# Patient Record
Sex: MALE | Race: WHITE | NOT HISPANIC OR LATINO | Employment: OTHER | URBAN - METROPOLITAN AREA
[De-identification: names, ages, dates, MRNs, and addresses within clinical notes are randomized per-mention and may not be internally consistent; named-entity substitution may affect disease eponyms.]

---

## 2017-02-14 ENCOUNTER — HOSPITAL ENCOUNTER (OUTPATIENT)
Dept: SLEEP MEDICINE | Facility: HOSPITAL | Age: 72
Discharge: HOME | End: 2017-02-14
Attending: INTERNAL MEDICINE | Admitting: INTERNAL MEDICINE

## 2017-03-08 ENCOUNTER — HOSPITAL ENCOUNTER (OUTPATIENT)
Dept: RESPIRATORY THERAPY | Facility: HOSPITAL | Age: 72
Discharge: HOME | End: 2017-03-08
Attending: INTERNAL MEDICINE | Admitting: INTERNAL MEDICINE

## 2017-07-17 ENCOUNTER — HOSPITAL ENCOUNTER (OUTPATIENT)
Dept: OTHER | Facility: HOSPITAL | Age: 72
Discharge: HOME | End: 2017-07-17
Attending: UROLOGY | Admitting: UROLOGY

## 2017-07-17 LAB
ALBUMIN SERPL-MCNC: 4 G/DL (ref 3.5–4.8)
ALP SERPL-CCNC: 40 IU/L (ref 32–91)
ALT SERPL-CCNC: 26 IU/L (ref 17–63)
AST SERPL-CCNC: 27 IU/L (ref 15–41)
BASOPHILS # BLD AUTO: 0 10*3/UL (ref 0–0.2)
BASOPHILS NFR BLD AUTO: 0 % (ref 0–2)
BILIRUB DIRECT SERPL-MCNC: 0.1 MG/DL (ref 0.1–0.5)
BILIRUB SERPL-MCNC: 0.9 MG/DL (ref 0.3–1.2)
CONV TOTAL PROTEIN: 7.1 G/DL (ref 6.1–7.9)
DIFFERENTIAL METHOD BLD: (no result)
EOSINOPHIL # BLD AUTO: 0.1 10*3/UL (ref 0–0.3)
EOSINOPHIL # BLD AUTO: 2 % (ref 0–3)
ERYTHROCYTE [DISTWIDTH] IN BLOOD BY AUTOMATED COUNT: 18.9 % (ref 11.5–14.5)
HCT VFR BLD AUTO: 46.1 % (ref 40–54)
HGB BLD-MCNC: 14.6 G/DL (ref 14–18)
LYMPHOCYTES # BLD AUTO: 3.7 10*3/UL (ref 0.8–4.8)
LYMPHOCYTES NFR BLD AUTO: 44 % (ref 18–42)
MCH RBC QN AUTO: 22.1 PG (ref 26–32)
MCHC RBC AUTO-ENTMCNC: 31.7 G/DL (ref 32–36)
MCV RBC AUTO: 69.8 FL (ref 80–94)
MONOCYTES # BLD AUTO: 1 10*3/UL (ref 0.1–1.3)
MONOCYTES NFR BLD AUTO: 12 % (ref 2–11)
NEUTROPHILS # BLD AUTO: 3.5 10*3/UL (ref 2.3–8.6)
NEUTROPHILS NFR BLD AUTO: 42 % (ref 50–75)
NRBC BLD AUTO-RTO: 0 /100{WBCS}
NRBC/RBC NFR BLD MANUAL: 0 10*3/UL
PLATELET # BLD AUTO: 156 10*3/UL (ref 150–450)
PMV BLD AUTO: 8.8 FL (ref 7.4–10.4)
PSA SERPL-MCNC: 0.47 NG/ML (ref 0–4)
RBC # BLD AUTO: 6.61 10*6/UL (ref 4.6–6)
TESTOST SERPL-MCNC: 6.13 NG/ML (ref 1.7–7.8)
WBC # BLD AUTO: 8.3 10*3/UL (ref 4.5–11.5)

## 2017-08-01 ENCOUNTER — HOSPITAL ENCOUNTER (OUTPATIENT)
Dept: OTHER | Facility: HOSPITAL | Age: 72
Discharge: HOME | End: 2017-08-01
Attending: UROLOGY | Admitting: UROLOGY

## 2017-08-01 LAB
ANION GAP SERPL CALC-SCNC: 13.8 MMOL/L (ref 10–20)
BASOPHILS # BLD AUTO: 0 10*3/UL (ref 0–0.2)
BASOPHILS NFR BLD AUTO: 0 % (ref 0–2)
BUN SERPL-MCNC: 10 MG/DL (ref 8–20)
BUN/CREAT SERPL: 8.3 (ref 6.2–20.3)
CALCIUM SERPL-MCNC: 8.9 MG/DL (ref 8.9–10.3)
CHLORIDE SERPL-SCNC: 102 MMOL/L (ref 101–111)
CONV CO2: 28 MMOL/L (ref 22–32)
CREAT UR-MCNC: 1.2 MG/DL (ref 0.7–1.2)
DIFFERENTIAL METHOD BLD: (no result)
EOSINOPHIL # BLD AUTO: 0.1 10*3/UL (ref 0–0.3)
EOSINOPHIL # BLD AUTO: 1 % (ref 0–3)
ERYTHROCYTE [DISTWIDTH] IN BLOOD BY AUTOMATED COUNT: 19 % (ref 11.5–14.5)
GLUCOSE SERPL-MCNC: 83 MG/DL (ref 65–99)
HCT VFR BLD AUTO: 47.5 % (ref 40–54)
HGB BLD-MCNC: 14.8 G/DL (ref 14–18)
LYMPHOCYTES # BLD AUTO: 3.1 10*3/UL (ref 0.8–4.8)
LYMPHOCYTES NFR BLD AUTO: 37 % (ref 18–42)
MCH RBC QN AUTO: 21.8 PG (ref 26–32)
MCHC RBC AUTO-ENTMCNC: 31.1 G/DL (ref 32–36)
MCV RBC AUTO: 70.1 FL (ref 80–94)
MONOCYTES # BLD AUTO: 0.9 10*3/UL (ref 0.1–1.3)
MONOCYTES NFR BLD AUTO: 10 % (ref 2–11)
NEUTROPHILS # BLD AUTO: 4.4 10*3/UL (ref 2.3–8.6)
NEUTROPHILS NFR BLD AUTO: 52 % (ref 50–75)
NRBC BLD AUTO-RTO: 0 /100{WBCS}
NRBC/RBC NFR BLD MANUAL: 0 10*3/UL
PLATELET # BLD AUTO: 205 10*3/UL (ref 150–450)
PMV BLD AUTO: 8.5 FL (ref 7.4–10.4)
POTASSIUM SERPL-SCNC: 3.8 MMOL/L (ref 3.6–5.1)
RBC # BLD AUTO: 6.79 10*6/UL (ref 4.6–6)
SODIUM SERPL-SCNC: 140 MMOL/L (ref 136–144)
WBC # BLD AUTO: 8.5 10*3/UL (ref 4.5–11.5)

## 2017-08-15 ENCOUNTER — HOSPITAL ENCOUNTER (OUTPATIENT)
Dept: SLEEP MEDICINE | Facility: HOSPITAL | Age: 72
Discharge: HOME | End: 2017-08-15
Attending: INTERNAL MEDICINE | Admitting: INTERNAL MEDICINE

## 2017-12-08 ENCOUNTER — HOSPITAL ENCOUNTER (OUTPATIENT)
Dept: URGENT CARE | Facility: CLINIC | Age: 72
Discharge: HOME | End: 2017-12-08
Attending: NURSE PRACTITIONER | Admitting: NURSE PRACTITIONER

## 2018-06-06 ENCOUNTER — OFFICE (AMBULATORY)
Dept: URBAN - METROPOLITAN AREA PATHOLOGY 4 | Facility: PATHOLOGY | Age: 73
End: 2018-06-06

## 2018-06-06 ENCOUNTER — ON CAMPUS - OUTPATIENT (AMBULATORY)
Dept: URBAN - METROPOLITAN AREA HOSPITAL 2 | Facility: HOSPITAL | Age: 73
End: 2018-06-06

## 2018-06-06 ENCOUNTER — HOSPITAL ENCOUNTER (OUTPATIENT)
Dept: OTHER | Facility: HOSPITAL | Age: 73
Setting detail: SPECIMEN
Discharge: HOME | End: 2018-06-06
Attending: INTERNAL MEDICINE | Admitting: INTERNAL MEDICINE

## 2018-06-06 VITALS
DIASTOLIC BLOOD PRESSURE: 94 MMHG | HEART RATE: 100 BPM | RESPIRATION RATE: 10 BRPM | HEIGHT: 73 IN | SYSTOLIC BLOOD PRESSURE: 181 MMHG | SYSTOLIC BLOOD PRESSURE: 91 MMHG | SYSTOLIC BLOOD PRESSURE: 135 MMHG | HEART RATE: 94 BPM | HEART RATE: 68 BPM | SYSTOLIC BLOOD PRESSURE: 137 MMHG | OXYGEN SATURATION: 95 % | DIASTOLIC BLOOD PRESSURE: 100 MMHG | DIASTOLIC BLOOD PRESSURE: 83 MMHG | OXYGEN SATURATION: 99 % | HEART RATE: 102 BPM | OXYGEN SATURATION: 98 % | OXYGEN SATURATION: 96 % | RESPIRATION RATE: 15 BRPM | DIASTOLIC BLOOD PRESSURE: 107 MMHG | SYSTOLIC BLOOD PRESSURE: 129 MMHG | HEART RATE: 70 BPM | HEART RATE: 91 BPM | OXYGEN SATURATION: 97 % | DIASTOLIC BLOOD PRESSURE: 69 MMHG | SYSTOLIC BLOOD PRESSURE: 151 MMHG | DIASTOLIC BLOOD PRESSURE: 78 MMHG | DIASTOLIC BLOOD PRESSURE: 105 MMHG | HEART RATE: 101 BPM | WEIGHT: 285 LBS | TEMPERATURE: 98 F | OXYGEN SATURATION: 73 % | HEART RATE: 66 BPM | SYSTOLIC BLOOD PRESSURE: 126 MMHG | RESPIRATION RATE: 16 BRPM | SYSTOLIC BLOOD PRESSURE: 141 MMHG | OXYGEN SATURATION: 100 % | HEART RATE: 84 BPM | DIASTOLIC BLOOD PRESSURE: 70 MMHG | DIASTOLIC BLOOD PRESSURE: 91 MMHG | SYSTOLIC BLOOD PRESSURE: 96 MMHG | HEART RATE: 92 BPM | DIASTOLIC BLOOD PRESSURE: 82 MMHG

## 2018-06-06 DIAGNOSIS — Z86.010 PERSONAL HISTORY OF COLONIC POLYPS: ICD-10-CM

## 2018-06-06 DIAGNOSIS — D12.3 BENIGN NEOPLASM OF TRANSVERSE COLON: ICD-10-CM

## 2018-06-06 DIAGNOSIS — K62.6 ULCER OF ANUS AND RECTUM: ICD-10-CM

## 2018-06-06 DIAGNOSIS — K57.30 DIVERTICULOSIS OF LARGE INTESTINE WITHOUT PERFORATION OR ABS: ICD-10-CM

## 2018-06-06 DIAGNOSIS — A63.0 ANOGENITAL (VENEREAL) WARTS: ICD-10-CM

## 2018-06-06 PROBLEM — K63.3 ULCER OF INTESTINE: Status: ACTIVE | Noted: 2018-06-06

## 2018-06-06 LAB
GI HISTOLOGY: A. UNSPECIFIED: (no result)
GI HISTOLOGY: B. SELECT: (no result)
GI HISTOLOGY: PDF REPORT: (no result)

## 2018-06-06 PROCEDURE — 45385 COLONOSCOPY W/LESION REMOVAL: CPT | Performed by: INTERNAL MEDICINE

## 2018-06-06 PROCEDURE — 45380 COLONOSCOPY AND BIOPSY: CPT | Mod: 59 | Performed by: INTERNAL MEDICINE

## 2018-06-06 PROCEDURE — 88305 TISSUE EXAM BY PATHOLOGIST: CPT | Mod: 26 | Performed by: INTERNAL MEDICINE

## 2018-06-06 RX ADMIN — PROPOFOL: 10 INJECTION, EMULSION INTRAVENOUS at 11:09

## 2018-06-06 NOTE — SERVICEHPINOTES
JANINE SHAH  is a  72  male   who presents today for a  Colonoscopy   for   the indications listed below. The updated Patient Profile was reviewed prior to the procedure, in conjunction with the Physical Exam, including medical conditions, surgical procedures, medications, allergies, family history and social history. See Physical Exam time stamp below for date and time of HPI completion.Pre-operatively, I reviewed the indication(s) for the procedure, the risks of the procedure [including but not limited to: unexpected bleeding possibly requiring hospitalization and/or unplanned repeat procedures, perforation possibly requiring surgical treatment, missed lesions and complications of sedation/MAC (also explained by anesthesia staff)]. I have evaluated the patient for risks associated with the planned anesthesia and the procedure to be performed and find the patient an acceptable candidate for IV sedation.Multiple opportunities were provided for any questions or concerns, and all questions were answered satisfactorily before any anesthesia was administered. We will proceed with the planned procedure.BR

## 2018-06-28 ENCOUNTER — HOSPITAL ENCOUNTER (OUTPATIENT)
Dept: CARDIOLOGY | Facility: HOSPITAL | Age: 73
Discharge: HOME | End: 2018-06-28
Attending: FAMILY MEDICINE | Admitting: FAMILY MEDICINE

## 2018-08-08 ENCOUNTER — HOSPITAL ENCOUNTER (OUTPATIENT)
Dept: OTHER | Facility: HOSPITAL | Age: 73
Discharge: HOME | End: 2018-08-08
Attending: UROLOGY | Admitting: UROLOGY

## 2018-08-08 LAB
BASOPHILS # BLD AUTO: 0.1 10*3/UL (ref 0–0.2)
BASOPHILS NFR BLD AUTO: 1 % (ref 0–2)
DIFFERENTIAL METHOD BLD: (no result)
EOSINOPHIL # BLD AUTO: 0.1 10*3/UL (ref 0–0.3)
EOSINOPHIL # BLD AUTO: 2 % (ref 0–3)
ERYTHROCYTE [DISTWIDTH] IN BLOOD BY AUTOMATED COUNT: 19.5 % (ref 11.5–14.5)
HCT VFR BLD AUTO: 50 % (ref 40–54)
HGB BLD-MCNC: 15.4 G/DL (ref 14–18)
LYMPHOCYTES # BLD AUTO: 2.4 10*3/UL (ref 0.8–4.8)
LYMPHOCYTES NFR BLD AUTO: 34 % (ref 18–42)
MCH RBC QN AUTO: 20.9 PG (ref 26–32)
MCHC RBC AUTO-ENTMCNC: 30.9 G/DL (ref 32–36)
MCV RBC AUTO: 67.9 FL (ref 80–94)
MONOCYTES # BLD AUTO: 0.7 10*3/UL (ref 0.1–1.3)
MONOCYTES NFR BLD AUTO: 9 % (ref 2–11)
NEUTROPHILS # BLD AUTO: 3.8 10*3/UL (ref 2.3–8.6)
NEUTROPHILS NFR BLD AUTO: 54 % (ref 50–75)
NRBC BLD AUTO-RTO: 0 /100{WBCS}
NRBC/RBC NFR BLD MANUAL: 0 10*3/UL
PLATELET # BLD AUTO: 185 10*3/UL (ref 150–450)
PMV BLD AUTO: 8.4 FL (ref 7.4–10.4)
RBC # BLD AUTO: 7.36 10*6/UL (ref 4.6–6)
WBC # BLD AUTO: 7.1 10*3/UL (ref 4.5–11.5)

## 2018-11-28 ENCOUNTER — HOSPITAL ENCOUNTER (OUTPATIENT)
Dept: GENERAL RADIOLOGY | Facility: HOSPITAL | Age: 73
Discharge: HOME | End: 2018-11-28
Attending: UROLOGY | Admitting: UROLOGY

## 2019-02-11 ENCOUNTER — HOSPITAL ENCOUNTER (OUTPATIENT)
Dept: OTHER | Facility: HOSPITAL | Age: 74
Discharge: HOME | End: 2019-02-11
Attending: UROLOGY | Admitting: UROLOGY

## 2019-02-11 LAB
BASOPHILS # BLD AUTO: 0 10*3/UL (ref 0–0.2)
BASOPHILS NFR BLD AUTO: 1 % (ref 0–2)
DIFFERENTIAL METHOD BLD: (no result)
EOSINOPHIL # BLD AUTO: 0.1 10*3/UL (ref 0–0.3)
EOSINOPHIL # BLD AUTO: 2 % (ref 0–3)
ERYTHROCYTE [DISTWIDTH] IN BLOOD BY AUTOMATED COUNT: 19 % (ref 11.5–14.5)
HCT VFR BLD AUTO: 45 % (ref 40–54)
HGB BLD-MCNC: 14 G/DL (ref 14–18)
LYMPHOCYTES # BLD AUTO: 2.2 10*3/UL (ref 0.8–4.8)
LYMPHOCYTES NFR BLD AUTO: 32 % (ref 18–42)
MCH RBC QN AUTO: 21.3 PG (ref 26–32)
MCHC RBC AUTO-ENTMCNC: 31.2 G/DL (ref 32–36)
MCV RBC AUTO: 68.4 FL (ref 80–94)
MONOCYTES # BLD AUTO: 0.6 10*3/UL (ref 0.1–1.3)
MONOCYTES NFR BLD AUTO: 9 % (ref 2–11)
NEUTROPHILS # BLD AUTO: 3.8 10*3/UL (ref 2.3–8.6)
NEUTROPHILS NFR BLD AUTO: 56 % (ref 50–75)
NRBC BLD AUTO-RTO: 0 /100{WBCS}
NRBC/RBC NFR BLD MANUAL: 0 10*3/UL
PLATELET # BLD AUTO: 196 10*3/UL (ref 150–450)
PMV BLD AUTO: 6.8 FL (ref 7.4–10.4)
RBC # BLD AUTO: 6.57 10*6/UL (ref 4.6–6)
WBC # BLD AUTO: 6.6 10*3/UL (ref 4.5–11.5)

## 2019-02-27 ENCOUNTER — HOSPITAL ENCOUNTER (OUTPATIENT)
Dept: LAB | Facility: HOSPITAL | Age: 74
Setting detail: SPECIMEN
Discharge: HOME | End: 2019-02-27
Attending: UROLOGY | Admitting: UROLOGY

## 2019-05-14 ENCOUNTER — HOSPITAL ENCOUNTER (OUTPATIENT)
Dept: OTHER | Facility: HOSPITAL | Age: 74
Discharge: HOME | End: 2019-05-14
Attending: UROLOGY | Admitting: UROLOGY

## 2019-05-14 LAB
ANION GAP SERPL CALC-SCNC: 14.9 MMOL/L (ref 10–20)
BASOPHILS # BLD AUTO: 0 10*3/UL (ref 0–0.2)
BASOPHILS NFR BLD AUTO: 1 % (ref 0–2)
BUN SERPL-MCNC: 10 MG/DL (ref 8–20)
BUN/CREAT SERPL: 7.7 (ref 6.2–20.3)
CALCIUM SERPL-MCNC: 9.1 MG/DL (ref 8.9–10.3)
CHLORIDE SERPL-SCNC: 101 MMOL/L (ref 101–111)
CONV CO2: 25 MMOL/L (ref 22–32)
CREAT UR-MCNC: 1.3 MG/DL (ref 0.7–1.2)
DIFFERENTIAL METHOD BLD: (no result)
EOSINOPHIL # BLD AUTO: 0.1 10*3/UL (ref 0–0.3)
EOSINOPHIL # BLD AUTO: 1 % (ref 0–3)
ERYTHROCYTE [DISTWIDTH] IN BLOOD BY AUTOMATED COUNT: 19.5 % (ref 11.5–14.5)
GLUCOSE SERPL-MCNC: 98 MG/DL (ref 65–99)
HCT VFR BLD AUTO: 47.7 % (ref 40–54)
HGB BLD-MCNC: 14.9 G/DL (ref 14–18)
LYMPHOCYTES # BLD AUTO: 2.2 10*3/UL (ref 0.8–4.8)
LYMPHOCYTES NFR BLD AUTO: 32 % (ref 18–42)
MCH RBC QN AUTO: 21.7 PG (ref 26–32)
MCHC RBC AUTO-ENTMCNC: 31.2 G/DL (ref 32–36)
MCV RBC AUTO: 69.6 FL (ref 80–94)
MONOCYTES # BLD AUTO: 0.7 10*3/UL (ref 0.1–1.3)
MONOCYTES NFR BLD AUTO: 10 % (ref 2–11)
NEUTROPHILS # BLD AUTO: 3.9 10*3/UL (ref 2.3–8.6)
NEUTROPHILS NFR BLD AUTO: 56 % (ref 50–75)
NRBC BLD AUTO-RTO: 0 /100{WBCS}
NRBC/RBC NFR BLD MANUAL: 0 10*3/UL
PLATELET # BLD AUTO: 200 10*3/UL (ref 150–450)
PMV BLD AUTO: 8.4 FL (ref 7.4–10.4)
POTASSIUM SERPL-SCNC: 3.9 MMOL/L (ref 3.6–5.1)
RBC # BLD AUTO: 6.84 10*6/UL (ref 4.6–6)
SODIUM SERPL-SCNC: 137 MMOL/L (ref 136–144)
WBC # BLD AUTO: 6.9 10*3/UL (ref 4.5–11.5)

## 2019-05-21 ENCOUNTER — HOSPITAL ENCOUNTER (OUTPATIENT)
Dept: OTHER | Facility: HOSPITAL | Age: 74
Setting detail: SPECIMEN
Discharge: HOME | End: 2019-05-21
Attending: UROLOGY | Admitting: UROLOGY

## 2019-06-21 ENCOUNTER — TRANSCRIBE ORDERS (OUTPATIENT)
Dept: ADMINISTRATIVE | Facility: HOSPITAL | Age: 74
End: 2019-06-21

## 2019-06-21 ENCOUNTER — APPOINTMENT (OUTPATIENT)
Dept: GENERAL RADIOLOGY | Facility: HOSPITAL | Age: 74
End: 2019-06-21

## 2019-06-21 ENCOUNTER — OFFICE VISIT (OUTPATIENT)
Dept: CARDIOLOGY | Facility: CLINIC | Age: 74
End: 2019-06-21

## 2019-06-21 ENCOUNTER — HOSPITAL ENCOUNTER (OUTPATIENT)
Dept: GENERAL RADIOLOGY | Facility: HOSPITAL | Age: 74
Discharge: HOME | End: 2019-06-21
Admitting: UROLOGY

## 2019-06-21 DIAGNOSIS — I48.20 CHRONIC ATRIAL FIBRILLATION (HCC): ICD-10-CM

## 2019-06-21 DIAGNOSIS — N20.0 RENAL STONE: Primary | ICD-10-CM

## 2019-06-21 LAB — INR PPP: 2.5 (ref 0.9–1.1)

## 2019-06-21 PROCEDURE — 36416 COLLJ CAPILLARY BLOOD SPEC: CPT | Performed by: INTERNAL MEDICINE

## 2019-06-21 PROCEDURE — 74018 RADEX ABDOMEN 1 VIEW: CPT

## 2019-06-21 PROCEDURE — 85610 PROTHROMBIN TIME: CPT | Performed by: INTERNAL MEDICINE

## 2019-06-25 RX ORDER — WARFARIN SODIUM 5 MG/1
TABLET ORAL
Qty: 30 TABLET | Refills: 0 | Status: SHIPPED | OUTPATIENT
Start: 2019-06-25 | End: 2019-07-22 | Stop reason: SDUPTHER

## 2019-06-25 RX ORDER — VERAPAMIL HYDROCHLORIDE 240 MG/1
CAPSULE, EXTENDED RELEASE ORAL
Qty: 90 CAPSULE | Refills: 2 | Status: SHIPPED | OUTPATIENT
Start: 2019-06-25 | End: 2019-10-21 | Stop reason: SDUPTHER

## 2019-06-26 PROBLEM — I48.91 ATRIAL FIBRILLATION: Status: ACTIVE | Noted: 2019-06-26

## 2019-07-19 ENCOUNTER — HOSPITAL ENCOUNTER (OUTPATIENT)
Dept: GENERAL RADIOLOGY | Facility: HOSPITAL | Age: 74
Discharge: HOME | End: 2019-07-19
Admitting: UROLOGY

## 2019-07-19 ENCOUNTER — TRANSCRIBE ORDERS (OUTPATIENT)
Dept: ADMINISTRATIVE | Facility: HOSPITAL | Age: 74
End: 2019-07-19

## 2019-07-19 DIAGNOSIS — K80.50 STONES COMMON DUCT: ICD-10-CM

## 2019-07-19 DIAGNOSIS — K80.50 STONES COMMON DUCT: Primary | ICD-10-CM

## 2019-07-19 PROCEDURE — 74018 RADEX ABDOMEN 1 VIEW: CPT

## 2019-07-22 RX ORDER — WARFARIN SODIUM 5 MG/1
TABLET ORAL
Qty: 30 TABLET | Refills: 0 | Status: SHIPPED | OUTPATIENT
Start: 2019-07-22 | End: 2019-08-19 | Stop reason: SDUPTHER

## 2019-07-25 ENCOUNTER — OFFICE VISIT (OUTPATIENT)
Dept: CARDIOLOGY | Facility: CLINIC | Age: 74
End: 2019-07-25

## 2019-07-25 DIAGNOSIS — I48.20 CHRONIC ATRIAL FIBRILLATION (HCC): ICD-10-CM

## 2019-07-25 LAB
INR PPP: 2.6 (ref 2–3)
INR PPP: 2.6 (ref 2–3)

## 2019-07-25 PROCEDURE — 36416 COLLJ CAPILLARY BLOOD SPEC: CPT | Performed by: INTERNAL MEDICINE

## 2019-07-25 PROCEDURE — 85610 PROTHROMBIN TIME: CPT | Performed by: INTERNAL MEDICINE

## 2019-08-19 RX ORDER — HYDRALAZINE HYDROCHLORIDE 50 MG/1
TABLET, FILM COATED ORAL
Qty: 60 TABLET | Refills: 2 | Status: SHIPPED | OUTPATIENT
Start: 2019-08-19 | End: 2019-11-13 | Stop reason: SDUPTHER

## 2019-08-19 RX ORDER — WARFARIN SODIUM 5 MG/1
TABLET ORAL
Qty: 30 TABLET | Refills: 0 | Status: SHIPPED | OUTPATIENT
Start: 2019-08-19 | End: 2019-09-23 | Stop reason: SDUPTHER

## 2019-08-23 ENCOUNTER — ANTICOAGULATION VISIT (OUTPATIENT)
Dept: CARDIOLOGY | Facility: CLINIC | Age: 74
End: 2019-08-23

## 2019-08-23 DIAGNOSIS — I48.20 CHRONIC ATRIAL FIBRILLATION (HCC): ICD-10-CM

## 2019-08-23 LAB — INR PPP: 2.5 (ref 2–3)

## 2019-08-23 PROCEDURE — 85610 PROTHROMBIN TIME: CPT | Performed by: INTERNAL MEDICINE

## 2019-08-23 PROCEDURE — 36416 COLLJ CAPILLARY BLOOD SPEC: CPT | Performed by: INTERNAL MEDICINE

## 2019-09-23 RX ORDER — WARFARIN SODIUM 5 MG/1
TABLET ORAL
Qty: 30 TABLET | Refills: 0 | Status: SHIPPED | OUTPATIENT
Start: 2019-09-23 | End: 2019-10-21 | Stop reason: SDUPTHER

## 2019-09-30 ENCOUNTER — ANTICOAGULATION VISIT (OUTPATIENT)
Dept: CARDIOLOGY | Facility: CLINIC | Age: 74
End: 2019-09-30

## 2019-09-30 ENCOUNTER — OFFICE VISIT (OUTPATIENT)
Dept: CARDIOLOGY | Facility: CLINIC | Age: 74
End: 2019-09-30

## 2019-09-30 VITALS
HEART RATE: 88 BPM | WEIGHT: 281 LBS | BODY MASS INDEX: 36.06 KG/M2 | SYSTOLIC BLOOD PRESSURE: 132 MMHG | HEIGHT: 74 IN | OXYGEN SATURATION: 98 % | DIASTOLIC BLOOD PRESSURE: 78 MMHG

## 2019-09-30 DIAGNOSIS — I48.21 PERMANENT ATRIAL FIBRILLATION (HCC): ICD-10-CM

## 2019-09-30 DIAGNOSIS — I10 ESSENTIAL HYPERTENSION: ICD-10-CM

## 2019-09-30 DIAGNOSIS — I48.19 PERSISTENT ATRIAL FIBRILLATION (HCC): Primary | ICD-10-CM

## 2019-09-30 DIAGNOSIS — J44.9 CHRONIC OBSTRUCTIVE PULMONARY DISEASE, UNSPECIFIED COPD TYPE (HCC): ICD-10-CM

## 2019-09-30 PROCEDURE — 93000 ELECTROCARDIOGRAM COMPLETE: CPT | Performed by: INTERNAL MEDICINE

## 2019-09-30 PROCEDURE — 99213 OFFICE O/P EST LOW 20 MIN: CPT | Performed by: INTERNAL MEDICINE

## 2019-09-30 RX ORDER — LEVOTHYROXINE SODIUM 0.07 MG/1
75 TABLET ORAL DAILY
Refills: 5 | COMMUNITY
Start: 2019-09-23

## 2019-09-30 RX ORDER — PRAVASTATIN SODIUM 40 MG
40 TABLET ORAL
Refills: 3 | COMMUNITY
Start: 2019-09-23 | End: 2020-04-01

## 2019-09-30 RX ORDER — SERTRALINE HYDROCHLORIDE 100 MG/1
150 TABLET, FILM COATED ORAL DAILY
Refills: 5 | COMMUNITY
Start: 2019-09-23 | End: 2022-03-15 | Stop reason: SDUPTHER

## 2019-09-30 RX ORDER — MONTELUKAST SODIUM 10 MG/1
10 TABLET ORAL EVERY EVENING
Refills: 5 | COMMUNITY
Start: 2019-09-23

## 2019-09-30 RX ORDER — IPRATROPIUM BROMIDE AND ALBUTEROL SULFATE 2.5; .5 MG/3ML; MG/3ML
3 SOLUTION RESPIRATORY (INHALATION) 4 TIMES DAILY PRN
COMMUNITY
Start: 2019-04-01 | End: 2020-06-16

## 2019-09-30 RX ORDER — DIGOXIN 250 UG/1
250 TABLET ORAL DAILY
Refills: 4 | COMMUNITY
Start: 2019-08-19 | End: 2020-01-13

## 2019-09-30 RX ORDER — OMEPRAZOLE 20 MG/1
20 CAPSULE, DELAYED RELEASE ORAL 2 TIMES DAILY
Refills: 0 | COMMUNITY
Start: 2019-09-23 | End: 2020-06-12 | Stop reason: HOSPADM

## 2019-09-30 RX ORDER — GABAPENTIN 300 MG/1
600 CAPSULE ORAL 2 TIMES DAILY
Refills: 3 | COMMUNITY
Start: 2019-09-13

## 2019-09-30 RX ORDER — ALFUZOSIN HYDROCHLORIDE 10 MG/1
10 TABLET, EXTENDED RELEASE ORAL 2 TIMES DAILY
Refills: 11 | COMMUNITY
Start: 2019-09-23

## 2019-09-30 RX ORDER — THEOPHYLLINE 400 MG/1
400 TABLET, EXTENDED RELEASE ORAL 2 TIMES DAILY
Refills: 5 | COMMUNITY
Start: 2019-09-23

## 2019-09-30 RX ORDER — MELOXICAM 15 MG/1
15 TABLET ORAL DAILY
Refills: 1 | COMMUNITY
Start: 2019-08-19 | End: 2020-06-12 | Stop reason: HOSPADM

## 2019-10-01 ENCOUNTER — TELEPHONE (OUTPATIENT)
Dept: ENDOCRINOLOGY | Facility: CLINIC | Age: 74
End: 2019-10-01

## 2019-10-01 NOTE — TELEPHONE ENCOUNTER
I CALLED THE PATIENT AND REMINDED HIM OF HIS APPT TOMORROW  WITH NAVEEN NORTON AT 1PM AND TO ARRIVE 15MINS BEFORE HIS APPT.

## 2019-10-02 ENCOUNTER — OFFICE VISIT (OUTPATIENT)
Dept: ENDOCRINOLOGY | Facility: CLINIC | Age: 74
End: 2019-10-02

## 2019-10-02 VITALS
SYSTOLIC BLOOD PRESSURE: 116 MMHG | WEIGHT: 284 LBS | DIASTOLIC BLOOD PRESSURE: 80 MMHG | BODY MASS INDEX: 36.45 KG/M2 | HEART RATE: 93 BPM | OXYGEN SATURATION: 96 % | HEIGHT: 74 IN

## 2019-10-02 DIAGNOSIS — E11.65 UNCONTROLLED TYPE 2 DIABETES MELLITUS WITH HYPERGLYCEMIA (HCC): Primary | ICD-10-CM

## 2019-10-02 PROBLEM — R73.9 HYPERGLYCEMIA: Status: ACTIVE | Noted: 2019-10-02

## 2019-10-02 LAB — GLUCOSE BLDC GLUCOMTR-MCNC: 145 MG/DL (ref 70–130)

## 2019-10-02 PROCEDURE — 82962 GLUCOSE BLOOD TEST: CPT | Performed by: INTERNAL MEDICINE

## 2019-10-02 PROCEDURE — 99213 OFFICE O/P EST LOW 20 MIN: CPT | Performed by: INTERNAL MEDICINE

## 2019-10-02 NOTE — PROGRESS NOTES
Subjective   Efe Malloy is a 74 y.o. male.     History of Present Illness ;    This is a 74-year-old male who was referred for the evaluation of newly diagnosed type 2 diabetes mellitus about 2 to 3 months ago.  He has no glucose monitor. He denies any polyuria, polydipsia, and neuropathic pain or any vision problems.  He is not on any oral hypoglycemic agents.  He admits that he has been drinking Pepsi 2 times daily, and eating sweets.  Reports he walks every day.  Labs showed A1c of 6.6% July 24, 2009.  Creatinine is 1.32 with estimated GFR of 57.    The following portions of the patient's history were reviewed and updated as appropriate: allergies, current medications, past family history, past medical history, past social history, past surgical history and problem list.    Review of Systems   Constitutional: Negative.    Eyes: Negative.    Respiratory: Negative.    Cardiovascular: Negative.    Gastrointestinal: Negative.    Endocrine: Negative.    Genitourinary: Negative for dysuria, frequency and urgency.   Musculoskeletal: negative for joint swelling,  Negative for myalgias.   Neurological: Negative for dizziness, light-headedness and memory problem.   Psychiatric/Behavioral: Negative for behavioral problems, hallucinations and depressed mood.       Physical Exam   Constitutional:  oriented to person, place, and time. appears well-developed and well-nourished.   Head: Normocephalic and atraumatic.   Eyes: EOM are normal. Pupils are equal, round, and reactive to light.   Neck: Normal range of motion. Neck supple.   Cardiovascular: Normal rate, regular rhythm and normal heart sounds.   Pulmonary/Chest: Effort normal and breath sounds normal.   Abdominal: Soft. Bowel sounds are normal.   Musculoskeletal: Normal range of motion.   Neurological:  alert and oriented to person, place, and time.   Skin: Skin is warm.   Psychiatric: normal mood and affect.  behavior is normal. Judgment and thought content normal.        Assessment/Plan     Hyperglycemia  Patient has a A1c of 6.6%.  Labs showed a glucose level of 102.  I had a very lengthy discussion with the patient to stop the regular sodas and sweet tea and candies.  Discussed at length the need for the weight loss.  No need for any medications at this point.  Refer the patient to the diabetes education class.  Check A1c in 2 to 3 months.

## 2019-10-02 NOTE — ASSESSMENT & PLAN NOTE
Patient has a A1c of 6.6%.  Labs showed a glucose level of 102.  I had a very lengthy discussion with the patient to stop the regular sodas and sweet tea and candies.  Discussed at length the need for the weight loss.  No need for any medications at this point.  Refer the patient to the diabetes education class.  Check A1c in 2 to 3 months.

## 2019-10-21 RX ORDER — WARFARIN SODIUM 5 MG/1
TABLET ORAL
Qty: 30 TABLET | Refills: 0 | Status: SHIPPED | OUTPATIENT
Start: 2019-10-21 | End: 2019-11-18 | Stop reason: SDUPTHER

## 2019-10-21 RX ORDER — VERAPAMIL HYDROCHLORIDE 240 MG/1
CAPSULE, EXTENDED RELEASE ORAL
Qty: 90 CAPSULE | Refills: 2 | Status: SHIPPED | OUTPATIENT
Start: 2019-10-21 | End: 2020-02-05

## 2019-10-31 ENCOUNTER — ANTICOAGULATION VISIT (OUTPATIENT)
Dept: CARDIOLOGY | Facility: CLINIC | Age: 74
End: 2019-10-31

## 2019-10-31 DIAGNOSIS — I48.21 PERMANENT ATRIAL FIBRILLATION (HCC): ICD-10-CM

## 2019-10-31 LAB — INR PPP: 2.9 (ref 2–3)

## 2019-10-31 PROCEDURE — 85610 PROTHROMBIN TIME: CPT | Performed by: INTERNAL MEDICINE

## 2019-10-31 PROCEDURE — 36416 COLLJ CAPILLARY BLOOD SPEC: CPT | Performed by: INTERNAL MEDICINE

## 2019-11-13 RX ORDER — HYDRALAZINE HYDROCHLORIDE 50 MG/1
TABLET, FILM COATED ORAL
Qty: 60 TABLET | Refills: 2 | Status: SHIPPED | OUTPATIENT
Start: 2019-11-13 | End: 2020-02-05

## 2019-11-18 RX ORDER — WARFARIN SODIUM 5 MG/1
TABLET ORAL
Qty: 30 TABLET | Refills: 0 | Status: SHIPPED | OUTPATIENT
Start: 2019-11-18 | End: 2019-12-16 | Stop reason: SDUPTHER

## 2019-11-19 ENCOUNTER — OFFICE VISIT (OUTPATIENT)
Dept: ENDOCRINOLOGY | Facility: CLINIC | Age: 74
End: 2019-11-19

## 2019-11-19 DIAGNOSIS — E11.65 TYPE 2 DIABETES MELLITUS WITH HYPERGLYCEMIA, WITHOUT LONG-TERM CURRENT USE OF INSULIN (HCC): ICD-10-CM

## 2019-11-19 PROCEDURE — 97802 MEDICAL NUTRITION INDIV IN: CPT | Performed by: DIETITIAN, REGISTERED

## 2019-11-27 ENCOUNTER — ANTICOAGULATION VISIT (OUTPATIENT)
Dept: CARDIOLOGY | Facility: CLINIC | Age: 74
End: 2019-11-27

## 2019-11-27 DIAGNOSIS — I48.0 PAROXYSMAL ATRIAL FIBRILLATION (HCC): ICD-10-CM

## 2019-11-27 LAB
INR PPP: 2.6 (ref 2–3)
INR PPP: 2.6 (ref 2–3)

## 2019-11-27 PROCEDURE — 85610 PROTHROMBIN TIME: CPT | Performed by: INTERNAL MEDICINE

## 2019-11-27 PROCEDURE — 36416 COLLJ CAPILLARY BLOOD SPEC: CPT | Performed by: INTERNAL MEDICINE

## 2019-12-16 RX ORDER — WARFARIN SODIUM 5 MG/1
TABLET ORAL
Qty: 30 TABLET | Refills: 0 | Status: SHIPPED | OUTPATIENT
Start: 2019-12-16 | End: 2020-01-13

## 2019-12-19 ENCOUNTER — ANTICOAGULATION VISIT (OUTPATIENT)
Dept: CARDIOLOGY | Facility: CLINIC | Age: 74
End: 2019-12-19

## 2019-12-19 DIAGNOSIS — I48.0 PAROXYSMAL ATRIAL FIBRILLATION (HCC): ICD-10-CM

## 2019-12-19 LAB
INR PPP: 2.9 (ref 2–3)
INR PPP: 2.9 (ref 2–3)

## 2019-12-19 PROCEDURE — 36416 COLLJ CAPILLARY BLOOD SPEC: CPT | Performed by: INTERNAL MEDICINE

## 2019-12-19 PROCEDURE — 85610 PROTHROMBIN TIME: CPT | Performed by: INTERNAL MEDICINE

## 2020-01-05 PROBLEM — I48.19 PERSISTENT ATRIAL FIBRILLATION (HCC): Status: ACTIVE | Noted: 2019-06-26

## 2020-01-13 ENCOUNTER — TELEPHONE (OUTPATIENT)
Dept: CARDIOLOGY | Facility: CLINIC | Age: 75
End: 2020-01-13

## 2020-01-13 LAB — INR PPP: 1.5

## 2020-01-13 RX ORDER — DIGOXIN 250 MCG
TABLET ORAL
Qty: 90 TABLET | Refills: 2 | Status: SHIPPED | OUTPATIENT
Start: 2020-01-13 | End: 2020-09-21

## 2020-01-13 RX ORDER — WARFARIN SODIUM 5 MG/1
TABLET ORAL
Qty: 30 TABLET | Refills: 0 | Status: SHIPPED | OUTPATIENT
Start: 2020-01-13 | End: 2020-02-10

## 2020-01-13 NOTE — TELEPHONE ENCOUNTER
Patient was bridged with Eliquis per Dr. Vargas per Maryann at [facility]. Now home and started warfarin today.  INR 1.5.  Will recheck in 7 days.

## 2020-01-13 NOTE — TELEPHONE ENCOUNTER
Maryann with [facility]   Pt 17.5 inr 1.5  He took his last dose of eliquis today and 5 mg coumadin

## 2020-01-16 ENCOUNTER — TELEPHONE (OUTPATIENT)
Dept: CARDIOLOGY | Facility: CLINIC | Age: 75
End: 2020-01-16

## 2020-01-16 RX ORDER — WARFARIN SODIUM 1 MG/1
TABLET ORAL
Qty: 30 TABLET | Refills: 0 | Status: SHIPPED | OUTPATIENT
Start: 2020-01-16 | End: 2020-02-10

## 2020-01-16 NOTE — TELEPHONE ENCOUNTER
"Call actually from \"Raiza\" at [facility].  Reported an INR of 1.5 from 1/13.    Now have adjusted dosage to 6mg Mon and Thurs and continue 5mg all other days.  Instructed to recheck in one week at next home visit.   "

## 2020-01-22 ENCOUNTER — ANTICOAGULATION VISIT (OUTPATIENT)
Dept: CARDIOLOGY | Facility: CLINIC | Age: 75
End: 2020-01-22

## 2020-01-22 DIAGNOSIS — I48.19 PERSISTENT ATRIAL FIBRILLATION (HCC): ICD-10-CM

## 2020-02-05 RX ORDER — HYDRALAZINE HYDROCHLORIDE 50 MG/1
TABLET, FILM COATED ORAL
Qty: 60 TABLET | Refills: 2 | Status: SHIPPED | OUTPATIENT
Start: 2020-02-05 | End: 2020-05-08

## 2020-02-05 RX ORDER — VERAPAMIL HYDROCHLORIDE 240 MG/1
CAPSULE, EXTENDED RELEASE ORAL
Qty: 90 CAPSULE | Refills: 2 | Status: SHIPPED | OUTPATIENT
Start: 2020-02-05 | End: 2020-06-22

## 2020-02-06 ENCOUNTER — ANTICOAGULATION VISIT (OUTPATIENT)
Dept: CARDIOLOGY | Facility: CLINIC | Age: 75
End: 2020-02-06

## 2020-02-06 DIAGNOSIS — I48.19 PERSISTENT ATRIAL FIBRILLATION (HCC): ICD-10-CM

## 2020-02-06 LAB — INR PPP: 2.4 (ref 2–3)

## 2020-02-06 PROCEDURE — 36416 COLLJ CAPILLARY BLOOD SPEC: CPT | Performed by: INTERNAL MEDICINE

## 2020-02-06 PROCEDURE — 85610 PROTHROMBIN TIME: CPT | Performed by: INTERNAL MEDICINE

## 2020-02-10 RX ORDER — WARFARIN SODIUM 5 MG/1
TABLET ORAL
Qty: 30 TABLET | Refills: 0 | Status: SHIPPED | OUTPATIENT
Start: 2020-02-10 | End: 2020-03-09

## 2020-02-10 RX ORDER — WARFARIN SODIUM 1 MG/1
TABLET ORAL
Qty: 30 TABLET | Refills: 0 | Status: SHIPPED | OUTPATIENT
Start: 2020-02-10 | End: 2020-03-09

## 2020-02-27 ENCOUNTER — ANTICOAGULATION VISIT (OUTPATIENT)
Dept: CARDIOLOGY | Facility: CLINIC | Age: 75
End: 2020-02-27

## 2020-02-27 DIAGNOSIS — I48.19 PERSISTENT ATRIAL FIBRILLATION (HCC): ICD-10-CM

## 2020-02-27 LAB — INR PPP: 2.8 (ref 2–3)

## 2020-02-27 PROCEDURE — 85610 PROTHROMBIN TIME: CPT | Performed by: INTERNAL MEDICINE

## 2020-02-27 PROCEDURE — 36416 COLLJ CAPILLARY BLOOD SPEC: CPT | Performed by: INTERNAL MEDICINE

## 2020-03-09 RX ORDER — WARFARIN SODIUM 5 MG/1
TABLET ORAL
Qty: 30 TABLET | Refills: 0 | Status: SHIPPED | OUTPATIENT
Start: 2020-03-09 | End: 2020-04-28

## 2020-03-09 RX ORDER — WARFARIN SODIUM 1 MG/1
TABLET ORAL
Qty: 30 TABLET | Refills: 0 | Status: SHIPPED | OUTPATIENT
Start: 2020-03-09 | End: 2020-05-29

## 2020-04-01 RX ORDER — PRAVASTATIN SODIUM 40 MG
TABLET ORAL
Qty: 90 TABLET | Refills: 3 | Status: SHIPPED | OUTPATIENT
Start: 2020-04-01 | End: 2021-01-27

## 2020-04-28 RX ORDER — WARFARIN SODIUM 5 MG/1
TABLET ORAL
Qty: 30 TABLET | Refills: 0 | Status: SHIPPED | OUTPATIENT
Start: 2020-04-28 | End: 2020-05-29

## 2020-04-30 ENCOUNTER — ANTICOAGULATION VISIT (OUTPATIENT)
Dept: CARDIOLOGY | Facility: CLINIC | Age: 75
End: 2020-04-30

## 2020-04-30 DIAGNOSIS — I48.19 PERSISTENT ATRIAL FIBRILLATION (HCC): ICD-10-CM

## 2020-04-30 LAB — INR PPP: 3.2 (ref 2–3)

## 2020-04-30 PROCEDURE — 85610 PROTHROMBIN TIME: CPT | Performed by: INTERNAL MEDICINE

## 2020-04-30 PROCEDURE — 36416 COLLJ CAPILLARY BLOOD SPEC: CPT | Performed by: INTERNAL MEDICINE

## 2020-04-30 NOTE — PROGRESS NOTES
Per Breann take 6 mg on Monday and 5 Mg all other days.. Recheck in 3 weeks.. Patient voiced understanding appt made

## 2020-05-08 RX ORDER — HYDRALAZINE HYDROCHLORIDE 50 MG/1
TABLET, FILM COATED ORAL
Qty: 60 TABLET | Refills: 2 | Status: SHIPPED | OUTPATIENT
Start: 2020-05-08 | End: 2020-08-17

## 2020-05-28 ENCOUNTER — ANTICOAGULATION VISIT (OUTPATIENT)
Dept: CARDIOLOGY | Facility: CLINIC | Age: 75
End: 2020-05-28

## 2020-05-28 DIAGNOSIS — I48.19 PERSISTENT ATRIAL FIBRILLATION (HCC): ICD-10-CM

## 2020-05-28 LAB — INR PPP: 2.8 (ref 2–3)

## 2020-05-28 PROCEDURE — 85610 PROTHROMBIN TIME: CPT | Performed by: INTERNAL MEDICINE

## 2020-05-28 PROCEDURE — 36416 COLLJ CAPILLARY BLOOD SPEC: CPT | Performed by: INTERNAL MEDICINE

## 2020-05-29 RX ORDER — WARFARIN SODIUM 5 MG/1
TABLET ORAL
Qty: 30 TABLET | Refills: 0 | Status: SHIPPED | OUTPATIENT
Start: 2020-05-29 | End: 2020-06-29

## 2020-05-29 RX ORDER — WARFARIN SODIUM 1 MG/1
TABLET ORAL
Qty: 30 TABLET | Refills: 0 | Status: SHIPPED | OUTPATIENT
Start: 2020-05-29 | End: 2020-06-29

## 2020-06-11 ENCOUNTER — HOSPITAL ENCOUNTER (OUTPATIENT)
Facility: HOSPITAL | Age: 75
Setting detail: OBSERVATION
Discharge: HOME | End: 2020-06-12
Attending: INTERNAL MEDICINE | Admitting: INTERNAL MEDICINE

## 2020-06-11 ENCOUNTER — APPOINTMENT (OUTPATIENT)
Dept: CT IMAGING | Facility: HOSPITAL | Age: 75
End: 2020-06-11

## 2020-06-11 ENCOUNTER — APPOINTMENT (OUTPATIENT)
Dept: GENERAL RADIOLOGY | Facility: HOSPITAL | Age: 75
End: 2020-06-11

## 2020-06-11 DIAGNOSIS — R51.9 NONINTRACTABLE HEADACHE, UNSPECIFIED CHRONICITY PATTERN, UNSPECIFIED HEADACHE TYPE: ICD-10-CM

## 2020-06-11 DIAGNOSIS — Z79.01 CHRONIC ANTICOAGULATION: ICD-10-CM

## 2020-06-11 DIAGNOSIS — R42 DIZZINESS: ICD-10-CM

## 2020-06-11 DIAGNOSIS — R07.9 CHEST PAIN, UNSPECIFIED TYPE: Primary | ICD-10-CM

## 2020-06-11 PROBLEM — E78.5 HYPERLIPIDEMIA: Status: ACTIVE | Noted: 2020-06-11

## 2020-06-11 PROBLEM — G47.33 OBSTRUCTIVE SLEEP APNEA SYNDROME: Status: ACTIVE | Noted: 2020-06-11

## 2020-06-11 PROBLEM — K21.9 GASTROESOPHAGEAL REFLUX DISEASE: Status: ACTIVE | Noted: 2020-06-11

## 2020-06-11 PROBLEM — F32.A DEPRESSIVE DISORDER: Status: ACTIVE | Noted: 2020-06-11

## 2020-06-11 PROBLEM — G62.9 NEUROPATHY: Status: ACTIVE | Noted: 2020-06-11

## 2020-06-11 PROBLEM — I50.9 CONGESTIVE HEART FAILURE: Status: ACTIVE | Noted: 2020-06-11

## 2020-06-11 PROBLEM — M17.11 PRIMARY OSTEOARTHRITIS OF RIGHT KNEE: Status: ACTIVE | Noted: 2020-01-09

## 2020-06-11 PROBLEM — R85.619 ABNORMAL PAP SMEAR OF ANUS: Status: ACTIVE | Noted: 2020-06-11

## 2020-06-11 PROBLEM — E03.9 HYPOTHYROIDISM: Status: ACTIVE | Noted: 2020-06-11

## 2020-06-11 LAB
ANION GAP SERPL CALCULATED.3IONS-SCNC: 11 MMOL/L (ref 5–15)
APTT PPP: 29.3 SECONDS (ref 24–31)
BASOPHILS # BLD AUTO: 0.1 10*3/MM3 (ref 0–0.2)
BASOPHILS NFR BLD AUTO: 1.2 % (ref 0–1.5)
BUN BLD-MCNC: 10 MG/DL (ref 8–23)
BUN BLD-MCNC: ABNORMAL MG/DL
BUN/CREAT SERPL: ABNORMAL
CALCIUM SPEC-SCNC: 9.2 MG/DL (ref 8.6–10.5)
CHLORIDE SERPL-SCNC: 102 MMOL/L (ref 98–107)
CO2 SERPL-SCNC: 27 MMOL/L (ref 22–29)
CREAT BLD-MCNC: 1.37 MG/DL (ref 0.76–1.27)
DEPRECATED RDW RBC AUTO: 45.1 FL (ref 37–54)
DIGOXIN SERPL-MCNC: 1.2 NG/ML (ref 0.6–1.2)
EOSINOPHIL # BLD AUTO: 0.1 10*3/MM3 (ref 0–0.4)
EOSINOPHIL NFR BLD AUTO: 1.9 % (ref 0.3–6.2)
ERYTHROCYTE [DISTWIDTH] IN BLOOD BY AUTOMATED COUNT: 18.6 % (ref 12.3–15.4)
GFR SERPL CREATININE-BSD FRML MDRD: 51 ML/MIN/1.73
GLUCOSE BLD-MCNC: 117 MG/DL (ref 65–99)
HCT VFR BLD AUTO: 45.3 % (ref 37.5–51)
HGB BLD-MCNC: 14.6 G/DL (ref 13–17.7)
HOLD SPECIMEN: NORMAL
INR PPP: 2.75 (ref 2–3)
LYMPHOCYTES # BLD AUTO: 2.3 10*3/MM3 (ref 0.7–3.1)
LYMPHOCYTES NFR BLD AUTO: 34.4 % (ref 19.6–45.3)
MAGNESIUM SERPL-MCNC: 2 MG/DL (ref 1.6–2.4)
MCH RBC QN AUTO: 22 PG (ref 26.6–33)
MCHC RBC AUTO-ENTMCNC: 32.3 G/DL (ref 31.5–35.7)
MCV RBC AUTO: 68.1 FL (ref 79–97)
MONOCYTES # BLD AUTO: 0.6 10*3/MM3 (ref 0.1–0.9)
MONOCYTES NFR BLD AUTO: 9.7 % (ref 5–12)
NEUTROPHILS # BLD AUTO: 3.5 10*3/MM3 (ref 1.7–7)
NEUTROPHILS NFR BLD AUTO: 52.8 % (ref 42.7–76)
NRBC BLD AUTO-RTO: 0.1 /100 WBC (ref 0–0.2)
NT-PROBNP SERPL-MCNC: 328.2 PG/ML (ref 5–900)
PLATELET # BLD AUTO: 172 10*3/MM3 (ref 140–450)
PMV BLD AUTO: 8.1 FL (ref 6–12)
POTASSIUM BLD-SCNC: 4.1 MMOL/L (ref 3.5–5.2)
PROTHROMBIN TIME: 26 SECONDS (ref 19.4–28.5)
RBC # BLD AUTO: 6.65 10*6/MM3 (ref 4.14–5.8)
SODIUM BLD-SCNC: 140 MMOL/L (ref 136–145)
TROPONIN T SERPL-MCNC: <0.01 NG/ML (ref 0–0.03)
WBC NRBC COR # BLD: 6.7 10*3/MM3 (ref 3.4–10.8)

## 2020-06-11 PROCEDURE — 70450 CT HEAD/BRAIN W/O DYE: CPT

## 2020-06-11 PROCEDURE — 80048 BASIC METABOLIC PNL TOTAL CA: CPT | Performed by: NURSE PRACTITIONER

## 2020-06-11 PROCEDURE — 84484 ASSAY OF TROPONIN QUANT: CPT | Performed by: NURSE PRACTITIONER

## 2020-06-11 PROCEDURE — 99285 EMERGENCY DEPT VISIT HI MDM: CPT

## 2020-06-11 PROCEDURE — 99220 PR INITIAL OBSERVATION CARE/DAY 70 MINUTES: CPT | Performed by: INTERNAL MEDICINE

## 2020-06-11 PROCEDURE — 85730 THROMBOPLASTIN TIME PARTIAL: CPT | Performed by: NURSE PRACTITIONER

## 2020-06-11 PROCEDURE — 96374 THER/PROPH/DIAG INJ IV PUSH: CPT

## 2020-06-11 PROCEDURE — 83880 ASSAY OF NATRIURETIC PEPTIDE: CPT | Performed by: NURSE PRACTITIONER

## 2020-06-11 PROCEDURE — 25010000002 KETOROLAC TROMETHAMINE PER 15 MG: Performed by: PHYSICIAN ASSISTANT

## 2020-06-11 PROCEDURE — G0378 HOSPITAL OBSERVATION PER HR: HCPCS

## 2020-06-11 PROCEDURE — 93005 ELECTROCARDIOGRAM TRACING: CPT | Performed by: INTERNAL MEDICINE

## 2020-06-11 PROCEDURE — 80162 ASSAY OF DIGOXIN TOTAL: CPT | Performed by: NURSE PRACTITIONER

## 2020-06-11 PROCEDURE — 85025 COMPLETE CBC W/AUTO DIFF WBC: CPT | Performed by: NURSE PRACTITIONER

## 2020-06-11 PROCEDURE — 71045 X-RAY EXAM CHEST 1 VIEW: CPT

## 2020-06-11 PROCEDURE — 93005 ELECTROCARDIOGRAM TRACING: CPT

## 2020-06-11 PROCEDURE — 72125 CT NECK SPINE W/O DYE: CPT

## 2020-06-11 PROCEDURE — 85610 PROTHROMBIN TIME: CPT | Performed by: NURSE PRACTITIONER

## 2020-06-11 PROCEDURE — 83735 ASSAY OF MAGNESIUM: CPT | Performed by: NURSE PRACTITIONER

## 2020-06-11 RX ORDER — NITROGLYCERIN 0.4 MG/1
0.4 TABLET SUBLINGUAL
Status: DISCONTINUED | OUTPATIENT
Start: 2020-06-11 | End: 2020-06-12 | Stop reason: HOSPADM

## 2020-06-11 RX ORDER — ACETAMINOPHEN 500 MG
1000 TABLET ORAL ONCE
Status: COMPLETED | OUTPATIENT
Start: 2020-06-11 | End: 2020-06-11

## 2020-06-11 RX ORDER — FLUTICASONE PROPIONATE 50 MCG
1 SPRAY, SUSPENSION (ML) NASAL DAILY
COMMUNITY

## 2020-06-11 RX ORDER — SODIUM CHLORIDE 0.9 % (FLUSH) 0.9 %
10 SYRINGE (ML) INJECTION AS NEEDED
Status: DISCONTINUED | OUTPATIENT
Start: 2020-06-11 | End: 2020-06-12 | Stop reason: HOSPADM

## 2020-06-11 RX ORDER — BUTALBITAL, ACETAMINOPHEN AND CAFFEINE 50; 325; 40 MG/1; MG/1; MG/1
1 TABLET ORAL EVERY 4 HOURS PRN
Status: DISCONTINUED | OUTPATIENT
Start: 2020-06-11 | End: 2020-06-12 | Stop reason: HOSPADM

## 2020-06-11 RX ORDER — KETOROLAC TROMETHAMINE 15 MG/ML
15 INJECTION, SOLUTION INTRAMUSCULAR; INTRAVENOUS EVERY 6 HOURS PRN
Status: DISCONTINUED | OUTPATIENT
Start: 2020-06-11 | End: 2020-06-12

## 2020-06-11 RX ORDER — ACETAMINOPHEN 500 MG
TABLET ORAL
Status: DISPENSED
Start: 2020-06-11 | End: 2020-06-12

## 2020-06-11 RX ADMIN — ACETAMINOPHEN 1000 MG: 500 TABLET, FILM COATED ORAL at 14:45

## 2020-06-11 RX ADMIN — NITROGLYCERIN 0.4 MG: 0.4 TABLET SUBLINGUAL at 21:12

## 2020-06-11 RX ADMIN — KETOROLAC TROMETHAMINE 15 MG: 15 INJECTION, SOLUTION INTRAMUSCULAR; INTRAVENOUS at 21:41

## 2020-06-11 RX ADMIN — BUTALBITAL, ACETAMINOPHEN AND CAFFEINE 1 TABLET: 50; 325; 40 TABLET ORAL at 16:16

## 2020-06-11 RX ADMIN — NITROGLYCERIN 0.4 MG: 0.4 TABLET SUBLINGUAL at 13:37

## 2020-06-11 NOTE — H&P
[facility]    Patient Name: Efe Malloy  : 1945  MRN: [id number]  Primary Care Physician: Uday Beltran MD  Date of admission: 2020    Patient Care Team:  Uday Beltran MD as PCP - General (Family Medicine)  Moise Mclean Jr., DPM as PCP - Claims Attributed     Subjective   History Present Illness     Chief Complaint:   Chief Complaint   Patient presents with   • Chest Pain     Mr. Malloy is a 74 y.o. with a past medical history of CHF, HTN, HLD, hypothyroidism, persistent atrial fibrillation on Coumadin, REGLA on CPAP, depression and COPD who presents to [facility]  complaining of intermittent chest pressure x 1 week.  The patient states he has also had intermittent dizziness x 1-2 months, for which he is being followed by ENT.  He developed a headache and chest pressure while he was walking around his yard this morning.  He denies nausea, vomiting, dyspnea and diaphoresis.  The patient has noticed when he takes his socks off at the end of the day, his legs are very swollen.    In the ED, the patient's labs included the following: Na 140, K 4.1, BUN 10, Cr 1.37, glucose 117, WBC 6.7, hgb 14.6, plts 172.  Troponin negative, .  CXR, CT cervical spine and CT head negative.  EKG: atrial fibrillation, HR 79.  The patient was given nitro SL and tylenol in the ED.  He was admitted for further cardiac workup.     History of Present Illness    Review of Systems   Constitution: Negative for chills and fever.   Cardiovascular: Positive for chest pain.   Respiratory: Negative for shortness of breath.    Gastrointestinal: Negative for nausea and vomiting.   Neurological: Positive for dizziness and headaches.   All other systems reviewed and are negative.    Personal History     Past Medical History:   Past Medical History:   Diagnosis Date   • Asthma    • COPD (chronic obstructive pulmonary disease) (CMS/HCC)    • Hyperlipidemia    • Kidney  stone      Surgical History:      Past Surgical History:   Procedure Laterality Date   • APPENDECTOMY     • CARDIAC CATHETERIZATION  2007    nonobstructive dz   • CHOLECYSTECTOMY     • EYE SURGERY Right     corneal transplant   • JOINT REPLACEMENT Right     knee   • KIDNEY STONE SURGERY     • PROSTATE SURGERY       Family History: family history includes Cancer in his father; Diabetes in his father, paternal grandmother, and sister; Heart disease in his maternal grandmother and mother; Hypertension in his father and mother; Stroke in his father; Thyroid disease in his sister.     Social History:  reports that he has quit smoking. He does not have any smokeless tobacco history on file. He reports that he drinks alcohol. Drug use questions deferred to the physician.    Medications:  Prior to Admission medications    Medication Sig Start Date End Date Taking? Authorizing Provider   alfuzosin (UROXATRAL) 10 MG 24 hr tablet Take 10 mg by mouth 2 (Two) Times a Day. 9/23/19  Yes Shelby Haskins MD   BREHENRY ELLIPTA 100-25 MCG/INH inhaler Inhale 1 puff Daily. 9/24/19  Yes Shelby Haskins MD   digoxin (LANOXIN) 250 MCG tablet TAKE ONE TABLET BY MOUTH EVERY DAY 1/13/20  Yes AUDRA Vargas MD   fluticasone (FLONASE) 50 MCG/ACT nasal spray 1 spray into the nostril(s) as directed by provider Daily.   Yes ProviderShelby MD   gabapentin (NEURONTIN) 300 MG capsule Take 600 mg by mouth 2 (Two) Times a Day. 9/13/19  Yes ProviderShelby MD   hydrALAZINE (APRESOLINE) 50 MG tablet TAKE ONE TABLET BY MOUTH TWICE DAILY 5/8/20  Yes AUDRA Vargas MD   ipratropium-albuterol (DUO-NEB) 0.5-2.5 mg/3 ml nebulizer Take 3 mL by nebulization 4 (Four) Times a Day As Needed. 4/1/19  Yes Shelby Haskins MD   levothyroxine (SYNTHROID, LEVOTHROID) 75 MCG tablet Take 75 mcg by mouth Daily. 9/23/19  Yes Shelby Haskins MD   meloxicam (MOBIC) 15 MG tablet Take 15 mg by mouth Daily. 8/19/19  Yes Shelby Haskins  MD   montelukast (SINGULAIR) 10 MG tablet Take 10 mg by mouth Every Evening. 9/23/19  Yes ProviderShelby MD   omeprazole (priLOSEC) 20 MG capsule Take 20 mg by mouth 2 (Two) Times a Day. 9/23/19  Yes ProviderShelby MD   pravastatin (PRAVACHOL) 40 MG tablet TAKE ONE TABLET BY MOUTH EVERY NIGHT AT BEDTIME 4/1/20  Yes AUDRA Vargas MD   sertraline (ZOLOFT) 100 MG tablet Take 100 mg by mouth Daily. 9/23/19  Yes ProviderShelby MD   theophylline (UNIPHYL) 400 MG 24 hr tablet Take 400 mg by mouth Daily. 9/23/19  Yes ProviderShelby MD   verapamil ER (VERELAN) 240 MG 24 hr capsule TAKE ONE CAPSULE BY MOUTH TWICE DAILY 2/5/20  Yes AUDRA Vargas MD   warfarin (COUMADIN) 1 MG tablet TAKE ONE TABLET BY MOUTH DAILY AS DIRECTED  Patient taking differently: Take 1 mg by mouth 1 (One) Time Per Week. Take with 5 mg tablet on Monday only 5/29/20  Yes AUDRA Vargas MD   warfarin (COUMADIN) 5 MG tablet TAKE ONE TABLET BY MOUTH EVERY DAY AS DIRECTED 5/29/20  Yes AUDRA Vargas MD       Allergies:  No Known Allergies    Objective   Objective     Vital Signs  Temp:  [98.5 °F (36.9 °C)] 98.5 °F (36.9 °C)  Heart Rate:  [66-84] 66  Resp:  [14] 14  BP: (117-170)/(61-87) 140/84  SpO2:  [95 %-99 %] 99 %  on   ;      Body mass index is 36.24 kg/m².    Physical Exam   Constitutional: He is oriented to person, place, and time. He appears well-developed and well-nourished. No distress.   HENT:   Head: Normocephalic and atraumatic.   Eyes: Pupils are equal, round, and reactive to light. EOM are normal.   Neck: Normal range of motion. Neck supple.   Cardiovascular: Normal rate, regular rhythm and normal heart sounds. Exam reveals no gallop and no friction rub.   No murmur heard.  Pulmonary/Chest: Effort normal and breath sounds normal. No stridor. No respiratory distress. He has no wheezes.   Abdominal: Soft. Bowel sounds are normal. He exhibits no distension. There is no tenderness. There is no guarding.    Musculoskeletal: Normal range of motion. He exhibits no edema or deformity.   Neurological: He is alert and oriented to person, place, and time.   Skin: Skin is warm and dry. He is not diaphoretic.   Psychiatric: He has a normal mood and affect.   Vitals reviewed.    Results Review:    Results from last 7 days   Lab Units 06/11/20  1307   WBC 10*3/mm3 6.70   HEMOGLOBIN g/dL 14.6   HEMATOCRIT % 45.3   PLATELETS 10*3/mm3 172   INR  2.75     Results from last 7 days   Lab Units 06/11/20  1307   SODIUM mmol/L 140   POTASSIUM mmol/L 4.1   CHLORIDE mmol/L 102   CO2 mmol/L 27.0   BUN  10   CREATININE mg/dL 1.37*   GLUCOSE mg/dL 117*   CALCIUM mg/dL 9.2   TROPONIN T ng/mL <0.010   PROBNP pg/mL 328.2     Estimated Creatinine Clearance: 67.5 mL/min (A) (by C-G formula based on SCr of 1.37 mg/dL (H)).  Brief Urine Lab Results     None          Microbiology Results (last 10 days)     ** No results found for the last 240 hours. **          ECG/EMG Results (most recent)     Procedure Component Value Units Date/Time    ECG 12 Lead [[id number]] Collected:  06/11/20 1246     Updated:  06/11/20 1454    Narrative:       HEART RATE= 79  bpm  RR Interval= 756  ms  GA Interval=   ms  P Horizontal Axis=   deg  P Front Axis=   deg  QRSD Interval= 113  ms  QT Interval= 358  ms  QRS Axis= -61  deg  T Wave Axis= 103  deg  - ABNORMAL ECG -  Atrial fibrillation  Nonspecific T abnormalities, lateral leads  When compared with ECG of 14-May-2019 11:46:30,  No significant change  Electronically Signed By: Tu Langley (CHRISTIANO) 11-Jun-2020 14:53:06  Date and Time of Study: 2020-06-11 12:46:36    ECG 12 Lead [[id number]] Resulted:  06/11/20 1538     Updated:  06/11/20 1538                    Ct Head Without Contrast    Result Date: 6/11/2020  Age-appropriate atrophy. No acute process identified.  Electronically Signed By-Guilherme Guadarrama On:6/11/2020 2:26 PM This report was finalized on [id number] by  Guilherme Guadarrama, .    Ct Cervical Spine Without  Contrast    Result Date: 6/11/2020  No acute cervical spine findings. Multilevel degenerative changes as described above.  Electronically Signed By-Dr. Gabrielle Georges MD On:6/11/2020 2:30 PM This report was finalized on [id number] by Dr. Gabrielle Georges MD.    Xr Chest 1 View    Result Date: 6/11/2020  No acute chest finding.  Electronically Signed By-Dr. Gabrielle Georges MD On:6/11/2020 1:06 PM This report was finalized on [id number] by Dr. Gabrielle Georges MD.        Estimated Creatinine Clearance: 67.5 mL/min (A) (by C-G formula based on SCr of 1.37 mg/dL (H)).    Assessment/Plan   Assessment/Plan       Active Hospital Problems    Diagnosis  POA   • **Chest pain [R07.9]  Yes     Priority: High   • Congestive heart failure (CMS/HCC) [I50.9]  Yes   • Depressive disorder [F32.9]  Yes   • Gastroesophageal reflux disease [K21.9]  Yes   • Hyperlipidemia [E78.5]  Yes   • Hypothyroidism [E03.9]  Yes   • Obstructive sleep apnea syndrome [G47.33]  Yes   • COPD (chronic obstructive pulmonary disease) (CMS/HCC) [J44.9]  Yes   • Essential hypertension [I10]  Yes   • Persistent atrial fibrillation (CMS/HCC) [I48.19]  Yes      Resolved Hospital Problems   No resolved problems to display.     Acute chest pain   - r/o ACS   - CXR negative   - EKG: atrial fibrillation, HR 79  - troponin negative   -   - stress test in AM   - cardiac monitoring  - SL nitro PRN   - regular diet / NPO after midnight     Acute dizziness  - CT head negative   - CT cervical spine negative   - monitor   - continue follow up with ENT as scheduled     Bilateral lower extremity edema with h/o CHF   - echo pending  - BNP noted above    Essential hypertension, chronic  - continue home hydralazine  - hold home verapamil d/t stress test in AM     Hyperlipidemia   - continue home pravastatin     Hypothyroidism   - continue home synthroid     Persistent atrial fibrillation  - continue home Coumadin   - monitor daily INR    Depression  - continue home  zoloft     COPD  - without acute exacerbation  - continue home inhalers, Singulair and theophylline     REGLA on CPAP   - patient may use home unit     Obesity (BMI 36.24)  - encourage lifestyle modifications     VTE Prophylaxis - warfarin    Mechanical Order History:     None      Pharmalogical Order History:     None        CODE STATUS:    Code Status and Medical Interventions:   Ordered at: 06/11/20 1609     Code Status:    CPR     Medical Interventions (Level of Support Prior to Arrest):    Full     I discussed the patient's findings and my recommendations with patient.    Electronically signed by Roma Perry PA-C, 06/11/20, 3:40 PM.  [facility] Hospitalist Team

## 2020-06-11 NOTE — ED PROVIDER NOTES
Subjective   Chief complaint: dizziness, cp,       Context: Patient is a 74-year-old male who comes in with multiple complaints.  He states he has had some chest tightness and heaviness intermittently for the last week.  He states it is nonradiating.  He denies any recent trauma or injury.  It is not reproducible with range of motion.  He states he has had some intermittent dizziness for the last 4 months that is not new worse or different than it has been.  He has been following an ENT for this.  He denies any unilateral focal deficits weakness confusion or lethargy.  He denies any leg swelling recent trauma surgery immobilization prior history of DVT or PE.  He states his been many years since he had a heart cath, does not member when his last stress test was.  He is currently anticoagulated on warfarin therapy and states he has been compliant with his medications and follow-up visits with his cardiologist.  He denies any fever but bites tick bites.  He states his headache today is not the worst headache he is ever had no thunderclap type noise.  Denies any unilateral focal deficits weakness confusion ataxia or lethargy.  She denies any fever or systemic illness.  No visual changes.    Duration: As above    Timing: Waxes and wanes    Severity: Mild to moderate    Associated symptoms: Denies          PCP: grief, bickers            Review of Systems    Past Medical History:   Diagnosis Date   • Asthma    • Atrial fibrillation (CMS/HCC)    • COPD (chronic obstructive pulmonary disease) (CMS/HCC)    • Hyperlipidemia    • Hypertension    • Kidney stone    • Sleep apnea    • Sleep apnea        No Known Allergies    Past Surgical History:   Procedure Laterality Date   • APPENDECTOMY     • CARDIAC CATHETERIZATION  2007    nonobstructive dz   • CHOLECYSTECTOMY     • EYE SURGERY Right     corneal transplant   • JOINT REPLACEMENT Right     knee   • KIDNEY STONE SURGERY     • PROSTATE SURGERY         Family History   Problem  Relation Age of Onset   • Heart disease Mother    • Hypertension Mother    • Diabetes Father    • Stroke Father    • Hypertension Father    • Cancer Father         esophageal. prostate, skin   • Diabetes Sister    • Thyroid disease Sister    • Heart disease Maternal Grandmother    • Diabetes Paternal Grandmother        Social History     Socioeconomic History   • Marital status:      Spouse name: Not on file   • Number of children: Not on file   • Years of education: Not on file   • Highest education level: Not on file   Tobacco Use   • Smoking status: Former Smoker   Substance and Sexual Activity   • Alcohol use: Yes     Comment: occasionally   • Drug use: Defer   • Sexual activity: Defer           Objective   Physical Exam    Vital signs and triage nurse note reviewed.   Constitutional: Awake, alert; well-developed and well-nourished. No acute distress is noted.   HEENT: Normocephalic, atraumatic; pupils are PERRL with intact EOM; oropharynx is pink and moist without exudate or erythema.  No nystagmus   Neck: Supple, full range of motion without pain; no cervical lymphadenopathy.   Cardiovascular: Regularly irregular rate and rhythm, normal S1-S2.   Pulmonary: Respiratory effort regular nonlabored, breath sounds clear to auscultation all fields.   Abdomen: Soft, nontender nondistended with normoactive bowel sounds; no rebound or guarding.   Musculoskeletal: Independent range of motion of all extremities with no palpable tenderness or edema.   Neuro: Alert oriented x3, speech is clear and appropriate, GCS 15   Skin:  Fleshtone warm, dry, intact; no erythematous or petechial rash or lesion       ECG 12 Lead    Date/Time: 6/11/2020 12:46 PM  Performed by: Bryanna Danielson APRN  Authorized by: Bryanna Danielson APRN   Interpreted by physician (luiza)  Comparison: compared with previous ECG from 5/14/2019  Comparison to previous ECG: SUSANA woods  Rhythm: atrial fibrillation  BPM: 79                   ED  Course  ED Course as of Jun 11 1537   Thu Jun 11, 2020   1530 I saw and evaluated this patient    [LH]      ED Course User Index  [LH] Tu Langley DO      Labs Reviewed   BASIC METABOLIC PANEL - Abnormal; Notable for the following components:       Result Value    Glucose 117 (*)     Creatinine 1.37 (*)     eGFR Non  Amer 51 (*)     All other components within normal limits    Narrative:     GFR Normal >60  Chronic Kidney Disease <60  Kidney Failure <15     CBC WITH AUTO DIFFERENTIAL - Abnormal; Notable for the following components:    RBC 6.65 (*)     MCV 68.1 (*)     MCH 22.0 (*)     RDW 18.6 (*)     All other components within normal limits   DIGOXIN LEVEL - Normal   PROTIME-INR - Normal   APTT - Normal   TROPONIN (IN-HOUSE) - Normal    Narrative:     Troponin T Reference Range:  <= 0.03 ng/mL-   Negative for AMI  >0.03 ng/mL-     Abnormal for myocardial necrosis.  Clinicians would have to utilize clinical acumen, EKG, Troponin and serial changes to determine if it is an Acute Myocardial Infarction or myocardial injury due to an underlying chronic condition.       Results may be falsely decreased if patient taking Biotin.     BNP (IN-HOUSE) - Normal    Narrative:     Among patients with dyspnea, NT-proBNP is highly sensitive for the detection of acute congestive heart failure. In addition NT-proBNP of <300 pg/ml effectively rules out acute congestive heart failure with 99% negative predictive value.    Results may be falsely decreased if patient taking Biotin.     MAGNESIUM - Normal   BUN - Normal   CBC AND DIFFERENTIAL    Narrative:     The following orders were created for panel order CBC & Differential.  Procedure                               Abnormality         Status                     ---------                               -----------         ------                     CBC Auto Differential[[id number]]        Abnormal            Final result                 Please view results for these  tests on the individual orders.   EXTRA TUBES    Narrative:     The following orders were created for panel order Extra Tubes.  Procedure                               Abnormality         Status                     ---------                               -----------         ------                     Gold Top - SST[[id number]]                                   Final result                 Please view results for these tests on the individual orders.   GOLD TOP - SST       Medications   sodium chloride 0.9 % flush 10 mL (has no administration in time range)   nitroglycerin (NITROSTAT) SL tablet 0.4 mg (0.4 mg Sublingual Given 6/11/20 1337)   acetaminophen (TYLENOL) tablet 1,000 mg (1,000 mg Oral Given 6/11/20 1445)     Ct Head Without Contrast    Result Date: 6/11/2020  Age-appropriate atrophy. No acute process identified.  Electronically Signed By-Guilherme Guadarrama On:6/11/2020 2:26 PM This report was finalized on [id number] by  Guilherme Guadarrama, .    Ct Cervical Spine Without Contrast    Result Date: 6/11/2020  No acute cervical spine findings. Multilevel degenerative changes as described above.  Electronically Signed By-Dr. Gabrielle Georges MD On:6/11/2020 2:30 PM This report was finalized on [id number] by Dr. Gabrielle Georges MD.    Xr Chest 1 View    Result Date: 6/11/2020  No acute chest finding.  Electronically Signed By-Dr. Gabrielle Georges MD On:6/11/2020 1:06 PM This report was finalized on [id number] by Dr. Gabrielle Georges MD.                               MDM  Number of Diagnoses or Management Options  Chest pain, unspecified type:   Dizziness:   Nonintractable headache, unspecified chronicity pattern, unspecified headache type:   Diagnosis management comments:    Comorbidities:  has a past medical history of Asthma, Atrial fibrillation (CMS/HCC), COPD (chronic obstructive pulmonary disease) (CMS/HCC), Hyperlipidemia, Hypertension, Kidney stone, Sleep apnea, and Sleep apnea.  Differentials: The non-STEMI electrode  abnormalities dehydration not all inclusive of differentials considered  Discussion with provider: Hospitalist  Radiology interpretation:  X-rays reviewed by me and interpreted by radiologist,   Ct Head Without Contrast    Result Date: 6/11/2020  Age-appropriate atrophy. No acute process identified.  Electronically Signed By-Guilherme Guadarrama On:6/11/2020 2:26 PM This report was finalized on [id number] by  Guilherme Guadarrama, .    Ct Cervical Spine Without Contrast    Result Date: 6/11/2020  No acute cervical spine findings. Multilevel degenerative changes as described above.  Electronically Signed By-Dr. Gabrielle Georges MD On:6/11/2020 2:30 PM This report was finalized on [id number] by Dr. Gabrielle Georges MD.    Xr Chest 1 View    Result Date: 6/11/2020  No acute chest finding.  Electronically Signed By-Dr. Gabrielle Georges MD On:6/11/2020 1:06 PM This report was finalized on [id number] by Dr. Gabrielle Georges MD.    Lab interpretation:  Labs viewed by me significant for, creatinine 1.3    Appropriate PPE worn during exam.  Patient was given sublingual nitro, states his pain is resolved but was only a 2 on arrival.  He was given Tylenol for headache.  Reexam remains weak alert and oriented neurologically intact.    i discussed findings with patient who voices understanding of       Final diagnoses:   Chest pain, unspecified type   Dizziness   Nonintractable headache, unspecified chronicity pattern, unspecified headache type            Bryanna Danielson, APRN  06/11/20 1538

## 2020-06-11 NOTE — ED NOTES
Pt c/o of  chest pain that radiates to his neck x 1 week. Pt sts it also causes dizziness.     Kenyatta Ochoa, LPN  06/11/20 1257

## 2020-06-11 NOTE — ED PROVIDER NOTES
Subjective   Chief complaint: dizziness, cp,       Context: Patient is a 74-year-old male who comes in with multiple complaints.  He states he has had some chest tightness and heaviness intermittently for the last week.  He states it is nonradiating.  He denies any recent trauma or injury.  It is not reproducible with range of motion.  He states he has had some intermittent dizziness for the last 4 months that is not new worse or different than it has been.  He has been following an ENT for this.  He denies any unilateral focal deficits weakness confusion or lethargy.  He denies any leg swelling recent trauma surgery immobilization prior history of DVT or PE.  He states his been many years since he had a heart cath, does not remember when his last stress test was.  He is currently anticoagulated on warfarin therapy and states he has been compliant with his medications and follow-up visits with his cardiologist. Denies lower extremity edema.    Duration: As above    Timing: Waxes and wanes    Severity: Mild to moderate    Associated symptoms: Denies          PCP: grief, bickers            Review of Systems    Past Medical History:   Diagnosis Date   • Asthma    • Atrial fibrillation (CMS/HCC)    • COPD (chronic obstructive pulmonary disease) (CMS/HCC)    • Hyperlipidemia    • Hypertension    • Kidney stone    • Sleep apnea    • Sleep apnea        No Known Allergies    Past Surgical History:   Procedure Laterality Date   • APPENDECTOMY     • CARDIAC CATHETERIZATION  2007    nonobstructive dz   • CHOLECYSTECTOMY     • EYE SURGERY Right     corneal transplant   • JOINT REPLACEMENT Right     knee   • KIDNEY STONE SURGERY     • PROSTATE SURGERY         Family History   Problem Relation Age of Onset   • Heart disease Mother    • Hypertension Mother    • Diabetes Father    • Stroke Father    • Hypertension Father    • Cancer Father         esophageal. prostate, skin   • Diabetes Sister    • Thyroid disease Sister    • Heart  disease Maternal Grandmother    • Diabetes Paternal Grandmother        Social History     Socioeconomic History   • Marital status:      Spouse name: Not on file   • Number of children: Not on file   • Years of education: Not on file   • Highest education level: Not on file   Tobacco Use   • Smoking status: Former Smoker   Substance and Sexual Activity   • Alcohol use: Yes     Comment: occasionally   • Drug use: Defer   • Sexual activity: Defer           Objective   Physical Exam    Vital signs and triage nurse note reviewed.   Constitutional: Awake, alert; well-developed and well-nourished. No acute distress is noted.   HEENT: Normocephalic, atraumatic; pupils are PERRL with intact EOM; oropharynx is pink and moist without exudate or erythema.  No nystagmus   Neck: Supple, chronic limited ROM of neck due to arthritis; no cervical lymphadenopathy.   Cardiovascular: Regularly irregular rate and rhythm, normal S1-S2.   Pulmonary: Respiratory effort regular nonlabored, breath sounds clear to auscultation all fields.   Abdomen: Soft, nontender nondistended with normoactive bowel sounds; no rebound or guarding.   Musculoskeletal: Independent range of motion of all extremities with no palpable tenderness or edema.   Neuro: Alert oriented x3, speech is clear and appropriate, GCS 15   Skin:  Fleshtone warm, dry, intact; no erythematous or petechial rash or lesion       ECG 12 Lead    Date/Time: 6/11/2020 12:46 PM  Performed by: Bryanna Danielson APRN  Authorized by: Bryanna Danielson APRN   Interpreted by physician (luiza)  Comparison: compared with previous ECG from 5/14/2019  Comparison to previous ECG: A. fib  Rhythm: atrial fibrillation  BPM: 79                   ED Course      Labs Reviewed   BASIC METABOLIC PANEL - Abnormal; Notable for the following components:       Result Value    Glucose 117 (*)     Creatinine 1.37 (*)     eGFR Non  Amer 51 (*)     All other components within normal limits     Narrative:     GFR Normal >60  Chronic Kidney Disease <60  Kidney Failure <15     CBC WITH AUTO DIFFERENTIAL - Abnormal; Notable for the following components:    RBC 6.65 (*)     MCV 68.1 (*)     MCH 22.0 (*)     RDW 18.6 (*)     All other components within normal limits   DIGOXIN LEVEL - Normal   PROTIME-INR - Normal   APTT - Normal   TROPONIN (IN-HOUSE) - Normal    Narrative:     Troponin T Reference Range:  <= 0.03 ng/mL-   Negative for AMI  >0.03 ng/mL-     Abnormal for myocardial necrosis.  Clinicians would have to utilize clinical acumen, EKG, Troponin and serial changes to determine if it is an Acute Myocardial Infarction or myocardial injury due to an underlying chronic condition.       Results may be falsely decreased if patient taking Biotin.     BNP (IN-HOUSE) - Normal    Narrative:     Among patients with dyspnea, NT-proBNP is highly sensitive for the detection of acute congestive heart failure. In addition NT-proBNP of <300 pg/ml effectively rules out acute congestive heart failure with 99% negative predictive value.    Results may be falsely decreased if patient taking Biotin.     MAGNESIUM - Normal   BUN - Normal   CBC AND DIFFERENTIAL    Narrative:     The following orders were created for panel order CBC & Differential.  Procedure                               Abnormality         Status                     ---------                               -----------         ------                     CBC Auto Differential[[id number]]        Abnormal            Final result                 Please view results for these tests on the individual orders.   EXTRA TUBES    Narrative:     The following orders were created for panel order Extra Tubes.  Procedure                               Abnormality         Status                     ---------                               -----------         ------                     Gold Top - SST[[id number]]                                   Final result                 Please  view results for these tests on the individual orders.   GOLD TOP - SST     Medications   sodium chloride 0.9 % flush 10 mL (has no administration in time range)   nitroglycerin (NITROSTAT) SL tablet 0.4 mg (0.4 mg Sublingual Given 6/11/20 1337)   acetaminophen (TYLENOL) tablet 1,000 mg (1,000 mg Oral Given 6/11/20 1445)     Ct Head Without Contrast    Result Date: 6/11/2020  Age-appropriate atrophy. No acute process identified.  Electronically Signed By-Guilherme Guadarrama On:6/11/2020 2:26 PM This report was finalized on [id number] by  Guilherme Guadarrama, .    Ct Cervical Spine Without Contrast    Result Date: 6/11/2020  No acute cervical spine findings. Multilevel degenerative changes as described above.  Electronically Signed By-Dr. Gabrielle Georges MD On:6/11/2020 2:30 PM This report was finalized on [id number] by Dr. Gabrielle Georges MD.    Xr Chest 1 View    Result Date: 6/11/2020  No acute chest finding.  Electronically Signed By-Dr. Gabrielle Georges MD On:6/11/2020 1:06 PM This report was finalized on [id number] by Dr. Gabrielle Georges MD.                                         MDM  Number of Diagnoses or Management Options  Diagnosis management comments:     Comorbidities:  has a past medical history of Asthma, Atrial fibrillation (CMS/HCC), COPD (chronic obstructive pulmonary disease) (CMS/[facility]), Hyperlipidemia, Hypertension, Kidney stone, Sleep apnea, and Sleep apnea.  Differentials: STEMI non-STEMI electrode abnormalities dehydration REGLA not all inclusive of differentials considered  Discussion with provider: Hospitalist  Radiology interpretation:  X-rays reviewed by me and interpreted by radiologist,   Ct Head Without Contrast    Result Date: 6/11/2020  Age-appropriate atrophy. No acute process identified.  Electronically Signed By-Guilherme Guadarrama On:6/11/2020 2:26 PM This report was finalized on [id number] by  Guilherme Guadarrama, .    Ct Cervical Spine Without Contrast    Result Date: 6/11/2020  No acute cervical spine  findings. Multilevel degenerative changes as described above.  Electronically Signed By-Dr. Gabrielle Georges MD On:6/11/2020 2:30 PM This report was finalized on [id number] by Dr. Gabrielle Georges MD.    Xr Chest 1 View    Result Date: 6/11/2020  No acute chest finding.  Electronically Signed By-Dr. Gabrielle Georges MD On:6/11/2020 1:06 PM This report was finalized on [id number] by Dr. Gabrielle Georges MD.    Lab interpretation:  Labs viewed by me significant for,    Appropriate PPE worn during exam.  chest pain Pain free after ntg    i discussed findings with patient who voices understanding of admission      Final diagnoses:   None

## 2020-06-12 ENCOUNTER — APPOINTMENT (OUTPATIENT)
Dept: NUCLEAR MEDICINE | Facility: HOSPITAL | Age: 75
End: 2020-06-12

## 2020-06-12 ENCOUNTER — APPOINTMENT (OUTPATIENT)
Dept: CARDIOLOGY | Facility: HOSPITAL | Age: 75
End: 2020-06-12

## 2020-06-12 VITALS
RESPIRATION RATE: 16 BRPM | BODY MASS INDEX: 36.57 KG/M2 | TEMPERATURE: 97.6 F | HEART RATE: 84 BPM | HEIGHT: 72 IN | DIASTOLIC BLOOD PRESSURE: 80 MMHG | SYSTOLIC BLOOD PRESSURE: 145 MMHG | WEIGHT: 270 LBS | OXYGEN SATURATION: 98 %

## 2020-06-12 LAB
ANION GAP SERPL CALCULATED.3IONS-SCNC: 12 MMOL/L (ref 5–15)
BASOPHILS # BLD AUTO: 0 10*3/MM3 (ref 0–0.2)
BASOPHILS NFR BLD AUTO: 0.4 % (ref 0–1.5)
BUN BLD-MCNC: 21 MG/DL (ref 8–23)
BUN BLD-MCNC: ABNORMAL MG/DL
BUN/CREAT SERPL: ABNORMAL
CALCIUM SPEC-SCNC: 9 MG/DL (ref 8.6–10.5)
CHLORIDE SERPL-SCNC: 105 MMOL/L (ref 98–107)
CO2 SERPL-SCNC: 23 MMOL/L (ref 22–29)
CREAT BLD-MCNC: 1.36 MG/DL (ref 0.76–1.27)
DEPRECATED RDW RBC AUTO: 45.5 FL (ref 37–54)
EOSINOPHIL # BLD AUTO: 0.2 10*3/MM3 (ref 0–0.4)
EOSINOPHIL NFR BLD AUTO: 3.1 % (ref 0.3–6.2)
ERYTHROCYTE [DISTWIDTH] IN BLOOD BY AUTOMATED COUNT: 18.8 % (ref 12.3–15.4)
GFR SERPL CREATININE-BSD FRML MDRD: 52 ML/MIN/1.73
GLUCOSE BLD-MCNC: 73 MG/DL (ref 65–99)
HCT VFR BLD AUTO: 45.7 % (ref 37.5–51)
HGB BLD-MCNC: 14.7 G/DL (ref 13–17.7)
INR PPP: 2.87 (ref 2–3)
INR PPP: 2.96 (ref 2–3)
LYMPHOCYTES # BLD AUTO: 2.8 10*3/MM3 (ref 0.7–3.1)
LYMPHOCYTES NFR BLD AUTO: 36.8 % (ref 19.6–45.3)
MCH RBC QN AUTO: 22 PG (ref 26.6–33)
MCHC RBC AUTO-ENTMCNC: 32.1 G/DL (ref 31.5–35.7)
MCV RBC AUTO: 68.5 FL (ref 79–97)
MONOCYTES # BLD AUTO: 0.7 10*3/MM3 (ref 0.1–0.9)
MONOCYTES NFR BLD AUTO: 8.7 % (ref 5–12)
NEUTROPHILS # BLD AUTO: 3.8 10*3/MM3 (ref 1.7–7)
NEUTROPHILS NFR BLD AUTO: 51 % (ref 42.7–76)
NRBC BLD AUTO-RTO: 0.2 /100 WBC (ref 0–0.2)
PLATELET # BLD AUTO: 170 10*3/MM3 (ref 140–450)
PMV BLD AUTO: 8.4 FL (ref 6–12)
POTASSIUM BLD-SCNC: 4.7 MMOL/L (ref 3.5–5.2)
PROTHROMBIN TIME: 27 SECONDS (ref 19.4–28.5)
PROTHROMBIN TIME: 27.9 SECONDS (ref 19.4–28.5)
RBC # BLD AUTO: 6.68 10*6/MM3 (ref 4.14–5.8)
SODIUM BLD-SCNC: 140 MMOL/L (ref 136–145)
TROPONIN T SERPL-MCNC: <0.01 NG/ML (ref 0–0.03)
TSH SERPL DL<=0.05 MIU/L-ACNC: 4.04 UIU/ML (ref 0.27–4.2)
WBC NRBC COR # BLD: 7.5 10*3/MM3 (ref 3.4–10.8)

## 2020-06-12 PROCEDURE — 93017 CV STRESS TEST TRACING ONLY: CPT

## 2020-06-12 PROCEDURE — 84484 ASSAY OF TROPONIN QUANT: CPT | Performed by: PHYSICIAN ASSISTANT

## 2020-06-12 PROCEDURE — A9500 TC99M SESTAMIBI: HCPCS | Performed by: INTERNAL MEDICINE

## 2020-06-12 PROCEDURE — 93306 TTE W/DOPPLER COMPLETE: CPT

## 2020-06-12 PROCEDURE — G0378 HOSPITAL OBSERVATION PER HR: HCPCS

## 2020-06-12 PROCEDURE — 25010000002 REGADENOSON 0.4 MG/5ML SOLUTION: Performed by: INTERNAL MEDICINE

## 2020-06-12 PROCEDURE — 80048 BASIC METABOLIC PNL TOTAL CA: CPT | Performed by: PHYSICIAN ASSISTANT

## 2020-06-12 PROCEDURE — 99217 PR OBSERVATION CARE DISCHARGE MANAGEMENT: CPT | Performed by: INTERNAL MEDICINE

## 2020-06-12 PROCEDURE — 99214 OFFICE O/P EST MOD 30 MIN: CPT | Performed by: INTERNAL MEDICINE

## 2020-06-12 PROCEDURE — 78452 HT MUSCLE IMAGE SPECT MULT: CPT

## 2020-06-12 PROCEDURE — 0 TECHNETIUM SESTAMIBI: Performed by: INTERNAL MEDICINE

## 2020-06-12 PROCEDURE — 84443 ASSAY THYROID STIM HORMONE: CPT | Performed by: PHYSICIAN ASSISTANT

## 2020-06-12 PROCEDURE — 85610 PROTHROMBIN TIME: CPT | Performed by: PHYSICIAN ASSISTANT

## 2020-06-12 PROCEDURE — 85025 COMPLETE CBC W/AUTO DIFF WBC: CPT | Performed by: PHYSICIAN ASSISTANT

## 2020-06-12 RX ORDER — DIGOXIN 125 MCG
250 TABLET ORAL DAILY
Status: DISCONTINUED | OUTPATIENT
Start: 2020-06-12 | End: 2020-06-12 | Stop reason: HOSPADM

## 2020-06-12 RX ORDER — CALCIUM CARBONATE 200(500)MG
2 TABLET,CHEWABLE ORAL 2 TIMES DAILY PRN
Status: DISCONTINUED | OUTPATIENT
Start: 2020-06-12 | End: 2020-06-12 | Stop reason: HOSPADM

## 2020-06-12 RX ORDER — SERTRALINE HYDROCHLORIDE 100 MG/1
100 TABLET, FILM COATED ORAL DAILY
Status: DISCONTINUED | OUTPATIENT
Start: 2020-06-12 | End: 2020-06-12 | Stop reason: HOSPADM

## 2020-06-12 RX ORDER — MAGNESIUM SULFATE HEPTAHYDRATE 40 MG/ML
2 INJECTION, SOLUTION INTRAVENOUS AS NEEDED
Status: DISCONTINUED | OUTPATIENT
Start: 2020-06-12 | End: 2020-06-12 | Stop reason: HOSPADM

## 2020-06-12 RX ORDER — ALUMINA, MAGNESIA, AND SIMETHICONE 2400; 2400; 240 MG/30ML; MG/30ML; MG/30ML
15 SUSPENSION ORAL EVERY 6 HOURS PRN
Status: DISCONTINUED | OUTPATIENT
Start: 2020-06-12 | End: 2020-06-12 | Stop reason: HOSPADM

## 2020-06-12 RX ORDER — ONDANSETRON 2 MG/ML
4 INJECTION INTRAMUSCULAR; INTRAVENOUS EVERY 6 HOURS PRN
Status: DISCONTINUED | OUTPATIENT
Start: 2020-06-12 | End: 2020-06-12 | Stop reason: HOSPADM

## 2020-06-12 RX ORDER — ATORVASTATIN CALCIUM 10 MG/1
10 TABLET, FILM COATED ORAL DAILY
Status: DISCONTINUED | OUTPATIENT
Start: 2020-06-12 | End: 2020-06-12 | Stop reason: HOSPADM

## 2020-06-12 RX ORDER — HYDRALAZINE HYDROCHLORIDE 25 MG/1
50 TABLET, FILM COATED ORAL 2 TIMES DAILY
Status: DISCONTINUED | OUTPATIENT
Start: 2020-06-12 | End: 2020-06-12 | Stop reason: HOSPADM

## 2020-06-12 RX ORDER — DOCUSATE SODIUM 100 MG/1
100 CAPSULE, LIQUID FILLED ORAL 2 TIMES DAILY PRN
Status: DISCONTINUED | OUTPATIENT
Start: 2020-06-12 | End: 2020-06-12 | Stop reason: HOSPADM

## 2020-06-12 RX ORDER — POTASSIUM CHLORIDE 20 MEQ/1
40 TABLET, EXTENDED RELEASE ORAL AS NEEDED
Status: DISCONTINUED | OUTPATIENT
Start: 2020-06-12 | End: 2020-06-12 | Stop reason: HOSPADM

## 2020-06-12 RX ORDER — CHOLECALCIFEROL (VITAMIN D3) 125 MCG
5 CAPSULE ORAL NIGHTLY PRN
Status: DISCONTINUED | OUTPATIENT
Start: 2020-06-12 | End: 2020-06-12 | Stop reason: HOSPADM

## 2020-06-12 RX ORDER — WARFARIN SODIUM 1 MG/1
1 TABLET ORAL DAILY
Status: DISCONTINUED | OUTPATIENT
Start: 2020-06-12 | End: 2020-06-12

## 2020-06-12 RX ORDER — TAMSULOSIN HYDROCHLORIDE 0.4 MG/1
0.4 CAPSULE ORAL NIGHTLY
Status: DISCONTINUED | OUTPATIENT
Start: 2020-06-12 | End: 2020-06-12 | Stop reason: HOSPADM

## 2020-06-12 RX ORDER — TAMSULOSIN HYDROCHLORIDE 0.4 MG/1
CAPSULE ORAL
Status: COMPLETED
Start: 2020-06-12 | End: 2020-06-12

## 2020-06-12 RX ORDER — SODIUM CHLORIDE 0.9 % (FLUSH) 0.9 %
10 SYRINGE (ML) INJECTION AS NEEDED
Status: DISCONTINUED | OUTPATIENT
Start: 2020-06-12 | End: 2020-06-12 | Stop reason: HOSPADM

## 2020-06-12 RX ORDER — AMLODIPINE BESYLATE 5 MG/1
5 TABLET ORAL
Status: DISCONTINUED | OUTPATIENT
Start: 2020-06-12 | End: 2020-06-12 | Stop reason: HOSPADM

## 2020-06-12 RX ORDER — MAGNESIUM SULFATE HEPTAHYDRATE 40 MG/ML
4 INJECTION, SOLUTION INTRAVENOUS AS NEEDED
Status: DISCONTINUED | OUTPATIENT
Start: 2020-06-12 | End: 2020-06-12 | Stop reason: HOSPADM

## 2020-06-12 RX ORDER — SODIUM CHLORIDE 0.9 % (FLUSH) 0.9 %
10 SYRINGE (ML) INJECTION EVERY 12 HOURS SCHEDULED
Status: DISCONTINUED | OUTPATIENT
Start: 2020-06-12 | End: 2020-06-12 | Stop reason: HOSPADM

## 2020-06-12 RX ORDER — LEVOTHYROXINE SODIUM 0.07 MG/1
75 TABLET ORAL DAILY
Status: DISCONTINUED | OUTPATIENT
Start: 2020-06-12 | End: 2020-06-12 | Stop reason: HOSPADM

## 2020-06-12 RX ORDER — GABAPENTIN 300 MG/1
600 CAPSULE ORAL 2 TIMES DAILY
Status: DISCONTINUED | OUTPATIENT
Start: 2020-06-12 | End: 2020-06-12 | Stop reason: HOSPADM

## 2020-06-12 RX ORDER — HYDRALAZINE HYDROCHLORIDE 25 MG/1
TABLET, FILM COATED ORAL
Status: COMPLETED
Start: 2020-06-12 | End: 2020-06-12

## 2020-06-12 RX ORDER — POTASSIUM CHLORIDE 1.5 G/1.77G
40 POWDER, FOR SOLUTION ORAL AS NEEDED
Status: DISCONTINUED | OUTPATIENT
Start: 2020-06-12 | End: 2020-06-12 | Stop reason: HOSPADM

## 2020-06-12 RX ORDER — ACETAMINOPHEN 160 MG/5ML
650 SOLUTION ORAL EVERY 4 HOURS PRN
Status: DISCONTINUED | OUTPATIENT
Start: 2020-06-12 | End: 2020-06-12 | Stop reason: HOSPADM

## 2020-06-12 RX ORDER — ACETAMINOPHEN 325 MG/1
650 TABLET ORAL EVERY 4 HOURS PRN
Status: DISCONTINUED | OUTPATIENT
Start: 2020-06-12 | End: 2020-06-12 | Stop reason: HOSPADM

## 2020-06-12 RX ORDER — PANTOPRAZOLE SODIUM 40 MG/1
40 TABLET, DELAYED RELEASE ORAL EVERY MORNING
Status: DISCONTINUED | OUTPATIENT
Start: 2020-06-12 | End: 2020-06-12 | Stop reason: HOSPADM

## 2020-06-12 RX ORDER — PANTOPRAZOLE SODIUM 40 MG/1
40 TABLET, DELAYED RELEASE ORAL DAILY
Qty: 30 TABLET | Refills: 0 | Status: SHIPPED | OUTPATIENT
Start: 2020-06-12 | End: 2020-07-12

## 2020-06-12 RX ORDER — IPRATROPIUM BROMIDE AND ALBUTEROL SULFATE 2.5; .5 MG/3ML; MG/3ML
3 SOLUTION RESPIRATORY (INHALATION) 4 TIMES DAILY PRN
Status: DISCONTINUED | OUTPATIENT
Start: 2020-06-12 | End: 2020-06-12 | Stop reason: HOSPADM

## 2020-06-12 RX ORDER — KETOROLAC TROMETHAMINE 15 MG/ML
15 INJECTION, SOLUTION INTRAMUSCULAR; INTRAVENOUS EVERY 6 HOURS PRN
Status: DISCONTINUED | OUTPATIENT
Start: 2020-06-12 | End: 2020-06-12

## 2020-06-12 RX ORDER — WARFARIN SODIUM 5 MG/1
5 TABLET ORAL
Status: DISCONTINUED | OUTPATIENT
Start: 2020-06-12 | End: 2020-06-12

## 2020-06-12 RX ORDER — WARFARIN SODIUM 6 MG/1
6 TABLET ORAL
Status: DISCONTINUED | OUTPATIENT
Start: 2020-06-15 | End: 2020-06-12

## 2020-06-12 RX ORDER — CALCIUM GLUCONATE 20 MG/ML
1 INJECTION, SOLUTION INTRAVENOUS AS NEEDED
Status: DISCONTINUED | OUTPATIENT
Start: 2020-06-12 | End: 2020-06-12 | Stop reason: HOSPADM

## 2020-06-12 RX ORDER — ACETAMINOPHEN 650 MG/1
650 SUPPOSITORY RECTAL EVERY 4 HOURS PRN
Status: DISCONTINUED | OUTPATIENT
Start: 2020-06-12 | End: 2020-06-12 | Stop reason: HOSPADM

## 2020-06-12 RX ORDER — CALCIUM GLUCONATE 20 MG/ML
2 INJECTION, SOLUTION INTRAVENOUS AS NEEDED
Status: DISCONTINUED | OUTPATIENT
Start: 2020-06-12 | End: 2020-06-12 | Stop reason: HOSPADM

## 2020-06-12 RX ORDER — MONTELUKAST SODIUM 10 MG/1
10 TABLET ORAL EVERY EVENING
Status: DISCONTINUED | OUTPATIENT
Start: 2020-06-12 | End: 2020-06-12 | Stop reason: HOSPADM

## 2020-06-12 RX ORDER — ONDANSETRON 4 MG/1
4 TABLET, FILM COATED ORAL EVERY 6 HOURS PRN
Status: DISCONTINUED | OUTPATIENT
Start: 2020-06-12 | End: 2020-06-12 | Stop reason: HOSPADM

## 2020-06-12 RX ORDER — GABAPENTIN 300 MG/1
CAPSULE ORAL
Status: COMPLETED
Start: 2020-06-12 | End: 2020-06-12

## 2020-06-12 RX ORDER — BISACODYL 10 MG
10 SUPPOSITORY, RECTAL RECTAL DAILY PRN
Status: DISCONTINUED | OUTPATIENT
Start: 2020-06-12 | End: 2020-06-12 | Stop reason: HOSPADM

## 2020-06-12 RX ORDER — BUDESONIDE AND FORMOTEROL FUMARATE DIHYDRATE 80; 4.5 UG/1; UG/1
2 AEROSOL RESPIRATORY (INHALATION)
Status: DISCONTINUED | OUTPATIENT
Start: 2020-06-12 | End: 2020-06-12 | Stop reason: HOSPADM

## 2020-06-12 RX ORDER — MELOXICAM 15 MG/1
15 TABLET ORAL DAILY
Status: DISCONTINUED | OUTPATIENT
Start: 2020-06-12 | End: 2020-06-12

## 2020-06-12 RX ADMIN — HYDRALAZINE HYDROCHLORIDE 50 MG: 25 TABLET, FILM COATED ORAL at 10:07

## 2020-06-12 RX ADMIN — GABAPENTIN 600 MG: 300 CAPSULE ORAL at 01:44

## 2020-06-12 RX ADMIN — REGADENOSON 0.4 MG: 0.08 INJECTION, SOLUTION INTRAVENOUS at 08:15

## 2020-06-12 RX ADMIN — TAMSULOSIN HYDROCHLORIDE 0.4 MG: 0.4 CAPSULE ORAL at 01:07

## 2020-06-12 RX ADMIN — ATORVASTATIN CALCIUM 10 MG: 10 TABLET, FILM COATED ORAL at 10:06

## 2020-06-12 RX ADMIN — GABAPENTIN 600 MG: 300 CAPSULE ORAL at 01:07

## 2020-06-12 RX ADMIN — HYDRALAZINE HYDROCHLORIDE 50 MG: 25 TABLET, FILM COATED ORAL at 01:45

## 2020-06-12 RX ADMIN — PANTOPRAZOLE SODIUM 40 MG: 40 TABLET, DELAYED RELEASE ORAL at 10:06

## 2020-06-12 RX ADMIN — GABAPENTIN 600 MG: 300 CAPSULE ORAL at 10:06

## 2020-06-12 RX ADMIN — DIGOXIN 250 MCG: 0.12 TABLET ORAL at 10:07

## 2020-06-12 RX ADMIN — HYDRALAZINE HYDROCHLORIDE 50 MG: 25 TABLET, FILM COATED ORAL at 01:07

## 2020-06-12 RX ADMIN — TECHNETIUM TC 99M SESTAMIBI 1 DOSE: 1 INJECTION INTRAVENOUS at 08:15

## 2020-06-12 RX ADMIN — TAMSULOSIN HYDROCHLORIDE 0.4 MG: 0.4 CAPSULE ORAL at 01:46

## 2020-06-12 RX ADMIN — SERTRALINE 100 MG: 100 TABLET, FILM COATED ORAL at 10:06

## 2020-06-12 RX ADMIN — THEOPHYLLINE ANHYDROUS 400 MG: 200 CAPSULE, EXTENDED RELEASE ORAL at 10:06

## 2020-06-12 RX ADMIN — AMLODIPINE BESYLATE 5 MG: 5 TABLET ORAL at 12:59

## 2020-06-12 RX ADMIN — Medication 10 ML: at 10:07

## 2020-06-12 RX ADMIN — TECHNETIUM TC 99M SESTAMIBI 1 DOSE: 1 INJECTION INTRAVENOUS at 07:00

## 2020-06-12 RX ADMIN — LEVOTHYROXINE SODIUM 75 MCG: 75 TABLET ORAL at 10:06

## 2020-06-12 RX ADMIN — Medication 10 ML: at 02:23

## 2020-06-12 NOTE — PROGRESS NOTES
Discharge Planning Assessment   Samuel     Patient Name: Efe Malloy  MRN: [id number]  Today's Date: 6/12/2020    Admit Date: 6/11/2020    Discharge Needs Assessment     Row Name 06/12/20 1740       Living Environment    Lives With  child(suellen), dependent;spouse    Name(s) of Who Lives With Patient  adult handicap son    Current Living Arrangements  home/apartment/condo    Primary Care Provided by  self    Provides Primary Care For  child(suellen)    Caregiving Concerns  adult handicap son     Family Caregiver if Needed  spouse    Quality of Family Relationships  helpful    Able to Return to Prior Arrangements  yes       Resource/Environmental Concerns    Resource/Environmental Concerns  none    Transportation Concerns  car, none       Transition Planning    Patient/Family Anticipates Transition to  home with family    Patient/Family Anticipated Services at Transition  none    Transportation Anticipated  family or friend will provide       Discharge Needs Assessment    Readmission Within the Last 30 Days  no previous admission in last 30 days    Concerns to be Addressed  denies needs/concerns at this time    Anticipated Changes Related to Illness  none    Equipment Needed After Discharge  none        Discharge Plan     Row Name 06/12/20 0864       Plan    Plan  Anticipate routine home     Plan Comments  DC barriers: cardiology consult         Demographic Summary     Row Name 06/12/20 8091       General Information    Admission Type  observation    Arrived From  emergency department    Required Notices Provided  Observation Status Notice    Referral Source  admission list    Reason for Consult  discharge planning    Preferred Language  English        Functional Status     Row Name 06/12/20 8351       Functional Status    Usual Activity Tolerance  good    Current Activity Tolerance  good       Functional Status, IADL    Medications  independent    Meal Preparation  independent    Housekeeping  independent    Laundry   independent    Shopping  independent        Patient Forms     Row Name 06/12/20 1333       Patient Forms    Important Message from Medicare ([facility])  -- Dial 6/11            Tamiko Choi RN

## 2020-06-12 NOTE — CONSULTS
CARDIOLOGY CONSULT NOTE      Referring Provider: Dr. Pineda    Reason for Consultation: Chest pain    Attending: Natanael Pineda MD    Chief complaint    Chest Pain    Subjective .     History of present illness:  Efe Malloy is a 74 y.o. male who presents with complaints of chest pain..  He describes some chest heaviness and tightness occurring intermittently over the last couple of weeks.  It happens at rest and with exertion.  It is been associated with some periods of dizziness and shortness of breath.    The patient has a history of atrial fibrillation which been deemed permanent.  His ventricular rates have been well controlled.  He has been anticoagulated with warfarin.    He also reports some recent headaches.    Patient has a history of hypertension, obstructive sleep apnea, dyslipidemia.    EKG on admission showed atrial fibrillation with controlled rates.  There were no acute ST or T wave segment abnormalities.  He is ruled out for an MI.    Patient has nonobstructive coronary artery disease by cath done in 2007.        Review of Systems   Constitution: Negative for decreased appetite and diaphoresis.   HENT: Negative for congestion, hearing loss and nosebleeds.    Cardiovascular: Negative for chest pain, claudication, dyspnea on exertion, irregular heartbeat, leg swelling, near-syncope, orthopnea, palpitations, paroxysmal nocturnal dyspnea and syncope.   Respiratory: Negative for cough, shortness of breath and sleep disturbances due to breathing.    Endocrine: Negative for polyuria.   Hematologic/Lymphatic: Does not bruise/bleed easily.   Skin: Negative for itching and rash.   Musculoskeletal: Negative for back pain, muscle weakness and myalgias.   Gastrointestinal: Negative for abdominal pain, change in bowel habit and nausea.   Genitourinary: Negative for dysuria, flank pain, frequency and hesitancy.   Neurological: Negative for dizziness, tremors and weakness.   Psychiatric/Behavioral: Negative for  altered mental status. The patient does not have insomnia.        History  Past Medical History:   Diagnosis Date   • Asthma    • COPD (chronic obstructive pulmonary disease) (CMS/HCC)    • Hyperlipidemia    • Kidney stone        Past Surgical History:   Procedure Laterality Date   • APPENDECTOMY     • CARDIAC CATHETERIZATION  2007    nonobstructive dz   • CHOLECYSTECTOMY     • EYE SURGERY Right     corneal transplant   • JOINT REPLACEMENT Right     knee   • KIDNEY STONE SURGERY     • PROSTATE SURGERY         Family History   Problem Relation Age of Onset   • Heart disease Mother    • Hypertension Mother    • Diabetes Father    • Stroke Father    • Hypertension Father    • Cancer Father         esophageal. prostate, skin   • Diabetes Sister    • Thyroid disease Sister    • Heart disease Maternal Grandmother    • Diabetes Paternal Grandmother        Social History     Tobacco Use   • Smoking status: Former Smoker   Substance Use Topics   • Alcohol use: Yes     Comment: occasionally   • Drug use: Defer        Medications Prior to Admission   Medication Sig Dispense Refill Last Dose   • alfuzosin (UROXATRAL) 10 MG 24 hr tablet Take 10 mg by mouth 2 (Two) Times a Day.  11 6/11/2020 at 0800   • BREO ELLIPTA 100-25 MCG/INH inhaler Inhale 1 puff Daily.  5 6/11/2020 at 0800   • digoxin (LANOXIN) 250 MCG tablet TAKE ONE TABLET BY MOUTH EVERY DAY 90 tablet 2 6/11/2020 at 0800   • fluticasone (FLONASE) 50 MCG/ACT nasal spray 1 spray into the nostril(s) as directed by provider Daily.   6/11/2020 at 0800   • gabapentin (NEURONTIN) 300 MG capsule Take 600 mg by mouth 2 (Two) Times a Day.  3 6/11/2020 at 0800   • hydrALAZINE (APRESOLINE) 50 MG tablet TAKE ONE TABLET BY MOUTH TWICE DAILY 60 tablet 2 6/11/2020 at 0800   • ipratropium-albuterol (DUO-NEB) 0.5-2.5 mg/3 ml nebulizer Take 3 mL by nebulization 4 (Four) Times a Day As Needed.   Taking   • levothyroxine (SYNTHROID, LEVOTHROID) 75 MCG tablet Take 75 mcg by mouth Daily.  5  6/11/2020 at 0800   • meloxicam (MOBIC) 15 MG tablet Take 15 mg by mouth Daily.  1 6/11/2020 at 0800   • montelukast (SINGULAIR) 10 MG tablet Take 10 mg by mouth Every Evening.  5 6/10/2020 at Unknown time   • omeprazole (priLOSEC) 20 MG capsule Take 20 mg by mouth 2 (Two) Times a Day.  0 6/11/2020 at 0800   • pravastatin (PRAVACHOL) 40 MG tablet TAKE ONE TABLET BY MOUTH EVERY NIGHT AT BEDTIME 90 tablet 3 6/10/2020 at Unknown time   • sertraline (ZOLOFT) 100 MG tablet Take 100 mg by mouth Daily.  5 6/11/2020 at 0800   • theophylline (UNIPHYL) 400 MG 24 hr tablet Take 400 mg by mouth Daily.  5 6/11/2020 at 0800   • verapamil ER (VERELAN) 240 MG 24 hr capsule TAKE ONE CAPSULE BY MOUTH TWICE DAILY 90 capsule 2 6/11/2020 at 0800   • warfarin (COUMADIN) 1 MG tablet TAKE ONE TABLET BY MOUTH DAILY AS DIRECTED (Patient taking differently: Take 1 mg by mouth 1 (One) Time Per Week. Take with 5 mg tablet on Monday only) 30 tablet 0 Past Week at Unknown time   • warfarin (COUMADIN) 5 MG tablet TAKE ONE TABLET BY MOUTH EVERY DAY AS DIRECTED 30 tablet 0 6/10/2020 at Unknown time         Patient has no known allergies.    Scheduled Meds:    amLODIPine 5 mg Oral Q24H   atorvastatin 10 mg Oral Daily   budesonide-formoterol 2 puff Inhalation BID - RT   digoxin 250 mcg Oral Daily   gabapentin 600 mg Oral BID   hydrALAZINE 50 mg Oral BID   levothyroxine 75 mcg Oral Daily   montelukast 10 mg Oral Q PM   pantoprazole 40 mg Oral QAM   sertraline 100 mg Oral Daily   sodium chloride 10 mL Intravenous Q12H   tamsulosin 0.4 mg Oral Nightly   theophylline 400 mg Oral Q24H     Continuous Infusions:   PRN Meds:.•  acetaminophen **OR** acetaminophen **OR** acetaminophen  •  aluminum-magnesium hydroxide-simethicone  •  bisacodyl  •  butalbital-acetaminophen-caffeine  •  calcium carbonate  •  Calcium Gluconate-NaCl **AND** calcium gluconate **AND** Calcium, Ionized  •  docusate sodium  •  ipratropium-albuterol  •  magnesium hydroxide  •  magnesium  "sulfate **OR** magnesium sulfate **OR** magnesium sulfate  •  melatonin  •  nitroglycerin  •  ondansetron **OR** ondansetron  •  potassium chloride  •  potassium chloride  •  [COMPLETED] Insert peripheral IV **AND** sodium chloride  •  sodium chloride    Objective     VITAL SIGNS  Vitals:    06/12/20 0150 06/12/20 0907 06/12/20 1002 06/12/20 1137   BP:  161/91 157/93 145/80   BP Location:   Left arm Right arm   Patient Position:   Sitting Lying   Pulse:   64 84   Resp:   18 16   Temp:    97.6 °F (36.4 °C)   TempSrc:    Oral   SpO2:    98%   Weight: 122 kg (270 lb) 122 kg (270 lb)     Height: 185.4 cm (72.99\") 182.9 cm (72\")         Flowsheet Rows      First Filed Value   Admission Height  185.4 cm (73\") Documented at 06/11/2020 1233   Admission Weight  125 kg (274 lb 11.1 oz) Documented at 06/11/2020 1233           TELEMETRY: Atrial Fibrillation controlled rates    Physical Exam:  Physical Exam   Constitutional: He is oriented to person, place, and time. He appears well-developed and well-nourished. No distress.   HENT:   Head: Normocephalic and atraumatic.   Eyes: Pupils are equal, round, and reactive to light.   Neck: Normal range of motion. Neck supple. No JVD present.   Cardiovascular: Normal rate, regular rhythm, S1 normal, S2 normal, normal heart sounds and intact distal pulses.   No murmur heard.  Pulmonary/Chest: Effort normal and breath sounds normal.   Abdominal: Soft. Normal appearance. He exhibits no distension. There is no tenderness.   Musculoskeletal: Normal range of motion. He exhibits no edema.   Neurological: He is alert and oriented to person, place, and time.   Skin: Skin is warm and dry.   Psychiatric: He has a normal mood and affect.        Results Review:   I reviewed the patient's new clinical results.    CBC    Results from last 7 days   Lab Units 06/12/20  0519 06/11/20  1307   WBC 10*3/mm3 7.50 6.70   HEMOGLOBIN g/dL 14.7 14.6   PLATELETS 10*3/mm3 170 172     BMP   Results from last 7 days "   Lab Units 06/12/20  0044 06/11/20  1307   SODIUM mmol/L 140 140   POTASSIUM mmol/L 4.7 4.1   CHLORIDE mmol/L 105 102   CO2 mmol/L 23.0 27.0   BUN  21 10   CREATININE mg/dL 1.36* 1.37*   GLUCOSE mg/dL 73 117*   MAGNESIUM mg/dL  --  2.0     Cr Clearance Estimated Creatinine Clearance: 66.3 mL/min (A) (by C-G formula based on SCr of 1.36 mg/dL (H)).  Coag   Results from last 7 days   Lab Units 06/12/20  0519 06/12/20  0044 06/11/20  1307   INR  2.87 2.96 2.75   APTT seconds  --   --  29.3     HbA1C   Lab Results   Component Value Date    HGBA1C 6.4 (H) 11/26/2019     Blood Glucose No results found for: POCGLU  Infection     CMP   Results from last 7 days   Lab Units 06/12/20  0044 06/11/20  1307   SODIUM mmol/L 140 140   POTASSIUM mmol/L 4.7 4.1   CHLORIDE mmol/L 105 102   CO2 mmol/L 23.0 27.0   BUN  21 10   CREATININE mg/dL 1.36* 1.37*   GLUCOSE mg/dL 73 117*     ABG      UA      MIKI  No results found for: POCMETH, POCAMPHET, POCBARBITUR, POCBENZO, POCCOCAINE, POCOPIATES, POCOXYCODO, POCPHENCYC, POCPROPOXY, POCTHC, POCTRICYC  Lysis Labs   Results from last 7 days   Lab Units 06/12/20 0519 06/12/20 0044 06/11/20  1307   INR  2.87 2.96 2.75   APTT seconds  --   --  29.3   HEMOGLOBIN g/dL 14.7  --  14.6   PLATELETS 10*3/mm3 170  --  172   CREATININE mg/dL  --  1.36* 1.37*     Radiology(recent) Ct Head Without Contrast    Result Date: 6/11/2020  Age-appropriate atrophy. No acute process identified.  Electronically Signed By-Guilherme Guadarrama On:6/11/2020 2:26 PM This report was finalized on [id number] by  Guilherme Guadarrama, .    Ct Cervical Spine Without Contrast    Result Date: 6/11/2020  No acute cervical spine findings. Multilevel degenerative changes as described above.  Electronically Signed By-Dr. Gabrielle Georges MD On:6/11/2020 2:30 PM This report was finalized on [id number] by Dr. Gabrielle Georges MD.    Xr Chest 1 View    Result Date: 6/11/2020  No acute chest finding.  Electronically Signed By-Dr. Gabrielle Georges MD  On:6/11/2020 1:06 PM This report was finalized on [id number] by Dr. Gabrielle Georges MD.      Results from last 7 days   Lab Units 06/12/20  1240   TROPONIN T ng/mL <0.010       Imaging Results (Last 24 Hours)     ** No results found for the last 24 hours. **          EKG          I personally viewed and interpreted the patient's EKG/Telemetry data:    ECHOCARDIOGRAM:    pending    STRESS MYOVIEW:    CARDIAC CATHETERIZATION:  2007        OTHER:         Assessment/Plan       Chest pain    Persistent atrial fibrillation (CMS/[facility])    Essential hypertension    COPD (chronic obstructive pulmonary disease) (CMS/[facility])    Congestive heart failure (CMS/[facility])    Depressive disorder    Gastroesophageal reflux disease    Hyperlipidemia    Hypothyroidism    Obstructive sleep apnea syndrome      Assessment:    Chest Pain  Dizziness  Permanent Atrial Fibrillation  Elevated BP  Headaches  Non obstructive CAD by previous cath 2007      -30-40% LAD; RCA lesion 50-60% ? Vasospasm after IVUS  Dyslipidemia  REGLA  COPD/Asthma: on Theophylline    Plan:  Lexiscan myoview has been completed  Chest pain requiring SL NTG for relief after Lexiscan  Dr. Patel to review stress test  Hold Warfarin.   Was on verapamil at home; consider change to amlodipine due to vasospasm  Remains on digoxin  Not currently wheezing.   Consider cardiac catheterization but INR will need to come down to 1.5 or less.   Has not had am meds/will reassess blood pressure after am meds    Additional recommendations per Dr. Patel    I discussed the patients findings and my recommendations with patient.    Patient is seen and examined and findings are verified.  Patient is presented with chest pain.  Patient described this as a dull ache in the retrosternal region with radiation across the chest.    Hemodynamics are stable    Normal S1 and S2.  No pericardial rub or murmur.    At this stage, patient underwent stress test and it was negative for ischemia but fixed inferior  defect was noted.  Global EF on gated SPECT was normal.    I would recommend to start Protonix 40 mg daily.  However if patient continues to have chest pain he would need cardiac catheterization.  His INR is 2.8.  Patient is advised to call if his symptoms worsen after discharge.  Patient to be seen with Dr. Vargas next week.    Nate Patel MD  06/12/20  15:03

## 2020-06-12 NOTE — DISCHARGE SUMMARY
[facility]  DISCHARGE SUMMARY        Prepared For PCP:  Uday Beltran MD    Patient Name: Efe Malloy  : 1945  MRN: [id number]      Date of Admission:   2020    Date of Discharge:  2020    Length of stay:  LOS: 0 days     Hospital Course     Presenting Problem:   Dizziness [R42]  Nonintractable headache, unspecified chronicity pattern, unspecified headache type [R51]  Chest pain, unspecified type [R07.9]      Active Hospital Problems    Diagnosis  POA   • **Chest pain [R07.9]  Yes   • Congestive heart failure (CMS/HCC) [I50.9]  Yes   • Depressive disorder [F32.9]  Yes   • Gastroesophageal reflux disease [K21.9]  Yes   • Hyperlipidemia [E78.5]  Yes   • Hypothyroidism [E03.9]  Yes   • Obstructive sleep apnea syndrome [G47.33]  Yes   • COPD (chronic obstructive pulmonary disease) (CMS/HCC) [J44.9]  Yes   • Essential hypertension [I10]  Yes   • Persistent atrial fibrillation (CMS/HCC) [I48.19]  Yes      Resolved Hospital Problems   No resolved problems to display.           Hospital Course:  74-year-old man with medical history significant for persistent atrial fibrillation on Coumadin, hypothyroidism, hypertension and hyperlipidemia.  Presented to the emergency room with complaints of intermittent chest pain.  Stated chest pain is pressure-like, substernal, nonradiating with no aggravating or relieving factor.  After evaluating in the ED, patient was admitted for further work-up.  Serial troponin was insignificant and EKG showed no acute ST/T wave changes.  He underwent Myoview stress test on 2020 which was negative for ischemia but had fixed inferior defect and globally EF was normal.  Cardiology saw patient and recommended starting on PPI 40 mg daily and discharging patient home.  Patient is to follow-up with his primary cardiologist next week.  Cardiac catheterization will be considered if patient continues to have chest pain.  Patient was  advised to call cardiologist if symptoms worsens after discharge.    Of note is that patient serum creatinine is 1.36 and he is on daily NSAID- Mobic was discontinued.    Patient was hemodynamically stable at discharge        Recommendation for Outpatient Providers:             Reasons For Change In Medications and Indications for New Medications:        Day of Discharge     HPI:       Vital Signs:   Temp:  [97.6 °F (36.4 °C)-98 °F (36.7 °C)] 97.6 °F (36.4 °C)  Heart Rate:  [61-84] 84  Resp:  [16-18] 16  BP: (129-161)/() 145/80     Physical Exam:  Physical Exam   Constitutional: He is oriented to person, place, and time. He appears well-developed. No distress.   HENT:   Head: Normocephalic and atraumatic.   Eyes: Pupils are equal, round, and reactive to light. Conjunctivae and EOM are normal.   Neck: Neck supple.   Cardiovascular:   Irregularly irregular   Pulmonary/Chest: Effort normal and breath sounds normal. No respiratory distress.   Abdominal: Soft. Bowel sounds are normal. There is no tenderness.   Musculoskeletal: Normal range of motion.   Neurological: He is alert and oriented to person, place, and time. No cranial nerve deficit.   Skin: Skin is warm and dry.   Psychiatric: He has a normal mood and affect.   Vitals reviewed.      Pertinent  and/or Most Recent Results     Results from last 7 days   Lab Units 06/12/20 0519 06/12/20 0044 06/11/20  1307   WBC 10*3/mm3 7.50  --  6.70   HEMOGLOBIN g/dL 14.7  --  14.6   HEMATOCRIT % 45.7  --  45.3   PLATELETS 10*3/mm3 170  --  172   SODIUM mmol/L  --  140 140   POTASSIUM mmol/L  --  4.7 4.1   CHLORIDE mmol/L  --  105 102   CO2 mmol/L  --  23.0 27.0   BUN   --  21 10   CREATININE mg/dL  --  1.36* 1.37*   GLUCOSE mg/dL  --  73 117*   CALCIUM mg/dL  --  9.0 9.2     Results from last 7 days   Lab Units 06/12/20 0519 06/12/20 0044 06/11/20  1307   PROTIME Seconds 27.0 27.9 26.0   INR  2.87 2.96 2.75   APTT seconds  --   --  29.3           Invalid input(s):  TG, LDLCALC, LDLREALC  Results from last 7 days   Lab Units 06/12/20  1240 06/12/20  0519 06/12/20  0044 06/11/20  1307   TSH uIU/mL  --   --  4.040  --    PROBNP pg/mL  --   --   --  328.2   TROPONIN T ng/mL <0.010 <0.010 <0.010 <0.010       Brief Urine Lab Results     None          Microbiology Results Abnormal     None          Ct Head Without Contrast    Result Date: 6/11/2020  Impression: Age-appropriate atrophy. No acute process identified.  Electronically Signed By-Guilherme Guadarrama On:6/11/2020 2:26 PM This report was finalized on [id number] by  Guilherme Guadarrama, .    Ct Cervical Spine Without Contrast    Result Date: 6/11/2020  Impression: No acute cervical spine findings. Multilevel degenerative changes as described above.  Electronically Signed By-Dr. Gabrielle Georges MD On:6/11/2020 2:30 PM This report was finalized on [id number] by Dr. Gabrielle Georges MD.    Xr Chest 1 View    Result Date: 6/11/2020  Impression: No acute chest finding.  Electronically Signed By-Dr. Gabrielle Georges MD On:6/11/2020 1:06 PM This report was finalized on [id number] by Dr. Gabrielle Georges MD.                             Test Results Pending at Discharge        Procedures Performed           Consults:   Consults     Date and Time Order Name Status Description    6/12/2020 0850 Inpatient Cardiology Consult Completed     6/11/2020 1527 Hospitalist (on-call MD unless specified) Completed             Discharge Details        Discharge Medications      New Medications      Instructions Start Date   pantoprazole 40 MG EC tablet  Commonly known as:  Protonix   40 mg, Oral, Daily         Changes to Medications      Instructions Start Date   warfarin 5 MG tablet  Commonly known as:  COUMADIN  What changed:  Another medication with the same name was changed. Make sure you understand how and when to take each.   TAKE ONE TABLET BY MOUTH EVERY DAY AS DIRECTED      warfarin 1 MG tablet  Commonly known as:  COUMADIN  What changed:    when to take  this  additional instructions   TAKE ONE TABLET BY MOUTH DAILY AS DIRECTED         Continue These Medications      Instructions Start Date   alfuzosin 10 MG 24 hr tablet  Commonly known as:  UROXATRAL   10 mg, Oral, 2 Times Daily      Breo Ellipta 100-25 MCG/INH inhaler  Generic drug:  Fluticasone Furoate-Vilanterol   1 puff, Inhalation, Daily - RT      digoxin 250 MCG tablet  Commonly known as:  LANOXIN   TAKE ONE TABLET BY MOUTH EVERY DAY      fluticasone 50 MCG/ACT nasal spray  Commonly known as:  FLONASE   1 spray, Nasal, Daily      gabapentin 300 MG capsule  Commonly known as:  NEURONTIN   600 mg, Oral, 2 Times Daily      hydrALAZINE 50 MG tablet  Commonly known as:  APRESOLINE   TAKE ONE TABLET BY MOUTH TWICE DAILY      ipratropium-albuterol 0.5-2.5 mg/3 ml nebulizer  Commonly known as:  DUO-NEB   3 mL, Nebulization, 4 Times Daily PRN      levothyroxine 75 MCG tablet  Commonly known as:  SYNTHROID, LEVOTHROID   75 mcg, Oral, Daily      montelukast 10 MG tablet  Commonly known as:  SINGULAIR   10 mg, Oral, Every Evening      pravastatin 40 MG tablet  Commonly known as:  PRAVACHOL   TAKE ONE TABLET BY MOUTH EVERY NIGHT AT BEDTIME      sertraline 100 MG tablet  Commonly known as:  ZOLOFT   100 mg, Oral, Daily      theophylline 400 MG 24 hr tablet  Commonly known as:  UNIPHYL   400 mg, Oral, Daily      verapamil  MG 24 hr capsule  Commonly known as:  VERELAN   TAKE ONE CAPSULE BY MOUTH TWICE DAILY         Stop These Medications    meloxicam 15 MG tablet  Commonly known as:  MOBIC     omeprazole 20 MG capsule  Commonly known as:  priLOSEC            No Known Allergies      Discharge Disposition:  Home or Self Care    Diet:  Hospital:  Diet Order   Procedures   • NPO Diet   • Diet Cardiac; Healthy Heart         Discharge Activity:   Activity Instructions     Gradually Increase Activity Until at Pre-Hospitalization Level              CODE STATUS:    Code Status and Medical Interventions:   Ordered at: 06/11/20  1609     Code Status:    CPR     Medical Interventions (Level of Support Prior to Arrest):    Full         Follow-up Appointments  Future Appointments   Date Time Provider Department Center   6/25/2020  1:00 PM PROTIME, [facility]   10/1/2020 11:00 AM AUDRA Vargas MD [facility]       Additional Instructions for the Follow-ups that You Need to Schedule     Discharge Follow-up with PCP   As directed       Currently Documented PCP:    Uday Beltran MD    PCP Phone Number:    [phone]     Follow Up Details:  Follow-up with PCP as needed         Discharge Follow-up with Specified Provider: Follow-up with your cardiologist in 1 week   As directed      To:  Follow-up with your cardiologist in 1 week         Protime-INR    Jun 17, 2020 (Approximate)      Is Patient on anti-coag:  Yes                 Condition on Discharge:      Stable        Electronically signed by Natanael Pineda MD, 06/12/20, 3:22 PM.      Time: I spent  31  minutes on this discharge activity which included face-to-face encounter with the patient/reviewing the data in the system/coordination of the care with the nursing staff as well as consultants/documentation/entering orders.

## 2020-06-12 NOTE — SIGNIFICANT NOTE
06/12/20 1655   Discharge of Care   Discharge Mode wheel chair;ambulatory   Discharge Destination home   Discharged Accompanied by spouse   Discharge Contact Information if Applicable [phone]   Discharge Teaching Done  Yes   Learning Method Explanation;Teach Back     Patient called wife Desirae to provide transport home for pt. States will make own follow up appt with MD Vargas for next week.

## 2020-06-12 NOTE — PLAN OF CARE
Problem: Patient Care Overview  Goal: Plan of Care Review  Outcome: Ongoing (interventions implemented as appropriate)  Flowsheets  Taken 6/12/2020 0041  Progress: no change  Outcome Summary: Patient had myoview this AM. MD salmeron spoke to patient and can D/C on protonix and follow up with MD Vargas next week. Pt will discharge this evening.  Taken 6/12/2020 0710  Plan of Care Reviewed With: patient

## 2020-06-12 NOTE — PLAN OF CARE
Problem: Patient Care Overview  Goal: Plan of Care Review  Outcome: Ongoing (interventions implemented as appropriate)  Flowsheets (Taken 6/12/2020 5251)  Progress: no change  Plan of Care Reviewed With: patient  Outcome Summary: Patient was admitted this morning with chest pain. Has had no c/o pain since arriving on floor. Myoview planned for later today. Will continue to monitor.

## 2020-06-13 ENCOUNTER — READMISSION MANAGEMENT (OUTPATIENT)
Dept: CALL CENTER | Facility: HOSPITAL | Age: 75
End: 2020-06-13

## 2020-06-13 LAB
BH CV NUCLEAR PRIOR STUDY: 3
BH CV STRESS BP STAGE 1: NORMAL
BH CV STRESS BP STAGE 2: NORMAL
BH CV STRESS COMMENTS STAGE 1: NORMAL
BH CV STRESS COMMENTS STAGE 2: NORMAL
BH CV STRESS DOSE REGADENOSON STAGE 1: 0.4
BH CV STRESS DURATION MIN STAGE 1: 0
BH CV STRESS DURATION MIN STAGE 2: 4
BH CV STRESS DURATION SEC STAGE 1: 10
BH CV STRESS DURATION SEC STAGE 2: 0
BH CV STRESS HR STAGE 1: 78
BH CV STRESS HR STAGE 2: 87
BH CV STRESS PROTOCOL 1: NORMAL
BH CV STRESS RECOVERY BP: NORMAL MMHG
BH CV STRESS RECOVERY HR: 87 BPM
BH CV STRESS STAGE 1: 1
BH CV STRESS STAGE 2: 2
LV EF NUC BP: 56 %
MAXIMAL PREDICTED HEART RATE: 148 BPM
PERCENT MAX PREDICTED HR: 60.14 %
STRESS BASELINE BP: NORMAL MMHG
STRESS BASELINE HR: 63 BPM
STRESS PERCENT HR: 71 %
STRESS POST PEAK BP: NORMAL MMHG
STRESS POST PEAK HR: 89 BPM
STRESS TARGET HR: 126 BPM

## 2020-06-13 PROCEDURE — 93018 CV STRESS TEST I&R ONLY: CPT | Performed by: INTERNAL MEDICINE

## 2020-06-13 PROCEDURE — 78452 HT MUSCLE IMAGE SPECT MULT: CPT | Performed by: INTERNAL MEDICINE

## 2020-06-13 NOTE — OUTREACH NOTE
Prep Survey      Responses   [facility] facility patient discharged from?  [facility]   Is LACE score < 7 ?  Yes   Eligibility  Readm Mgmt   Discharge diagnosis  Chest pain, CHF, depressive disorder, GERD, HLD, Hypothyroidism, REGLA, COPD, essential HTN, PAF   COVID-19 Test Status  Not tested   Does the patient have one of the following disease processes/diagnoses(primary or secondary)?  Other [EF was normal, LACE <7]   Does the patient have Home health ordered?  No   Is there a DME ordered?  No   Comments regarding appointments  See AVS   Medication alerts for this patient  see AVS   Prep survey completed?  Yes          Madison Pearce RN

## 2020-06-14 LAB
BH CV ECHO MEAS - ACS: 2.3 CM
BH CV ECHO MEAS - AO MAX PG (FULL): -0.56 MMHG
BH CV ECHO MEAS - AO MAX PG: 5.4 MMHG
BH CV ECHO MEAS - AO MEAN PG (FULL): 0.51 MMHG
BH CV ECHO MEAS - AO MEAN PG: 3.2 MMHG
BH CV ECHO MEAS - AO ROOT AREA (BSA CORRECTED): 1.5
BH CV ECHO MEAS - AO ROOT AREA: 10.5 CM^2
BH CV ECHO MEAS - AO ROOT DIAM: 3.7 CM
BH CV ECHO MEAS - AO V2 MAX: 115.7 CM/SEC
BH CV ECHO MEAS - AO V2 MEAN: 83.4 CM/SEC
BH CV ECHO MEAS - AO V2 VTI: 20.7 CM
BH CV ECHO MEAS - AORTIC HR: 52.2 BPM
BH CV ECHO MEAS - AORTIC R-R: 1.2 SEC
BH CV ECHO MEAS - ASC AORTA: 3.1 CM
BH CV ECHO MEAS - AVA(I,A): 4.7 CM^2
BH CV ECHO MEAS - AVA(I,D): 4.7 CM^2
BH CV ECHO MEAS - AVA(V,A): 5.3 CM^2
BH CV ECHO MEAS - AVA(V,D): 5.3 CM^2
BH CV ECHO MEAS - BSA(HAYCOCK): 2.5 M^2
BH CV ECHO MEAS - BSA: 2.4 M^2
BH CV ECHO MEAS - BZI_BMI: 36.6 KILOGRAMS/M^2
BH CV ECHO MEAS - BZI_METRIC_HEIGHT: 182.9 CM
BH CV ECHO MEAS - BZI_METRIC_WEIGHT: 122.5 KG
BH CV ECHO MEAS - CI(AO): 4.7 L/MIN/M^2
BH CV ECHO MEAS - CI(LVOT): 2.1 L/MIN/M^2
BH CV ECHO MEAS - CO(AO): 11.4 L/MIN
BH CV ECHO MEAS - CO(LVOT): 5.1 L/MIN
BH CV ECHO MEAS - EDV(CUBED): 152.6 ML
BH CV ECHO MEAS - EDV(MOD-SP4): 74.1 ML
BH CV ECHO MEAS - EDV(TEICH): 138 ML
BH CV ECHO MEAS - EF(CUBED): 77.5 %
BH CV ECHO MEAS - EF(MOD-BP): 63 %
BH CV ECHO MEAS - EF(MOD-SP4): 62.8 %
BH CV ECHO MEAS - EF(TEICH): 69.1 %
BH CV ECHO MEAS - ESV(CUBED): 34.4 ML
BH CV ECHO MEAS - ESV(MOD-SP4): 27.6 ML
BH CV ECHO MEAS - ESV(TEICH): 42.6 ML
BH CV ECHO MEAS - FS: 39.1 %
BH CV ECHO MEAS - IVS/LVPW: 1.2
BH CV ECHO MEAS - IVSD: 1.9 CM
BH CV ECHO MEAS - LA DIMENSION(2D): 5.9 CM
BH CV ECHO MEAS - LV DIASTOLIC VOL/BSA (35-75): 30.6 ML/M^2
BH CV ECHO MEAS - LV MASS(C)D: 428.7 GRAMS
BH CV ECHO MEAS - LV MASS(C)DI: 177.1 GRAMS/M^2
BH CV ECHO MEAS - LV MAX PG: 5.9 MMHG
BH CV ECHO MEAS - LV MEAN PG: 2.7 MMHG
BH CV ECHO MEAS - LV SYSTOLIC VOL/BSA (12-30): 11.4 ML/M^2
BH CV ECHO MEAS - LV V1 MAX: 121.6 CM/SEC
BH CV ECHO MEAS - LV V1 MEAN: 73.6 CM/SEC
BH CV ECHO MEAS - LV V1 VTI: 19.3 CM
BH CV ECHO MEAS - LVIDD: 5.3 CM
BH CV ECHO MEAS - LVIDS: 3.3 CM
BH CV ECHO MEAS - LVOT AREA: 5.1 CM^2
BH CV ECHO MEAS - LVOT DIAM: 2.5 CM
BH CV ECHO MEAS - LVPWD: 1.5 CM
BH CV ECHO MEAS - MR MAX PG: 109 MMHG
BH CV ECHO MEAS - MR MAX VEL: 522.1 CM/SEC
BH CV ECHO MEAS - MV MAX PG: 3.9 MMHG
BH CV ECHO MEAS - MV MEAN PG: 1.6 MMHG
BH CV ECHO MEAS - MV V2 MAX: 98.4 CM/SEC
BH CV ECHO MEAS - MV V2 MEAN: 58.4 CM/SEC
BH CV ECHO MEAS - MV V2 VTI: 28 CM
BH CV ECHO MEAS - MVA(VTI): 3.5 CM^2
BH CV ECHO MEAS - PA ACC TIME: 0.13 SEC
BH CV ECHO MEAS - PA MAX PG (FULL): 0.11 MMHG
BH CV ECHO MEAS - PA MAX PG: 5.3 MMHG
BH CV ECHO MEAS - PA MEAN PG (FULL): 0.8 MMHG
BH CV ECHO MEAS - PA MEAN PG: 2.9 MMHG
BH CV ECHO MEAS - PA PR(ACCEL): 19.1 MMHG
BH CV ECHO MEAS - PA V2 MAX: 115.3 CM/SEC
BH CV ECHO MEAS - PA V2 MEAN: 80.5 CM/SEC
BH CV ECHO MEAS - PA V2 VTI: 20.5 CM
BH CV ECHO MEAS - PI END-D VEL: 82.3 CM/SEC
BH CV ECHO MEAS - PI MAX PG: 8.4 MMHG
BH CV ECHO MEAS - PI MAX VEL: 145.3 CM/SEC
BH CV ECHO MEAS - PVA(I,A): 7.9 CM^2
BH CV ECHO MEAS - PVA(I,D): 7.9 CM^2
BH CV ECHO MEAS - PVA(V,A): 7.6 CM^2
BH CV ECHO MEAS - PVA(V,D): 7.6 CM^2
BH CV ECHO MEAS - QP/QS: 1.7
BH CV ECHO MEAS - RAP SYSTOLE: 3 MMHG
BH CV ECHO MEAS - RV MAX PG: 5.2 MMHG
BH CV ECHO MEAS - RV MEAN PG: 2.1 MMHG
BH CV ECHO MEAS - RV V1 MAX: 114.2 CM/SEC
BH CV ECHO MEAS - RV V1 MEAN: 63.7 CM/SEC
BH CV ECHO MEAS - RV V1 VTI: 21.1 CM
BH CV ECHO MEAS - RVDD: 2.7 CM
BH CV ECHO MEAS - RVOT AREA: 7.7 CM^2
BH CV ECHO MEAS - RVOT DIAM: 3.1 CM
BH CV ECHO MEAS - RVSP: 23 MMHG
BH CV ECHO MEAS - SI(AO): 89.9 ML/M^2
BH CV ECHO MEAS - SI(CUBED): 48.8 ML/M^2
BH CV ECHO MEAS - SI(LVOT): 40.4 ML/M^2
BH CV ECHO MEAS - SI(MOD-SP4): 19.2 ML/M^2
BH CV ECHO MEAS - SI(TEICH): 39.4 ML/M^2
BH CV ECHO MEAS - SV(AO): 217.6 ML
BH CV ECHO MEAS - SV(CUBED): 118.2 ML
BH CV ECHO MEAS - SV(LVOT): 97.7 ML
BH CV ECHO MEAS - SV(MOD-SP4): 46.5 ML
BH CV ECHO MEAS - SV(RVOT): 161.8 ML
BH CV ECHO MEAS - SV(TEICH): 95.4 ML
BH CV ECHO MEAS - TR MAX VEL: 223.7 CM/SEC

## 2020-06-14 PROCEDURE — 93306 TTE W/DOPPLER COMPLETE: CPT | Performed by: INTERNAL MEDICINE

## 2020-06-16 ENCOUNTER — OFFICE VISIT (OUTPATIENT)
Dept: CARDIOLOGY | Facility: CLINIC | Age: 75
End: 2020-06-16

## 2020-06-16 VITALS
DIASTOLIC BLOOD PRESSURE: 68 MMHG | HEART RATE: 86 BPM | BODY MASS INDEX: 36.7 KG/M2 | WEIGHT: 271 LBS | HEIGHT: 72 IN | SYSTOLIC BLOOD PRESSURE: 104 MMHG | OXYGEN SATURATION: 100 %

## 2020-06-16 DIAGNOSIS — E78.2 MIXED HYPERLIPIDEMIA: ICD-10-CM

## 2020-06-16 DIAGNOSIS — J44.9 CHRONIC OBSTRUCTIVE PULMONARY DISEASE, UNSPECIFIED COPD TYPE (HCC): ICD-10-CM

## 2020-06-16 DIAGNOSIS — I50.9 CONGESTIVE HEART FAILURE, UNSPECIFIED HF CHRONICITY, UNSPECIFIED HEART FAILURE TYPE (HCC): ICD-10-CM

## 2020-06-16 DIAGNOSIS — I48.19 PERSISTENT ATRIAL FIBRILLATION (HCC): Primary | ICD-10-CM

## 2020-06-16 DIAGNOSIS — R07.89 ATYPICAL CHEST PAIN: ICD-10-CM

## 2020-06-16 DIAGNOSIS — I10 ESSENTIAL HYPERTENSION: ICD-10-CM

## 2020-06-16 DIAGNOSIS — G47.33 OBSTRUCTIVE SLEEP APNEA SYNDROME: ICD-10-CM

## 2020-06-16 PROCEDURE — 99213 OFFICE O/P EST LOW 20 MIN: CPT | Performed by: INTERNAL MEDICINE

## 2020-06-16 PROCEDURE — 93000 ELECTROCARDIOGRAM COMPLETE: CPT | Performed by: INTERNAL MEDICINE

## 2020-06-16 NOTE — PROGRESS NOTES
Cardiology Office Visit Note      Referring physician: Uday Beltran MD    Reason For Followup: Hospital follow up    HPI:  Efe Malloy is a 74 y.o. male presents Hospital  F/U  chronic persistent afib,  hypertensive heart dz, dyslipidemia, REGLA and reversible airway dz.     He typically maintains very good anticoagulation profiles through this office.    Call maintain satisfactory functional activity status with regards to routine ADLs, but is not engaged in a regular progressive exercise program.      He denies chest pain,  PND, orthopnea, palpitations, near syncope, lower extremity edema or feelings of her heart racing.     He reports dizziness and chest discomfort off and on last night.  His episodes of chest pain are very brief lasting typically only seconds and not associated with shortness of air diaphoresis or palpitations.  He has not had any predictable effort related angina episodes    He has been compliant with prescribed medications.          Past Medical History:   Diagnosis Date   • Asthma    • Atypical chest pain 6/11/2020   • COPD (chronic obstructive pulmonary disease) (CMS/[facility])    • Hyperlipidemia    • Kidney stone        Past Surgical History:   Procedure Laterality Date   • APPENDECTOMY     • CARDIAC CATHETERIZATION  2007    nonobstructive dz   • CHOLECYSTECTOMY     • EYE SURGERY Right     corneal transplant   • JOINT REPLACEMENT Right     knee   • KIDNEY STONE SURGERY     • PROSTATE SURGERY           Current Outpatient Medications:   •  alfuzosin (UROXATRAL) 10 MG 24 hr tablet, Take 10 mg by mouth 2 (Two) Times a Day., Disp: , Rfl: 11  •  BREO ELLIPTA 100-25 MCG/INH inhaler, Inhale 1 puff Daily., Disp: , Rfl: 5  •  digoxin (LANOXIN) 250 MCG tablet, TAKE ONE TABLET BY MOUTH EVERY DAY, Disp: 90 tablet, Rfl: 2  •  fluticasone (FLONASE) 50 MCG/ACT nasal spray, 1 spray into the nostril(s) as directed by provider Daily., Disp: , Rfl:   •  gabapentin (NEURONTIN) 300 MG capsule, Take 600 mg by  mouth 2 (Two) Times a Day., Disp: , Rfl: 3  •  hydrALAZINE (APRESOLINE) 50 MG tablet, TAKE ONE TABLET BY MOUTH TWICE DAILY, Disp: 60 tablet, Rfl: 2  •  levothyroxine (SYNTHROID, LEVOTHROID) 75 MCG tablet, Take 75 mcg by mouth Daily., Disp: , Rfl: 5  •  montelukast (SINGULAIR) 10 MG tablet, Take 10 mg by mouth Every Evening., Disp: , Rfl: 5  •  pantoprazole (Protonix) 40 MG EC tablet, Take 1 tablet by mouth Daily for 30 days., Disp: 30 tablet, Rfl: 0  •  pravastatin (PRAVACHOL) 40 MG tablet, TAKE ONE TABLET BY MOUTH EVERY NIGHT AT BEDTIME, Disp: 90 tablet, Rfl: 3  •  sertraline (ZOLOFT) 100 MG tablet, Take 100 mg by mouth Daily., Disp: , Rfl: 5  •  theophylline (UNIPHYL) 400 MG 24 hr tablet, Take 400 mg by mouth Daily., Disp: , Rfl: 5  •  warfarin (COUMADIN) 1 MG tablet, TAKE ONE TABLET BY MOUTH DAILY AS DIRECTED (Patient taking differently: Take 1 mg by mouth 1 (One) Time Per Week. Take with 5 mg tablet on Monday only), Disp: 30 tablet, Rfl: 0  •  warfarin (COUMADIN) 5 MG tablet, TAKE ONE TABLET BY MOUTH EVERY DAY AS DIRECTED, Disp: 30 tablet, Rfl: 0  •  verapamil ER (VERELAN) 240 MG 24 hr capsule, TAKE ONE CAPSULE BY MOUTH TWICE DAILY, Disp: 90 capsule, Rfl: 2    Social History     Socioeconomic History   • Marital status:      Spouse name: Not on file   • Number of children: Not on file   • Years of education: Not on file   • Highest education level: Not on file   Tobacco Use   • Smoking status: Former Smoker   • Smokeless tobacco: Never Used   Substance and Sexual Activity   • Alcohol use: Yes     Comment: occasionally   • Drug use: Defer   • Sexual activity: Defer       Family History   Problem Relation Age of Onset   • Heart disease Mother    • Hypertension Mother    • Diabetes Father    • Stroke Father    • Hypertension Father    • Cancer Father         esophageal. prostate, skin   • Diabetes Sister    • Thyroid disease Sister    • Heart disease Maternal Grandmother    • Diabetes Paternal Grandmother   "        Review of Systems   General: denies fever, chills, anorexia, weight loss  Eyes: denies blurring, diplopia  Ear/Nose/Throat: denies ear pain, nosebleeds, hoarseness  Cardiovascular: See HPI  Respiratory: denies excessive sputum, hemoptysis, wheezing  Gastrointestinal: denies nausea, vomiting, change in bowel habits, abdominal pain  Genitourinary: No hematuria or dysuria; nocturia usually once per night  Musculoskeletal: Mild to moderate chronic low back pain and lower extremity weightbearing arthralgias  Skin: denies rashes, itching, suspicious lesions  Neurologic: denies focal neuro deficits  Psychiatric: denies depression, anxiety  Endocrine: denies cold intolerance, heat intolerance  Hematologic/Lymphatic: denies abnormal bruising, bleeding  Allergic/Immunologic: denies urticaria or persistent infections      Objective     Visit Vitals  /68   Pulse 86   Ht 182.9 cm (72.01\")   Wt 123 kg (271 lb)   SpO2 100%   BMI 36.75 kg/m²           Physical Exam  General:     Obese, well developed,, in no acute distress.    Head:     normocephalic and atraumatic.    Eyes:    PERRL/EOM intact, conjunctiva and sclera clear with out nystagmus.    Neck:    no jvd or bruits  Chest Wall:    no deformities   Lungs:    clear bilaterally to auscultation with adequate global airflow   Heart:    non-displaced PMI; regular rate and rhythm, normal S1, S2 without murmurs, rubs, or gallops  Abdomen:  Soft, nontender without HSM  Msk:    no deformity; adequate R OM  Pulses:    pulses normal in all 4 extremities.    Extremities:    no clubbing, cyanosis, edema   Neurologic:    no focal sensory or motor deficits  Skin:    intact without lesions or rashes.    Psych:    alert and cooperative; normal mood and affect; normal attention span and concentration.            ECG 12 Lead  Date/Time: 6/16/2020 8:19 AM  Performed by: AUDRA Vargas MD  Authorized by: AUDRA Vargas MD   Comparison: compared with previous ECG   Similar to " previous ECG  Comparison to previous ECG: A Fib   Non Specific T wave abnormalities  HR 79  Rhythm: atrial fibrillation  Conduction: left anterior fascicular block and non-specific intraventricular conduction delay    Clinical impression: abnormal EKG              Assessment:   Problems Addressed this Visit        Cardiovascular and Mediastinum    Persistent atrial fibrillation (CMS/HCC) - Primary     Satisfactorily rate controlled and fully anticoagulated on Coumadin therapy         Relevant Orders    ECG 12 Lead    Essential hypertension     Hypertension is well regulated on current medications         Relevant Orders    ECG 12 Lead    Congestive heart failure (CMS/HCC)     Congestive heart failure due to diastolic dysfunction; currently well compensated         Relevant Orders    ECG 12 Lead    Hyperlipidemia     Lipid abnormalities are maintained on pravastatin; followed by PCP         Relevant Orders    ECG 12 Lead       Respiratory    COPD (chronic obstructive pulmonary disease) (CMS/HCC)    Relevant Orders    ECG 12 Lead    Obstructive sleep apnea syndrome     Compliant with CPAP therapy            Nervous and Auditory    Atypical chest pain    Relevant Orders    ECG 12 Lead            Plan:  Continue current medications as listed and reviewed in detail as he appears to be well compensated and relatively asymptomatic.  Once again, we discussed the features of ischemic coronary disease versus atypical chest pain which he typically has.  Weight reduction of a progressive exercise are encouraged.  Return to clinic 6 months or sooner if needed    AUDRA Vargas MD  6/29/2020 02:40    This report was generated using the Dragon voice recognition system.

## 2020-06-22 RX ORDER — VERAPAMIL HYDROCHLORIDE 240 MG/1
CAPSULE, EXTENDED RELEASE ORAL
Qty: 90 CAPSULE | Refills: 2 | Status: SHIPPED | OUTPATIENT
Start: 2020-06-22 | End: 2020-10-12

## 2020-06-25 ENCOUNTER — ANTICOAGULATION VISIT (OUTPATIENT)
Dept: CARDIOLOGY | Facility: CLINIC | Age: 75
End: 2020-06-25

## 2020-06-25 DIAGNOSIS — I48.19 PERSISTENT ATRIAL FIBRILLATION (HCC): ICD-10-CM

## 2020-06-25 LAB — INR PPP: 2.3 (ref 2–3)

## 2020-06-25 PROCEDURE — 36416 COLLJ CAPILLARY BLOOD SPEC: CPT | Performed by: INTERNAL MEDICINE

## 2020-06-25 PROCEDURE — 85610 PROTHROMBIN TIME: CPT | Performed by: INTERNAL MEDICINE

## 2020-06-29 RX ORDER — WARFARIN SODIUM 1 MG/1
TABLET ORAL
Qty: 30 TABLET | Refills: 0 | Status: SHIPPED | OUTPATIENT
Start: 2020-06-29 | End: 2020-07-29

## 2020-06-29 RX ORDER — WARFARIN SODIUM 5 MG/1
TABLET ORAL
Qty: 30 TABLET | Refills: 0 | Status: SHIPPED | OUTPATIENT
Start: 2020-06-29 | End: 2020-07-29

## 2020-07-23 ENCOUNTER — ANTICOAGULATION VISIT (OUTPATIENT)
Dept: CARDIOLOGY | Facility: CLINIC | Age: 75
End: 2020-07-23

## 2020-07-23 DIAGNOSIS — I48.19 PERSISTENT ATRIAL FIBRILLATION (HCC): ICD-10-CM

## 2020-07-23 LAB — INR PPP: 2.3 (ref 2–3)

## 2020-07-23 PROCEDURE — 36416 COLLJ CAPILLARY BLOOD SPEC: CPT | Performed by: INTERNAL MEDICINE

## 2020-07-23 PROCEDURE — 85610 PROTHROMBIN TIME: CPT | Performed by: INTERNAL MEDICINE

## 2020-07-29 RX ORDER — WARFARIN SODIUM 1 MG/1
TABLET ORAL
Qty: 30 TABLET | Refills: 0 | Status: ON HOLD | OUTPATIENT
Start: 2020-07-29 | End: 2022-04-10

## 2020-07-29 RX ORDER — WARFARIN SODIUM 5 MG/1
TABLET ORAL
Qty: 30 TABLET | Refills: 0 | Status: SHIPPED | OUTPATIENT
Start: 2020-07-29 | End: 2020-09-25

## 2020-08-17 RX ORDER — HYDRALAZINE HYDROCHLORIDE 50 MG/1
TABLET, FILM COATED ORAL
Qty: 60 TABLET | Refills: 2 | Status: SHIPPED | OUTPATIENT
Start: 2020-08-17 | End: 2020-11-23

## 2020-08-20 ENCOUNTER — ANTICOAGULATION VISIT (OUTPATIENT)
Dept: CARDIOLOGY | Facility: CLINIC | Age: 75
End: 2020-08-20

## 2020-08-20 DIAGNOSIS — I48.19 PERSISTENT ATRIAL FIBRILLATION (HCC): ICD-10-CM

## 2020-08-20 LAB — INR PPP: 2.8 (ref 2–3)

## 2020-08-20 PROCEDURE — 85610 PROTHROMBIN TIME: CPT | Performed by: INTERNAL MEDICINE

## 2020-08-20 PROCEDURE — 36416 COLLJ CAPILLARY BLOOD SPEC: CPT | Performed by: INTERNAL MEDICINE

## 2020-09-17 ENCOUNTER — ANTICOAGULATION VISIT (OUTPATIENT)
Dept: CARDIOLOGY | Facility: CLINIC | Age: 75
End: 2020-09-17

## 2020-09-17 DIAGNOSIS — I48.19 PERSISTENT ATRIAL FIBRILLATION (HCC): ICD-10-CM

## 2020-09-17 LAB — INR PPP: 2 (ref 2–3)

## 2020-09-17 PROCEDURE — 85610 PROTHROMBIN TIME: CPT | Performed by: INTERNAL MEDICINE

## 2020-09-17 PROCEDURE — 36416 COLLJ CAPILLARY BLOOD SPEC: CPT | Performed by: INTERNAL MEDICINE

## 2020-09-21 RX ORDER — DIGOXIN 250 MCG
TABLET ORAL
Qty: 90 TABLET | Refills: 2 | Status: SHIPPED | OUTPATIENT
Start: 2020-09-21 | End: 2021-01-27

## 2020-09-25 RX ORDER — WARFARIN SODIUM 5 MG/1
TABLET ORAL
Qty: 30 TABLET | Refills: 5 | Status: SHIPPED | OUTPATIENT
Start: 2020-09-25 | End: 2021-03-19

## 2020-10-13 RX ORDER — VERAPAMIL HYDROCHLORIDE 240 MG/1
CAPSULE, EXTENDED RELEASE ORAL
Qty: 180 CAPSULE | Refills: 2 | Status: SHIPPED | OUTPATIENT
Start: 2020-10-13 | End: 2021-01-27

## 2020-10-15 ENCOUNTER — ANTICOAGULATION VISIT (OUTPATIENT)
Dept: CARDIOLOGY | Facility: CLINIC | Age: 75
End: 2020-10-15

## 2020-10-15 DIAGNOSIS — I48.19 PERSISTENT ATRIAL FIBRILLATION (HCC): ICD-10-CM

## 2020-10-15 LAB — INR PPP: 2.4 (ref 2–3)

## 2020-10-15 PROCEDURE — 85610 PROTHROMBIN TIME: CPT | Performed by: INTERNAL MEDICINE

## 2020-10-15 PROCEDURE — 36416 COLLJ CAPILLARY BLOOD SPEC: CPT | Performed by: INTERNAL MEDICINE

## 2020-11-12 ENCOUNTER — ANTICOAGULATION VISIT (OUTPATIENT)
Dept: CARDIOLOGY | Facility: CLINIC | Age: 75
End: 2020-11-12

## 2020-11-12 DIAGNOSIS — I48.19 PERSISTENT ATRIAL FIBRILLATION (HCC): ICD-10-CM

## 2020-11-12 LAB — INR PPP: 2.1 (ref 2–3)

## 2020-11-12 PROCEDURE — 85610 PROTHROMBIN TIME: CPT | Performed by: INTERNAL MEDICINE

## 2020-11-12 PROCEDURE — 36416 COLLJ CAPILLARY BLOOD SPEC: CPT | Performed by: INTERNAL MEDICINE

## 2020-11-23 RX ORDER — HYDRALAZINE HYDROCHLORIDE 50 MG/1
TABLET, FILM COATED ORAL
Qty: 60 TABLET | Refills: 2 | Status: SHIPPED | OUTPATIENT
Start: 2020-11-23 | End: 2021-02-24

## 2020-12-17 ENCOUNTER — OFFICE VISIT (OUTPATIENT)
Dept: CARDIOLOGY | Facility: CLINIC | Age: 75
End: 2020-12-17

## 2020-12-17 ENCOUNTER — ANTICOAGULATION VISIT (OUTPATIENT)
Dept: CARDIOLOGY | Facility: CLINIC | Age: 75
End: 2020-12-17

## 2020-12-17 VITALS
SYSTOLIC BLOOD PRESSURE: 122 MMHG | OXYGEN SATURATION: 99 % | BODY MASS INDEX: 36.98 KG/M2 | DIASTOLIC BLOOD PRESSURE: 68 MMHG | HEIGHT: 72 IN | WEIGHT: 273 LBS | HEART RATE: 80 BPM

## 2020-12-17 DIAGNOSIS — R55 NEAR SYNCOPE: ICD-10-CM

## 2020-12-17 DIAGNOSIS — I10 ESSENTIAL HYPERTENSION: ICD-10-CM

## 2020-12-17 DIAGNOSIS — I50.9 CONGESTIVE HEART FAILURE, UNSPECIFIED HF CHRONICITY, UNSPECIFIED HEART FAILURE TYPE (HCC): ICD-10-CM

## 2020-12-17 DIAGNOSIS — R07.89 ATYPICAL CHEST PAIN: ICD-10-CM

## 2020-12-17 DIAGNOSIS — E78.2 MIXED HYPERLIPIDEMIA: ICD-10-CM

## 2020-12-17 DIAGNOSIS — I48.19 PERSISTENT ATRIAL FIBRILLATION (HCC): Primary | ICD-10-CM

## 2020-12-17 DIAGNOSIS — I48.19 PERSISTENT ATRIAL FIBRILLATION (HCC): ICD-10-CM

## 2020-12-17 PROBLEM — M47.817 LUMBOSACRAL SPONDYLOSIS WITHOUT MYELOPATHY: Status: ACTIVE | Noted: 2020-11-11

## 2020-12-17 PROBLEM — M47.812 CERVICAL SPONDYLOSIS WITHOUT MYELOPATHY: Status: ACTIVE | Noted: 2020-11-11

## 2020-12-17 PROBLEM — M54.81 CERVICO-OCCIPITAL NEURALGIA: Status: ACTIVE | Noted: 2020-11-11

## 2020-12-17 LAB — INR PPP: 2.1 (ref 2–3)

## 2020-12-17 PROCEDURE — 85610 PROTHROMBIN TIME: CPT | Performed by: INTERNAL MEDICINE

## 2020-12-17 PROCEDURE — 99213 OFFICE O/P EST LOW 20 MIN: CPT | Performed by: INTERNAL MEDICINE

## 2020-12-17 PROCEDURE — 93000 ELECTROCARDIOGRAM COMPLETE: CPT | Performed by: INTERNAL MEDICINE

## 2020-12-17 PROCEDURE — 36416 COLLJ CAPILLARY BLOOD SPEC: CPT | Performed by: INTERNAL MEDICINE

## 2020-12-17 RX ORDER — ALBUTEROL SULFATE 2.5 MG/3ML
SOLUTION RESPIRATORY (INHALATION) EVERY 6 HOURS PRN
COMMUNITY
Start: 2020-11-18 | End: 2021-08-05 | Stop reason: SDUPTHER

## 2020-12-17 RX ORDER — PANTOPRAZOLE SODIUM 40 MG/1
40 TABLET, DELAYED RELEASE ORAL DAILY
COMMUNITY
Start: 2020-11-27 | End: 2022-02-22 | Stop reason: SDUPTHER

## 2020-12-17 NOTE — PROGRESS NOTES
Cardiology Office Visit Note      Referring physician:  Uday Beltran MD    Reason For Followup: 6 month follow up A-fib/CP    HPI:  Efe Malloy is a 75 y.o. male. Presents to [facility] for a follow up visit for Chest pain, Near Syncope and Afib. History of chronic persistent afib, hypertensive heart dz, dyslipidemia, REGLA and reversible airway dz.  He denies   PND, orthopnea, palpitations, lower extremity edema or feelings of her heart racing. Currently getting C-Spine Epidural injections.     Despite multiple previous evaluations for ischemic coronary disease and improved unremarkable, Efe continues to have rare to occasional mid retrosternal chest discomfort described as pressure-like sensations not associated with shortness of air, diaphoresis, palpitations or other typical anginal features and not having any anginal type radiation.  These are not provoked by exertional activity or any predictable other events or situations.    Today's INR was 2.1     06/12/2020 Stress test-Myocardial perfusion imaging indicates a normal myocardial perfusion study with no evidence of ischemia. Impressions are consistent with a low risk study. This is normal Cardiolite imaging stress test with no evidence of ischemia or myocardial infarction. Left ventricular ejection fraction is normal (Calculated EF = 56%).   Echo-Left ventricular systolic function is normal. Calculated EF = 63.0%. Estimated EF appears to be in the range of 61 - 65%.     He has been compliant with prescribed medications.            Past Medical History:   Diagnosis Date   • Asthma    • Atypical chest pain 6/11/2020   • COPD (chronic obstructive pulmonary disease) (CMS/[facility])    • Hyperlipidemia    • Kidney stone    • Near syncope 12/17/2020       Past Surgical History:   Procedure Laterality Date   • APPENDECTOMY     • CARDIAC CATHETERIZATION  2007    nonobstructive dz   • CERVICAL EPIDURAL      with Dr. Harshil Rodgers mgt   • CHOLECYSTECTOMY     • EYE SURGERY  Bilateral     corneal transplant left 09/02/2020 (Right eye 2014)   • JOINT REPLACEMENT Right     knee   • KIDNEY STONE SURGERY     • PROSTATE SURGERY           Current Outpatient Medications:   •  albuterol (PROVENTIL) (2.5 MG/3ML) 0.083% nebulizer solution, INHALE ONE vial via NEBULIZER THREE TIMES DAILY, Disp: , Rfl:   •  alfuzosin (UROXATRAL) 10 MG 24 hr tablet, Take 10 mg by mouth 2 (Two) Times a Day., Disp: , Rfl: 11  •  BREO ELLIPTA 100-25 MCG/INH inhaler, Inhale 1 puff Daily., Disp: , Rfl: 5  •  digoxin (LANOXIN) 250 MCG tablet, TAKE ONE TABLET BY MOUTH EVERY DAY, Disp: 90 tablet, Rfl: 2  •  fluticasone (FLONASE) 50 MCG/ACT nasal spray, 1 spray into the nostril(s) as directed by provider Daily., Disp: , Rfl:   •  gabapentin (NEURONTIN) 300 MG capsule, Take 600 mg by mouth 2 (Two) Times a Day., Disp: , Rfl: 3  •  hydrALAZINE (APRESOLINE) 50 MG tablet, TAKE ONE TABLET BY MOUTH TWICE DAILY, Disp: 60 tablet, Rfl: 2  •  levothyroxine (SYNTHROID, LEVOTHROID) 75 MCG tablet, Take 75 mcg by mouth Daily., Disp: , Rfl: 5  •  montelukast (SINGULAIR) 10 MG tablet, Take 10 mg by mouth Every Evening., Disp: , Rfl: 5  •  pantoprazole (PROTONIX) 40 MG EC tablet, Take 40 mg by mouth Daily., Disp: , Rfl:   •  pravastatin (PRAVACHOL) 40 MG tablet, TAKE ONE TABLET BY MOUTH EVERY NIGHT AT BEDTIME, Disp: 90 tablet, Rfl: 3  •  sertraline (ZOLOFT) 100 MG tablet, Take 150 mg by mouth Daily., Disp: , Rfl: 5  •  theophylline (UNIPHYL) 400 MG 24 hr tablet, Take 400 mg by mouth Daily., Disp: , Rfl: 5  •  verapamil ER (VERELAN) 240 MG 24 hr capsule, TAKE ONE CAPSULE BY MOUTH TWICE DAILY, Disp: 180 capsule, Rfl: 2  •  warfarin (COUMADIN) 1 MG tablet, TAKE ONE TABLET BY MOUTH DAILY AS DIRECTED, Disp: 30 tablet, Rfl: 0  •  warfarin (COUMADIN) 5 MG tablet, TAKE ONE TABLET BY MOUTH EVERY DAY AS DIRECTED, Disp: 30 tablet, Rfl: 5    Social History     Socioeconomic History   • Marital status:      Spouse name: Not on file   • Number of  "children: Not on file   • Years of education: Not on file   • Highest education level: Not on file   Tobacco Use   • Smoking status: Former Smoker   • Smokeless tobacco: Never Used   Substance and Sexual Activity   • Alcohol use: Yes     Comment: occasionally   • Drug use: Defer   • Sexual activity: Defer       Family History   Problem Relation Age of Onset   • Heart disease Mother    • Hypertension Mother    • Diabetes Father    • Stroke Father    • Hypertension Father    • Cancer Father         esophageal. prostate, skin   • Diabetes Sister    • Thyroid disease Sister    • Heart disease Maternal Grandmother    • Diabetes Paternal Grandmother          Review of Systems   General: denies fever, chills, anorexia, weight loss  Eyes: denies blurring, diplopia  Ear/Nose/Throat: denies ear pain, nosebleeds, hoarseness  Cardiovascular: See HPI  Respiratory: denies excessive sputum, hemoptysis, wheezing  Gastrointestinal: denies nausea, vomiting, change in bowel habits, abdominal pain  Genitourinary: No hematuria dysuria only occasional nocturia usually not more than once per night  Musculoskeletal: Mild to moderate low back pain and weightbearing arthralgias  Skin: denies rashes, itching, suspicious lesions  Neurologic: denies focal neuro deficits  Psychiatric: denies depression, anxiety  Endocrine: denies cold intolerance, heat intolerance  Hematologic/Lymphatic: denies abnormal bruising, bleeding  Allergic/Immunologic: denies urticaria or persistent infections      Objective     Visit Vitals  /68   Pulse 80   Ht 182.9 cm (72.01\")   Wt 124 kg (273 lb)   SpO2 99%   BMI 37.02 kg/m²           Physical Exam  General:     Obese, well developed,, in no acute distress.    Head:     normocephalic and atraumatic.    Eyes:    PERRL/EOM intact, conjunctiva and sclera clear with out nystagmus.    Neck:    no jvd or bruits  Chest Wall:    no deformities   Lungs:    clear bilaterally to auscultation with adequate global airflow "   Heart:    non-displaced PMI; regular rate and rhythm, normal S1, S2 without murmurs, rubs, or gallops  Abdomen:  Soft, nontender without HSM  Msk:    no deformity; adequate R OM  Pulses:    pulses normal in all 4 extremities.    Extremities:    no clubbing, cyanosis, edema   Neurologic:    no focal sensory or motor deficits  Skin:    intact without lesions or rashes.    Psych:    alert and cooperative; normal mood and affect; normal attention span and concentration.            ECG 12 Lead    Date/Time: 12/17/2020 9:49 AM  Performed by: AUDRA Vargas MD  Authorized by: AUDRA Vargas MD   Comparison: compared with previous ECG from 6/16/2020  Similar to previous ECG  Rhythm: atrial fibrillation    Clinical impression: abnormal EKG  Comments: No change in previous tracing              Assessment:   Problems Addressed this Visit        Cardiovascular and Mediastinum    Persistent atrial fibrillation (CMS/HCC) - Primary    -Remains sufficiently rate controlled and fully anticoagulated        Essential hypertension    -Well-regulated on current medications as listed and reviewed in detail        Congestive heart failure (CMS/HCC)    -Remains hemodynamically stable well compensated and symptom-free        Hyperlipidemia-maintained on Pravachol; followed by PCP    Near syncope       Nervous and Auditory    Atypical chest pain      Diagnoses       Codes Comments    Persistent atrial fibrillation (CMS/HCC)    -  Primary ICD-10-CM: I48.19  ICD-9-CM: 427.31     Essential hypertension     ICD-10-CM: I10  ICD-9-CM: 401.9     Congestive heart failure, unspecified HF chronicity, unspecified heart failure type (CMS/HCC)     ICD-10-CM: I50.9  ICD-9-CM: 428.0     Mixed hyperlipidemia     ICD-10-CM: E78.2  ICD-9-CM: 272.2     Near syncope     ICD-10-CM: R55  ICD-9-CM: 780.2     Atypical chest pain     ICD-10-CM: R07.89  ICD-9-CM: 786.59             Plan:  Continue current medications as listed and reviewed in detail  today.  Encouraged continued efforts at weight reduction with regular progressive exercise.  I will prescribe sublingual nitroglycerin for indeterminate chest pain syndrome which may be related to vasospasm or even esophageal spasm or chest wall pain.  Return to clinic 6 to 12 months or sooner if needed.    AUDRA Vargas MD  12/17/2020 12:55 EST    This report was generated using the Dragon voice recognition system.

## 2021-01-14 ENCOUNTER — TELEPHONE (OUTPATIENT)
Dept: CARDIOLOGY | Facility: CLINIC | Age: 76
End: 2021-01-14

## 2021-01-14 NOTE — TELEPHONE ENCOUNTER
He tested positive for the COVID yesterday  Small cough and fever  They put him on doxycycline , furosemide and  potassium  They told him if he had trouble breathing or other issuesto go to the ER  His irregular heart beat is more pronounced He just wanted to let you know what's going on

## 2021-01-17 ENCOUNTER — HOSPITAL ENCOUNTER (EMERGENCY)
Facility: HOSPITAL | Age: 76
Discharge: HOME | End: 2021-01-17
Admitting: EMERGENCY MEDICINE

## 2021-01-17 VITALS
OXYGEN SATURATION: 97 % | BODY MASS INDEX: 36.05 KG/M2 | TEMPERATURE: 99.8 F | DIASTOLIC BLOOD PRESSURE: 77 MMHG | HEART RATE: 89 BPM | WEIGHT: 272 LBS | SYSTOLIC BLOOD PRESSURE: 109 MMHG | RESPIRATION RATE: 16 BRPM | HEIGHT: 73 IN

## 2021-01-17 DIAGNOSIS — U07.1 COVID-19: Primary | ICD-10-CM

## 2021-01-17 LAB
ANION GAP SERPL CALCULATED.3IONS-SCNC: 12 MMOL/L (ref 5–15)
BASOPHILS # BLD AUTO: 0 10*3/MM3 (ref 0–0.2)
BASOPHILS NFR BLD AUTO: 0.7 % (ref 0–1.5)
BUN SERPL-MCNC: 18 MG/DL (ref 8–23)
BUN/CREAT SERPL: 11.4 (ref 7–25)
CALCIUM SPEC-SCNC: 9 MG/DL (ref 8.6–10.5)
CHLORIDE SERPL-SCNC: 99 MMOL/L (ref 98–107)
CO2 SERPL-SCNC: 28 MMOL/L (ref 22–29)
CREAT SERPL-MCNC: 1.58 MG/DL (ref 0.76–1.27)
DEPRECATED RDW RBC AUTO: 46.8 FL (ref 37–54)
DIGOXIN SERPL-MCNC: 1.4 NG/ML (ref 0.6–1.2)
EOSINOPHIL # BLD AUTO: 0 10*3/MM3 (ref 0–0.4)
EOSINOPHIL NFR BLD AUTO: 0 % (ref 0.3–6.2)
ERYTHROCYTE [DISTWIDTH] IN BLOOD BY AUTOMATED COUNT: 18.1 % (ref 12.3–15.4)
GFR SERPL CREATININE-BSD FRML MDRD: 43 ML/MIN/1.73
GLUCOSE SERPL-MCNC: 112 MG/DL (ref 65–99)
HCT VFR BLD AUTO: 47.4 % (ref 37.5–51)
HGB BLD-MCNC: 16.3 G/DL (ref 13–17.7)
INR PPP: 2.25 (ref 2–3)
INR PPP: 2.25 (ref 2–3)
LYMPHOCYTES # BLD AUTO: 1.2 10*3/MM3 (ref 0.7–3.1)
LYMPHOCYTES NFR BLD AUTO: 23.9 % (ref 19.6–45.3)
MCH RBC QN AUTO: 25.3 PG (ref 26.6–33)
MCHC RBC AUTO-ENTMCNC: 34.3 G/DL (ref 31.5–35.7)
MCV RBC AUTO: 73.7 FL (ref 79–97)
MONOCYTES # BLD AUTO: 0.6 10*3/MM3 (ref 0.1–0.9)
MONOCYTES NFR BLD AUTO: 11 % (ref 5–12)
NEUTROPHILS NFR BLD AUTO: 3.3 10*3/MM3 (ref 1.7–7)
NEUTROPHILS NFR BLD AUTO: 64.4 % (ref 42.7–76)
NRBC BLD AUTO-RTO: 0.1 /100 WBC (ref 0–0.2)
PLATELET # BLD AUTO: 124 10*3/MM3 (ref 140–450)
PMV BLD AUTO: 7.3 FL (ref 6–12)
POTASSIUM SERPL-SCNC: 3.8 MMOL/L (ref 3.5–5.2)
PROTHROMBIN TIME: 23.8 SECONDS (ref 19.4–28.5)
RBC # BLD AUTO: 6.43 10*6/MM3 (ref 4.14–5.8)
SODIUM SERPL-SCNC: 139 MMOL/L (ref 136–145)
THEOPHYLLINE SERPL-MCNC: 11.3 MCG/ML (ref 10–20)
WBC # BLD AUTO: 5.1 10*3/MM3 (ref 3.4–10.8)

## 2021-01-17 PROCEDURE — 99284 EMERGENCY DEPT VISIT MOD MDM: CPT

## 2021-01-17 PROCEDURE — 25010000006 BAMLANIVIMAB 700 MG/20ML SOLUTION 20 ML VIAL: Performed by: NURSE PRACTITIONER

## 2021-01-17 PROCEDURE — 80048 BASIC METABOLIC PNL TOTAL CA: CPT | Performed by: NURSE PRACTITIONER

## 2021-01-17 PROCEDURE — 96374 THER/PROPH/DIAG INJ IV PUSH: CPT

## 2021-01-17 PROCEDURE — M0239 BAMLANIVIMAB-XXXX INFUSION: HCPCS | Performed by: NURSE PRACTITIONER

## 2021-01-17 PROCEDURE — 85025 COMPLETE CBC W/AUTO DIFF WBC: CPT | Performed by: NURSE PRACTITIONER

## 2021-01-17 PROCEDURE — 85610 PROTHROMBIN TIME: CPT | Performed by: NURSE PRACTITIONER

## 2021-01-17 PROCEDURE — 80198 ASSAY OF THEOPHYLLINE: CPT | Performed by: NURSE PRACTITIONER

## 2021-01-17 PROCEDURE — 25010000002 ONDANSETRON PER 1 MG: Performed by: NURSE PRACTITIONER

## 2021-01-17 PROCEDURE — 80162 ASSAY OF DIGOXIN TOTAL: CPT | Performed by: NURSE PRACTITIONER

## 2021-01-17 PROCEDURE — 36415 COLL VENOUS BLD VENIPUNCTURE: CPT

## 2021-01-17 RX ORDER — METHYLPREDNISOLONE SODIUM SUCCINATE 125 MG/2ML
125 INJECTION, POWDER, LYOPHILIZED, FOR SOLUTION INTRAMUSCULAR; INTRAVENOUS ONCE AS NEEDED
Status: DISCONTINUED | OUTPATIENT
Start: 2021-01-17 | End: 2021-01-18 | Stop reason: HOSPADM

## 2021-01-17 RX ORDER — ONDANSETRON 2 MG/ML
4 INJECTION INTRAMUSCULAR; INTRAVENOUS ONCE
Status: COMPLETED | OUTPATIENT
Start: 2021-01-17 | End: 2021-01-17

## 2021-01-17 RX ORDER — EPINEPHRINE 1 MG/ML
0.3 INJECTION, SOLUTION, CONCENTRATE INTRAVENOUS ONCE AS NEEDED
Status: DISCONTINUED | OUTPATIENT
Start: 2021-01-17 | End: 2021-01-18 | Stop reason: HOSPADM

## 2021-01-17 RX ORDER — DIPHENHYDRAMINE HYDROCHLORIDE 50 MG/ML
50 INJECTION INTRAMUSCULAR; INTRAVENOUS ONCE AS NEEDED
Status: DISCONTINUED | OUTPATIENT
Start: 2021-01-17 | End: 2021-01-18 | Stop reason: HOSPADM

## 2021-01-17 RX ORDER — SODIUM CHLORIDE 0.9 % (FLUSH) 0.9 %
10 SYRINGE (ML) INJECTION AS NEEDED
Status: DISCONTINUED | OUTPATIENT
Start: 2021-01-17 | End: 2021-01-18 | Stop reason: HOSPADM

## 2021-01-17 RX ORDER — ACETAMINOPHEN 500 MG
1000 TABLET ORAL ONCE
Status: COMPLETED | OUTPATIENT
Start: 2021-01-17 | End: 2021-01-17

## 2021-01-17 RX ORDER — SODIUM CHLORIDE 9 MG/ML
30 INJECTION, SOLUTION INTRAVENOUS ONCE
Status: COMPLETED | OUTPATIENT
Start: 2021-01-17 | End: 2021-01-17

## 2021-01-17 RX ADMIN — ACETAMINOPHEN 1000 MG: 500 TABLET, FILM COATED ORAL at 20:30

## 2021-01-17 RX ADMIN — SODIUM CHLORIDE 1000 ML: 9 INJECTION, SOLUTION INTRAVENOUS at 19:09

## 2021-01-17 RX ADMIN — SODIUM CHLORIDE 700 MG: 9 INJECTION, SOLUTION INTRAVENOUS at 19:18

## 2021-01-17 RX ADMIN — SODIUM CHLORIDE 30 ML: 9 INJECTION, SOLUTION INTRAVENOUS at 20:46

## 2021-01-17 RX ADMIN — ONDANSETRON 4 MG: 2 INJECTION, SOLUTION INTRAMUSCULAR; INTRAVENOUS at 19:09

## 2021-01-17 NOTE — ED NOTES
The FDA has authorized the emergency use of bamlanivimab, which is not an FDA approved drug. Discussions with Efe Malloy, regarding the risks and benefits of bamlanivimab have occurred. The patient, Efe Malloy, recognizes that this is an investigational treatment which may offer significant known and potential benefits and risks, the extent of which are unknown. Information on available alternative treatments and the risks and benefits of those alternatives was discussed. The patient, Efe Malloy, received the “Fact Sheet for Patients Parents and Caregivers”. All questions from the patient, Efe Malloy, were answered to satisfaction. Efe Malloy has the option to accept or refuse treatment with bamlanivimab and would like to accept treatment.    Counseling regarding continued self isolation and use of infection control measures according to the CDC guidelines has occurred.     Cindi Lewis RN  01/17/21 [id number]

## 2021-01-17 NOTE — ED PROVIDER NOTES
Subjective   History: Patient is a 75-year-old male complains of feeling tired over the last week.  States he had dry heaves for the last 2 days and has not been eating and drinking much.  States he began having Covid symptoms January 12 and tested + January 13 at [facility] pharmacy.  States he does not really think he has any chest pain or shortness of breath, just does not feel well overall.  States he has a history of A. fib and stays in A. fib, takes medications for the irregular heartbeat has not missed any dosages.      Onset: 2 days  Location:   Duration: Waxes and wanes  Character: Dry heaves  Aggravating/Alleviating factors: None  Radiation not applicable  Severity: Moderate            Review of Systems   Constitutional: Positive for fatigue. Negative for chills and fever.   HENT: Negative for congestion, sore throat, tinnitus and trouble swallowing.    Eyes: Negative for photophobia, discharge and visual disturbance.   Respiratory: Negative for cough and shortness of breath.    Cardiovascular: Negative for chest pain.   Gastrointestinal: Positive for nausea. Negative for abdominal pain, diarrhea and vomiting.   Genitourinary: Negative for dysuria, frequency and urgency.   Musculoskeletal: Negative for back pain and myalgias.   Skin: Negative for rash.   Neurological: Negative for dizziness and headaches.   Psychiatric/Behavioral: Negative for confusion.       Past Medical History:   Diagnosis Date   • Asthma    • Atypical chest pain 6/11/2020   • COPD (chronic obstructive pulmonary disease) (CMS/[facility])    • COVID-19 01/13/2021   • Hyperlipidemia    • Kidney stone    • Near syncope 12/17/2020       Allergies   Allergen Reactions   • Penicillins Other (See Comments)     AS A CHILD   • Rivaroxaban Other (See Comments)       Past Surgical History:   Procedure Laterality Date   • APPENDECTOMY     • CARDIAC CATHETERIZATION  2007    nonobstructive dz   • CERVICAL EPIDURAL      with Dr. Harshil Rodgers mgt   •  CHOLECYSTECTOMY     • EYE SURGERY Bilateral     corneal transplant left 09/02/2020 (Right eye 2014)   • JOINT REPLACEMENT Right     knee   • KIDNEY STONE SURGERY     • PROSTATE SURGERY         Family History   Problem Relation Age of Onset   • Heart disease Mother    • Hypertension Mother    • Diabetes Father    • Stroke Father    • Hypertension Father    • Cancer Father         esophageal. prostate, skin   • Diabetes Sister    • Thyroid disease Sister    • Heart disease Maternal Grandmother    • Diabetes Paternal Grandmother        Social History     Socioeconomic History   • Marital status:      Spouse name: Not on file   • Number of children: Not on file   • Years of education: Not on file   • Highest education level: Not on file   Tobacco Use   • Smoking status: Former Smoker   • Smokeless tobacco: Never Used   Substance and Sexual Activity   • Alcohol use: Yes     Comment: occasionally   • Drug use: Defer   • Sexual activity: Defer           Objective   Physical Exam  Constitutional:       Appearance: Normal appearance. He is obese. He is ill-appearing.   HENT:      Head: Normocephalic and atraumatic.      Right Ear: Tympanic membrane normal.      Left Ear: Tympanic membrane normal.      Mouth/Throat:      Mouth: Mucous membranes are dry.      Pharynx: Oropharynx is clear.   Eyes:      Pupils: Pupils are equal, round, and reactive to light.   Neck:      Musculoskeletal: Normal range of motion.   Cardiovascular:      Rate and Rhythm: Normal rate. Rhythm irregular.      Pulses: Normal pulses.      Heart sounds: No murmur.      Comments: A. fib, controlled rate, patient baseline  Pulmonary:      Effort: Pulmonary effort is normal. No respiratory distress.      Breath sounds: No stridor. No wheezing, rhonchi or rales.      Comments: Room air sat 98%.  Able to speak in complete sentences without pausing for breath between words.  Abdominal:      General: Bowel sounds are normal. There is no distension.       "Palpations: Abdomen is soft.      Tenderness: There is no abdominal tenderness. There is no guarding or rebound.      Hernia: No hernia is present.   Musculoskeletal: Normal range of motion.   Skin:     General: Skin is warm and dry.      Findings: No rash.   Neurological:      Mental Status: He is alert and oriented to person, place, and time.      GCS: GCS eye subscore is 4. GCS verbal subscore is 5. GCS motor subscore is 6.      Cranial Nerves: No dysarthria or facial asymmetry.      Sensory: No sensory deficit.      Motor: No pronator drift.      Coordination: Finger-Nose-Finger Test normal.      Comments: Able to ambulate independently.  3 object recall over 15 seconds intact.  Follows commands.   Psychiatric:         Mood and Affect: Mood normal.         Behavior: Behavior normal.         Thought Content: Thought content normal.         Judgment: Judgment normal.         Procedures           ED Course      /80 (BP Location: Left arm, Patient Position: Sitting)   Pulse 93   Temp 98.6 °F (37 °C) (Oral)   Resp 18   Ht 185.4 cm (73\")   Wt 123 kg (272 lb)   SpO2 98%   BMI 35.89 kg/m²   Labs Reviewed   BASIC METABOLIC PANEL - Abnormal; Notable for the following components:       Result Value    Glucose 112 (*)     Creatinine 1.58 (*)     eGFR Non  Amer 43 (*)     All other components within normal limits    Narrative:     GFR Normal >60  Chronic Kidney Disease <60  Kidney Failure <15     DIGOXIN LEVEL - Abnormal; Notable for the following components:    Digoxin 1.40 (*)     All other components within normal limits   CBC WITH AUTO DIFFERENTIAL - Abnormal; Notable for the following components:    RBC 6.43 (*)     MCV 73.7 (*)     MCH 25.3 (*)     RDW 18.1 (*)     Platelets 124 (*)     Eosinophil % 0.0 (*)     All other components within normal limits   PROTIME-INR - Normal   THEOPHYLLINE LEVEL - Normal   CBC AND DIFFERENTIAL    Narrative:     The following orders were created for panel order CBC & " Differential.  Procedure                               Abnormality         Status                     ---------                               -----------         ------                     Scan Slide[[id number]]                                                                  CBC Auto Differential[[id number]]        Abnormal            Final result                 Please view results for these tests on the individual orders.     Medications   EPINEPHrine PF (ADRENALIN) injection 0.3 mg (has no administration in time range)   diphenhydrAMINE (BENADRYL) injection 50 mg (has no administration in time range)   methylPREDNISolone sodium succinate (SOLU-Medrol) injection 125 mg (has no administration in time range)   sodium chloride 0.9 % flush 10 mL (has no administration in time range)   bamlanivimab 700 mg in sodium chloride 0.9 % 270 mL IVPB (0 mg Intravenous Stopped 1/17/21 2045)   sodium chloride 0.9 % infusion 30 mL (0 mL Intravenous Stopped 1/17/21 2052)   sodium chloride 0.9 % bolus 1,000 mL (0 mL Intravenous Stopped 1/17/21 2003)   ondansetron (ZOFRAN) injection 4 mg (4 mg Intravenous Given 1/17/21 1909)   acetaminophen (TYLENOL) tablet 1,000 mg (1,000 mg Oral Given 1/17/21 2030)     No radiology results for the last day                                       MDM     I examined the patient using the appropriate personal protective equipment.      DISPOSITION:   Chart Review:  Comorbidity:  has a past medical history of Asthma, Atypical chest pain (6/11/2020), COPD (chronic obstructive pulmonary disease) (CMS/[facility]), COVID-19 (01/13/2021), Hyperlipidemia, Kidney stone, and Near syncope (12/17/2020).    Labs: CBC white count 5.10 H&H 16.3 and 47.4.  BMP glucose 112 creatinine 1.58 digoxin 1.4    No radiology results for the last day    Disposition/Treatment:    Patient presented after being Covid positive since January 13.  Reports his symptoms started the prior day on January 12.  He states he has had increased  tiredness and dry heaves x2 days.  Patient has been on room air for the entire stay in ER sat is 98% heart rate 93.  Patient met criteria for and agreed to receive Bamlanivamab.  He did spike a fever while in .6 was given Tylenol with resolution of fever 97.9 patient tolerated infusion well.  Discussed emergent reasons to return to ER otherwise follow-up primary care provider.  May take over-the-counter medications for mild symptoms.  Tylenol for fever.  Also discussed pronation during sleep with patient.  Patient agreeable to plan of care.      Final diagnoses:   COVID-19            Karen Brannon, APRN  01/17/21 [id number]

## 2021-01-18 NOTE — DISCHARGE INSTRUCTIONS
May take Tylenol or over-the-counter medications for mild symptoms.  Sleep on your stomach is much as possible at night.  Return to ER for worsening of symptoms otherwise follow-up primary care provider.

## 2021-01-22 ENCOUNTER — ANTICOAGULATION VISIT (OUTPATIENT)
Dept: CARDIOLOGY | Facility: CLINIC | Age: 76
End: 2021-01-22

## 2021-01-22 DIAGNOSIS — I48.19 PERSISTENT ATRIAL FIBRILLATION (HCC): ICD-10-CM

## 2021-01-27 RX ORDER — PRAVASTATIN SODIUM 40 MG
TABLET ORAL
Qty: 90 TABLET | Refills: 3 | Status: SHIPPED | OUTPATIENT
Start: 2021-01-27 | End: 2022-04-18 | Stop reason: SDUPTHER

## 2021-01-27 RX ORDER — VERAPAMIL HYDROCHLORIDE 240 MG/1
CAPSULE, EXTENDED RELEASE ORAL
Qty: 180 CAPSULE | Refills: 2 | Status: SHIPPED | OUTPATIENT
Start: 2021-01-27 | End: 2021-10-04

## 2021-01-27 RX ORDER — DIGOXIN 250 MCG
TABLET ORAL
Qty: 90 TABLET | Refills: 2 | Status: SHIPPED | OUTPATIENT
Start: 2021-01-27 | End: 2021-11-18

## 2021-02-12 ENCOUNTER — OFFICE VISIT (OUTPATIENT)
Dept: CARDIOLOGY | Facility: CLINIC | Age: 76
End: 2021-02-12

## 2021-02-12 ENCOUNTER — ANTICOAGULATION VISIT (OUTPATIENT)
Dept: CARDIOLOGY | Facility: CLINIC | Age: 76
End: 2021-02-12

## 2021-02-12 VITALS
OXYGEN SATURATION: 97 % | HEIGHT: 73 IN | SYSTOLIC BLOOD PRESSURE: 110 MMHG | BODY MASS INDEX: 36.05 KG/M2 | HEART RATE: 84 BPM | WEIGHT: 272 LBS | DIASTOLIC BLOOD PRESSURE: 74 MMHG

## 2021-02-12 DIAGNOSIS — I10 ESSENTIAL HYPERTENSION: ICD-10-CM

## 2021-02-12 DIAGNOSIS — I48.19 PERSISTENT ATRIAL FIBRILLATION (HCC): ICD-10-CM

## 2021-02-12 DIAGNOSIS — R55 NEAR SYNCOPE: Primary | ICD-10-CM

## 2021-02-12 LAB — INR PPP: 3.5 (ref 2–3)

## 2021-02-12 PROCEDURE — 93000 ELECTROCARDIOGRAM COMPLETE: CPT | Performed by: INTERNAL MEDICINE

## 2021-02-12 PROCEDURE — 36416 COLLJ CAPILLARY BLOOD SPEC: CPT | Performed by: INTERNAL MEDICINE

## 2021-02-12 PROCEDURE — 99213 OFFICE O/P EST LOW 20 MIN: CPT | Performed by: INTERNAL MEDICINE

## 2021-02-12 PROCEDURE — 85610 PROTHROMBIN TIME: CPT | Performed by: INTERNAL MEDICINE

## 2021-02-12 RX ORDER — POTASSIUM CHLORIDE 750 MG/1
TABLET, FILM COATED, EXTENDED RELEASE ORAL
COMMUNITY
Start: 2021-01-13 | End: 2021-08-05 | Stop reason: ALTCHOICE

## 2021-02-12 RX ORDER — FUROSEMIDE 20 MG/1
TABLET ORAL
COMMUNITY
Start: 2021-01-13 | End: 2021-08-05 | Stop reason: ALTCHOICE

## 2021-02-12 NOTE — PROGRESS NOTES
Cardiology Office Visit Note      Referring physician:  Dr. Uday Beltran    Reason For Followup: Dizziness    HPI:  Efe Malloy is a 75 y.o. male who come to the office today with increasing episodes of dizziness over the last month or so.     He was seen in the ER January 17th and diagnosed with Covid 19. Since then he has been getting worse  . He has seen an ENT and there is a lot of wax build up- they advised cleaning his ears and using white vinegar. He states yesterday he was in the store and almost fell. He has not checked his blood pressure when these incidents occur. He has also had injections in his neck with pain management and he has had a lot of pain since that was done.    Nonetheless, he has had no chest pain, increased shortness of air, palpitations or near syncope.  He denies any PND orthopnea or bipedal edema.  He has had no persistent fever, cough, shortness of air or anosmia.    His medication list was reviewed by his memory during his visit today- no bottles or list to review.    Past Medical History:   Diagnosis Date   • Asthma    • Atypical chest pain 6/11/2020   • COPD (chronic obstructive pulmonary disease) (CMS/[facility])    • COVID-19 01/13/2021   • Hyperlipidemia    • Kidney stone    • Near syncope 12/17/2020       Past Surgical History:   Procedure Laterality Date   • APPENDECTOMY     • CARDIAC CATHETERIZATION  2007    nonobstructive dz   • CERVICAL EPIDURAL      with Dr. Harshil Rodgers mgjohn   • CHOLECYSTECTOMY     • EYE SURGERY Bilateral     corneal transplant left 09/02/2020 (Right eye 2014)   • JOINT REPLACEMENT Right     knee   • KIDNEY STONE SURGERY     • PROSTATE SURGERY           Current Outpatient Medications:   •  alfuzosin (UROXATRAL) 10 MG 24 hr tablet, Take 10 mg by mouth 2 (Two) Times a Day., Disp: , Rfl: 11  •  BREO ELLIPTA 100-25 MCG/INH inhaler, Inhale 1 puff Daily., Disp: , Rfl: 5  •  digoxin (LANOXIN) 250 MCG tablet, TAKE ONE TABLET BY MOUTH EVERY DAY, Disp: 90 tablet, Rfl:  2  •  fluticasone (FLONASE) 50 MCG/ACT nasal spray, 1 spray into the nostril(s) as directed by provider Daily., Disp: , Rfl:   •  gabapentin (NEURONTIN) 300 MG capsule, Take 600 mg by mouth 2 (Two) Times a Day., Disp: , Rfl: 3  •  hydrALAZINE (APRESOLINE) 50 MG tablet, TAKE ONE TABLET BY MOUTH TWICE DAILY, Disp: 60 tablet, Rfl: 2  •  levothyroxine (SYNTHROID, LEVOTHROID) 75 MCG tablet, Take 75 mcg by mouth Daily., Disp: , Rfl: 5  •  montelukast (SINGULAIR) 10 MG tablet, Take 10 mg by mouth Every Evening., Disp: , Rfl: 5  •  pantoprazole (PROTONIX) 40 MG EC tablet, Take 40 mg by mouth Daily., Disp: , Rfl:   •  pravastatin (PRAVACHOL) 40 MG tablet, TAKE ONE TABLET BY MOUTH EVERY NIGHT AT BEDTIME, Disp: 90 tablet, Rfl: 3  •  sertraline (ZOLOFT) 100 MG tablet, Take 150 mg by mouth Daily., Disp: , Rfl: 5  •  theophylline (UNIPHYL) 400 MG 24 hr tablet, Take 400 mg by mouth Daily., Disp: , Rfl: 5  •  Unable to find, Trimix 30/2/20mcg, Disp: , Rfl:   •  verapamil ER (VERELAN) 240 MG 24 hr capsule, TAKE ONE CAPSULE BY MOUTH TWICE DAILY, Disp: 180 capsule, Rfl: 2  •  warfarin (COUMADIN) 1 MG tablet, TAKE ONE TABLET BY MOUTH DAILY AS DIRECTED, Disp: 30 tablet, Rfl: 0  •  warfarin (COUMADIN) 5 MG tablet, TAKE ONE TABLET BY MOUTH EVERY DAY AS DIRECTED, Disp: 30 tablet, Rfl: 5  •  albuterol (PROVENTIL) (2.5 MG/3ML) 0.083% nebulizer solution, Every 6 (Six) Hours As Needed., Disp: , Rfl:   •  EC-RX Testosterone 10 % cream, APPLY FOUR CLICKS DAILY AS DIRECTED, Disp: , Rfl:   •  furosemide (LASIX) 20 MG tablet, , Disp: , Rfl:   •  potassium chloride 10 MEQ CR tablet, , Disp: , Rfl:     Social History     Socioeconomic History   • Marital status:      Spouse name: Not on file   • Number of children: Not on file   • Years of education: Not on file   • Highest education level: Not on file   Tobacco Use   • Smoking status: Former Smoker   • Smokeless tobacco: Never Used   Substance and Sexual Activity   • Alcohol use: Yes      "Comment: occasionally   • Drug use: Defer   • Sexual activity: Defer       Family History   Problem Relation Age of Onset   • Heart disease Mother    • Hypertension Mother    • Diabetes Father    • Stroke Father    • Hypertension Father    • Cancer Father         esophageal. prostate, skin   • Diabetes Sister    • Thyroid disease Sister    • Heart disease Maternal Grandmother    • Diabetes Paternal Grandmother          Review of Systems   General: denies fever, chills, anorexia, weight loss  Eyes: denies blurring, diplopia  Ear/Nose/Throat: denies ear pain, nosebleeds, hoarseness  Cardiovascular: See HPI  Respiratory: denies excessive sputum, hemoptysis, wheezing  Gastrointestinal: denies nausea, vomiting, change in bowel habits, abdominal pain  Genitourinary: BPH usually 1 or 2 times per night; no hematuria or dysuria  Musculoskeletal: Modest low back pain and weightbearing joint arthralgias though not functionally limiting  Skin: denies rashes, itching, suspicious lesions  Neurologic: denies focal neuro deficits  Psychiatric: denies depression, anxiety  Endocrine: denies cold intolerance, heat intolerance  Hematologic/Lymphatic: denies abnormal bruising, bleeding  Allergic/Immunologic: denies urticaria or persistent infections      Objective     Visit Vitals  /74   Pulse 84   Ht 185.4 cm (73\")   Wt 123 kg (272 lb)   SpO2 97%   BMI 35.89 kg/m²           Physical Exam  General:     Obese, well developed,, in no acute distress.    Head:     normocephalic and atraumatic.    Eyes:    PERRL/EOM intact, conjunctiva and sclera clear with out nystagmus.    Neck:    no jvd or bruits  Chest Wall:    no deformities   Lungs:    clear bilaterally to auscultation with adequate global airflow   Heart:    non-displaced PMI; irregularly irregular rate and rhythm consistent with A. fib with controlled ventricular rates, normal S1, S2 without murmurs, rubs, or gallops  Abdomen:  Soft, nontender without HSM  Msk:    no " deformity; adequate R OM  Pulses:    pulses normal in all 4 extremities.    Extremities:    no clubbing, cyanosis, edema   Neurologic:    no focal sensory or motor deficits  Skin:    intact without lesions or rashes.    Psych:    alert and cooperative; normal mood and affect; normal attention span and concentration.            ECG 12 Lead    Date/Time: 2/12/2021 10:48 AM  Performed by: AUDRA Vargas MD  Authorized by: AUDRA Vargas MD   Comparison: compared with previous ECG from 12/17/2020  Rhythm: atrial fibrillation  Conduction: left anterior fascicular block  Other findings: non-specific ST-T wave changes    Clinical impression: abnormal EKG  Comments: No change in previous tracing              Assessment:   Problems Addressed this Visit        Other    Persistent atrial fibrillation (CMS/HCC)    -Remains sufficiently rate controlled; fully anticoagulated on warfarin therapy with generally well controlled anticoagulant profiles        Essential hypertension    -Well-regulated on current medication listed and reviewed                Near syncope - Primary    -Recent episode somewhat like dizziness of noncardiac etiology          Diagnoses       Codes Comments    Near syncope    -  Primary ICD-10-CM: R55  ICD-9-CM: 780.2     Persistent atrial fibrillation (CMS/HCC)     ICD-10-CM: I48.19  ICD-9-CM: 427.31     Essential hypertension     ICD-10-CM: I10  ICD-9-CM: 401.9             Plan:  Overall Mr. Malloy appears hemodynamically stable well compensated despite permanent A. fib with controlled ventricular rates.  He is advised to continue his care medications as listed and reviewed in detail.  Strongly encouraged towards weight reduction and regular breast exercise, though strongly cautioned against risk for falls.  Return to clinic 6 months or sooner if needed    AUDRA Vargas MD  2/12/2021 12:01 EST    This report was generated using the Dragon voice recognition system.

## 2021-02-24 RX ORDER — HYDRALAZINE HYDROCHLORIDE 50 MG/1
TABLET, FILM COATED ORAL
Qty: 60 TABLET | Refills: 2 | Status: SHIPPED | OUTPATIENT
Start: 2021-02-24 | End: 2021-05-17

## 2021-03-11 ENCOUNTER — ANTICOAGULATION VISIT (OUTPATIENT)
Dept: CARDIOLOGY | Facility: CLINIC | Age: 76
End: 2021-03-11

## 2021-03-11 DIAGNOSIS — I48.19 PERSISTENT ATRIAL FIBRILLATION (HCC): ICD-10-CM

## 2021-03-11 LAB — INR PPP: 2.9 (ref 2–3)

## 2021-03-11 PROCEDURE — 85610 PROTHROMBIN TIME: CPT | Performed by: INTERNAL MEDICINE

## 2021-03-11 PROCEDURE — 36416 COLLJ CAPILLARY BLOOD SPEC: CPT | Performed by: INTERNAL MEDICINE

## 2021-03-19 RX ORDER — WARFARIN SODIUM 5 MG/1
TABLET ORAL
Qty: 30 TABLET | Refills: 5 | Status: SHIPPED | OUTPATIENT
Start: 2021-03-19 | End: 2021-09-13

## 2021-04-13 ENCOUNTER — ANTICOAGULATION VISIT (OUTPATIENT)
Dept: CARDIOLOGY | Facility: CLINIC | Age: 76
End: 2021-04-13

## 2021-04-13 DIAGNOSIS — I48.19 PERSISTENT ATRIAL FIBRILLATION (HCC): Primary | ICD-10-CM

## 2021-04-13 LAB — INR PPP: 2.5 (ref 2–3)

## 2021-04-13 PROCEDURE — 85610 PROTHROMBIN TIME: CPT | Performed by: INTERNAL MEDICINE

## 2021-04-13 PROCEDURE — 36416 COLLJ CAPILLARY BLOOD SPEC: CPT | Performed by: INTERNAL MEDICINE

## 2021-05-13 ENCOUNTER — ANTICOAGULATION VISIT (OUTPATIENT)
Dept: CARDIOLOGY | Facility: CLINIC | Age: 76
End: 2021-05-13

## 2021-05-13 DIAGNOSIS — I48.19 PERSISTENT ATRIAL FIBRILLATION (HCC): Primary | ICD-10-CM

## 2021-05-13 PROBLEM — K59.04 CHRONIC IDIOPATHIC CONSTIPATION: Status: ACTIVE | Noted: 2020-02-13

## 2021-05-13 PROBLEM — H91.90 HEARING LOSS: Status: ACTIVE | Noted: 2018-07-05

## 2021-05-13 PROBLEM — M19.90 ARTHRITIS: Status: ACTIVE | Noted: 2018-07-05

## 2021-05-13 PROBLEM — N42.9 DISORDER OF PROSTATE: Status: ACTIVE | Noted: 2018-07-05

## 2021-05-13 PROBLEM — J30.2 SEASONAL ALLERGIC RHINITIS: Status: ACTIVE | Noted: 2018-07-05

## 2021-05-13 PROBLEM — M10.9 GOUT: Status: ACTIVE | Noted: 2018-07-05

## 2021-05-13 PROBLEM — K62.82 DYSPLASIA OF ANUS: Status: ACTIVE | Noted: 2018-07-05

## 2021-05-13 LAB — INR PPP: 3.1 (ref 2–3)

## 2021-05-13 PROCEDURE — 85610 PROTHROMBIN TIME: CPT | Performed by: INTERNAL MEDICINE

## 2021-05-13 PROCEDURE — 36416 COLLJ CAPILLARY BLOOD SPEC: CPT | Performed by: INTERNAL MEDICINE

## 2021-05-17 RX ORDER — HYDRALAZINE HYDROCHLORIDE 50 MG/1
TABLET, FILM COATED ORAL
Qty: 60 TABLET | Refills: 2 | Status: SHIPPED | OUTPATIENT
Start: 2021-05-17 | End: 2021-08-11

## 2021-06-12 ENCOUNTER — HOSPITAL ENCOUNTER (EMERGENCY)
Facility: HOSPITAL | Age: 76
Discharge: HOME | End: 2021-06-12
Attending: EMERGENCY MEDICINE | Admitting: EMERGENCY MEDICINE

## 2021-06-12 VITALS
TEMPERATURE: 98.6 F | SYSTOLIC BLOOD PRESSURE: 150 MMHG | RESPIRATION RATE: 16 BRPM | WEIGHT: 270.73 LBS | HEIGHT: 73 IN | OXYGEN SATURATION: 100 % | DIASTOLIC BLOOD PRESSURE: 95 MMHG | BODY MASS INDEX: 35.88 KG/M2 | HEART RATE: 75 BPM

## 2021-06-12 DIAGNOSIS — S61.211A LACERATION OF LEFT INDEX FINGER WITHOUT FOREIGN BODY WITHOUT DAMAGE TO NAIL, INITIAL ENCOUNTER: Primary | ICD-10-CM

## 2021-06-12 LAB
INR PPP: 1.69 (ref 2–3)
PROTHROMBIN TIME: 18.1 SECONDS (ref 19.4–28.5)

## 2021-06-12 PROCEDURE — 99283 EMERGENCY DEPT VISIT LOW MDM: CPT

## 2021-06-12 PROCEDURE — 85610 PROTHROMBIN TIME: CPT | Performed by: EMERGENCY MEDICINE

## 2021-06-13 NOTE — ED PROVIDER NOTES
Subjective   Patient is a 74-year-old male complaint laceration to his left index finger from a piece of metal.  He states had difficulty controlling the bleeding.  He denies other associated complaints          Review of Systems  Negative for numbness tingling or other complaint  Past Medical History:   Diagnosis Date   • Asthma    • Atypical chest pain 6/11/2020   • COPD (chronic obstructive pulmonary disease) (CMS/[facility])    • COVID-19 01/13/2021   • Hyperlipidemia    • Kidney stone    • Near syncope 12/17/2020       Allergies   Allergen Reactions   • Penicillins Other (See Comments)     AS A CHILD   • Rivaroxaban Other (See Comments)       Past Surgical History:   Procedure Laterality Date   • APPENDECTOMY     • CARDIAC CATHETERIZATION  2007    nonobstructive dz   • CERVICAL EPIDURAL      with Dr. Harshil Rodgers mgt   • CHOLECYSTECTOMY     • EYE SURGERY Bilateral     corneal transplant left 09/02/2020 (Right eye 2014)   • JOINT REPLACEMENT Right     knee   • KIDNEY STONE SURGERY     • PROSTATE SURGERY         Family History   Problem Relation Age of Onset   • Heart disease Mother    • Hypertension Mother    • Diabetes Father    • Stroke Father    • Hypertension Father    • Cancer Father         esophageal. prostate, skin   • Diabetes Sister    • Thyroid disease Sister    • Heart disease Maternal Grandmother    • Diabetes Paternal Grandmother        Social History     Socioeconomic History   • Marital status:      Spouse name: Not on file   • Number of children: Not on file   • Years of education: Not on file   • Highest education level: Not on file   Tobacco Use   • Smoking status: Former Smoker   • Smokeless tobacco: Never Used   Vaping Use   • Vaping Use: Never used   Substance and Sexual Activity   • Alcohol use: Yes     Comment: occasionally   • Drug use: Defer   • Sexual activity: Defer           Objective   Physical Exam  Extremity exam shows a 1/2 cm laceration over the dorsum of the patient's left index  finger.  Wound edges are well approximated.  No active bleeding is noted.  He has no tendon dysfunction is neurovascular intact.  Procedures           ED Course                                           MDM  Number of Diagnoses or Management Options  Diagnosis management comments: She has been cleaned and a sterile dressing applied.  INR is mildly low.  Patient will take 1 extra Coumadin pill and follow his MD for recheck as needed.       Amount and/or Complexity of Data Reviewed  Clinical lab tests: reviewed    Risk of Complications, Morbidity, and/or Mortality  Presenting problems: low  Diagnostic procedures: low  Management options: low    Patient Progress  Patient progress: stable      Final diagnoses:   Laceration of left index finger without foreign body without damage to nail, initial encounter       ED Disposition  ED Disposition     ED Disposition Condition Comment    Discharge Stable           No follow-up provider specified.       Medication List      No changes were made to your prescriptions during this visit.          Everardo House MD  06/12/21 [id number]

## 2021-06-24 ENCOUNTER — ANTICOAGULATION VISIT (OUTPATIENT)
Dept: CARDIOLOGY | Facility: CLINIC | Age: 76
End: 2021-06-24

## 2021-06-24 DIAGNOSIS — I48.19 PERSISTENT ATRIAL FIBRILLATION (HCC): Primary | ICD-10-CM

## 2021-06-24 LAB — INR PPP: 2.6 (ref 2–3)

## 2021-06-24 PROCEDURE — 85610 PROTHROMBIN TIME: CPT | Performed by: INTERNAL MEDICINE

## 2021-06-24 PROCEDURE — 36416 COLLJ CAPILLARY BLOOD SPEC: CPT | Performed by: INTERNAL MEDICINE

## 2021-07-22 ENCOUNTER — ANTICOAGULATION VISIT (OUTPATIENT)
Dept: CARDIOLOGY | Facility: CLINIC | Age: 76
End: 2021-07-22

## 2021-07-22 DIAGNOSIS — I48.19 PERSISTENT ATRIAL FIBRILLATION (HCC): Primary | ICD-10-CM

## 2021-07-22 LAB — INR PPP: 2.4 (ref 2–3)

## 2021-07-22 PROCEDURE — 85610 PROTHROMBIN TIME: CPT | Performed by: INTERNAL MEDICINE

## 2021-07-22 PROCEDURE — 36416 COLLJ CAPILLARY BLOOD SPEC: CPT | Performed by: INTERNAL MEDICINE

## 2021-08-04 RX ORDER — ALBUTEROL SULFATE 90 UG/1
2 AEROSOL, METERED RESPIRATORY (INHALATION) EVERY 6 HOURS PRN
COMMUNITY
Start: 2021-07-06

## 2021-08-04 RX ORDER — BACLOFEN 10 MG/1
10 TABLET ORAL 2 TIMES DAILY
COMMUNITY
Start: 2021-07-13

## 2021-08-04 RX ORDER — FLUDROCORTISONE ACETATE 0.1 MG/1
TABLET ORAL 2 TIMES DAILY
COMMUNITY
Start: 2021-07-07 | End: 2021-08-05 | Stop reason: ALTCHOICE

## 2021-08-04 RX ORDER — AZELASTINE HCL 205.5 UG/1
SPRAY NASAL
COMMUNITY
Start: 2021-05-17 | End: 2021-08-05

## 2021-08-04 RX ORDER — FLUOROMETHOLONE 0.1 %
1 SUSPENSION, DROPS(FINAL DOSAGE FORM)(ML) OPHTHALMIC (EYE) DAILY
COMMUNITY
Start: 2021-05-17

## 2021-08-04 RX ORDER — EPINEPHRINE 0.3 MG/.3ML
INJECTION SUBCUTANEOUS
COMMUNITY

## 2021-08-04 NOTE — PROGRESS NOTES
Cardiology Office Visit Note      Referring physician: Uday Beltran MD     Reason For Followup: Afib, HTN, Dizziness    HPI:  Efe Malloy is a 76 y.o. male who returns to the office for a 6 month follow up.  He has a history of chronic persistent afib, hypertensive heart disease, dyslipidemia, REGLA and reversible airway disease. He has had some chest pain and pain in his left arm as well as some episodes of dizziness- most recently Tuesday night. He has been to a neurologist and is getting and MRI of his head soon. He has been having issues with his ears, which he feels may be part of his dizziness complaint, and he is seeing Dr Cadena routinely.     He denies PND, orthopnea, palpitations, lower extremity edema or feelings of her heart racing.    Despite multiple previous evaluations for ischemic coronary disease and improved unremarkable, Efe continues to have rare to occasional mid retrosternal chest discomfort described as pressure-like sensations not associated with shortness of air, diaphoresis, palpitations or other typical anginal features and not having any anginal type radiation.  These are not provoked by exertional activity or any predictable other events or situations    Medications were reviewed today during his visit    Past Medical History:   Diagnosis Date   • Asthma    • Atypical chest pain 6/11/2020   • COPD (chronic obstructive pulmonary disease) (CMS/[facility])    • COVID-19 01/13/2021   • Hyperlipidemia    • Kidney stone    • Near syncope 12/17/2020       Past Surgical History:   Procedure Laterality Date   • APPENDECTOMY     • CARDIAC CATHETERIZATION  2007    nonobstructive dz   • CERVICAL EPIDURAL      with Dr. Harshil Rodgers mgt   • CHOLECYSTECTOMY     • EYE SURGERY Bilateral     corneal transplant left 09/02/2020 (Right eye 2014)   • JOINT REPLACEMENT Right     knee   • KIDNEY STONE SURGERY     • PROSTATE SURGERY           Current Outpatient Medications:   •  albuterol sulfate  (90  Base) MCG/ACT inhaler, INHALE 2 puffs every 4-6 hours as needed, Disp: , Rfl:   •  alfuzosin (UROXATRAL) 10 MG 24 hr tablet, Take 10 mg by mouth 2 (Two) Times a Day., Disp: , Rfl: 11  •  baclofen (LIORESAL) 10 MG tablet, Take 10 mg by mouth 2 (Two) Times a Day., Disp: , Rfl:   •  BREO ELLIPTA 100-25 MCG/INH inhaler, Inhale 1 puff Daily., Disp: , Rfl: 5  •  digoxin (LANOXIN) 250 MCG tablet, TAKE ONE TABLET BY MOUTH EVERY DAY, Disp: 90 tablet, Rfl: 2  •  EPINEPHrine (EPIPEN) 0.3 MG/0.3ML solution auto-injector injection, epinephrine 0.3 mg/0.3 mL injection, auto-injector, Disp: , Rfl:   •  fluorometholone (FML) 0.1 % ophthalmic suspension, APPLY ONE DROP IN THE RIGHT EYE EVERY DAY, Disp: , Rfl:   •  fluticasone (FLONASE) 50 MCG/ACT nasal spray, 1 spray into the nostril(s) as directed by provider Daily., Disp: , Rfl:   •  gabapentin (NEURONTIN) 300 MG capsule, Take 600 mg by mouth 2 (Two) Times a Day., Disp: , Rfl: 3  •  levothyroxine (SYNTHROID, LEVOTHROID) 75 MCG tablet, Take 75 mcg by mouth Daily., Disp: , Rfl: 5  •  montelukast (SINGULAIR) 10 MG tablet, Take 10 mg by mouth Every Evening., Disp: , Rfl: 5  •  pantoprazole (PROTONIX) 40 MG EC tablet, Take 40 mg by mouth Daily., Disp: , Rfl:   •  pravastatin (PRAVACHOL) 40 MG tablet, TAKE ONE TABLET BY MOUTH EVERY NIGHT AT BEDTIME, Disp: 90 tablet, Rfl: 3  •  sertraline (ZOLOFT) 100 MG tablet, Take 150 mg by mouth Daily., Disp: , Rfl: 5  •  theophylline (UNIPHYL) 400 MG 24 hr tablet, Take 400 mg by mouth Daily., Disp: , Rfl: 5  •  verapamil ER (VERELAN) 240 MG 24 hr capsule, TAKE ONE CAPSULE BY MOUTH TWICE DAILY, Disp: 180 capsule, Rfl: 2  •  warfarin (COUMADIN) 1 MG tablet, TAKE ONE TABLET BY MOUTH DAILY AS DIRECTED, Disp: 30 tablet, Rfl: 0  •  warfarin (COUMADIN) 5 MG tablet, TAKE ONE TABLET BY MOUTH EVERY DAY AS DIRECTED, Disp: 30 tablet, Rfl: 5  •  EC-RX Testosterone 10 % cream, APPLY FOUR CLICKS DAILY AS DIRECTED, Disp: , Rfl:   •  hydrALAZINE (APRESOLINE) 50 MG  tablet, TAKE ONE TABLET BY MOUTH TWICE DAILY, Disp: 60 tablet, Rfl: 2  •  losartan-hydrochlorothiazide (HYZAAR) 100-12.5 MG per tablet, Take 1 tablet by mouth Daily., Disp: 30 tablet, Rfl: 1  •  Testosterone 20 % cream, APPLY FOUR CLICKS DAILY AS DIRECTED, Disp: , Rfl:     Social History     Socioeconomic History   • Marital status:      Spouse name: Not on file   • Number of children: Not on file   • Years of education: Not on file   • Highest education level: Not on file   Tobacco Use   • Smoking status: Former Smoker   • Smokeless tobacco: Never Used   Vaping Use   • Vaping Use: Never used   Substance and Sexual Activity   • Alcohol use: Yes     Comment: occasionally   • Drug use: Defer   • Sexual activity: Defer       Family History   Problem Relation Age of Onset   • Heart disease Mother    • Hypertension Mother    • Diabetes Father    • Stroke Father    • Hypertension Father    • Cancer Father         esophageal. prostate, skin   • Diabetes Sister    • Thyroid disease Sister    • Heart disease Maternal Grandmother    • Diabetes Paternal Grandmother          Review of Systems   General: denies fever, chills, anorexia, weight loss  Eyes: denies blurring, diplopia  Ear/Nose/Throat: denies ear pain, nosebleeds, hoarseness  Cardiovascular: See HPI  Respiratory: denies excessive sputum, hemoptysis, wheezing  Gastrointestinal: denies nausea, vomiting, change in bowel habits, abdominal pain  Genitourinary: No hematuria dysuria  Musculoskeletal: Mild to moderate low back pain and lower extremity weightbearing joint arthralgias though still fairly functionally active for age  Skin: denies rashes, itching, suspicious lesions  Neurologic: denies focal neuro deficits  Psychiatric: denies depression, anxiety  Endocrine: denies cold intolerance, heat intolerance  Hematologic/Lymphatic: denies abnormal bruising, bleeding  Allergic/Immunologic: denies urticaria or persistent infections      Objective     Visit  "Vitals  /94   Pulse 67   Ht 185.4 cm (73\")   Wt 125 kg (275 lb)   BMI 36.28 kg/m²           Physical Exam  General:     Obese, well developed,, in no acute distress.    Head:     normocephalic and atraumatic.    Eyes:    PERRL/EOM intact, conjunctiva and sclera clear with out nystagmus.    Neck:    no jvd or bruits  Chest Wall:    no deformities   Lungs:    clear bilaterally to auscultation with adequate global airflow   Heart:    non-displaced PMI; irregularly irregular rhythm, normal S1, S2 without murmurs, rubs, or gallops.  Abdomen:  Soft, nontender without HSM  Msk:    no deformity; adequate R OM  Pulses:    pulses normal in all 4 extremities.    Extremities:    no clubbing, cyanosis, edema   Neurologic:    no focal sensory or motor deficits  Skin:    intact without lesions or rashes.    Psych:    alert and cooperative; normal mood and affect; normal attention span and concentration.            ECG 12 Lead    Date/Time: 8/5/2021 11:23 AM  Performed by: AUDRA Vargas MD  Authorized by: AUDRA Vargas MD   Comparison: compared with previous ECG from 2/12/2021  Similar to previous ECG  Rhythm: atrial fibrillation  Conduction: left anterior fascicular block and non-specific intraventricular conduction delay  Other findings: non-specific ST-T wave changes    Clinical impression: abnormal EKG              Assessment:   Problems Addressed this Visit        Other    Persistent atrial fibrillation (CMS/HCC) - Primary    -Remains sufficiently rate controlled and fully anticoagulated using Coumadin therapy        Essential hypertension  -Does not appear to be well-regulated this time based on knowing today's office reading but home blood pressure readings per patient    Relevant Medications    EPINEPHrine (EPIPEN) 0.3 MG/0.3ML solution auto-injector injection    losartan-hydrochlorothiazide (HYZAAR) 100-12.5 MG per tablet            Congestive heart failure (CMS/HCC)  Remains clinically well compensated, " asymptomatic              Other Visit Diagnoses     Chest pain, atypical has both typical and atypical features with moderate to high   risk profile         Relevant Orders    Stress Test With Myocardial Perfusion      Diagnoses       Codes Comments    Persistent atrial fibrillation (CMS/[facility])    -  Primary ICD-10-CM: I48.19  ICD-9-CM: 427.31     Congestive heart failure, unspecified HF chronicity, unspecified heart failure type (CMS/HCC)     ICD-10-CM: I50.9  ICD-9-CM: 428.0     Essential hypertension     ICD-10-CM: I10  ICD-9-CM: 401.9     Chest pain, atypical     ICD-10-CM: R07.89  ICD-9-CM: 786.59             Plan:  I am prescribing additional antihypertensive therapy today in the form of losartan 100 mg with 12.5 mg HCTZ 1 p.o. daily.  I am also scheduling Efe for an outpatient stress Myoview exam at [facility] with return to clinic in 6 weeks or sooner if needed.    AUDRA Vargas MD  8/23/2021 10:36 EDT    This report was generated using the Dragon voice recognition system.

## 2021-08-05 ENCOUNTER — OFFICE VISIT (OUTPATIENT)
Dept: CARDIOLOGY | Facility: CLINIC | Age: 76
End: 2021-08-05

## 2021-08-05 VITALS
BODY MASS INDEX: 36.45 KG/M2 | DIASTOLIC BLOOD PRESSURE: 94 MMHG | HEART RATE: 67 BPM | SYSTOLIC BLOOD PRESSURE: 154 MMHG | HEIGHT: 73 IN | WEIGHT: 275 LBS

## 2021-08-05 DIAGNOSIS — I48.19 PERSISTENT ATRIAL FIBRILLATION (HCC): Primary | ICD-10-CM

## 2021-08-05 DIAGNOSIS — I10 ESSENTIAL HYPERTENSION: ICD-10-CM

## 2021-08-05 DIAGNOSIS — I50.9 CONGESTIVE HEART FAILURE, UNSPECIFIED HF CHRONICITY, UNSPECIFIED HEART FAILURE TYPE (HCC): ICD-10-CM

## 2021-08-05 DIAGNOSIS — R07.89 CHEST PAIN, ATYPICAL: ICD-10-CM

## 2021-08-05 PROCEDURE — 93000 ELECTROCARDIOGRAM COMPLETE: CPT | Performed by: INTERNAL MEDICINE

## 2021-08-05 PROCEDURE — 99214 OFFICE O/P EST MOD 30 MIN: CPT | Performed by: INTERNAL MEDICINE

## 2021-08-05 RX ORDER — LOSARTAN POTASSIUM AND HYDROCHLOROTHIAZIDE 12.5; 1 MG/1; MG/1
1 TABLET ORAL DAILY
Qty: 30 TABLET | Refills: 1 | Status: SHIPPED | OUTPATIENT
Start: 2021-08-05 | End: 2021-09-10

## 2021-08-11 RX ORDER — HYDRALAZINE HYDROCHLORIDE 50 MG/1
TABLET, FILM COATED ORAL
Qty: 60 TABLET | Refills: 2 | Status: SHIPPED | OUTPATIENT
Start: 2021-08-11 | End: 2021-09-10

## 2021-08-19 ENCOUNTER — ANTICOAGULATION VISIT (OUTPATIENT)
Dept: CARDIOLOGY | Facility: CLINIC | Age: 76
End: 2021-08-19

## 2021-08-19 DIAGNOSIS — I48.19 PERSISTENT ATRIAL FIBRILLATION (HCC): Primary | ICD-10-CM

## 2021-08-19 LAB — INR PPP: 1.7 (ref 2–3)

## 2021-08-19 PROCEDURE — 85610 PROTHROMBIN TIME: CPT | Performed by: INTERNAL MEDICINE

## 2021-08-19 PROCEDURE — 36416 COLLJ CAPILLARY BLOOD SPEC: CPT | Performed by: INTERNAL MEDICINE

## 2021-08-26 ENCOUNTER — HOSPITAL ENCOUNTER (OUTPATIENT)
Dept: NUCLEAR MEDICINE | Facility: HOSPITAL | Age: 76
Discharge: HOME | End: 2021-08-26

## 2021-08-26 DIAGNOSIS — I10 ESSENTIAL HYPERTENSION: ICD-10-CM

## 2021-08-26 DIAGNOSIS — R07.89 CHEST PAIN, ATYPICAL: ICD-10-CM

## 2021-08-26 LAB
BH CV NUCLEAR PRIOR STUDY: 3
BH CV REST NUCLEAR ISOTOPE DOSE: 7.9 MCI
BH CV STRESS BP STAGE 2: NORMAL
BH CV STRESS COMMENTS STAGE 1: NORMAL
BH CV STRESS COMMENTS STAGE 2: NORMAL
BH CV STRESS DOSE REGADENOSON STAGE 1: 0.4
BH CV STRESS DURATION MIN STAGE 1: 0
BH CV STRESS DURATION MIN STAGE 2: 4
BH CV STRESS DURATION SEC STAGE 1: 10
BH CV STRESS DURATION SEC STAGE 2: 0
BH CV STRESS HR STAGE 1: 79
BH CV STRESS HR STAGE 2: 78
BH CV STRESS HR STAGE 3: 82
BH CV STRESS HR STAGE 4: 79
BH CV STRESS NUCLEAR ISOTOPE DOSE: 21.9 MCI
BH CV STRESS PROTOCOL 1: NORMAL
BH CV STRESS RECOVERY BP: NORMAL MMHG
BH CV STRESS RECOVERY HR: 76 BPM
BH CV STRESS STAGE 1: 1
BH CV STRESS STAGE 2: 2
BH CV STRESS STAGE 3: 3
BH CV STRESS STAGE 4: 4
LV EF NUC BP: 51 %
MAXIMAL PREDICTED HEART RATE: 146 BPM
PERCENT MAX PREDICTED HR: 60.96 %
STRESS BASELINE BP: NORMAL MMHG
STRESS BASELINE HR: 72 BPM
STRESS PERCENT HR: 72 %
STRESS POST PEAK BP: NORMAL MMHG
STRESS POST PEAK HR: 89 BPM
STRESS TARGET HR: 124 BPM

## 2021-08-26 PROCEDURE — A9502 TC99M TETROFOSMIN: HCPCS | Performed by: INTERNAL MEDICINE

## 2021-08-26 PROCEDURE — 93016 CV STRESS TEST SUPVJ ONLY: CPT | Performed by: NURSE PRACTITIONER

## 2021-08-26 PROCEDURE — 25010000002 REGADENOSON 0.4 MG/5ML SOLUTION: Performed by: INTERNAL MEDICINE

## 2021-08-26 PROCEDURE — 0 TECHNETIUM TETROFOSMIN KIT: Performed by: INTERNAL MEDICINE

## 2021-08-26 PROCEDURE — 93018 CV STRESS TEST I&R ONLY: CPT | Performed by: INTERNAL MEDICINE

## 2021-08-26 PROCEDURE — 78452 HT MUSCLE IMAGE SPECT MULT: CPT | Performed by: INTERNAL MEDICINE

## 2021-08-26 PROCEDURE — 78452 HT MUSCLE IMAGE SPECT MULT: CPT

## 2021-08-26 PROCEDURE — 93017 CV STRESS TEST TRACING ONLY: CPT

## 2021-08-26 RX ADMIN — REGADENOSON 0.4 MG: 0.08 INJECTION, SOLUTION INTRAVENOUS at 13:00

## 2021-08-26 RX ADMIN — TETROFOSMIN 1 DOSE: 1.38 INJECTION, POWDER, LYOPHILIZED, FOR SOLUTION INTRAVENOUS at 13:00

## 2021-08-26 RX ADMIN — TETROFOSMIN 1 DOSE: 1.38 INJECTION, POWDER, LYOPHILIZED, FOR SOLUTION INTRAVENOUS at 11:06

## 2021-09-10 ENCOUNTER — OFFICE VISIT (OUTPATIENT)
Dept: CARDIOLOGY | Facility: CLINIC | Age: 76
End: 2021-09-10

## 2021-09-10 VITALS
HEIGHT: 73 IN | BODY MASS INDEX: 34.25 KG/M2 | WEIGHT: 258.4 LBS | RESPIRATION RATE: 18 BRPM | HEART RATE: 82 BPM | SYSTOLIC BLOOD PRESSURE: 90 MMHG | DIASTOLIC BLOOD PRESSURE: 68 MMHG

## 2021-09-10 DIAGNOSIS — I50.9 CONGESTIVE HEART FAILURE, UNSPECIFIED HF CHRONICITY, UNSPECIFIED HEART FAILURE TYPE (HCC): ICD-10-CM

## 2021-09-10 DIAGNOSIS — R07.89 CHEST PAIN, ATYPICAL: Primary | ICD-10-CM

## 2021-09-10 DIAGNOSIS — Z01.818 PRE-OP TESTING: ICD-10-CM

## 2021-09-10 PROCEDURE — 99204 OFFICE O/P NEW MOD 45 MIN: CPT | Performed by: INTERNAL MEDICINE

## 2021-09-10 RX ORDER — LOSARTAN POTASSIUM 25 MG/1
25 TABLET ORAL DAILY
Qty: 30 TABLET | Refills: 5 | Status: SHIPPED | OUTPATIENT
Start: 2021-09-10 | End: 2021-10-11

## 2021-09-10 NOTE — PROGRESS NOTES
[facility] cardiology  Cardiology clinic note  Silvano Gillis MD, PhD  Subjective:     Encounter Date:09/10/2021      Patient ID: Efe Malloy is a 76 y.o. male.    Chief Complaint:  Chief Complaint   Patient presents with   • Follow-up   New patient encounter today  Follow-up stress test  Paroxysmal A. fib  Essential hypertension  Comorbidities of diabetes  Hyperlipidemia  CAD      HPI:  History of Present Illness  I the pleasure to see this 76-year-old gentleman today as a new patient, he recently had a stress test ordered by another provider with some atypical chest pain.  He has had low blood pressures recently of even 70-80 systolic with new blood pressure medicine started recently.  His blood pressure today is 90/68 heart rates in the 80s.  He is 258 pounds and has had intentional weight loss down from 275.  He has a known history of chest pain with multiple stress test being ordered all unremarkable previously.  I reviewed interpreted his 1 from the last couple of weeks which demonstrates resting significant abnormalities with decreased uptake in the apex anterior inferior lateral walls broadly with improved stress perfusion but otherwise there is still the perfusion abnormality.  It is difficult to say whether it is reversible ischemia and he has never had definitive evaluation with letter catheterization given continued symptoms which he is requesting a definitive evaluation.  I did discuss letter catheterization risks and benefits and he would wish to proceed if offered.  He has chronic persistent atrial fibrillation which is rate controlled in the 80s to 90s today.  He is on anticoagulation with Coumadin goal INR 2-3.  There were no prosthetic valves in place.  He is on digoxin and verapamil for rate control.  He is also on hydralazine and losartan 100/HCTZ 25 was recently started as a new medicine.  Has been dizzy lightheaded and blood pressure at home again have been in the 70s to 80s and he held the  losartan today as a new medicine.  He still has some atypical chest pain waxing and waning described as substernal pain without radiation diaphoresis.  He has some mild palpitations every now and then but nothing sustained.    Review of systems otherwise negative x14 point review of systems except as mentioned above    Historical data copied forward from previous encounters in EMR is unchanged      Exam  Irregular, no rubs gallops no heave no left, faint 1 out of 6 stock ejection murmur lower sternal border  No carotid bruits or JVD  No clubbing cyanosis or edema  Obese soft nontender nondistended bowel sounds are positive  Clear to auscultation bilaterally  Normocephalic/atraumatic pupils equal round  Neurologic grossly intact bilaterally  Pulses 2+ radials equal bilaterally      Assessment plan per my encounter  Chest pain, recurrent  Stress test read as low risk however my review interpretation has perfusion abnormalities which do not completely exclude reversible ischemia with continued atypical chest pain and multiple stresses which have been equally as uninformative  Recommend definitive evaluation, will schedule for left her catheterization at patient's request for definitive evaluation    Risk and benefits of cardiac catheterization including death cardiac tach stroke pain bleeding infection need for vascular cardiovascular surgery discussed and the patient wishes to proceed    CHF, EF of 50% low normal, euvolemic appearing today    Persistent atrial fibrillation, digoxin and verapamil will be continued, get digoxin level    Hypertension but hypotensive on new medicines  Stop hydralazine as not effectual and not indicated unless other medicines optimized in the setting of cardiomyopathy EF 50%  Continue losartan but at 25 daily, can go to 50 daily if blood pressure gets above 140 systolic  Continue verapamil  Stop hydralazine    Dizziness and hypotension  As above stop hydralazine, continue low-dose  losartan  Next diabetes goal A1c less than 7    Hyperlipidemia continue pravastatin, guide by fasting lipid panel for ASCVD risk calculations, escalate doses per lipid studies    Patient will get CBC CMP INR dig level in less than a week prior to invasive ischemic evaluation for continued chest pain    Silvano Gillis MD, PhD    Further recommendation to follow findings and clinical course and response to therapy           The following portions of the patient's history were reviewed and updated as appropriate: allergies, current medications, past family history, past medical history, past social history, past surgical history and problem list.    Problem List:  Patient Active Problem List   Diagnosis   • Persistent atrial fibrillation (CMS/[facility])   • Essential hypertension   • COPD (chronic obstructive pulmonary disease) (CMS/[facility])   • Hyperglycemia   • Atypical chest pain   • Abnormal Pap smear of anus   • Congestive heart failure (CMS/[facility])   • Depressive disorder   • Gastroesophageal reflux disease   • Hyperlipidemia   • Hypothyroidism   • Neuropathy   • Obstructive sleep apnea syndrome   • Primary osteoarthritis of right knee   • Cervical spondylosis without myelopathy   • Cervico-occipital neuralgia   • Lumbosacral spondylosis without myelopathy   • Near syncope   • Arthritis   • Chronic idiopathic constipation   • Disorder of prostate   • Dysplasia of anus   • Gout   • Hearing loss   • Seasonal allergic rhinitis       Past Medical History:  Past Medical History:   Diagnosis Date   • Asthma    • Atypical chest pain 6/11/2020   • COPD (chronic obstructive pulmonary disease) (CMS/[facility])    • COVID-19 01/13/2021   • Hyperlipidemia    • Kidney stone    • Near syncope 12/17/2020       Past Surgical History:  Past Surgical History:   Procedure Laterality Date   • APPENDECTOMY     • CARDIAC CATHETERIZATION  2007    nonobstructive dz   • CERVICAL EPIDURAL      with Dr. Harshil Rodgers mgt   • CHOLECYSTECTOMY     • EYE SURGERY  "Bilateral     corneal transplant left 09/02/2020 (Right eye 2014)   • JOINT REPLACEMENT Right     knee   • KIDNEY STONE SURGERY     • PROSTATE SURGERY         Social History:  Social History     Socioeconomic History   • Marital status:      Spouse name: Not on file   • Number of children: Not on file   • Years of education: Not on file   • Highest education level: Not on file   Tobacco Use   • Smoking status: Former Smoker   • Smokeless tobacco: Never Used   Vaping Use   • Vaping Use: Never used   Substance and Sexual Activity   • Alcohol use: Yes     Comment: occasionally   • Drug use: Defer   • Sexual activity: Defer       Allergies:  Allergies   Allergen Reactions   • Penicillins Other (See Comments)     AS A CHILD   • Rivaroxaban Other (See Comments)       Immunizations:  Immunization History   Administered Date(s) Administered   • FLUAD TRI 65YR+ 10/31/2019   • Fluzone High Dose =>65 Years ([employer] ONLY) 10/14/2016, 10/16/2017, 11/14/2018   • H1N1 Inj 12/22/2009   • Hepatitis A 09/17/2019, 11/19/2019   • Influenza Quad Vaccine (Inpatient) 09/22/2014   • Influenza, Unspecified 09/18/2013   • Pneumococcal Conjugate 13-Valent (PCV13) 10/12/2015   • Pneumococcal Polysaccharide (PPSV23) 09/24/2013   • Shingrix 09/17/2019, 11/19/2019   • Zostavax 02/12/2013       ROS:  ROS       Objective:         BP 90/68 (BP Location: Left arm, Patient Position: Sitting)   Pulse 82   Resp 18   Ht 185.4 cm (73\")   Wt 117 kg (258 lb 6.4 oz)   BMI 34.09 kg/m²     Physical Exam    In-Office Procedure(s):  Procedures    ASCVD RIsk Score::  The ASCVD Risk score (Lili AMBROCIO Byrd., et al., 2013) failed to calculate for the following reasons:    Cannot find a previous HDL lab    Cannot find a previous total cholesterol lab    Recent Radiology:  Imaging Results (Most Recent)     None          Lab Review:   Hospital Outpatient Visit on 08/26/2021   Component Date Value   • Target HR (85%) 08/26/2021 124    • Max. Pred. HR (100%) " 08/26/2021 146    •  CV STRESS PROTOCOL 1 08/26/2021 Pharmacologic    • Stage 1 08/26/2021 1    • HR Stage 1 08/26/2021 79    • Duration Min Stage 1 08/26/2021 0    • Duration Sec Stage 1 08/26/2021 10    • Stress Dose Regadenoson * 08/26/2021 0.4    • Stress Comments Stage 1 08/26/2021 10 sec bolus injection    • Stage 2 08/26/2021 2    • HR Stage 2 08/26/2021 78    • BP Stage 2 08/26/2021 129/74    • Duration Min Stage 2 08/26/2021 4    • Duration Sec Stage 2 08/26/2021 0    • Stress Comments Stage 2 08/26/2021 recovery    • Stage 3 08/26/2021 3    • HR Stage 3 08/26/2021 82    • Stage 4 08/26/2021 4    • HR Stage 4 08/26/2021 79    • Baseline HR 08/26/2021 72    • Baseline BP 08/26/2021 138/72    • Peak HR 08/26/2021 89    • Percent Max Pred HR 08/26/2021 60.96    • Percent Target HR 08/26/2021 72    • Peak BP 08/26/2021 138/72    • Recovery HR 08/26/2021 76    • Recovery BP 08/26/2021 121/72    • Nuclear Prior Study 08/26/2021 3    •  CV REST NUCLEAR ISOTO* 08/26/2021 7.9    •  CV STRESS NUCLEAR ISO* 08/26/2021 21.9    • Nuc Stress EF 08/26/2021 51    Anticoagulation Visit on 08/19/2021   Component Date Value   • INR 08/19/2021 1.70*   Anticoagulation Visit on 07/22/2021   Component Date Value   • INR 07/22/2021 2.40    Anticoagulation Visit on 06/24/2021   Component Date Value   • INR 06/24/2021 2.60    Admission on 06/12/2021, Discharged on 06/12/2021   Component Date Value   • Protime 06/12/2021 18.1*   • INR 06/12/2021 1.69*   Anticoagulation Visit on 05/13/2021   Component Date Value   • INR 05/13/2021 3.10*   Anticoagulation Visit on 04/13/2021   Component Date Value   • INR 04/13/2021 2.50    Anticoagulation Visit on 03/11/2021   Component Date Value   • INR 03/11/2021 2.90                 Assessment:          Diagnosis Plan   1. Chest pain, atypical  Case Request Cath Lab: Left Heart Cath    CBC & Differential    Comprehensive Metabolic Panel    Protime-INR   2. Pre-op testing  CBC & Differential     Comprehensive Metabolic Panel    Protime-INR   3. Congestive heart failure, unspecified HF chronicity, unspecified heart failure type (CMS/[facility])  Digoxin Level              Silvano Gillis MD  09/10/21  .

## 2021-09-10 NOTE — H&P (VIEW-ONLY)
[facility] cardiology  Cardiology clinic note  Silvano Gillis MD, PhD  Subjective:     Encounter Date:09/10/2021      Patient ID: Efe Malloy is a 76 y.o. male.    Chief Complaint:  Chief Complaint   Patient presents with   • Follow-up   New patient encounter today  Follow-up stress test  Paroxysmal A. fib  Essential hypertension  Comorbidities of diabetes  Hyperlipidemia  CAD      HPI:  History of Present Illness  I the pleasure to see this 76-year-old gentleman today as a new patient, he recently had a stress test ordered by another provider with some atypical chest pain.  He has had low blood pressures recently of even 70-80 systolic with new blood pressure medicine started recently.  His blood pressure today is 90/68 heart rates in the 80s.  He is 258 pounds and has had intentional weight loss down from 275.  He has a known history of chest pain with multiple stress test being ordered all unremarkable previously.  I reviewed interpreted his 1 from the last couple of weeks which demonstrates resting significant abnormalities with decreased uptake in the apex anterior inferior lateral walls broadly with improved stress perfusion but otherwise there is still the perfusion abnormality.  It is difficult to say whether it is reversible ischemia and he has never had definitive evaluation with letter catheterization given continued symptoms which he is requesting a definitive evaluation.  I did discuss letter catheterization risks and benefits and he would wish to proceed if offered.  He has chronic persistent atrial fibrillation which is rate controlled in the 80s to 90s today.  He is on anticoagulation with Coumadin goal INR 2-3.  There were no prosthetic valves in place.  He is on digoxin and verapamil for rate control.  He is also on hydralazine and losartan 100/HCTZ 25 was recently started as a new medicine.  Has been dizzy lightheaded and blood pressure at home again have been in the 70s to 80s and he held the  losartan today as a new medicine.  He still has some atypical chest pain waxing and waning described as substernal pain without radiation diaphoresis.  He has some mild palpitations every now and then but nothing sustained.    Review of systems otherwise negative x14 point review of systems except as mentioned above    Historical data copied forward from previous encounters in EMR is unchanged      Exam  Irregular, no rubs gallops no heave no left, faint 1 out of 6 stock ejection murmur lower sternal border  No carotid bruits or JVD  No clubbing cyanosis or edema  Obese soft nontender nondistended bowel sounds are positive  Clear to auscultation bilaterally  Normocephalic/atraumatic pupils equal round  Neurologic grossly intact bilaterally  Pulses 2+ radials equal bilaterally      Assessment plan per my encounter  Chest pain, recurrent  Stress test read as low risk however my review interpretation has perfusion abnormalities which do not completely exclude reversible ischemia with continued atypical chest pain and multiple stresses which have been equally as uninformative  Recommend definitive evaluation, will schedule for left her catheterization at patient's request for definitive evaluation    Risk and benefits of cardiac catheterization including death cardiac tach stroke pain bleeding infection need for vascular cardiovascular surgery discussed and the patient wishes to proceed    CHF, EF of 50% low normal, euvolemic appearing today    Persistent atrial fibrillation, digoxin and verapamil will be continued, get digoxin level    Hypertension but hypotensive on new medicines  Stop hydralazine as not effectual and not indicated unless other medicines optimized in the setting of cardiomyopathy EF 50%  Continue losartan but at 25 daily, can go to 50 daily if blood pressure gets above 140 systolic  Continue verapamil  Stop hydralazine    Dizziness and hypotension  As above stop hydralazine, continue low-dose  losartan  Next diabetes goal A1c less than 7    Hyperlipidemia continue pravastatin, guide by fasting lipid panel for ASCVD risk calculations, escalate doses per lipid studies    Patient will get CBC CMP INR dig level in less than a week prior to invasive ischemic evaluation for continued chest pain    Silvano Gillis MD, PhD    Further recommendation to follow findings and clinical course and response to therapy           The following portions of the patient's history were reviewed and updated as appropriate: allergies, current medications, past family history, past medical history, past social history, past surgical history and problem list.    Problem List:  Patient Active Problem List   Diagnosis   • Persistent atrial fibrillation (CMS/[facility])   • Essential hypertension   • COPD (chronic obstructive pulmonary disease) (CMS/[facility])   • Hyperglycemia   • Atypical chest pain   • Abnormal Pap smear of anus   • Congestive heart failure (CMS/[facility])   • Depressive disorder   • Gastroesophageal reflux disease   • Hyperlipidemia   • Hypothyroidism   • Neuropathy   • Obstructive sleep apnea syndrome   • Primary osteoarthritis of right knee   • Cervical spondylosis without myelopathy   • Cervico-occipital neuralgia   • Lumbosacral spondylosis without myelopathy   • Near syncope   • Arthritis   • Chronic idiopathic constipation   • Disorder of prostate   • Dysplasia of anus   • Gout   • Hearing loss   • Seasonal allergic rhinitis       Past Medical History:  Past Medical History:   Diagnosis Date   • Asthma    • Atypical chest pain 6/11/2020   • COPD (chronic obstructive pulmonary disease) (CMS/[facility])    • COVID-19 01/13/2021   • Hyperlipidemia    • Kidney stone    • Near syncope 12/17/2020       Past Surgical History:  Past Surgical History:   Procedure Laterality Date   • APPENDECTOMY     • CARDIAC CATHETERIZATION  2007    nonobstructive dz   • CERVICAL EPIDURAL      with Dr. Harshil Rodgers mgt   • CHOLECYSTECTOMY     • EYE SURGERY  "Bilateral     corneal transplant left 09/02/2020 (Right eye 2014)   • JOINT REPLACEMENT Right     knee   • KIDNEY STONE SURGERY     • PROSTATE SURGERY         Social History:  Social History     Socioeconomic History   • Marital status:      Spouse name: Not on file   • Number of children: Not on file   • Years of education: Not on file   • Highest education level: Not on file   Tobacco Use   • Smoking status: Former Smoker   • Smokeless tobacco: Never Used   Vaping Use   • Vaping Use: Never used   Substance and Sexual Activity   • Alcohol use: Yes     Comment: occasionally   • Drug use: Defer   • Sexual activity: Defer       Allergies:  Allergies   Allergen Reactions   • Penicillins Other (See Comments)     AS A CHILD   • Rivaroxaban Other (See Comments)       Immunizations:  Immunization History   Administered Date(s) Administered   • FLUAD TRI 65YR+ 10/31/2019   • Fluzone High Dose =>65 Years ([facility] ONLY) 10/14/2016, 10/16/2017, 11/14/2018   • H1N1 Inj 12/22/2009   • Hepatitis A 09/17/2019, 11/19/2019   • Influenza Quad Vaccine (Inpatient) 09/22/2014   • Influenza, Unspecified 09/18/2013   • Pneumococcal Conjugate 13-Valent (PCV13) 10/12/2015   • Pneumococcal Polysaccharide (PPSV23) 09/24/2013   • Shingrix 09/17/2019, 11/19/2019   • Zostavax 02/12/2013       ROS:  ROS       Objective:         BP 90/68 (BP Location: Left arm, Patient Position: Sitting)   Pulse 82   Resp 18   Ht 185.4 cm (73\")   Wt 117 kg (258 lb 6.4 oz)   BMI 34.09 kg/m²     Physical Exam    In-Office Procedure(s):  Procedures    ASCVD RIsk Score::  The ASCVD Risk score (Lili AMBROCIO Byrd., et al., 2013) failed to calculate for the following reasons:    Cannot find a previous HDL lab    Cannot find a previous total cholesterol lab    Recent Radiology:  Imaging Results (Most Recent)     None          Lab Review:   Hospital Outpatient Visit on 08/26/2021   Component Date Value   • Target HR (85%) 08/26/2021 124    • Max. Pred. HR (100%) " 08/26/2021 146    •  CV STRESS PROTOCOL 1 08/26/2021 Pharmacologic    • Stage 1 08/26/2021 1    • HR Stage 1 08/26/2021 79    • Duration Min Stage 1 08/26/2021 0    • Duration Sec Stage 1 08/26/2021 10    • Stress Dose Regadenoson * 08/26/2021 0.4    • Stress Comments Stage 1 08/26/2021 10 sec bolus injection    • Stage 2 08/26/2021 2    • HR Stage 2 08/26/2021 78    • BP Stage 2 08/26/2021 129/74    • Duration Min Stage 2 08/26/2021 4    • Duration Sec Stage 2 08/26/2021 0    • Stress Comments Stage 2 08/26/2021 recovery    • Stage 3 08/26/2021 3    • HR Stage 3 08/26/2021 82    • Stage 4 08/26/2021 4    • HR Stage 4 08/26/2021 79    • Baseline HR 08/26/2021 72    • Baseline BP 08/26/2021 138/72    • Peak HR 08/26/2021 89    • Percent Max Pred HR 08/26/2021 60.96    • Percent Target HR 08/26/2021 72    • Peak BP 08/26/2021 138/72    • Recovery HR 08/26/2021 76    • Recovery BP 08/26/2021 121/72    • Nuclear Prior Study 08/26/2021 3    •  CV REST NUCLEAR ISOTO* 08/26/2021 7.9    •  CV STRESS NUCLEAR ISO* 08/26/2021 21.9    • Nuc Stress EF 08/26/2021 51    Anticoagulation Visit on 08/19/2021   Component Date Value   • INR 08/19/2021 1.70*   Anticoagulation Visit on 07/22/2021   Component Date Value   • INR 07/22/2021 2.40    Anticoagulation Visit on 06/24/2021   Component Date Value   • INR 06/24/2021 2.60    Admission on 06/12/2021, Discharged on 06/12/2021   Component Date Value   • Protime 06/12/2021 18.1*   • INR 06/12/2021 1.69*   Anticoagulation Visit on 05/13/2021   Component Date Value   • INR 05/13/2021 3.10*   Anticoagulation Visit on 04/13/2021   Component Date Value   • INR 04/13/2021 2.50    Anticoagulation Visit on 03/11/2021   Component Date Value   • INR 03/11/2021 2.90                 Assessment:          Diagnosis Plan   1. Chest pain, atypical  Case Request Cath Lab: Left Heart Cath    CBC & Differential    Comprehensive Metabolic Panel    Protime-INR   2. Pre-op testing  CBC & Differential     Comprehensive Metabolic Panel    Protime-INR   3. Congestive heart failure, unspecified HF chronicity, unspecified heart failure type (CMS/[facility])  Digoxin Level              Silvano Gillis MD  09/10/21  .

## 2021-09-13 RX ORDER — WARFARIN SODIUM 5 MG/1
TABLET ORAL
Qty: 30 TABLET | Refills: 0 | Status: SHIPPED | OUTPATIENT
Start: 2021-09-13 | End: 2021-10-20

## 2021-09-15 ENCOUNTER — LAB (OUTPATIENT)
Dept: LAB | Facility: HOSPITAL | Age: 76
End: 2021-09-15

## 2021-09-15 DIAGNOSIS — I50.9 CONGESTIVE HEART FAILURE, UNSPECIFIED HF CHRONICITY, UNSPECIFIED HEART FAILURE TYPE (HCC): ICD-10-CM

## 2021-09-15 LAB
ALBUMIN SERPL-MCNC: 4.3 G/DL (ref 3.5–5.2)
ALBUMIN/GLOB SERPL: 1.6 G/DL
ALP SERPL-CCNC: 47 U/L (ref 39–117)
ALT SERPL W P-5'-P-CCNC: 17 U/L (ref 1–41)
ANION GAP SERPL CALCULATED.3IONS-SCNC: 11.4 MMOL/L (ref 5–15)
AST SERPL-CCNC: 18 U/L (ref 1–40)
BASOPHILS # BLD AUTO: 0.03 10*3/MM3 (ref 0–0.2)
BASOPHILS NFR BLD AUTO: 0.5 % (ref 0–1.5)
BILIRUB SERPL-MCNC: 0.4 MG/DL (ref 0–1.2)
BUN SERPL-MCNC: 14 MG/DL (ref 8–23)
BUN/CREAT SERPL: 12.3 (ref 7–25)
CALCIUM SPEC-SCNC: 9.4 MG/DL (ref 8.6–10.5)
CHLORIDE SERPL-SCNC: 104 MMOL/L (ref 98–107)
CO2 SERPL-SCNC: 25.6 MMOL/L (ref 22–29)
CREAT SERPL-MCNC: 1.14 MG/DL (ref 0.76–1.27)
DEPRECATED RDW RBC AUTO: 50.3 FL (ref 37–54)
DIGOXIN SERPL-MCNC: 1.5 NG/ML (ref 0.6–1.2)
EOSINOPHIL # BLD AUTO: 0.11 10*3/MM3 (ref 0–0.4)
EOSINOPHIL NFR BLD AUTO: 1.8 % (ref 0.3–6.2)
ERYTHROCYTE [DISTWIDTH] IN BLOOD BY AUTOMATED COUNT: 20.1 % (ref 12.3–15.4)
GFR SERPL CREATININE-BSD FRML MDRD: 63 ML/MIN/1.73
GLOBULIN UR ELPH-MCNC: 2.7 GM/DL
GLUCOSE SERPL-MCNC: 99 MG/DL (ref 65–99)
HCT VFR BLD AUTO: 51.1 % (ref 37.5–51)
HGB BLD-MCNC: 15.8 G/DL (ref 13–17.7)
IMM GRANULOCYTES # BLD AUTO: 0.03 10*3/MM3 (ref 0–0.05)
IMM GRANULOCYTES NFR BLD AUTO: 0.5 % (ref 0–0.5)
INR PPP: 1.45 (ref 2–3)
LYMPHOCYTES # BLD AUTO: 1.78 10*3/MM3 (ref 0.7–3.1)
LYMPHOCYTES NFR BLD AUTO: 29 % (ref 19.6–45.3)
MCH RBC QN AUTO: 23.5 PG (ref 26.6–33)
MCHC RBC AUTO-ENTMCNC: 30.9 G/DL (ref 31.5–35.7)
MCV RBC AUTO: 76 FL (ref 79–97)
MONOCYTES # BLD AUTO: 0.57 10*3/MM3 (ref 0.1–0.9)
MONOCYTES NFR BLD AUTO: 9.3 % (ref 5–12)
NEUTROPHILS NFR BLD AUTO: 3.62 10*3/MM3 (ref 1.7–7)
NEUTROPHILS NFR BLD AUTO: 58.9 % (ref 42.7–76)
NRBC BLD AUTO-RTO: 0 /100 WBC (ref 0–0.2)
PLATELET # BLD AUTO: 169 10*3/MM3 (ref 140–450)
PMV BLD AUTO: 10.1 FL (ref 6–12)
POTASSIUM SERPL-SCNC: 4.2 MMOL/L (ref 3.5–5.2)
PROT SERPL-MCNC: 7 G/DL (ref 6–8.5)
PROTHROMBIN TIME: 15.7 SECONDS (ref 19.4–28.5)
RBC # BLD AUTO: 6.72 10*6/MM3 (ref 4.14–5.8)
SODIUM SERPL-SCNC: 141 MMOL/L (ref 136–145)
WBC # BLD AUTO: 6.14 10*3/MM3 (ref 3.4–10.8)

## 2021-09-15 PROCEDURE — 85025 COMPLETE CBC W/AUTO DIFF WBC: CPT | Performed by: INTERNAL MEDICINE

## 2021-09-15 PROCEDURE — 80053 COMPREHEN METABOLIC PANEL: CPT | Performed by: INTERNAL MEDICINE

## 2021-09-15 PROCEDURE — 80162 ASSAY OF DIGOXIN TOTAL: CPT

## 2021-09-15 PROCEDURE — 85610 PROTHROMBIN TIME: CPT | Performed by: INTERNAL MEDICINE

## 2021-09-15 PROCEDURE — 36415 COLL VENOUS BLD VENIPUNCTURE: CPT | Performed by: INTERNAL MEDICINE

## 2021-09-17 ENCOUNTER — HOSPITAL ENCOUNTER (OUTPATIENT)
Facility: HOSPITAL | Age: 76
Setting detail: HOSPITAL OUTPATIENT SURGERY
Discharge: HOME | End: 2021-09-17
Attending: INTERNAL MEDICINE | Admitting: INTERNAL MEDICINE

## 2021-09-17 VITALS
SYSTOLIC BLOOD PRESSURE: 149 MMHG | HEART RATE: 68 BPM | HEIGHT: 71 IN | DIASTOLIC BLOOD PRESSURE: 76 MMHG | RESPIRATION RATE: 16 BRPM | BODY MASS INDEX: 36.2 KG/M2 | OXYGEN SATURATION: 97 % | TEMPERATURE: 97.1 F | WEIGHT: 258.6 LBS

## 2021-09-17 DIAGNOSIS — R07.89 CHEST PAIN, ATYPICAL: ICD-10-CM

## 2021-09-17 LAB
ACT BLD: 235 SECONDS (ref 89–137)
SARS-COV-2 RNA PNL SPEC NAA+PROBE: NOT DETECTED

## 2021-09-17 PROCEDURE — C1887 CATHETER, GUIDING: HCPCS | Performed by: INTERNAL MEDICINE

## 2021-09-17 PROCEDURE — 87635 SARS-COV-2 COVID-19 AMP PRB: CPT | Performed by: INTERNAL MEDICINE

## 2021-09-17 PROCEDURE — 93458 L HRT ARTERY/VENTRICLE ANGIO: CPT | Performed by: INTERNAL MEDICINE

## 2021-09-17 PROCEDURE — C1769 GUIDE WIRE: HCPCS | Performed by: INTERNAL MEDICINE

## 2021-09-17 PROCEDURE — 25010000002 FENTANYL CITRATE (PF) 100 MCG/2ML SOLUTION: Performed by: INTERNAL MEDICINE

## 2021-09-17 PROCEDURE — 93571 IV DOP VEL&/PRESS C FLO 1ST: CPT | Performed by: INTERNAL MEDICINE

## 2021-09-17 PROCEDURE — C1894 INTRO/SHEATH, NON-LASER: HCPCS | Performed by: INTERNAL MEDICINE

## 2021-09-17 PROCEDURE — 0 IOPAMIDOL PER 1 ML: Performed by: INTERNAL MEDICINE

## 2021-09-17 PROCEDURE — 25010000002 ADENOSINE (DIAGNOSTIC) PER 30 MG: Performed by: INTERNAL MEDICINE

## 2021-09-17 PROCEDURE — 25010000002 HEPARIN (PORCINE) PER 1000 UNITS: Performed by: INTERNAL MEDICINE

## 2021-09-17 PROCEDURE — 25010000002 MIDAZOLAM PER 1 MG: Performed by: INTERNAL MEDICINE

## 2021-09-17 PROCEDURE — 99152 MOD SED SAME PHYS/QHP 5/>YRS: CPT | Performed by: INTERNAL MEDICINE

## 2021-09-17 PROCEDURE — C9803 HOPD COVID-19 SPEC COLLECT: HCPCS

## 2021-09-17 PROCEDURE — 99153 MOD SED SAME PHYS/QHP EA: CPT | Performed by: INTERNAL MEDICINE

## 2021-09-17 PROCEDURE — 85347 COAGULATION TIME ACTIVATED: CPT

## 2021-09-17 PROCEDURE — C1894 INTRO/SHEATH, NON-LASER: HCPCS

## 2021-09-17 RX ORDER — LIDOCAINE HYDROCHLORIDE 20 MG/ML
INJECTION, SOLUTION INFILTRATION; PERINEURAL AS NEEDED
Status: DISCONTINUED | OUTPATIENT
Start: 2021-09-17 | End: 2021-09-17 | Stop reason: HOSPADM

## 2021-09-17 RX ORDER — FENTANYL CITRATE 50 UG/ML
INJECTION, SOLUTION INTRAMUSCULAR; INTRAVENOUS AS NEEDED
Status: DISCONTINUED | OUTPATIENT
Start: 2021-09-17 | End: 2021-09-17 | Stop reason: HOSPADM

## 2021-09-17 RX ORDER — MIDAZOLAM HYDROCHLORIDE 1 MG/ML
INJECTION INTRAMUSCULAR; INTRAVENOUS AS NEEDED
Status: DISCONTINUED | OUTPATIENT
Start: 2021-09-17 | End: 2021-09-17 | Stop reason: HOSPADM

## 2021-09-17 RX ORDER — HEPARIN SODIUM 1000 [USP'U]/ML
INJECTION, SOLUTION INTRAVENOUS; SUBCUTANEOUS AS NEEDED
Status: DISCONTINUED | OUTPATIENT
Start: 2021-09-17 | End: 2021-09-17 | Stop reason: HOSPADM

## 2021-09-17 RX ORDER — ACETAMINOPHEN 325 MG/1
650 TABLET ORAL EVERY 4 HOURS PRN
Status: DISCONTINUED | OUTPATIENT
Start: 2021-09-17 | End: 2021-09-17 | Stop reason: HOSPADM

## 2021-09-17 RX ORDER — NITROGLYCERIN 5 MG/ML
INJECTION, SOLUTION INTRAVENOUS AS NEEDED
Status: DISCONTINUED | OUTPATIENT
Start: 2021-09-17 | End: 2021-09-17 | Stop reason: HOSPADM

## 2021-09-17 RX ORDER — NICARDIPINE HYDROCHLORIDE 2.5 MG/ML
INJECTION INTRAVENOUS AS NEEDED
Status: DISCONTINUED | OUTPATIENT
Start: 2021-09-17 | End: 2021-09-17 | Stop reason: HOSPADM

## 2021-09-17 NOTE — DISCHARGE INSTRUCTIONS
Post Cath Instructions    Call Dr. Gillis’s office to schedule a follow up appointment in 2-4 weeks at [phone].      1) Drink plenty of fluids for the next 24 hours.  This helps to eliminate the dye used in your procedure through urination.  You may resume a normal diet; however, try to avoid foods that would cause gas or constipation.    2) Sedative medication given to you during your catheterization may decrease your judgement and reaction time for up to 24-48 hours.  Therefore:  a. DO NOT drive or operate hazardous machinery (48 hours)  b. DO NOT consume alcoholic beverages  c. DO NOT make any important/legal decisions  d. Have someone stay with you for at least 24 hours    3) To allow proper healing and prevent bleeding, the following activities are to be strictly avoided for the next 24-48 hours:  a. Excessive bending at wound site  b. Straining (anything that would tense up muscles around the affected puncture site)  c. Lifting objects greater than 5 pounds, pushing, or pulling for 5 days  i. For Arm Cases:  1. No flexing at the puncture site, such as hammering, golfing, bowling, or swinging any objects    4) Keep the puncture site clean and dry.  You may remove the dressing tomorrow and replace it with a band-aid for at least one additional day.  Gently clean the site with mild soap and water.  No scrubbing/rubbing and lightly pat the area dry.  Showers are acceptable; however, avoid submerging in water (tub baths, hot tubs, swimming pools, dishwater, etc…) for at least one week.  The site should be completely healed before resuming these activities to reduce the risk of infection.  Check the site often.  Watch for signs and symptoms of infection and notify your physician if any of the following occur:  a. Bleeding or an increase in swelling at the puncture site  b. Fever  c. Increased soreness around puncture site  d. Foul odor or significant drainage from the puncture site  e. Swelling, redness, or  warmth at the puncture site    **A bruise or small “pea sized” lump under the skin at the puncture site is not unusual.  This should disappear within 3-4 weeks.**  5) CONTACT YOUR PHYSICIAN OR CALL 911 IF YOU EXPERIENCE ANY OF THE FOLLOWING:  a. Increased angina (chest pain) or frequent sensations of pressure, burning, pain, or other discomfort in the chest, arm, jaws, or stomach  b. Lightheadedness, dizziness, faint feeling, sweating, or difficulty breathing  c. Odd sensation changes like numbness, tingling, coldness, or pain in the arm or leg in which the catheter was inserted  d. Limb in which the catheter was inserted becomes pale/bluish in color    IMPORTANT:  Although this occurs very rarely, if you should develop bright red or excessive bleeding, feel a “pop” inside at the insertion site, or notice a sudden increase in swelling larger than a walnut, you should call 911.  Hold continuous firm pressure to the access site until emergency personnel arrive.  It is best if someone else can do this for you.

## 2021-10-04 RX ORDER — VERAPAMIL HYDROCHLORIDE 240 MG/1
CAPSULE, EXTENDED RELEASE ORAL
Qty: 180 CAPSULE | Refills: 0 | Status: ON HOLD | OUTPATIENT
Start: 2021-10-04 | End: 2022-04-10

## 2021-10-11 ENCOUNTER — ANTICOAGULATION VISIT (OUTPATIENT)
Dept: CARDIOLOGY | Facility: CLINIC | Age: 76
End: 2021-10-11

## 2021-10-11 ENCOUNTER — OFFICE VISIT (OUTPATIENT)
Dept: CARDIOLOGY | Facility: CLINIC | Age: 76
End: 2021-10-11

## 2021-10-11 VITALS
BODY MASS INDEX: 33.53 KG/M2 | SYSTOLIC BLOOD PRESSURE: 90 MMHG | HEIGHT: 73 IN | DIASTOLIC BLOOD PRESSURE: 62 MMHG | HEART RATE: 66 BPM | WEIGHT: 253 LBS | RESPIRATION RATE: 18 BRPM

## 2021-10-11 DIAGNOSIS — R55 NEAR SYNCOPE: ICD-10-CM

## 2021-10-11 DIAGNOSIS — I48.19 PERSISTENT ATRIAL FIBRILLATION (HCC): ICD-10-CM

## 2021-10-11 DIAGNOSIS — I48.19 PERSISTENT ATRIAL FIBRILLATION (HCC): Primary | ICD-10-CM

## 2021-10-11 DIAGNOSIS — E78.2 MIXED HYPERLIPIDEMIA: ICD-10-CM

## 2021-10-11 DIAGNOSIS — R07.89 CHEST PAIN, ATYPICAL: Primary | ICD-10-CM

## 2021-10-11 DIAGNOSIS — I10 ESSENTIAL HYPERTENSION: ICD-10-CM

## 2021-10-11 LAB — INR PPP: 2 (ref 2–3)

## 2021-10-11 PROCEDURE — 36416 COLLJ CAPILLARY BLOOD SPEC: CPT | Performed by: NURSE PRACTITIONER

## 2021-10-11 PROCEDURE — 99214 OFFICE O/P EST MOD 30 MIN: CPT | Performed by: INTERNAL MEDICINE

## 2021-10-11 PROCEDURE — 85610 PROTHROMBIN TIME: CPT | Performed by: NURSE PRACTITIONER

## 2021-10-20 RX ORDER — WARFARIN SODIUM 5 MG/1
TABLET ORAL
Qty: 30 TABLET | Refills: 0 | Status: SHIPPED | OUTPATIENT
Start: 2021-10-20 | End: 2021-12-23

## 2021-10-25 NOTE — PROGRESS NOTES
Cardiology clinic note  Silvano Gillis MD, PhD  Subjective:     Encounter Date:10/11/2021      Patient ID: Efe Malloy is a 76 y.o. male.    Chief Complaint:  Chief Complaint   Patient presents with   • Follow-up       HPI:  History of Present Illness  History of Present Illness  I the pleasure to see this 76-year-old gentleman today as a new patient, he recently had a stress test ordered by another provider with some atypical chest pain.  He has had low blood pressures recently of even 70-80 systolic with new blood pressure medicine started recently.  His blood pressure today is 90/68 heart rates in the 80s.  He is 258 pounds and has had intentional weight loss down from 275.  He has a known history of chest pain with multiple stress test being ordered all unremarkable previously.  I reviewed interpreted his 1 from the last couple of weeks which demonstrates resting significant abnormalities with decreased uptake in the apex anterior inferior lateral walls broadly with improved stress perfusion but otherwise there is still the perfusion abnormality.  It is difficult to say whether it is reversible ischemia and he has never had definitive evaluation with letter catheterization given continued symptoms which he is requesting a definitive evaluation.  I did discuss letter catheterization risks and benefits and he would wish to proceed if offered.  He has chronic persistent atrial fibrillation which is rate controlled in the 80s to 90s today.  He is on anticoagulation with Coumadin goal INR 2-3.  There were no prosthetic valves in place.  He is on digoxin and verapamil for rate control.  He is also on hydralazine and losartan 100/HCTZ 25 was recently started as a new medicine.  Has been dizzy lightheaded and blood pressure at home again have been in the 70s to 80s and he held the losartan today as a new medicine.  He still has some atypical chest pain waxing and waning described as substernal pain without radiation  diaphoresis.  He has some mild palpitations every now and then but nothing sustained.    Today presents with episodes of dizziness.  Blood pressure today is 90/62 and heart rates in the 70s to 80s.  He is down about 5 pounds to 53 from 258 on previous encounter.  He is a former smoker and continues abstinence after cessation remotely.  He is on alpha-blocker alfuzosin for urologic complaints with BPH and urinary retention.  He continues on digoxin with monitoring with high risk medicines.  He is on losartan 25 daily in addition to pravastatin.  He is also on verapamil 240 daily in addition to Coumadin for stroke risk reduction chronic persistent atrial fibrillation.  He underwent invasive ischemic evaluation secondary to uninformative stress testing with continued episodes of chest pain.  This revealed borderline obstructive disease and a large ramus branch and both superior and inferior limbs.  Both IFR/FFR of both limbs was insignificant with respect to hemodynamic criteria as described.  This does not meet criteria for intervention.  There is likely eccentric plaque which is overestimated angiographically.,  0.98 in the superior limb and 0.95 in the inferior limb  With respect to IFR, therefore he was recommended for medical therapy.  EF was 60%     Review of systems otherwise negative x14 point review of systems except as mentioned above     Historical data copied forward from previous encounters in EMR is unchanged     Left heart catheterization 9/17/2021  Findings next   #1 open aortic pressure, 157/96  2.  Closing pressure was unchanged next   #3 LVEDP 12 to 16 mmHg  4.  Normal LV systolic function EF of 65 to 70%  5.  No transaortic gradient     Angiography next   1 left main large-caliber vessel no angiographic disease  2.  LAD medium caliber vessel coursing anteriorly with mild nonobstructive disease most severely 20 to 30% diffusely with diffuse luminal regularities  3.  Ramus intermedius is a very large  caliber bifurcating vessel along anterolateral wall, proximally there is nonobstructive 20 to 30% luminal regularities, at the bifurcation of the superior and inferior limbs there is angiographic 70-80% in the superior limb, 50-60% in the inferior limb with only mild luminal regularities distally.  4.  Circumflex has 1 obtuse marginal branch, OM has 60% eccentric plaque, circumflex proper has mild luminal regularities only with DELFINO-3 flow throughout next   #5 RCA large caliber vessel with nonobstructive disease, mild luminal regularities only including PLV and PDA branches distally with DELFINO-3 flow     Conclusions and recommendations  1 borderline nonobstructive disease most severely in the ramus intermedius branch of the bifurcation involving superior and inferior sizable limbs.  IFR and FFR unremarkable in both limbs measured today as noted above.  Nonobstructive LAD disease, obtuse marginal disease and b luminal regularities only in the RCA.  2.  High normal LVEDP, no transaortic gradient, hyperdynamic LV function 65 to 70%  3.  Continue primary mention goals, aspirin statin beta-blocker, antianginals, may try Ranexa for small vessel disease not amenable to intervention or visible by angiography for microvascular dysfunction with negative work-up today  4.  Patient be discharged with discharge criteria met, follow-up cardiology 2 to 4 weeks for further optimization of his medical therapy     Silvano Gillis MD, PhD     ==============================================================     Exam today  Irregular, no rubs gallops no heave no left, faint 1 out of 6 stock ejection murmur lower sternal border  No carotid bruits or JVD  No clubbing cyanosis or edema  Obese soft nontender nondistended bowel sounds are positive  Clear to auscultation bilaterally  Normocephalic/atraumatic pupils equal round  Neurologic grossly intact bilaterally  Pulses 2+ radials equal bilaterally  Essentially unchanged     Assessment plan  per my encounter  Chest pain, no recurrence  IFR of the ramus at 0.98 and 0.95 with respect to superior and inferior limbs, no significant obstructive disease in other distributions with EF of 60 to 65%.  Obtuse marginal 60% eccentric plaque but nonflow limiting, RCA with luminal regularities, LAD 20 to 30% limb irregularities as well.     Risk and benefits of cardiac catheterization including death cardiac tach stroke pain bleeding infection need for vascular cardiovascular surgery discussed and the patient wishes to proceed     CHF, EF of 60% by LV gram, euvolemic appearing today     Persistent atrial fibrillation, digoxin and verapamil will be continued, get digoxin level     Hypertension but hypotensive on new medicines  Stop losartan  Off hydralazine  EF 60% with no clinical heart failure no class I indication for ARB  Continue verapamil for atrial fibrillation indications     Dizziness and hypotension  As above stop hydralazine, stop losartan as above  Next diabetes goal A1c less than 7     Hyperlipidemia continue pravastatin, guide by fasting lipid panel for ASCVD risk calculations, escalate doses per lipid studies     Patient will get CBC CMP INR dig level in less than a week prior to invasive ischemic evaluation for continued chest pain     Silvano Gillis MD, PhD     Further recommendation to follow findings and clinical course and response to therapy     Follow-up 6 months to 1 year       The following portions of the patient's history were reviewed and updated as appropriate: allergies, current medications, past family history, past medical history, past social history, past surgical history and problem list.    Problem List:  Patient Active Problem List   Diagnosis   • Persistent atrial fibrillation (HCC)   • Essential hypertension   • COPD (chronic obstructive pulmonary disease) (HCC)   • Hyperglycemia   • Atypical chest pain   • Abnormal Pap smear of anus   • Congestive heart failure (HCC)   •  Depressive disorder   • Gastroesophageal reflux disease   • Hyperlipidemia   • Hypothyroidism   • Neuropathy   • Obstructive sleep apnea syndrome   • Primary osteoarthritis of right knee   • Cervical spondylosis without myelopathy   • Cervico-occipital neuralgia   • Lumbosacral spondylosis without myelopathy   • Near syncope   • Arthritis   • Chronic idiopathic constipation   • Disorder of prostate   • Dysplasia of anus   • Gout   • Hearing loss   • Seasonal allergic rhinitis       Past Medical History:  Past Medical History:   Diagnosis Date   • Asthma    • Atrial fibrillation (HCC)    • Atypical chest pain 6/11/2020   • COPD (chronic obstructive pulmonary disease) (HCC)    • COVID-19 01/13/2021   • Enlarged prostate    • Hyperlipidemia    • Kidney stone    • Near syncope 12/17/2020   • Sleep apnea        Past Surgical History:  Past Surgical History:   Procedure Laterality Date   • APPENDECTOMY     • CARDIAC CATHETERIZATION  2007    nonobstructive dz   • CARDIAC CATHETERIZATION N/A 9/17/2021    Procedure: Left Heart Cath;  Surgeon: Silvano Gillis MD;  Location[facility] CATH INVASIVE LOC[facility];  Service: Cardiology;  Laterality: N/A;   • CERVICAL EPIDURAL      with Dr. Chua Pain mgt   • CHOLECYSTECTOMY     • EYE SURGERY Bilateral     corneal transplant left 09/02/2020 (Right eye 2014)   • JOINT REPLACEMENT Right     knee   • KIDNEY STONE SURGERY     • PROSTATE SURGERY         Social History:  Social History     Socioeconomic History   • Marital status:    Tobacco Use   • Smoking status: Former Smoker   • Smokeless tobacco: Never Used   Vaping Use   • Vaping Use: Never used   Substance and Sexual Activity   • Alcohol use: Yes     Comment: occasionally   • Drug use: Not Currently   • Sexual activity: Defer       Allergies:  Allergies   Allergen Reactions   • Penicillins Other (See Comments)     AS A CHILD   • Rivaroxaban Other (See Comments)       Immunizations:  Immunization History   Administered  "Date(s) Administered   • FLUAD TRI 65YR+ 10/31/2019   • Fluzone High Dose =>65 Years ([employer] ONLY) 10/14/2016, 10/16/2017, 11/14/2018   • H1N1 Inj 12/22/2009   • Hepatitis A 09/17/2019, 11/19/2019   • Influenza Quad Vaccine (Inpatient) 09/22/2014   • Influenza, Unspecified 09/18/2013   • Pneumococcal Conjugate 13-Valent (PCV13) 10/12/2015   • Pneumococcal Polysaccharide (PPSV23) 09/24/2013   • Shingrix 09/17/2019, 11/19/2019   • Zostavax 02/12/2013       ROS:  ROS       Objective:         BP 90/62 (BP Location: Left arm, Patient Position: Sitting)   Pulse 66   Resp 18   Ht 185.4 cm (73\")   Wt 115 kg (253 lb)   BMI 33.38 kg/m²     Physical Exam    In-Office Procedure(s):  Procedures    ASCVD RIsk Score::  The ASCVD Risk score (Lili ALBRECHT Jr., et al., 2013) failed to calculate for the following reasons:    Cannot find a previous HDL lab    Cannot find a previous total cholesterol lab    Recent Radiology:  Imaging Results (Most Recent)     None          Lab Review:   Anticoagulation Visit on 10/11/2021   Component Date Value   • INR 10/11/2021 2.00    Admission on 09/17/2021, Discharged on 09/17/2021   Component Date Value   • COVID19 09/17/2021 Not Detected    • Activated Clotting Time  09/17/2021 235*   Lab on 09/15/2021   Component Date Value   • Digoxin 09/15/2021 1.50*   Office Visit on 09/10/2021   Component Date Value   • Glucose 09/15/2021 99    • BUN 09/15/2021 14    • Creatinine 09/15/2021 1.14    • Sodium 09/15/2021 141    • Potassium 09/15/2021 4.2    • Chloride 09/15/2021 104    • CO2 09/15/2021 25.6    • Calcium 09/15/2021 9.4    • Total Protein 09/15/2021 7.0    • Albumin 09/15/2021 4.30    • ALT (SGPT) 09/15/2021 17    • AST (SGOT) 09/15/2021 18    • Alkaline Phosphatase 09/15/2021 47    • Total Bilirubin 09/15/2021 0.4    • eGFR Non  Amer 09/15/2021 63    • Globulin 09/15/2021 2.7    • A/G Ratio 09/15/2021 1.6    • BUN/Creatinine Ratio 09/15/2021 12.3    • Anion Gap 09/15/2021 11.4    • " Protime 09/15/2021 15.7*   • INR 09/15/2021 1.45*   • WBC 09/15/2021 6.14    • RBC 09/15/2021 6.72*   • Hemoglobin 09/15/2021 15.8    • Hematocrit 09/15/2021 51.1*   • MCV 09/15/2021 76.0*   • MCH 09/15/2021 23.5*   • MCHC 09/15/2021 30.9*   • RDW 09/15/2021 20.1*   • RDW-SD 09/15/2021 50.3    • MPV 09/15/2021 10.1    • Platelets 09/15/2021 169    • Neutrophil % 09/15/2021 58.9    • Lymphocyte % 09/15/2021 29.0    • Monocyte % 09/15/2021 9.3    • Eosinophil % 09/15/2021 1.8    • Basophil % 09/15/2021 0.5    • Immature Grans % 09/15/2021 0.5    • Neutrophils, Absolute 09/15/2021 3.62    • Lymphocytes, Absolute 09/15/2021 1.78    • Monocytes, Absolute 09/15/2021 0.57    • Eosinophils, Absolute 09/15/2021 0.11    • Basophils, Absolute 09/15/2021 0.03    • Immature Grans, Absolute 09/15/2021 0.03    • nRBC 09/15/2021 0.0    Hospital Outpatient Visit on 08/26/2021   Component Date Value   • Target HR (85%) 08/26/2021 124    • Max. Pred. HR (100%) 08/26/2021 146    • BH CV STRESS PROTOCOL 1 08/26/2021 Pharmacologic    • Stage 1 08/26/2021 1    • HR Stage 1 08/26/2021 79    • Duration Min Stage 1 08/26/2021 0    • Duration Sec Stage 1 08/26/2021 10    • Stress Dose Regadenoson * 08/26/2021 0.4    • Stress Comments Stage 1 08/26/2021 10 sec bolus injection    • Stage 2 08/26/2021 2    • HR Stage 2 08/26/2021 78    • BP Stage 2 08/26/2021 129/74    • Duration Min Stage 2 08/26/2021 4    • Duration Sec Stage 2 08/26/2021 0    • Stress Comments Stage 2 08/26/2021 recovery    • Stage 3 08/26/2021 3    • HR Stage 3 08/26/2021 82    • Stage 4 08/26/2021 4    • HR Stage 4 08/26/2021 79    • Baseline HR 08/26/2021 72    • Baseline BP 08/26/2021 138/72    • Peak HR 08/26/2021 89    • Percent Max Pred HR 08/26/2021 60.96    • Percent Target HR 08/26/2021 72    • Peak BP 08/26/2021 138/72    • Recovery HR 08/26/2021 76    • Recovery BP 08/26/2021 121/72    • Nuclear Prior Study 08/26/2021 3    • BH CV REST NUCLEAR ISOTO* 08/26/2021  7.9    • BH CV STRESS NUCLEAR ISO* 08/26/2021 21.9    • Nuc Stress EF 08/26/2021 51    Anticoagulation Visit on 08/19/2021   Component Date Value   • INR 08/19/2021 1.70*   Anticoagulation Visit on 07/22/2021   Component Date Value   • INR 07/22/2021 2.40    Anticoagulation Visit on 06/24/2021   Component Date Value   • INR 06/24/2021 2.60    Admission on 06/12/2021, Discharged on 06/12/2021   Component Date Value   • Protime 06/12/2021 18.1*   • INR 06/12/2021 1.69*   Anticoagulation Visit on 05/13/2021   Component Date Value   • INR 05/13/2021 3.10*   There may be more visits with results that are not included.                Assessment:         No diagnosis found.       Plan:            Level of Care:                 Silvano Gillis MD  10/25/21  .

## 2021-11-15 ENCOUNTER — ANTICOAGULATION VISIT (OUTPATIENT)
Dept: PHARMACY | Facility: HOSPITAL | Age: 76
End: 2021-11-15

## 2021-11-15 DIAGNOSIS — I48.19 PERSISTENT ATRIAL FIBRILLATION (HCC): Primary | ICD-10-CM

## 2021-11-15 LAB — INR PPP: 2 (ref 2–3)

## 2021-11-15 PROCEDURE — G0463 HOSPITAL OUTPT CLINIC VISIT: HCPCS

## 2021-11-15 PROCEDURE — 85610 PROTHROMBIN TIME: CPT

## 2021-11-15 PROCEDURE — 36416 COLLJ CAPILLARY BLOOD SPEC: CPT

## 2021-11-15 NOTE — PROGRESS NOTES
Anticoagulation Clinic Initial Visit Progress Note    Today was Efe Malloy's first visit to the Anticoagulation Clinic following transition from Dr. Gillis's office for anticoagulation management regarding atrial fibrillation.    Warfarin Initiation Date: 19    Planned Duration of Therapy: Indefinite    INR Goal: 2-3    Today's INR: 2.0 (therapeutic)    Current warfarin dose: warfarin 5 mg PO daily, except warfarin 7.5 mg PO on Thursday.  Current total weekly dose = 37.5 mg per week.     Clinic Interview:   Patient Findings     Negatives:  Signs/symptoms of thrombosis, Signs/symptoms of bleeding,   Laboratory test error suspected, Change in health, Change in alcohol use,   Change in activity, Upcoming invasive procedure, Emergency department   visit, Upcoming dental procedure, Missed doses, Extra doses, Change in   medications, Change in diet/appetite, Hospital admission, Bruising, Other   complaints      Clinical Outcomes     Negatives:  Major bleeding event, Thromboembolic event,   Anticoagulation-related hospital admission, Anticoagulation-related ED   visit, Anticoagulation-related fatality        INR History:  Anticoagulation Monitoring 2021 10/11/2021 11/15/2021   INR 1.70 2.00 2.00   INR Date 2021 10/11/2021 11/15/2021   INR Goal 2.0-3.0 2.0-3.0 2.0-3.0   Trend Up Same Same   Last Week Total 35 mg 37.5 mg 37.5 mg   Next Week Total 37.5 mg 37.5 mg 37.5 mg   Sun 5 mg 5 mg 5 mg   Mon 5 mg 5 mg 5 mg   Tue 5 mg 5 mg 5 mg   Wed 5 mg 5 mg 5 mg   Thu 7.5 mg 7.5 mg 7.5 mg   Fri 5 mg 5 mg 5 mg   Sat 5 mg 5 mg 5 mg   Visit Report - - -   Some recent data might be hidden       Anticoagulation Summary  As of 11/15/2021    INR goal:  2.0-3.0   TTR:  78.9 % (2.4 y)   INR used for dosin.00 (11/15/2021)   Warfarin maintenance plan:  7.5 mg every Thu; 5 mg all other days   Weekly warfarin total:  37.5 mg   No change documented:  Kavita Jolley, PharmD   Plan last modified:  Jodie Ponce MA (2021)    Next INR check:  12/13/2021   Priority:  Maintenance   Target end date:      Indications    Persistent atrial fibrillation (HCC) [I48.19]             Anticoagulation Episode Summary     INR check location:      Preferred lab:      Send INR reminders to:  [facility] CLINICAL POOL    Comments:  Patient contract signed 11/15/21.      Anticoagulation Care Providers     Provider Role Specialty Phone number    Silvano Gillis MD  Cardiology [phone]          Current Medications:   Prior to Admission medications    Medication Sig Start Date End Date Taking? Authorizing Provider   albuterol sulfate  (90 Base) MCG/ACT inhaler INHALE 2 puffs every 4-6 hours as needed 7/6/21   Shelby Haskins MD   alfuzosin (UROXATRAL) 10 MG 24 hr tablet Take 10 mg by mouth 2 (Two) Times a Day. 9/23/19   Shelby Haskins MD   baclofen (LIORESAL) 10 MG tablet Take 10 mg by mouth 2 (Two) Times a Day. 7/13/21   Shelby Haskins MD   BREO ELLIPTA 100-25 MCG/INH inhaler Inhale 1 puff Daily. 9/24/19   Shelby Haskins MD   digoxin (LANOXIN) 250 MCG tablet TAKE ONE TABLET BY MOUTH EVERY DAY 1/27/21   AUDRA Vargas MD   EC-RX Testosterone 10 % cream APPLY FOUR CLICKS DAILY AS DIRECTED 12/17/20   Shelby Haskins MD   EPINEPHrine (EPIPEN) 0.3 MG/0.3ML solution auto-injector injection epinephrine 0.3 mg/0.3 mL injection, auto-injector    Shelby Haskins MD   fluorometholone (FML) 0.1 % ophthalmic suspension APPLY ONE DROP IN THE RIGHT EYE EVERY DAY 5/17/21   Shelby Haskins MD   fluticasone (FLONASE) 50 MCG/ACT nasal spray 1 spray into the nostril(s) as directed by provider Daily.    Shelby Haskins MD   gabapentin (NEURONTIN) 300 MG capsule Take 600 mg by mouth 2 (Two) Times a Day. 9/13/19   Shelby Haskins MD   levothyroxine (SYNTHROID, LEVOTHROID) 75 MCG tablet Take 75 mcg by mouth Daily. 9/23/19   Shelby Haskins MD   montelukast (SINGULAIR) 10 MG tablet  Take 10 mg by mouth Every Evening. 9/23/19   Shelby Haskins MD   pantoprazole (PROTONIX) 40 MG EC tablet Take 40 mg by mouth Daily. 11/27/20   Shelby Haskins MD   pravastatin (PRAVACHOL) 40 MG tablet TAKE ONE TABLET BY MOUTH EVERY NIGHT AT BEDTIME 1/27/21   AUDRA Vargas MD   sertraline (ZOLOFT) 100 MG tablet Take 150 mg by mouth Daily. 9/23/19   Shelby Haskins MD   Testosterone 20 % cream APPLY FOUR CLICKS DAILY AS DIRECTED 6/15/21   Shelby Haskins MD   theophylline (UNIPHYL) 400 MG 24 hr tablet Take 400 mg by mouth Daily. 9/23/19   Shelby Haskins MD   verapamil ER (VERELAN) 240 MG 24 hr capsule TAKE ONE CAPSULE BY MOUTH TWICE DAILY  Patient taking differently: Daily. 10/4/21   Silvano Gillis MD   warfarin (COUMADIN) 1 MG tablet TAKE ONE TABLET BY MOUTH DAILY AS DIRECTED 7/29/20   AUDRA Vargas MD   warfarin (COUMADIN) 5 MG tablet TAKE ONE TABLET BY MOUTH EVERY DAY AS DIRECTED 10/20/21   Silvano Gillis MD       Allergies:    Penicillins and Rivaroxaban    This patient is managed via a collaborative agreement, pursuant to [id number]. The signed protocol is kept in the [facility] at [address] [place] [address].    Medical history:   Past Medical History:   Diagnosis Date   • Asthma    • Atrial fibrillation (HCC)    • Atypical chest pain 6/11/2020   • COPD (chronic obstructive pulmonary disease) (HCC)    • COVID-19 01/13/2021   • Enlarged prostate    • Hyperlipidemia    • Kidney stone    • Near syncope 12/17/2020   • Sleep apnea        CHADS2-VASc score for atrial fibrillation:  Age 65-74 (1)  Sex Male (0)  CHF history  Yes (1)  HTN history  Yes (1)  Vascular disease history  No (0)  DM history  No (0)  Stroke/TIA/Thromboembolism history No (0)  Total Score 3  Adjusted Stroke Risk (% per year): 3: 3.2%    HAS-BLED score for atrial fibrillation:  HTN Yes (1)  Abnormal renal/liver function None (0)  Stroke No (0)  Bleeding  tendency or predisposition No (0)  Labile INR No (0)  Age >65 (1)  Drugs or alcohol No (0)  Total Score 2  Bleeding Risk: Moderate Risk (2)    Social history:   Social History     Tobacco Use   • Smoking status: Former Smoker   • Smokeless tobacco: Never Used   Substance Use Topics   • Alcohol use: Yes     Comment: occasionally       Vaccine History:   Immunization History   Administered Date(s) Administered   • FLUAD TRI 65YR+ 10/31/2019   • Fluzone High Dose =>65 Years ([employer] ONLY) 10/14/2016, 10/16/2017, 11/14/2018   • H1N1 Inj 12/22/2009   • Hepatitis A 09/17/2019, 11/19/2019   • Influenza Quad Vaccine (Inpatient) 09/22/2014   • Influenza, Unspecified 09/18/2013   • Pneumococcal Conjugate 13-Valent (PCV13) 10/12/2015   • Pneumococcal Polysaccharide (PPSV23) 09/24/2013   • Shingrix 09/17/2019, 11/19/2019   • Zostavax 02/12/2013       Education: Provided verbal and written education that included, but is not limited to, dosing, side effects, signs/symptoms of bleeding and clotting, when to seek medical attention, food/drug interactions, and importance of wearing medical identification bracelet.    Clinic Contract:  Reviewed the clinic's patient contract; patient/caregiver verbalized understanding and signed contract.    Plan:  1. no change; warfarin 5 mg PO daily, except warfarin 7.5 mg PO on Thursday. Total weekly dose = 37.5 mg.  2. RTC in 4 week(s).      Kavita Jolley, RosettaD  11/15/2021  12:02 EST

## 2021-11-18 RX ORDER — DIGOXIN 250 MCG
TABLET ORAL
Qty: 90 TABLET | Refills: 1 | Status: SHIPPED | OUTPATIENT
Start: 2021-11-18 | End: 2022-05-13

## 2021-12-13 ENCOUNTER — ANTICOAGULATION VISIT (OUTPATIENT)
Dept: PHARMACY | Facility: HOSPITAL | Age: 76
End: 2021-12-13

## 2021-12-13 DIAGNOSIS — I48.19 PERSISTENT ATRIAL FIBRILLATION (HCC): Primary | ICD-10-CM

## 2021-12-13 LAB — INR PPP: 1.7 (ref 2–3)

## 2021-12-13 PROCEDURE — G0463 HOSPITAL OUTPT CLINIC VISIT: HCPCS

## 2021-12-13 PROCEDURE — 36416 COLLJ CAPILLARY BLOOD SPEC: CPT

## 2021-12-13 PROCEDURE — 85610 PROTHROMBIN TIME: CPT

## 2021-12-13 NOTE — PROGRESS NOTES
Anticoagulation Clinic Progress Note    INR Goal: 2 - 3  Today's INR: 1.7 (subtherapeutic)  Current warfarin dose: warfarin 5 mg PO daily, except warfarin 7.5 mg PO on Thursday.  Current total weekly dose = 37.5 mg per week.     INR History:  Anticoagulation Monitoring 10/11/2021 11/15/2021 2021   INR 2.00 2.00 1.70   INR Date 10/11/2021 11/15/2021 2021   INR Goal 2.0-3.0 2.0-3.0 2.0-3.0   Trend Same Same Up   Last Week Total 37.5 mg 37.5 mg 37.5 mg   Next Week Total 37.5 mg 37.5 mg 40 mg   Sun 5 mg 5 mg 5 mg   Mon 5 mg 5 mg 7.5 mg   Tue 5 mg 5 mg 5 mg   Wed 5 mg 5 mg 5 mg   Thu 7.5 mg 7.5 mg 7.5 mg   Fri 5 mg 5 mg 5 mg   Sat 5 mg 5 mg 5 mg   Visit Report - - -   Some recent data might be hidden       Anticoagulation Summary  As of 2021    INR goal:  2.0-3.0   TTR:  76.4 % (2.5 y)   INR used for dosin.70 (2021)   Warfarin maintenance plan:  7.5 mg every Mon, Thu; 5 mg all other days   Weekly warfarin total:  40 mg   Plan last modified:  Kavita Jolley, PharmD (2021)   Next INR check:  2021   Priority:  Maintenance   Target end date:      Indications    Persistent atrial fibrillation (HCC) [I48.19]             Anticoagulation Episode Summary     INR check location:      Preferred lab:      Send INR reminders to:  [facility] CLINICAL POOL    Comments:  Patient contract signed 11/15/21.      Anticoagulation Care Providers     Provider Role Specialty Phone number    Silvano Gillis MD  Cardiology [phone]          Clinic Interview:   Patient Findings     Positives:  Change in diet/appetite (Patient reports that he has been   eating more salads containing jennifer lettuce (may decrease INR).)    Negatives:  Signs/symptoms of thrombosis, Signs/symptoms of bleeding,   Laboratory test error suspected, Change in health, Change in alcohol use,   Change in activity, Upcoming invasive procedure, Emergency department   visit, Upcoming dental procedure, Missed doses,  Extra doses, Change in   medications, Hospital admission, Bruising, Other complaints      Clinical Outcomes     Negatives:  Major bleeding event, Thromboembolic event,   Anticoagulation-related hospital admission, Anticoagulation-related ED   visit, Anticoagulation-related fatality        Current Medications:   Prior to Admission medications    Medication Sig Start Date End Date Taking? Authorizing Provider   albuterol sulfate  (90 Base) MCG/ACT inhaler INHALE 2 puffs every 4-6 hours as needed 7/6/21   Shelby Haskins MD   alfuzosin (UROXATRAL) 10 MG 24 hr tablet Take 10 mg by mouth 2 (Two) Times a Day. 9/23/19   Shelby Haskins MD   baclofen (LIORESAL) 10 MG tablet Take 10 mg by mouth 2 (Two) Times a Day. 7/13/21   Shelby Haskins MD   BREHENRY ELLIPTA 100-25 MCG/INH inhaler Inhale 1 puff Daily. 9/24/19   Shelby Haskins MD   digoxin (LANOXIN) 250 MCG tablet TAKE ONE TABLET BY MOUTH EVERY DAY 11/18/21   Silvano Gillis MD   EC-RX Testosterone 10 % cream APPLY FOUR CLICKS DAILY AS DIRECTED 12/17/20   Shelby Haskins MD   EPINEPHrine (EPIPEN) 0.3 MG/0.3ML solution auto-injector injection epinephrine 0.3 mg/0.3 mL injection, auto-injector    Shelby Haskins MD   fluorometholone (FML) 0.1 % ophthalmic suspension APPLY ONE DROP IN THE RIGHT EYE EVERY DAY 5/17/21   Shelby Haskins MD   fluticasone (FLONASE) 50 MCG/ACT nasal spray 1 spray into the nostril(s) as directed by provider Daily.    ProviderShelby MD   gabapentin (NEURONTIN) 300 MG capsule Take 600 mg by mouth 2 (Two) Times a Day. 9/13/19   Shelby Haskins MD   levothyroxine (SYNTHROID, LEVOTHROID) 75 MCG tablet Take 75 mcg by mouth Daily. 9/23/19   Shelby Haskins MD   montelukast (SINGULAIR) 10 MG tablet Take 10 mg by mouth Every Evening. 9/23/19   Shelby Haskins MD   pantoprazole (PROTONIX) 40 MG EC tablet Take 40 mg by mouth Daily. 11/27/20   Shelby Haskins MD   pravastatin  (PRAVACHOL) 40 MG tablet TAKE ONE TABLET BY MOUTH EVERY NIGHT AT BEDTIME 1/27/21   AUDRA Vargas MD   sertraline (ZOLOFT) 100 MG tablet Take 150 mg by mouth Daily. 9/23/19   Shelby Haskins MD   Testosterone 20 % cream APPLY FOUR CLICKS DAILY AS DIRECTED 6/15/21   Shelby Haskins MD   theophylline (UNIPHYL) 400 MG 24 hr tablet Take 400 mg by mouth Daily. 9/23/19   Shelby Haskins MD   verapamil ER (VERELAN) 240 MG 24 hr capsule TAKE ONE CAPSULE BY MOUTH TWICE DAILY  Patient taking differently: Daily. 10/4/21   Silvano Gillis MD   warfarin (COUMADIN) 1 MG tablet TAKE ONE TABLET BY MOUTH DAILY AS DIRECTED 7/29/20   AUDRA Vargas MD   warfarin (COUMADIN) 5 MG tablet TAKE ONE TABLET BY MOUTH EVERY DAY AS DIRECTED 10/20/21   Silvano Gillis MD       Medical history:   Past Medical History:   Diagnosis Date   • Asthma    • Atrial fibrillation (HCC)    • Atypical chest pain 6/11/2020   • COPD (chronic obstructive pulmonary disease) (HCC)    • COVID-19 01/13/2021   • Enlarged prostate    • Hyperlipidemia    • Kidney stone    • Near syncope 12/17/2020   • Sleep apnea        Social history:   Social History     Tobacco Use   • Smoking status: Former Smoker   • Smokeless tobacco: Never Used   Substance Use Topics   • Alcohol use: Yes     Comment: occasionally       Allergies:    Penicillins and Rivaroxaban    Vaccine History:   Immunization History   Administered Date(s) Administered   • FLUAD TRI 65YR+ 10/31/2019   • Fluzone High Dose =>65 Years ([employer] ONLY) 10/14/2016, 10/16/2017, 11/14/2018   • H1N1 Inj 12/22/2009   • Hepatitis A 09/17/2019, 11/19/2019   • Influenza Quad Vaccine (Inpatient) 09/22/2014   • Influenza, Unspecified 09/18/2013   • Pneumococcal Conjugate 13-Valent (PCV13) 10/12/2015   • Pneumococcal Polysaccharide (PPSV23) 09/24/2013   • Shingrix 09/17/2019, 11/19/2019   • Zostavax 02/12/2013       This patient is managed via a collaborative agreement, pursuant to IC  25-26-16. The signed protocol is kept in the [facility] at [address] [place] [address].    Education: Efe Malloy has been instructed to call if any changes in medications, doses, concerns, etc. Patient was provided dosing instructions and expressed verbal understanding and has no further questions at this time.    Plan:  1. Anticoagulation   - increase by 7 %; warfarin 5 mg PO daily, except warfarin 7.5 mg PO on Monday and Thursday.  New total weekly dose = 40 mg.   - Educated patient on signs/symptoms to monitor for and when to seek medical attention.  - RTC in 2 week(s)    Kavita Jolley PharmD  12/13/2021  11:41 EST

## 2021-12-23 DIAGNOSIS — I48.19 PERSISTENT ATRIAL FIBRILLATION (HCC): Primary | ICD-10-CM

## 2021-12-23 RX ORDER — WARFARIN SODIUM 5 MG/1
TABLET ORAL
Qty: 96 TABLET | Refills: 0 | Status: SHIPPED | OUTPATIENT
Start: 2021-12-23 | End: 2022-03-25

## 2021-12-27 ENCOUNTER — ANTICOAGULATION VISIT (OUTPATIENT)
Dept: PHARMACY | Facility: HOSPITAL | Age: 76
End: 2021-12-27

## 2021-12-27 DIAGNOSIS — I48.19 PERSISTENT ATRIAL FIBRILLATION (HCC): Primary | ICD-10-CM

## 2021-12-27 LAB — INR PPP: 2.2 (ref 2–3)

## 2021-12-27 PROCEDURE — G0463 HOSPITAL OUTPT CLINIC VISIT: HCPCS

## 2021-12-27 PROCEDURE — 36416 COLLJ CAPILLARY BLOOD SPEC: CPT

## 2021-12-27 PROCEDURE — 85610 PROTHROMBIN TIME: CPT

## 2021-12-27 NOTE — PROGRESS NOTES
Anticoagulation Clinic Progress Note    INR Goal: 2 - 3  Today's INR: 2.2 (therapeutic)  Current warfarin dose: warfarin 5 mg PO daily, except warfarin 7.5 mg PO on Monday and Thursday.  Current total weekly dose = 40 mg per week.     INR History:  Anticoagulation Monitoring 11/15/2021 2021 2021   INR 2.00 1.70 2.20   INR Date 11/15/2021 2021 2021   INR Goal 2.0-3.0 2.0-3.0 2.0-3.0   Trend Same Up Same   Last Week Total 37.5 mg 37.5 mg 40 mg   Next Week Total 37.5 mg 40 mg 40 mg   Sun 5 mg 5 mg 5 mg   Mon 5 mg 7.5 mg 7.5 mg   Tue 5 mg 5 mg 5 mg   Wed 5 mg 5 mg 5 mg   Thu 7.5 mg 7.5 mg 7.5 mg   Fri 5 mg 5 mg 5 mg   Sat 5 mg 5 mg 5 mg   Visit Report - - -   Some recent data might be hidden       Anticoagulation Summary  As of 2021    INR goal:  2.0-3.0   TTR:  75.8 % (2.5 y)   INR used for dosin.20 (2021)   Warfarin maintenance plan:  7.5 mg every Mon, Thu; 5 mg all other days   Weekly warfarin total:  40 mg   No change documented:  Beam, Kavita, PharmD   Plan last modified:  Kavita Jolley PharmD (2021)   Next INR check:  2022   Priority:  Maintenance   Target end date:      Indications    Persistent atrial fibrillation (HCC) [I48.19]             Anticoagulation Episode Summary     INR check location:      Preferred lab:      Send INR reminders to:  [facility] CLINICAL POOL    Comments:  Patient contract signed 11/15/21.      Anticoagulation Care Providers     Provider Role Specialty Phone number    Silvano Gillis MD  Cardiology [phone]          Clinic Interview:   Patient Findings     Positives:  Change in diet/appetite (Patient reports a slight decrease in   salad intake (may increase INR).)    Negatives:  Signs/symptoms of thrombosis, Signs/symptoms of bleeding,   Laboratory test error suspected, Change in health, Change in alcohol use,   Change in activity, Upcoming invasive procedure, Emergency department   visit, Upcoming dental  procedure, Missed doses, Extra doses, Change in   medications, Hospital admission, Bruising, Other complaints      Clinical Outcomes     Negatives:  Major bleeding event, Thromboembolic event,   Anticoagulation-related hospital admission, Anticoagulation-related ED   visit, Anticoagulation-related fatality        Current Medications:   Prior to Admission medications    Medication Sig Start Date End Date Taking? Authorizing Provider   albuterol sulfate  (90 Base) MCG/ACT inhaler INHALE 2 puffs every 4-6 hours as needed 7/6/21   Shelby Haskins MD   alfuzosin (UROXATRAL) 10 MG 24 hr tablet Take 10 mg by mouth 2 (Two) Times a Day. 9/23/19   Shelby Haskins MD   baclofen (LIORESAL) 10 MG tablet Take 10 mg by mouth 2 (Two) Times a Day. 7/13/21   Shelby Haskins MD   BREO ELLIPTA 100-25 MCG/INH inhaler Inhale 1 puff Daily. 9/24/19   Shelby Haskins MD   digoxin (LANOXIN) 250 MCG tablet TAKE ONE TABLET BY MOUTH EVERY DAY 11/18/21   Silvano Gillis MD   EC-RX Testosterone 10 % cream APPLY FOUR CLICKS DAILY AS DIRECTED 12/17/20   Shelby Haskins MD   EPINEPHrine (EPIPEN) 0.3 MG/0.3ML solution auto-injector injection epinephrine 0.3 mg/0.3 mL injection, auto-injector    Shelby Haskins MD   fluorometholone (FML) 0.1 % ophthalmic suspension APPLY ONE DROP IN THE RIGHT EYE EVERY DAY 5/17/21   Shelby Haskins MD   fluticasone (FLONASE) 50 MCG/ACT nasal spray 1 spray into the nostril(s) as directed by provider Daily.    ProviderShelby MD   gabapentin (NEURONTIN) 300 MG capsule Take 600 mg by mouth 2 (Two) Times a Day. 9/13/19   Shelby Haskins MD   levothyroxine (SYNTHROID, LEVOTHROID) 75 MCG tablet Take 75 mcg by mouth Daily. 9/23/19   Shelby Haskins MD   montelukast (SINGULAIR) 10 MG tablet Take 10 mg by mouth Every Evening. 9/23/19   Shelby Haskins MD   pantoprazole (PROTONIX) 40 MG EC tablet Take 40 mg by mouth Daily. 11/27/20   Jozef  MD Shelby   pravastatin (PRAVACHOL) 40 MG tablet TAKE ONE TABLET BY MOUTH EVERY NIGHT AT BEDTIME 1/27/21   AUDRA Vargas MD   sertraline (ZOLOFT) 100 MG tablet Take 150 mg by mouth Daily. 9/23/19   Shelby Haskins MD   Testosterone 20 % cream APPLY FOUR CLICKS DAILY AS DIRECTED 6/15/21   Shelby Haskins MD   theophylline (UNIPHYL) 400 MG 24 hr tablet Take 400 mg by mouth Daily. 9/23/19   Shelby Haskins MD   verapamil ER (VERELAN) 240 MG 24 hr capsule TAKE ONE CAPSULE BY MOUTH TWICE DAILY  Patient taking differently: Daily. 10/4/21   Silvano Gillis MD   warfarin (COUMADIN) 1 MG tablet TAKE ONE TABLET BY MOUTH DAILY AS DIRECTED 7/29/20   AUDRA Vargas MD   warfarin (COUMADIN) 5 MG tablet Take 5 mg daily except 7.5 mg on Monday and Thursday or as directed. 12/23/21   Silvano Gillis MD       Medical history:   Past Medical History:   Diagnosis Date   • Asthma    • Atrial fibrillation (HCC)    • Atypical chest pain 6/11/2020   • COPD (chronic obstructive pulmonary disease) (HCC)    • COVID-19 01/13/2021   • Enlarged prostate    • Hyperlipidemia    • Kidney stone    • Near syncope 12/17/2020   • Sleep apnea        Social history:   Social History     Tobacco Use   • Smoking status: Former Smoker   • Smokeless tobacco: Never Used   Substance Use Topics   • Alcohol use: Yes     Comment: occasionally       Allergies:    Penicillins and Rivaroxaban    Vaccine History:   Immunization History   Administered Date(s) Administered   • FLUAD TRI 65YR+ 10/31/2019   • Fluzone High Dose =>65 Years ([employer] ONLY) 10/14/2016, 10/16/2017, 11/14/2018   • H1N1 Inj 12/22/2009   • Hepatitis A 09/17/2019, 11/19/2019   • Influenza Quad Vaccine (Inpatient) 09/22/2014   • Influenza, Unspecified 09/18/2013   • Pneumococcal Conjugate 13-Valent (PCV13) 10/12/2015   • Pneumococcal Polysaccharide (PPSV23) 09/24/2013   • Shingrix 09/17/2019, 11/19/2019   • Zostavax 02/12/2013       This patient is  managed via a collaborative agreement, pursuant to IC 25-26-16. The signed protocol is kept in the [facility] at [address] [place] [address].    Education: Efe Malloy has been instructed to call if any changes in medications, doses, concerns, etc. Patient was provided dosing instructions and expressed verbal understanding and has no further questions at this time.    Plan:  1. Anticoagulation   - no change; warfarin 5 mg PO daily, except warfarin 7.5 mg PO on Monday and Thursday.  Total weekly dose = 40 mg.   - Instructed patient to contact [facility] if he is consuming a considerable amount less of leafy green vegetables as this can cause INR to increase and adjustments may need to be made to dose.  - RTC in 4 week(s)    Rosetta MadisonD  12/27/2021  11:11 EST

## 2022-01-24 ENCOUNTER — ANTICOAGULATION VISIT (OUTPATIENT)
Dept: PHARMACY | Facility: HOSPITAL | Age: 77
End: 2022-01-24

## 2022-01-24 DIAGNOSIS — I48.19 PERSISTENT ATRIAL FIBRILLATION: Primary | ICD-10-CM

## 2022-01-24 LAB — INR PPP: 1.8 (ref 2–3)

## 2022-01-24 PROCEDURE — 85610 PROTHROMBIN TIME: CPT

## 2022-01-24 PROCEDURE — G0463 HOSPITAL OUTPT CLINIC VISIT: HCPCS

## 2022-01-24 PROCEDURE — 36416 COLLJ CAPILLARY BLOOD SPEC: CPT

## 2022-01-24 NOTE — PROGRESS NOTES
Anticoagulation Clinic Progress Note    INR Goal: 2 - 3  Today's INR: 1.8 (subtherapeutic)  Current warfarin dose: warfarin 5 mg PO daily, except warfarin 7.5 mg PO on Monday and .  Current total weekly dose = 40 mg per week.     INR History:  Anticoagulation Monitoring 2021   INR 1.70 2.20 1.80   INR Date 2021   INR Goal 2.0-3.0 2.0-3.0 2.0-3.0   Trend Up Same Same   Last Week Total 37.5 mg 40 mg 40 mg   Next Week Total 40 mg 40 mg 40 mg   Sun 5 mg 5 mg 5 mg   Mon 7.5 mg 7.5 mg 7.5 mg   Tue 5 mg 5 mg 5 mg   Wed 5 mg 5 mg 5 mg   Thu 7.5 mg 7.5 mg 7.5 mg   Fri 5 mg 5 mg 5 mg   Sat 5 mg 5 mg 5 mg   Visit Report - - -   Some recent data might be hidden       Anticoagulation Summary  As of 2022    INR goal:  2.0-3.0   TTR:  75.1 % (2.6 y)   INR used for dosin.80 (2022)   Warfarin maintenance plan:  7.5 mg every Mon, Thu; 5 mg all other days   Weekly warfarin total:  40 mg   No change documented:  Meeta Chapman, PharmD   Plan last modified:  Kavita Jolley, PharmD (2021)   Next INR check:  2022   Priority:  Maintenance   Target end date:      Indications    Persistent atrial fibrillation (HCC) [I48.19]             Anticoagulation Episode Summary     INR check location:      Preferred lab:      Send INR reminders to:  [facility] CLINICAL POOL    Comments:  Patient contract signed 11/15/21.      Anticoagulation Care Providers     Provider Role Specialty Phone number    Silvano Gillis MD  Cardiology [phone]          Clinic Interview:   Patient Findings     Negatives:  Signs/symptoms of thrombosis, Signs/symptoms of bleeding,   Laboratory test error suspected, Change in health, Change in alcohol use,   Change in activity, Upcoming invasive procedure, Emergency department   visit, Upcoming dental procedure, Missed doses, Extra doses, Change in   medications, Change in diet/appetite, Hospital admission, Bruising,  Other   complaints      Clinical Outcomes     Negatives:  Major bleeding event, Thromboembolic event,   Anticoagulation-related hospital admission, Anticoagulation-related ED   visit, Anticoagulation-related fatality        Current Medications:   Prior to Admission medications    Medication Sig Start Date End Date Taking? Authorizing Provider   albuterol sulfate  (90 Base) MCG/ACT inhaler INHALE 2 puffs every 4-6 hours as needed 7/6/21   Shelby Haskins MD   alfuzosin (UROXATRAL) 10 MG 24 hr tablet Take 10 mg by mouth 2 (Two) Times a Day. 9/23/19   Shelby Haskins MD   baclofen (LIORESAL) 10 MG tablet Take 10 mg by mouth 2 (Two) Times a Day. 7/13/21   Shelby Haskins MD   BREO ELLIPTA 100-25 MCG/INH inhaler Inhale 1 puff Daily. 9/24/19   Shelby Haskins MD   digoxin (LANOXIN) 250 MCG tablet TAKE ONE TABLET BY MOUTH EVERY DAY 11/18/21   Silvano Gillis MD   EC-RX Testosterone 10 % cream APPLY FOUR CLICKS DAILY AS DIRECTED 12/17/20   Shelby Haskins MD   EPINEPHrine (EPIPEN) 0.3 MG/0.3ML solution auto-injector injection epinephrine 0.3 mg/0.3 mL injection, auto-injector    Shelby Haskins MD   fluorometholone (FML) 0.1 % ophthalmic suspension APPLY ONE DROP IN THE RIGHT EYE EVERY DAY 5/17/21   Shelby Haskins MD   fluticasone (FLONASE) 50 MCG/ACT nasal spray 1 spray into the nostril(s) as directed by provider Daily.    Shelby Haskins MD   gabapentin (NEURONTIN) 300 MG capsule Take 600 mg by mouth 2 (Two) Times a Day. 9/13/19   Shelby Haskins MD   levothyroxine (SYNTHROID, LEVOTHROID) 75 MCG tablet Take 75 mcg by mouth Daily. 9/23/19   Shelby Haskins MD   montelukast (SINGULAIR) 10 MG tablet Take 10 mg by mouth Every Evening. 9/23/19   Shelby Haskins MD   pantoprazole (PROTONIX) 40 MG EC tablet Take 40 mg by mouth Daily. 11/27/20   Shelby Haskins MD   pravastatin (PRAVACHOL) 40 MG tablet TAKE ONE TABLET BY MOUTH EVERY NIGHT AT  BEDTIME 1/27/21   Margarito Vargas MD   sertraline (ZOLOFT) 100 MG tablet Take 150 mg by mouth Daily. 9/23/19   Shelby Haskins MD   Testosterone 20 % cream APPLY FOUR CLICKS DAILY AS DIRECTED 6/15/21   Shelby Haskins MD   theophylline (UNIPHYL) 400 MG 24 hr tablet Take 400 mg by mouth Daily. 9/23/19   Shelby Haskins MD   verapamil ER (VERELAN) 240 MG 24 hr capsule TAKE ONE CAPSULE BY MOUTH TWICE DAILY  Patient taking differently: Daily. 10/4/21   Silvano Gillis MD   warfarin (COUMADIN) 1 MG tablet TAKE ONE TABLET BY MOUTH DAILY AS DIRECTED 7/29/20   Margarito Vargas MD   warfarin (COUMADIN) 5 MG tablet Take 5 mg daily except 7.5 mg on Monday and Thursday or as directed. 12/23/21   Silvano Gillis MD       Medical history:   Past Medical History:   Diagnosis Date   • Asthma    • Atrial fibrillation (HCC)    • Atypical chest pain 6/11/2020   • COPD (chronic obstructive pulmonary disease) (HCC)    • COVID-19 01/13/2021   • Enlarged prostate    • Hyperlipidemia    • Kidney stone    • Near syncope 12/17/2020   • Sleep apnea        Social history:   Social History     Tobacco Use   • Smoking status: Former Smoker   • Smokeless tobacco: Never Used   Substance Use Topics   • Alcohol use: Yes     Comment: occasionally       Allergies:    Penicillins and Rivaroxaban    Vaccine History:   Immunization History   Administered Date(s) Administered   • FLUAD TRI 65YR+ 10/31/2019   • Fluzone High Dose =>65 Years ([employer] ONLY) 10/14/2016, 10/16/2017, 11/14/2018   • H1N1 Inj 12/22/2009   • Hepatitis A 09/17/2019, 11/19/2019   • Influenza Quad Vaccine (Inpatient) 09/22/2014   • Influenza, Unspecified 09/18/2013   • Pneumococcal Conjugate 13-Valent (PCV13) 10/12/2015   • Pneumococcal Polysaccharide (PPSV23) 09/24/2013   • Shingrix 09/17/2019, 11/19/2019   • Zostavax 02/12/2013       This patient is managed via a collaborative agreement, pursuant to IC 25-26-16. The signed protocol is  kept in the [facility] at [address] [place] [address].    Education: Efe Malloy has been instructed to call if any changes in medications, doses, concerns, etc. Patient was provided dosing instructions and expressed verbal understanding and has no further questions at this time.    Plan:  1. Anticoagulation   - no change; warfarin 5 mg PO daily, except warfarin 7.5 mg PO on Monday and Thursdays.   Total weekly dose = 40 mg.   -Patient is having surgery on 1/31.  Instructed to hold warfarin therapy prior to surgery.  Will see patient 7 days after restarting warfarin post surgery.    - RTC in 2 week(s)      Bony Chapman, PharmD  1/24/2022  11:02 EST

## 2022-01-29 ENCOUNTER — HOSPITAL ENCOUNTER (EMERGENCY)
Facility: HOSPITAL | Age: 77
Discharge: HOME | End: 2022-01-29
Attending: EMERGENCY MEDICINE | Admitting: EMERGENCY MEDICINE

## 2022-01-29 ENCOUNTER — APPOINTMENT (OUTPATIENT)
Dept: ULTRASOUND IMAGING | Facility: HOSPITAL | Age: 77
End: 2022-01-29

## 2022-01-29 VITALS
RESPIRATION RATE: 18 BRPM | TEMPERATURE: 98.1 F | SYSTOLIC BLOOD PRESSURE: 170 MMHG | BODY MASS INDEX: 33.72 KG/M2 | HEIGHT: 73 IN | OXYGEN SATURATION: 94 % | WEIGHT: 254.41 LBS | DIASTOLIC BLOOD PRESSURE: 88 MMHG | HEART RATE: 75 BPM

## 2022-01-29 DIAGNOSIS — N45.1 EPIDIDYMITIS: Primary | ICD-10-CM

## 2022-01-29 DIAGNOSIS — N43.3 HYDROCELE IN ADULT: ICD-10-CM

## 2022-01-29 LAB
ANION GAP SERPL CALCULATED.3IONS-SCNC: 11 MMOL/L (ref 5–15)
BACTERIA UR QL AUTO: ABNORMAL /HPF
BASOPHILS # BLD AUTO: 0.1 10*3/MM3 (ref 0–0.2)
BASOPHILS NFR BLD AUTO: 0.7 % (ref 0–1.5)
BILIRUB UR QL STRIP: NEGATIVE
BUN SERPL-MCNC: 15 MG/DL (ref 8–23)
BUN/CREAT SERPL: 11.6 (ref 7–25)
CALCIUM SPEC-SCNC: 9.6 MG/DL (ref 8.6–10.5)
CHLORIDE SERPL-SCNC: 101 MMOL/L (ref 98–107)
CLARITY UR: CLEAR
CO2 SERPL-SCNC: 28 MMOL/L (ref 22–29)
COLOR UR: YELLOW
CREAT SERPL-MCNC: 1.29 MG/DL (ref 0.76–1.27)
DEPRECATED RDW RBC AUTO: 45.1 FL (ref 37–54)
EOSINOPHIL # BLD AUTO: 0.1 10*3/MM3 (ref 0–0.4)
EOSINOPHIL NFR BLD AUTO: 1.6 % (ref 0.3–6.2)
ERYTHROCYTE [DISTWIDTH] IN BLOOD BY AUTOMATED COUNT: 17 % (ref 12.3–15.4)
GFR SERPL CREATININE-BSD FRML MDRD: 54 ML/MIN/1.73
GLUCOSE SERPL-MCNC: 84 MG/DL (ref 65–99)
GLUCOSE UR STRIP-MCNC: NEGATIVE MG/DL
HCT VFR BLD AUTO: 50.6 % (ref 37.5–51)
HGB BLD-MCNC: 16.3 G/DL (ref 13–17.7)
HGB UR QL STRIP.AUTO: ABNORMAL
HYALINE CASTS UR QL AUTO: ABNORMAL /LPF
INR PPP: 1.7 (ref 2–3)
KETONES UR QL STRIP: NEGATIVE
LEUKOCYTE ESTERASE UR QL STRIP.AUTO: ABNORMAL
LYMPHOCYTES # BLD AUTO: 1.9 10*3/MM3 (ref 0.7–3.1)
LYMPHOCYTES NFR BLD AUTO: 24.5 % (ref 19.6–45.3)
MCH RBC QN AUTO: 24.1 PG (ref 26.6–33)
MCHC RBC AUTO-ENTMCNC: 32.1 G/DL (ref 31.5–35.7)
MCV RBC AUTO: 75.1 FL (ref 79–97)
MONOCYTES # BLD AUTO: 0.9 10*3/MM3 (ref 0.1–0.9)
MONOCYTES NFR BLD AUTO: 11.2 % (ref 5–12)
NEUTROPHILS NFR BLD AUTO: 4.9 10*3/MM3 (ref 1.7–7)
NEUTROPHILS NFR BLD AUTO: 62 % (ref 42.7–76)
NITRITE UR QL STRIP: NEGATIVE
NRBC BLD AUTO-RTO: 0.1 /100 WBC (ref 0–0.2)
PH UR STRIP.AUTO: <=5 [PH] (ref 5–8)
PLATELET # BLD AUTO: 153 10*3/MM3 (ref 140–450)
PMV BLD AUTO: 7.6 FL (ref 6–12)
POTASSIUM SERPL-SCNC: 4 MMOL/L (ref 3.5–5.2)
PROT UR QL STRIP: NEGATIVE
PROTHROMBIN TIME: 18.1 SECONDS (ref 19.4–28.5)
RBC # BLD AUTO: 6.74 10*6/MM3 (ref 4.14–5.8)
RBC # UR STRIP: ABNORMAL /HPF
REF LAB TEST METHOD: ABNORMAL
SODIUM SERPL-SCNC: 140 MMOL/L (ref 136–145)
SP GR UR STRIP: 1.02 (ref 1–1.03)
SQUAMOUS #/AREA URNS HPF: ABNORMAL /HPF
UROBILINOGEN UR QL STRIP: ABNORMAL
WBC # UR STRIP: ABNORMAL /HPF
WBC NRBC COR # BLD: 8 10*3/MM3 (ref 3.4–10.8)

## 2022-01-29 PROCEDURE — 25010000002 CEFTRIAXONE PER 250 MG: Performed by: EMERGENCY MEDICINE

## 2022-01-29 PROCEDURE — 85025 COMPLETE CBC W/AUTO DIFF WBC: CPT | Performed by: EMERGENCY MEDICINE

## 2022-01-29 PROCEDURE — 93976 VASCULAR STUDY: CPT

## 2022-01-29 PROCEDURE — 85610 PROTHROMBIN TIME: CPT | Performed by: EMERGENCY MEDICINE

## 2022-01-29 PROCEDURE — 80048 BASIC METABOLIC PNL TOTAL CA: CPT | Performed by: EMERGENCY MEDICINE

## 2022-01-29 PROCEDURE — 76870 US EXAM SCROTUM: CPT

## 2022-01-29 PROCEDURE — 87086 URINE CULTURE/COLONY COUNT: CPT | Performed by: EMERGENCY MEDICINE

## 2022-01-29 PROCEDURE — 36415 COLL VENOUS BLD VENIPUNCTURE: CPT

## 2022-01-29 PROCEDURE — 99283 EMERGENCY DEPT VISIT LOW MDM: CPT

## 2022-01-29 PROCEDURE — 96372 THER/PROPH/DIAG INJ SC/IM: CPT

## 2022-01-29 PROCEDURE — 81001 URINALYSIS AUTO W/SCOPE: CPT | Performed by: EMERGENCY MEDICINE

## 2022-01-29 RX ORDER — CEFDINIR 300 MG/1
300 CAPSULE ORAL 2 TIMES DAILY
Qty: 14 CAPSULE | Refills: 0 | Status: ON HOLD | OUTPATIENT
Start: 2022-01-29 | End: 2022-04-09 | Stop reason: HOSPADM

## 2022-01-29 RX ADMIN — LIDOCAINE HYDROCHLORIDE 1 G: 10 INJECTION, SOLUTION EPIDURAL; INFILTRATION; INTRACAUDAL; PERINEURAL at 14:57

## 2022-01-30 LAB — BACTERIA SPEC AEROBE CULT: NORMAL

## 2022-02-01 PROCEDURE — 88305 TISSUE EXAM BY PATHOLOGIST: CPT | Performed by: UROLOGY

## 2022-02-02 ENCOUNTER — HOSPITAL ENCOUNTER (EMERGENCY)
Facility: HOSPITAL | Age: 77
Discharge: HOME | End: 2022-02-03
Attending: EMERGENCY MEDICINE | Admitting: EMERGENCY MEDICINE

## 2022-02-02 ENCOUNTER — LAB REQUISITION (OUTPATIENT)
Dept: LAB | Facility: HOSPITAL | Age: 77
End: 2022-02-02

## 2022-02-02 DIAGNOSIS — R33.9 URINARY RETENTION: Primary | ICD-10-CM

## 2022-02-02 DIAGNOSIS — N39.43 POST-VOID DRIBBLING: ICD-10-CM

## 2022-02-02 DIAGNOSIS — N40.1 BENIGN PROSTATIC HYPERPLASIA WITH LOWER URINARY TRACT SYMPTOMS: ICD-10-CM

## 2022-02-02 DIAGNOSIS — N39.41 URGE INCONTINENCE: ICD-10-CM

## 2022-02-02 DIAGNOSIS — R31.9 HEMATURIA, UNSPECIFIED TYPE: ICD-10-CM

## 2022-02-02 PROCEDURE — 99282 EMERGENCY DEPT VISIT SF MDM: CPT

## 2022-02-03 VITALS
OXYGEN SATURATION: 98 % | SYSTOLIC BLOOD PRESSURE: 155 MMHG | DIASTOLIC BLOOD PRESSURE: 80 MMHG | WEIGHT: 255.07 LBS | RESPIRATION RATE: 18 BRPM | HEIGHT: 73 IN | TEMPERATURE: 98 F | BODY MASS INDEX: 33.81 KG/M2 | HEART RATE: 85 BPM

## 2022-02-03 LAB
LAB AP CASE REPORT: NORMAL
PATH REPORT.FINAL DX SPEC: NORMAL
PATH REPORT.GROSS SPEC: NORMAL

## 2022-02-03 NOTE — ED PROVIDER NOTES
Subjective   Patient is a 76-year-old male who states he had TURP performed approximately 24 hours ago.  He states he developed urinary retention and pain tonight.  He denies fever Ronnie diarrhea or other complaint but the pain is moderate and constant.          Review of Systems  Negative for cough fever chest pain vomiting diarrhea dysuria or other complaint  Past Medical History:   Diagnosis Date   • Asthma    • Atrial fibrillation (HCC)    • Atypical chest pain 6/11/2020   • COPD (chronic obstructive pulmonary disease) (HCC)    • COVID-19 01/13/2021   • Enlarged prostate    • Hyperlipidemia    • Kidney stone    • Near syncope 12/17/2020   • Sleep apnea        Allergies   Allergen Reactions   • Penicillins Other (See Comments)     AS A CHILD   • Rivaroxaban Other (See Comments)       Past Surgical History:   Procedure Laterality Date   • APPENDECTOMY     • CARDIAC CATHETERIZATION  2007    nonobstructive dz   • CARDIAC CATHETERIZATION N/A 9/17/2021    Procedure: Left Heart Cath;  Surgeon: Silvano Gillis MD;  Location: [facility] CATH INVASIVE LOC[facility];  Service: Cardiology;  Laterality: N/A;   • CERVICAL EPIDURAL      with Dr. Harshil Rodgers mgt   • CHOLECYSTECTOMY     • EYE SURGERY Bilateral     corneal transplant left 09/02/2020 (Right eye 2014)   • JOINT REPLACEMENT Right     knee   • KIDNEY STONE SURGERY     • PROSTATE SURGERY         Family History   Problem Relation Age of Onset   • Heart disease Mother    • Hypertension Mother    • Diabetes Father    • Stroke Father    • Hypertension Father    • Cancer Father         esophageal. prostate, skin   • Diabetes Sister    • Thyroid disease Sister    • Heart disease Maternal Grandmother    • Diabetes Paternal Grandmother        Social History     Socioeconomic History   • Marital status:    Tobacco Use   • Smoking status: Former Smoker   • Smokeless tobacco: Never Used   Vaping Use   • Vaping Use: Never used   Substance and Sexual Activity   • Alcohol use:  Yes     Comment: occasionally   • Drug use: Not Currently   • Sexual activity: Defer           Objective   Physical Exam  Lungs are clear.  Heart has a regular rhythm.  Abdomen soft.  He does have some mild suprapubic discomfort.  Has normal bowel sounds.  Back has no tenderness.  Procedures           ED Course                                                 MDM  Number of Diagnoses or Management Options  Diagnosis management comments: Patient had a Barajas catheter placed with 800 cc of urine retrieved.  The urine is bloody.  There are no clots noted.  He is much improved on reexam after he had his catheter placed.  Will be discharged follow with his urologist for further outpatient evaluation.    Risk of Complications, Morbidity, and/or Mortality  Presenting problems: moderate  Diagnostic procedures: moderate  Management options: moderate    Patient Progress  Patient progress: stable      Final diagnoses:   Urinary retention   Hematuria, unspecified type       ED Disposition  ED Disposition     ED Disposition Condition Comment    Discharge Stable           No follow-up provider specified.       Medication List      Changed    verapamil  MG 24 hr capsule  Commonly known as: VERELAN  TAKE ONE CAPSULE BY MOUTH TWICE DAILY  What changed:   · how much to take  · how to take this  · when to take this             Everardo House MD  02/03/22 0011

## 2022-02-08 ENCOUNTER — APPOINTMENT (OUTPATIENT)
Dept: PHARMACY | Facility: HOSPITAL | Age: 77
End: 2022-02-08

## 2022-02-22 RX ORDER — PANTOPRAZOLE SODIUM 40 MG/1
40 TABLET, DELAYED RELEASE ORAL DAILY
Qty: 90 TABLET | Refills: 2 | Status: SHIPPED | OUTPATIENT
Start: 2022-02-22

## 2022-02-28 ENCOUNTER — ANTICOAGULATION VISIT (OUTPATIENT)
Dept: PHARMACY | Facility: HOSPITAL | Age: 77
End: 2022-02-28

## 2022-02-28 DIAGNOSIS — I48.19 PERSISTENT ATRIAL FIBRILLATION: Primary | ICD-10-CM

## 2022-02-28 LAB — INR PPP: 2.2 (ref 2–3)

## 2022-02-28 PROCEDURE — 85610 PROTHROMBIN TIME: CPT

## 2022-02-28 PROCEDURE — 36416 COLLJ CAPILLARY BLOOD SPEC: CPT

## 2022-02-28 PROCEDURE — G0463 HOSPITAL OUTPT CLINIC VISIT: HCPCS

## 2022-02-28 NOTE — PROGRESS NOTES
Anticoagulation Clinic Progress Note    INR Goal: 2 - 3  Today's INR: 2.2 (therapeutic)  Current warfarin dose: warfarin 5 mg PO daily, except warfarin 7 mg PO on Monday and .  Current total weekly dose = 40 mg per week.     INR History:  Anticoagulation Monitoring 2021   INR 2.20 1.80 2.20   INR Date 2021   INR Goal 2.0-3.0 2.0-3.0 2.0-3.0   Trend Same Same Same   Last Week Total 40 mg 40 mg 40 mg   Next Week Total 40 mg 40 mg 40 mg   Sun 5 mg 5 mg 5 mg   Mon 7.5 mg 7.5 mg 7.5 mg   Tue 5 mg 5 mg 5 mg   Wed 5 mg 5 mg 5 mg   Thu 7.5 mg 7.5 mg 7.5 mg   Fri 5 mg 5 mg 5 mg   Sat 5 mg 5 mg 5 mg   Visit Report - - -   Some recent data might be hidden       Anticoagulation Summary  As of 2022    INR goal:  2.0-3.0   TTR:  73.6 % (2.7 y)   INR used for dosin.20 (2022)   Warfarin maintenance plan:  7.5 mg every Mon, Thu; 5 mg all other days   Weekly warfarin total:  40 mg   No change documented:  Meeta Chapman, PharmD   Plan last modified:  Kavita Jolley, PharmD (2021)   Next INR check:  3/28/2022   Priority:  Maintenance   Target end date:      Indications    Persistent atrial fibrillation (HCC) [I48.19]             Anticoagulation Episode Summary     INR check location:      Preferred lab:      Send INR reminders to:  [facility] CLINICAL POOL    Comments:  Patient contract signed 11/15/21.      Anticoagulation Care Providers     Provider Role Specialty Phone number    Silvano Gillis MD  Cardiology [phone]          Clinic Interview:   Patient Findings     Negatives:  Signs/symptoms of thrombosis, Signs/symptoms of bleeding,   Laboratory test error suspected, Change in health, Change in alcohol use,   Change in activity, Upcoming invasive procedure, Emergency department   visit, Upcoming dental procedure, Missed doses, Extra doses, Change in   medications, Change in diet/appetite, Hospital admission, Bruising, Other    complaints      Clinical Outcomes     Negatives:  Major bleeding event, Thromboembolic event,   Anticoagulation-related hospital admission, Anticoagulation-related ED   visit, Anticoagulation-related fatality        Current Medications:   Prior to Admission medications    Medication Sig Start Date End Date Taking? Authorizing Provider   albuterol sulfate  (90 Base) MCG/ACT inhaler INHALE 2 puffs every 4-6 hours as needed 7/6/21   Shelby Haskins MD   alfuzosin (UROXATRAL) 10 MG 24 hr tablet Take 10 mg by mouth 2 (Two) Times a Day. 9/23/19   Shelby Haskins MD   baclofen (LIORESAL) 10 MG tablet Take 10 mg by mouth 2 (Two) Times a Day. 7/13/21   Shelby Haskins MD   BREO ELLIPTA 100-25 MCG/INH inhaler Inhale 1 puff Daily. 9/24/19   Shelby Haskins MD   cefdinir (OMNICEF) 300 MG capsule Take 1 capsule by mouth 2 (Two) Times a Day. 1/29/22   Guilherme Hernandez MD   digoxin (LANOXIN) 250 MCG tablet TAKE ONE TABLET BY MOUTH EVERY DAY 11/18/21   Silvano Gillis MD   EC-RX Testosterone 10 % cream APPLY FOUR CLICKS DAILY AS DIRECTED 12/17/20   Shelby Haskins MD   EPINEPHrine (EPIPEN) 0.3 MG/0.3ML solution auto-injector injection epinephrine 0.3 mg/0.3 mL injection, auto-injector    Shelby Haskins MD   fluorometholone (FML) 0.1 % ophthalmic suspension APPLY ONE DROP IN THE RIGHT EYE EVERY DAY 5/17/21   Shelby Haskins MD   fluticasone (FLONASE) 50 MCG/ACT nasal spray 1 spray into the nostril(s) as directed by provider Daily.    Shelby Haskins MD   gabapentin (NEURONTIN) 300 MG capsule Take 600 mg by mouth 2 (Two) Times a Day. 9/13/19   Shelby Haskins MD   levothyroxine (SYNTHROID, LEVOTHROID) 75 MCG tablet Take 75 mcg by mouth Daily. 9/23/19   Shelby Haskins MD   montelukast (SINGULAIR) 10 MG tablet Take 10 mg by mouth Every Evening. 9/23/19   Shelby Haskins MD   pantoprazole (PROTONIX) 40 MG EC tablet Take 1 tablet by mouth Daily. 2/22/22    Silvano Gillis MD   pravastatin (PRAVACHOL) 40 MG tablet TAKE ONE TABLET BY MOUTH EVERY NIGHT AT BEDTIME 1/27/21   Margarito Vargas MD   sertraline (ZOLOFT) 100 MG tablet Take 150 mg by mouth Daily. 9/23/19   Shelby Haskins MD   Testosterone 20 % cream APPLY FOUR CLICKS DAILY AS DIRECTED 6/15/21   Shelby Haskins MD   theophylline (UNIPHYL) 400 MG 24 hr tablet Take 400 mg by mouth Daily. 9/23/19   Shelby Haskins MD   verapamil ER (VERELAN) 240 MG 24 hr capsule TAKE ONE CAPSULE BY MOUTH TWICE DAILY  Patient taking differently: Daily. 10/4/21   Silvano Gillis MD   warfarin (COUMADIN) 1 MG tablet TAKE ONE TABLET BY MOUTH DAILY AS DIRECTED 7/29/20   Margarito Vargas MD   warfarin (COUMADIN) 5 MG tablet Take 5 mg daily except 7.5 mg on Monday and Thursday or as directed. 12/23/21   Silvano Gillis MD       Medical history:   Past Medical History:   Diagnosis Date   • Asthma    • Atrial fibrillation (HCC)    • Atypical chest pain 6/11/2020   • COPD (chronic obstructive pulmonary disease) (HCC)    • COVID-19 01/13/2021   • Enlarged prostate    • Hyperlipidemia    • Kidney stone    • Near syncope 12/17/2020   • Sleep apnea        Social history:   Social History     Tobacco Use   • Smoking status: Former Smoker   • Smokeless tobacco: Never Used   Substance Use Topics   • Alcohol use: Yes     Comment: occasionally       Allergies:    Penicillins and Rivaroxaban    Vaccine History:   Immunization History   Administered Date(s) Administered   • FLUAD TRI 65YR+ 10/31/2019   • Fluzone High Dose =>65 Years ([employer] ONLY) 10/14/2016, 10/16/2017, 11/14/2018   • H1N1 Inj 12/22/2009   • Hepatitis A 09/17/2019, 11/19/2019   • Influenza Quad Vaccine (Inpatient) 09/22/2014   • Influenza, Unspecified 09/18/2013   • Pneumococcal Conjugate 13-Valent (PCV13) 10/12/2015   • Pneumococcal Polysaccharide (PPSV23) 09/24/2013   • Shingrix 09/17/2019, 11/19/2019   • Zostavax 02/12/2013        This patient is managed via a collaborative agreement, pursuant to IC [id number]. The signed protocol is kept in the [facility] at [address] [place] [address].    Education: Efe Malloy has been instructed to call if any changes in medications, doses, concerns, etc. Patient was provided dosing instructions and expressed verbal understanding and has no further questions at this time.    Plan:  1. Anticoagulation   - no change; warfarin 5 mg PO daily, except warfarin 7 mg PO on Monday and Thursdays.   Total weekly dose = 40 mg.   - RTC in 4 week(s)        Bony Chapman, PharmD  2/28/2022  13:05 EST

## 2022-03-14 RX ORDER — PRAVASTATIN SODIUM 40 MG
40 TABLET ORAL
Qty: 90 TABLET | Refills: 3 | OUTPATIENT
Start: 2022-03-14

## 2022-03-15 DIAGNOSIS — E78.2 MIXED HYPERLIPIDEMIA: Primary | ICD-10-CM

## 2022-03-15 RX ORDER — SERTRALINE HYDROCHLORIDE 100 MG/1
150 TABLET, FILM COATED ORAL DAILY
Qty: 30 TABLET | Refills: 0 | Status: SHIPPED | OUTPATIENT
Start: 2022-03-15

## 2022-03-17 ENCOUNTER — LAB (OUTPATIENT)
Dept: LAB | Facility: HOSPITAL | Age: 77
End: 2022-03-17

## 2022-03-17 DIAGNOSIS — E78.2 MIXED HYPERLIPIDEMIA: ICD-10-CM

## 2022-03-17 LAB
CHOLEST SERPL-MCNC: 133 MG/DL (ref 0–200)
HDLC SERPL-MCNC: 36 MG/DL (ref 40–60)
LDLC SERPL CALC-MCNC: 80 MG/DL (ref 0–100)
LDLC/HDLC SERPL: 2.2 {RATIO}
TRIGL SERPL-MCNC: 89 MG/DL (ref 0–150)
VLDLC SERPL-MCNC: 17 MG/DL (ref 5–40)

## 2022-03-17 PROCEDURE — 80061 LIPID PANEL: CPT

## 2022-03-17 PROCEDURE — 36415 COLL VENOUS BLD VENIPUNCTURE: CPT

## 2022-03-25 DIAGNOSIS — I48.19 PERSISTENT ATRIAL FIBRILLATION: ICD-10-CM

## 2022-03-25 RX ORDER — WARFARIN SODIUM 5 MG/1
TABLET ORAL
Qty: 96 TABLET | Refills: 1 | Status: ON HOLD | OUTPATIENT
Start: 2022-03-25 | End: 2022-04-10

## 2022-03-28 ENCOUNTER — ANTICOAGULATION VISIT (OUTPATIENT)
Dept: PHARMACY | Facility: HOSPITAL | Age: 77
End: 2022-03-28

## 2022-03-28 DIAGNOSIS — I48.19 PERSISTENT ATRIAL FIBRILLATION: Primary | ICD-10-CM

## 2022-03-28 LAB — INR PPP: 2 (ref 2–3)

## 2022-03-28 PROCEDURE — 85610 PROTHROMBIN TIME: CPT

## 2022-03-28 PROCEDURE — G0463 HOSPITAL OUTPT CLINIC VISIT: HCPCS

## 2022-03-28 PROCEDURE — 36416 COLLJ CAPILLARY BLOOD SPEC: CPT

## 2022-03-28 NOTE — PROGRESS NOTES
Anticoagulation Clinic Progress Note    INR Goal: 2 - 3  Today's INR: 2.0 (therapeutic)  Current warfarin dose: warfarin 5 mg PO daily, except warfarin 7.5 mg PO on Monday and Thursday.  Current total weekly dose = 40 mg per week.     INR History:  Anticoagulation Monitoring 2022 2022 3/28/2022   INR 1.80 2.20 2.00   INR Date 2022 2022 3/28/2022   INR Goal 2.0-3.0 2.0-3.0 2.0-3.0   Trend Same Same Same   Last Week Total 40 mg 40 mg 40 mg   Next Week Total 40 mg 40 mg 40 mg   Sun 5 mg 5 mg 5 mg   Mon 7.5 mg 7.5 mg 7.5 mg   Tue 5 mg 5 mg 5 mg   Wed 5 mg 5 mg 5 mg   Thu 7.5 mg 7.5 mg 7.5 mg   Fri 5 mg 5 mg 5 mg   Sat 5 mg 5 mg 5 mg   Visit Report - - -   Some recent data might be hidden       Anticoagulation Summary  As of 3/28/2022    INR goal:  2.0-3.0   TTR:  74.4 % (2.7 y)   INR used for dosin.00 (3/28/2022)   Warfarin maintenance plan:  7.5 mg every Mon, Thu; 5 mg all other days   Weekly warfarin total:  40 mg   No change documented:  Kavita Jolley, PharmD   Plan last modified:  Kavita Jolley, PharmD (2021)   Next INR check:  2022   Priority:  Maintenance   Target end date:      Indications    Persistent atrial fibrillation (HCC) [I48.19]             Anticoagulation Episode Summary     INR check location:      Preferred lab:      Send INR reminders to:  [facility] CLINICAL POOL    Comments:  Patient contract signed 11/15/21.      Anticoagulation Care Providers     Provider Role Specialty Phone number    Silvano Gillis MD  Cardiology [phone]          Clinic Interview:   Patient Findings     Positives:  Upcoming invasive procedure (Injection in back on    (patient has had in the past and hasn't had to hold warfarin)), Change in   medications (Augmentin 3/22- for UTI (may increase INR))    Negatives:  Signs/symptoms of thrombosis, Signs/symptoms of bleeding,   Laboratory test error suspected, Change in health, Change in alcohol use,   Change in  activity, Emergency department visit, Upcoming dental procedure,   Missed doses, Extra doses, Change in diet/appetite, Hospital admission,   Bruising, Other complaints      Clinical Outcomes     Negatives:  Major bleeding event, Thromboembolic event,   Anticoagulation-related hospital admission, Anticoagulation-related ED   visit, Anticoagulation-related fatality        Current Medications:   Prior to Admission medications    Medication Sig Start Date End Date Taking? Authorizing Provider   albuterol sulfate  (90 Base) MCG/ACT inhaler INHALE 2 puffs every 4-6 hours as needed 7/6/21   Shelby Haskins MD   alfuzosin (UROXATRAL) 10 MG 24 hr tablet Take 10 mg by mouth 2 (Two) Times a Day. 9/23/19   Shelby Haskins MD   baclofen (LIORESAL) 10 MG tablet Take 10 mg by mouth 2 (Two) Times a Day. 7/13/21   Shelby Haskins MD   BREHENRY ELLIPTA 100-25 MCG/INH inhaler Inhale 1 puff Daily. 9/24/19   Shelby Haskins MD   cefdinir (OMNICEF) 300 MG capsule Take 1 capsule by mouth 2 (Two) Times a Day. 1/29/22   Guilherme Hernandez MD   digoxin (LANOXIN) 250 MCG tablet TAKE ONE TABLET BY MOUTH EVERY DAY 11/18/21   Silvano Gillis MD   EC-RX Testosterone 10 % cream APPLY FOUR CLICKS DAILY AS DIRECTED 12/17/20   Shelby Haskins MD   EPINEPHrine (EPIPEN) 0.3 MG/0.3ML solution auto-injector injection epinephrine 0.3 mg/0.3 mL injection, auto-injector    Shelby Haskins MD   fluorometholone (FML) 0.1 % ophthalmic suspension APPLY ONE DROP IN THE RIGHT EYE EVERY DAY 5/17/21   Shelby Haskins MD   fluticasone (FLONASE) 50 MCG/ACT nasal spray 1 spray into the nostril(s) as directed by provider Daily.    Shelby Haskins MD   gabapentin (NEURONTIN) 300 MG capsule Take 600 mg by mouth 2 (Two) Times a Day. 9/13/19   Shelby Haskins MD   levothyroxine (SYNTHROID, LEVOTHROID) 75 MCG tablet Take 75 mcg by mouth Daily. 9/23/19   Shelby Haskins MD   montelukast (SINGULAIR) 10 MG  tablet Take 10 mg by mouth Every Evening. 9/23/19   Shelby Haskins MD   pantoprazole (PROTONIX) 40 MG EC tablet Take 1 tablet by mouth Daily. 2/22/22   Silvano Gillis MD   pravastatin (PRAVACHOL) 40 MG tablet TAKE ONE TABLET BY MOUTH EVERY NIGHT AT BEDTIME 1/27/21   Margarito Vargas MD   sertraline (ZOLOFT) 100 MG tablet Take 1.5 tablets by mouth Daily. 3/15/22   Silvano Gillis MD   Testosterone 20 % cream APPLY FOUR CLICKS DAILY AS DIRECTED 6/15/21   Shelby Haskins MD   theophylline (UNIPHYL) 400 MG 24 hr tablet Take 400 mg by mouth Daily. 9/23/19   Shelby Haskins MD   verapamil ER (VERELAN) 240 MG 24 hr capsule TAKE ONE CAPSULE BY MOUTH TWICE DAILY  Patient taking differently: Daily. 10/4/21   Silvano Gillis MD   warfarin (COUMADIN) 1 MG tablet TAKE ONE TABLET BY MOUTH DAILY AS DIRECTED 7/29/20   Margarito Vargas MD   warfarin (COUMADIN) 5 MG tablet TAKE ONE TABLET BY MOUTH DAILY EXCEPT ON MONDAY AND THURSDAY TAKE ONE AND ONE-HALF TABLETS OR AS DIRECTED 3/25/22   Silvano Gillis MD       Medical history:   Past Medical History:   Diagnosis Date   • Asthma    • Atrial fibrillation (HCC)    • Atypical chest pain 6/11/2020   • COPD (chronic obstructive pulmonary disease) (HCC)    • COVID-19 01/13/2021   • Enlarged prostate    • Hyperlipidemia    • Kidney stone    • Near syncope 12/17/2020   • Sleep apnea        Social history:   Social History     Tobacco Use   • Smoking status: Former Smoker   • Smokeless tobacco: Never Used   Substance Use Topics   • Alcohol use: Yes     Comment: occasionally       Allergies:    Penicillins and Rivaroxaban    Vaccine History:   Immunization History   Administered Date(s) Administered   • FLUAD TRI 65YR+ 10/31/2019   • Fluzone High Dose =>65 Years ([employer] ONLY) 10/14/2016, 10/16/2017, 11/14/2018   • H1N1 Inj 12/22/2009   • Hepatitis A 09/17/2019, 11/19/2019   • Influenza Quad Vaccine (Inpatient) 09/22/2014   •  Influenza, Unspecified 09/18/2013   • Pneumococcal Conjugate 13-Valent (PCV13) 10/12/2015   • Pneumococcal Polysaccharide (PPSV23) 09/24/2013   • Shingrix 09/17/2019, 11/19/2019   • Zostavax 02/12/2013       This patient is managed via a collaborative agreement, pursuant to IC [id number]. The signed protocol is kept in the [facility] at [address] [place] [address].    Education: Efe Malloy has been instructed to call if any changes in medications, doses, concerns, etc. Patient was provided dosing instructions and expressed verbal understanding and has no further questions at this time.    Plan:  1. Anticoagulation   - no change; warfarin 5 mg PO daily, except warfarin 7.5 mg PO on Monday and Thursday.  Total weekly dose = 40 mg.   - RTC in 4 week(s)    - Contacting Dr. Jonathan Johnson's office to ensure patient does not need to hold warfarin prior to injection in his back on 4/5. Per office, patient does not need to hold warfarin prior to this injection.    Kavita Jolley, PharmD  3/28/2022  14:00 EDT

## 2022-04-09 ENCOUNTER — APPOINTMENT (OUTPATIENT)
Dept: GENERAL RADIOLOGY | Facility: HOSPITAL | Age: 77
End: 2022-04-09

## 2022-04-09 ENCOUNTER — APPOINTMENT (OUTPATIENT)
Dept: CT IMAGING | Facility: HOSPITAL | Age: 77
End: 2022-04-09

## 2022-04-09 ENCOUNTER — HOSPITAL ENCOUNTER (OUTPATIENT)
Facility: HOSPITAL | Age: 77
Setting detail: OBSERVATION
Discharge: HOME | End: 2022-04-10
Attending: EMERGENCY MEDICINE | Admitting: INTERNAL MEDICINE

## 2022-04-09 DIAGNOSIS — H53.9 VISUAL DISTURBANCE: ICD-10-CM

## 2022-04-09 DIAGNOSIS — G45.9 TIA (TRANSIENT ISCHEMIC ATTACK): Primary | ICD-10-CM

## 2022-04-09 DIAGNOSIS — I48.11 LONGSTANDING PERSISTENT ATRIAL FIBRILLATION: ICD-10-CM

## 2022-04-09 LAB
ABO GROUP BLD: NORMAL
ALBUMIN SERPL-MCNC: 4.1 G/DL (ref 3.5–5.2)
ALBUMIN/GLOB SERPL: 1.4 G/DL
ALP SERPL-CCNC: 59 U/L (ref 39–117)
ALT SERPL W P-5'-P-CCNC: 16 U/L (ref 1–41)
ANION GAP SERPL CALCULATED.3IONS-SCNC: 11 MMOL/L (ref 5–15)
ANION GAP SERPL CALCULATED.3IONS-SCNC: 21 MMOL/L (ref 10–20)
AST SERPL-CCNC: 17 U/L (ref 1–40)
BASOPHILS # BLD AUTO: 0.1 10*3/MM3 (ref 0–0.2)
BASOPHILS NFR BLD AUTO: 0.9 % (ref 0–1.5)
BILIRUB SERPL-MCNC: 0.4 MG/DL (ref 0–1.2)
BLD GP AB SCN SERPL QL: NEGATIVE
BUN BLDA-MCNC: 11 MG/DL (ref 8–26)
BUN SERPL-MCNC: 16 MG/DL (ref 8–23)
BUN/CREAT SERPL: 13.9 (ref 7–25)
CA-I BLDA-SCNC: 0.93 MMOL/L (ref 1.12–1.32)
CALCIUM SPEC-SCNC: 8.8 MG/DL (ref 8.6–10.5)
CHLORIDE BLDA-SCNC: 111 MMOL/L (ref 98–109)
CHLORIDE SERPL-SCNC: 102 MMOL/L (ref 98–107)
CO2 BLDA-SCNC: 19 MMOL/L (ref 24–29)
CO2 SERPL-SCNC: 26 MMOL/L (ref 22–29)
CREAT BLDA-MCNC: 0.6 MG/DL (ref 0.6–1.3)
CREAT SERPL-MCNC: 1.15 MG/DL (ref 0.76–1.27)
DEPRECATED RDW RBC AUTO: 42.9 FL (ref 37–54)
DIGOXIN SERPL-MCNC: 1.1 NG/ML (ref 0.6–1.2)
EGFRCR SERPLBLD CKD-EPI 2021: 101.3 ML/MIN/1.73
EGFRCR SERPLBLD CKD-EPI 2021: 66.8 ML/MIN/1.73
EOSINOPHIL # BLD AUTO: 0.2 10*3/MM3 (ref 0–0.4)
EOSINOPHIL NFR BLD AUTO: 2.6 % (ref 0.3–6.2)
ERYTHROCYTE [DISTWIDTH] IN BLOOD BY AUTOMATED COUNT: 16.8 % (ref 12.3–15.4)
GLOBULIN UR ELPH-MCNC: 2.9 GM/DL
GLUCOSE BLDC GLUCOMTR-MCNC: 61 MG/DL (ref 70–105)
GLUCOSE BLDC GLUCOMTR-MCNC: 87 MG/DL (ref 70–105)
GLUCOSE SERPL-MCNC: 87 MG/DL (ref 65–99)
HCT VFR BLD AUTO: 46.4 % (ref 37.5–51)
HCT VFR BLDA CALC: 34 % (ref 38–51)
HGB BLD-MCNC: 15.2 G/DL (ref 13–17.7)
HGB BLDA-MCNC: 11.6 G/DL (ref 12–17)
HOLD SPECIMEN: NORMAL
HOLD SPECIMEN: NORMAL
INR PPP: 2.59 (ref 2–3)
LYMPHOCYTES # BLD AUTO: 2.6 10*3/MM3 (ref 0.7–3.1)
LYMPHOCYTES NFR BLD AUTO: 32.5 % (ref 19.6–45.3)
MCH RBC QN AUTO: 23.6 PG (ref 26.6–33)
MCHC RBC AUTO-ENTMCNC: 32.8 G/DL (ref 31.5–35.7)
MCV RBC AUTO: 72 FL (ref 79–97)
MONOCYTES # BLD AUTO: 0.7 10*3/MM3 (ref 0.1–0.9)
MONOCYTES NFR BLD AUTO: 8.1 % (ref 5–12)
NEUTROPHILS NFR BLD AUTO: 4.5 10*3/MM3 (ref 1.7–7)
NEUTROPHILS NFR BLD AUTO: 55.9 % (ref 42.7–76)
NRBC BLD AUTO-RTO: 0.1 /100 WBC (ref 0–0.2)
PLATELET # BLD AUTO: 160 10*3/MM3 (ref 140–450)
PMV BLD AUTO: 7.2 FL (ref 6–12)
POTASSIUM BLDA-SCNC: 2.5 MMOL/L (ref 3.5–4.9)
POTASSIUM SERPL-SCNC: 4.5 MMOL/L (ref 3.5–5.2)
PROT SERPL-MCNC: 7 G/DL (ref 6–8.5)
PROTHROMBIN TIME: 25.3 SECONDS (ref 19.4–28.5)
RBC # BLD AUTO: 6.44 10*6/MM3 (ref 4.14–5.8)
RH BLD: POSITIVE
SARS-COV-2 RNA PNL SPEC NAA+PROBE: NOT DETECTED
SODIUM BLD-SCNC: 148 MMOL/L (ref 138–146)
SODIUM SERPL-SCNC: 139 MMOL/L (ref 136–145)
T&S EXPIRATION DATE: NORMAL
TROPONIN T SERPL-MCNC: <0.01 NG/ML (ref 0–0.03)
WBC NRBC COR # BLD: 8 10*3/MM3 (ref 3.4–10.8)
WHOLE BLOOD HOLD SPECIMEN: NORMAL
WHOLE BLOOD HOLD SPECIMEN: NORMAL

## 2022-04-09 PROCEDURE — 70450 CT HEAD/BRAIN W/O DYE: CPT

## 2022-04-09 PROCEDURE — 70496 CT ANGIOGRAPHY HEAD: CPT

## 2022-04-09 PROCEDURE — 86901 BLOOD TYPING SEROLOGIC RH(D): CPT | Performed by: EMERGENCY MEDICINE

## 2022-04-09 PROCEDURE — 93005 ELECTROCARDIOGRAM TRACING: CPT | Performed by: EMERGENCY MEDICINE

## 2022-04-09 PROCEDURE — 99285 EMERGENCY DEPT VISIT HI MDM: CPT

## 2022-04-09 PROCEDURE — 82607 VITAMIN B-12: CPT | Performed by: INTERNAL MEDICINE

## 2022-04-09 PROCEDURE — 86901 BLOOD TYPING SEROLOGIC RH(D): CPT

## 2022-04-09 PROCEDURE — 82962 GLUCOSE BLOOD TEST: CPT

## 2022-04-09 PROCEDURE — 80198 ASSAY OF THEOPHYLLINE: CPT

## 2022-04-09 PROCEDURE — C9803 HOPD COVID-19 SPEC COLLECT: HCPCS

## 2022-04-09 PROCEDURE — 86900 BLOOD TYPING SEROLOGIC ABO: CPT | Performed by: EMERGENCY MEDICINE

## 2022-04-09 PROCEDURE — 80162 ASSAY OF DIGOXIN TOTAL: CPT | Performed by: EMERGENCY MEDICINE

## 2022-04-09 PROCEDURE — 80061 LIPID PANEL: CPT | Performed by: INTERNAL MEDICINE

## 2022-04-09 PROCEDURE — 84484 ASSAY OF TROPONIN QUANT: CPT | Performed by: EMERGENCY MEDICINE

## 2022-04-09 PROCEDURE — 80053 COMPREHEN METABOLIC PANEL: CPT | Performed by: NURSE PRACTITIONER

## 2022-04-09 PROCEDURE — 87635 SARS-COV-2 COVID-19 AMP PRB: CPT

## 2022-04-09 PROCEDURE — 85014 HEMATOCRIT: CPT

## 2022-04-09 PROCEDURE — 80047 BASIC METABLC PNL IONIZED CA: CPT

## 2022-04-09 PROCEDURE — 85025 COMPLETE CBC W/AUTO DIFF WBC: CPT | Performed by: EMERGENCY MEDICINE

## 2022-04-09 PROCEDURE — 86900 BLOOD TYPING SEROLOGIC ABO: CPT

## 2022-04-09 PROCEDURE — 86850 RBC ANTIBODY SCREEN: CPT | Performed by: EMERGENCY MEDICINE

## 2022-04-09 PROCEDURE — 83036 HEMOGLOBIN GLYCOSYLATED A1C: CPT | Performed by: INTERNAL MEDICINE

## 2022-04-09 PROCEDURE — 84443 ASSAY THYROID STIM HORMONE: CPT | Performed by: INTERNAL MEDICINE

## 2022-04-09 PROCEDURE — 36415 COLL VENOUS BLD VENIPUNCTURE: CPT | Performed by: EMERGENCY MEDICINE

## 2022-04-09 PROCEDURE — 85025 COMPLETE CBC W/AUTO DIFF WBC: CPT | Performed by: NURSE PRACTITIONER

## 2022-04-09 PROCEDURE — 0042T HC CT CEREBRAL PERFUSION W/WO CONTRAST: CPT

## 2022-04-09 PROCEDURE — 0 IOPAMIDOL PER 1 ML: Performed by: EMERGENCY MEDICINE

## 2022-04-09 PROCEDURE — 70498 CT ANGIOGRAPHY NECK: CPT

## 2022-04-09 PROCEDURE — 85610 PROTHROMBIN TIME: CPT | Performed by: NURSE PRACTITIONER

## 2022-04-09 PROCEDURE — 85610 PROTHROMBIN TIME: CPT | Performed by: EMERGENCY MEDICINE

## 2022-04-09 PROCEDURE — 80053 COMPREHEN METABOLIC PANEL: CPT | Performed by: EMERGENCY MEDICINE

## 2022-04-09 PROCEDURE — 71045 X-RAY EXAM CHEST 1 VIEW: CPT

## 2022-04-09 PROCEDURE — G0378 HOSPITAL OBSERVATION PER HR: HCPCS

## 2022-04-09 RX ORDER — ONDANSETRON 2 MG/ML
4 INJECTION INTRAMUSCULAR; INTRAVENOUS EVERY 6 HOURS PRN
Status: DISCONTINUED | OUTPATIENT
Start: 2022-04-09 | End: 2022-04-11 | Stop reason: HOSPADM

## 2022-04-09 RX ORDER — MONTELUKAST SODIUM 10 MG/1
10 TABLET ORAL NIGHTLY
Status: DISCONTINUED | OUTPATIENT
Start: 2022-04-10 | End: 2022-04-11 | Stop reason: HOSPADM

## 2022-04-09 RX ORDER — HYDRALAZINE HYDROCHLORIDE 20 MG/ML
10 INJECTION INTRAMUSCULAR; INTRAVENOUS EVERY 6 HOURS PRN
Status: DISCONTINUED | OUTPATIENT
Start: 2022-04-09 | End: 2022-04-11 | Stop reason: HOSPADM

## 2022-04-09 RX ORDER — PANTOPRAZOLE SODIUM 40 MG/1
40 TABLET, DELAYED RELEASE ORAL DAILY
Status: DISCONTINUED | OUTPATIENT
Start: 2022-04-10 | End: 2022-04-11 | Stop reason: HOSPADM

## 2022-04-09 RX ORDER — GABAPENTIN 300 MG/1
600 CAPSULE ORAL 2 TIMES DAILY
Status: DISCONTINUED | OUTPATIENT
Start: 2022-04-10 | End: 2022-04-11 | Stop reason: HOSPADM

## 2022-04-09 RX ORDER — ATORVASTATIN CALCIUM 10 MG/1
10 TABLET, FILM COATED ORAL DAILY
Refills: 3 | Status: DISCONTINUED | OUTPATIENT
Start: 2022-04-10 | End: 2022-04-10

## 2022-04-09 RX ORDER — BUDESONIDE AND FORMOTEROL FUMARATE DIHYDRATE 80; 4.5 UG/1; UG/1
2 AEROSOL RESPIRATORY (INHALATION)
Refills: 5 | Status: DISCONTINUED | OUTPATIENT
Start: 2022-04-09 | End: 2022-04-11 | Stop reason: HOSPADM

## 2022-04-09 RX ORDER — WARFARIN SODIUM 5 MG/1
5 TABLET ORAL
Status: DISCONTINUED | OUTPATIENT
Start: 2022-04-10 | End: 2022-04-11 | Stop reason: HOSPADM

## 2022-04-09 RX ORDER — TAMSULOSIN HYDROCHLORIDE 0.4 MG/1
0.4 CAPSULE ORAL NIGHTLY
Refills: 11 | Status: DISCONTINUED | OUTPATIENT
Start: 2022-04-10 | End: 2022-04-11 | Stop reason: HOSPADM

## 2022-04-09 RX ORDER — SODIUM CHLORIDE 0.9 % (FLUSH) 0.9 %
10 SYRINGE (ML) INJECTION AS NEEDED
Status: DISCONTINUED | OUTPATIENT
Start: 2022-04-09 | End: 2022-04-11 | Stop reason: HOSPADM

## 2022-04-09 RX ORDER — POTASSIUM CHLORIDE 20 MEQ/1
20 TABLET, EXTENDED RELEASE ORAL DAILY
Status: DISCONTINUED | OUTPATIENT
Start: 2022-04-09 | End: 2022-04-09

## 2022-04-09 RX ORDER — LEVOTHYROXINE SODIUM 0.07 MG/1
75 TABLET ORAL
Status: DISCONTINUED | OUTPATIENT
Start: 2022-04-10 | End: 2022-04-11 | Stop reason: HOSPADM

## 2022-04-09 RX ORDER — BACLOFEN 10 MG/1
10 TABLET ORAL 2 TIMES DAILY
Status: DISCONTINUED | OUTPATIENT
Start: 2022-04-10 | End: 2022-04-10

## 2022-04-09 RX ORDER — VERAPAMIL HYDROCHLORIDE 120 MG/1
240 CAPSULE, EXTENDED RELEASE ORAL 2 TIMES DAILY
Status: DISCONTINUED | OUTPATIENT
Start: 2022-04-09 | End: 2022-04-11 | Stop reason: HOSPADM

## 2022-04-09 RX ORDER — DIGOXIN 250 MCG
250 TABLET ORAL
Status: DISCONTINUED | OUTPATIENT
Start: 2022-04-10 | End: 2022-04-11 | Stop reason: HOSPADM

## 2022-04-09 RX ORDER — SODIUM CHLORIDE 0.9 % (FLUSH) 0.9 %
10 SYRINGE (ML) INJECTION EVERY 12 HOURS SCHEDULED
Status: DISCONTINUED | OUTPATIENT
Start: 2022-04-09 | End: 2022-04-11 | Stop reason: HOSPADM

## 2022-04-09 RX ORDER — ALBUTEROL SULFATE 2.5 MG/3ML
2.5 SOLUTION RESPIRATORY (INHALATION) EVERY 6 HOURS PRN
Status: DISCONTINUED | OUTPATIENT
Start: 2022-04-09 | End: 2022-04-11 | Stop reason: HOSPADM

## 2022-04-09 RX ORDER — ACETAMINOPHEN 325 MG/1
650 TABLET ORAL EVERY 6 HOURS PRN
Status: DISCONTINUED | OUTPATIENT
Start: 2022-04-09 | End: 2022-04-11 | Stop reason: HOSPADM

## 2022-04-09 RX ADMIN — SODIUM CHLORIDE 500 ML: 9 INJECTION, SOLUTION INTRAVENOUS at 15:13

## 2022-04-09 RX ADMIN — IOPAMIDOL 100 ML: 755 INJECTION, SOLUTION INTRAVENOUS at 16:01

## 2022-04-09 RX ADMIN — IOPAMIDOL 50 ML: 755 INJECTION, SOLUTION INTRAVENOUS at 16:03

## 2022-04-09 RX ADMIN — ACETAMINOPHEN 650 MG: 325 TABLET ORAL at 22:31

## 2022-04-09 NOTE — ED NOTES
"Pt brought in by daughter from eye  Office,with c/o vision problems, and occipital headache. Pt states he was driving earlier and started having trouble seeing states he \"could only see part of a license plate.\" Pt denies any symptoms currently, states he also had some trouble with this earlier this week. Pt has some dizziness as well that he has had for quite some time. Hx of afib and some eye problems as well. Pt seen at eye  Office today and sent here from there.  "

## 2022-04-09 NOTE — ED PROVIDER NOTES
Subjective   History of Present Illness  This is a 76-year-old male who had an onset about 6 hours ago of visual loss in the right temporal field.  He states that it lasted for about 15 minutes and then gradually improved.  He states that he had a slight headache although was not unusual for him to have headaches.  He states that he has had a problem with visual field loss according to his ophthalmologist.  The ophthalmologist was concerned that maybe he had an vascular event going on but did not have a CT and MRI done.  The patient also complains of persistent dizziness since he had COVID a year ago.  He apparently is seeing a neurologist as well as ENT no etiology for this has been found.  He does have a history of persistent atrial fibrillation and is on Coumadin.  He has a past history of a kidney stone.  The patient has had bilateral corneal transplants.  The patient has also had a previous prostatectomy and has a problem with incontinence occasionally.  Review of Systems   Constitutional: Positive for activity change.   Eyes: Positive for visual disturbance.        Positive corneal transplants and cataract surgery   Respiratory: Negative.    Cardiovascular: Positive for palpitations.   Gastrointestinal: Negative.    Endocrine: Negative.    Genitourinary: Negative.         Incontinence   Musculoskeletal: Positive for arthralgias.   Skin: Negative.    Allergic/Immunologic: Negative.    Neurological: Positive for dizziness, weakness and headaches.   Hematological: Negative.    Psychiatric/Behavioral: Negative.        Past Medical History:   Diagnosis Date   • Asthma    • Atrial fibrillation (HCC)    • Atypical chest pain 06/11/2020   • COPD (chronic obstructive pulmonary disease) (HCC)    • COVID    • COVID-19 01/13/2021   • Enlarged prostate    • Hyperlipidemia    • Kidney stone    • Near syncope 12/17/2020   • Sleep apnea        Allergies   Allergen Reactions   • Penicillins Other (See Comments)     AS A CHILD    • Rivaroxaban Other (See Comments)       Past Surgical History:   Procedure Laterality Date   • APPENDECTOMY     • CARDIAC CATHETERIZATION  2007    nonobstructive dz   • CARDIAC CATHETERIZATION N/A 9/17/2021    Procedure: Left Heart Cath;  Surgeon: Silvano Gillis MD;  Location[facility] CATH INVASIVE [facility];  Service: Cardiology;  Laterality: N/A;   • CERVICAL EPIDURAL      with Dr. Harshil Rodgers mgt   • CHOLECYSTECTOMY     • EYE SURGERY Bilateral     corneal transplant left 09/02/2020 (Right eye 2014)   • JOINT REPLACEMENT Right     knee   • KIDNEY STONE SURGERY     • PROSTATE SURGERY         Family History   Problem Relation Age of Onset   • Heart disease Mother    • Hypertension Mother    • Diabetes Father    • Stroke Father    • Hypertension Father    • Cancer Father         esophageal. prostate, skin   • Diabetes Sister    • Thyroid disease Sister    • Heart disease Maternal Grandmother    • Diabetes Paternal Grandmother        Social History     Socioeconomic History   • Marital status:    Tobacco Use   • Smoking status: Former Smoker   • Smokeless tobacco: Never Used   Vaping Use   • Vaping Use: Never used   Substance and Sexual Activity   • Alcohol use: Yes     Comment: occasionally   • Drug use: Not Currently   • Sexual activity: Defer           Objective   Physical Exam  Patient is alert awake and alert he is oriented x3 vital signs are stable his pressure is 139/100 and his heart rate was 76 and irregularly irregular the HEENT exam pupils equal round reactive to light EOMs are full at 3 mm.  He has had bilateral corneal replacements with minimal scarring.  ENT is clear his neck is supple carotids are 2+ no bruits are heard his chest is clear cardiovascular exam reveals an irregularly irregular rhythm at a controlled rate the abdomen was soft and nontender he has no cyanosis clubbing or edema he has full range of motion of the extremities he has no focal neurologic deficit at this point in  time  Procedures           ED Course  ED Course as of 04/11/22 1314   Sat Apr 09, 2022   1714 MRI Brain Without Contrast [LUIS FERNANDO]      ED Course User Index  [LUIS FERNANDO] Guilherme Hernandez MD             .thisvisit  Medications   sodium chloride 0.9 % bolus 500 mL (0 mL Intravenous Stopped 4/9/22 1543)   iopamidol (ISOVUE-370) 76 % injection 100 mL (100 mL Intravenous Given 4/9/22 1601)   iopamidol (ISOVUE-370) 76 % injection 50 mL (50 mL Intravenous Given 4/9/22 1603)   verapamil ER (VERELAN) 24 hr capsule 240 mg (240 mg Oral Given 4/10/22 0119)     CT Angiogram Neck    Result Date: 4/9/2022  Major arterial vasculature within head and neck appears widely patent, with no hemodynamically significant stenosis, dissection, thrombosis, or aneurysm.  Electronically Signed By-Sha Rangel MD On:4/9/2022 5:06 PM This report was finalized on [id number] by  Sha Rangel MD.    MRI Brain Without Contrast    Result Date: 4/10/2022  1.Punctate focus of subacute infarct within medial right parietal lobe. No hemorrhagic transformation or significant mass effect. 2.Findings suggestive of mild chronic small vessel ischemic disease.  Electronically Signed By-Sha Rangel MD On:4/10/2022 8:49 AM This report was finalized on [id number] by  Sha Rangel MD.    XR Chest 1 View    Result Date: 4/9/2022  No acute cardiopulmonary process identified.  Electronically Signed By-Sha Rangel MD On:4/9/2022 3:34 PM This report was finalized on [id number] by  Sha Rangel MD.    CT Head Without Contrast Stroke Protocol    Result Date: 4/9/2022  No acute intracranial process identified.  Electronically Signed ByWilliam Rangel MD On:4/9/2022 4:08 PM This report was finalized on [id number] by  Sha Rangel MD.    CT Angiogram Head w AI Analysis of LVO    Result Date: 4/9/2022  Major arterial vasculature within head and neck appears widely patent, with no hemodynamically significant stenosis, dissection, thrombosis,  or aneurysm.  Electronically Signed By-Sha Rangel MD On:4/9/2022 5:06 PM This report was finalized on [id number] by  Sha Rangel MD.    CT CEREBRAL PERFUSION WITH & WITHOUT CONTRAST    Result Date: 4/9/2022  No definite acute perfusion abnormality identified. Suggestion of artifact on the Tmax as described above.  Electronically Signed By-Sha Rangel MD On:4/9/2022 4:17 PM This report was finalized on [id number] by  Sha Rangel MD.                                     MDM  The patient had a abdominal CT  Normal cervical perfusion scan also.  The patient had no recurrence of his symptoms.  His lab work was fairly unremarkable except for a slight bradycardia at a rate of 49 and atrial fibrillation and an INR of 2.59.  I discussed the case with neurology is suggested since he was already on Coumadin 2.7 admit him this evening on observation and then do his MRI of his head tomorrow morning.  The patient is seeing a ophthalmologist currently and he was diagnosed recently with a visual field loss.  I do not see anything on the CT scan to correlate with that.  Final diagnoses:   TIA (transient ischemic attack)   Longstanding persistent atrial fibrillation (HCC)   Visual disturbance       ED Disposition  ED Disposition     ED Disposition   Decision to Admit    Condition   --    Comment   Level of Care: Telemetry [5]   Admitting Physician: CRUZ SEO [[id number]]   Attending Physician: CRUZ SEO [[id number]]               Uday Beltran MD  [address] [facility] [address]  [place] [address] [place], [place] [phone]    Follow up in 1 week(s)      Silvano Gillis MD  [address] [place] [address] [place], [place] [phone]    Follow up in 1 day(s)  Keep existing appointment for tomorrow.    Seipel, Joseph F, MD  [address] [facility] [address]  [place] [facility]  [phone]    Schedule an appointment as soon as possible for a visit in 1 month(s)  Follow up in 1 month.         Medication List      Stop     EC-RX Testosterone 10 % cream             Ramon Lamb MD  04/11/22 2995

## 2022-04-10 ENCOUNTER — APPOINTMENT (OUTPATIENT)
Dept: CARDIOLOGY | Facility: HOSPITAL | Age: 77
End: 2022-04-10

## 2022-04-10 ENCOUNTER — READMISSION MANAGEMENT (OUTPATIENT)
Dept: CALL CENTER | Facility: HOSPITAL | Age: 77
End: 2022-04-10

## 2022-04-10 ENCOUNTER — APPOINTMENT (OUTPATIENT)
Dept: MRI IMAGING | Facility: HOSPITAL | Age: 77
End: 2022-04-10

## 2022-04-10 VITALS
BODY MASS INDEX: 34.06 KG/M2 | WEIGHT: 257 LBS | HEIGHT: 73 IN | HEART RATE: 76 BPM | TEMPERATURE: 97.9 F | OXYGEN SATURATION: 98 % | RESPIRATION RATE: 16 BRPM | SYSTOLIC BLOOD PRESSURE: 153 MMHG | DIASTOLIC BLOOD PRESSURE: 91 MMHG

## 2022-04-10 PROBLEM — G45.9 TIA (TRANSIENT ISCHEMIC ATTACK): Status: ACTIVE | Noted: 2022-04-10

## 2022-04-10 LAB
ALBUMIN SERPL-MCNC: 3.7 G/DL (ref 3.5–5.2)
ALBUMIN/GLOB SERPL: 1.5 G/DL
ALP SERPL-CCNC: 55 U/L (ref 39–117)
ALT SERPL W P-5'-P-CCNC: 16 U/L (ref 1–41)
ANION GAP SERPL CALCULATED.3IONS-SCNC: 12 MMOL/L (ref 5–15)
AST SERPL-CCNC: 16 U/L (ref 1–40)
BASOPHILS # BLD AUTO: 0 10*3/MM3 (ref 0–0.2)
BASOPHILS NFR BLD AUTO: 0.2 % (ref 0–1.5)
BH CV ECHO MEAS - ACS: 1.99 CM
BH CV ECHO MEAS - AO MAX PG: 10 MMHG
BH CV ECHO MEAS - AO MEAN PG: 6 MMHG
BH CV ECHO MEAS - AO ROOT DIAM: 3.1 CM
BH CV ECHO MEAS - AO V2 MAX: 158 CM/SEC
BH CV ECHO MEAS - AO V2 VTI: 29.2 CM
BH CV ECHO MEAS - AVA(I,D): 3.2 CM2
BH CV ECHO MEAS - EDV(CUBED): 252.5 ML
BH CV ECHO MEAS - EDV(MOD-SP4): 74.6 ML
BH CV ECHO MEAS - EF(MOD-SP4): 71.9 %
BH CV ECHO MEAS - ESV(CUBED): 81.4 ML
BH CV ECHO MEAS - ESV(MOD-SP4): 21 ML
BH CV ECHO MEAS - FS: 31.4 %
BH CV ECHO MEAS - IVS/LVPW: 1.32 CM
BH CV ECHO MEAS - IVSD: 1.45 CM
BH CV ECHO MEAS - LV DIASTOLIC VOL/BSA (35-75): 31.2 CM2
BH CV ECHO MEAS - LV MASS(C)D: 371.1 GRAMS
BH CV ECHO MEAS - LV MAX PG: 9.1 MMHG
BH CV ECHO MEAS - LV MEAN PG: 4.6 MMHG
BH CV ECHO MEAS - LV SYSTOLIC VOL/BSA (12-30): 8.8 CM2
BH CV ECHO MEAS - LV V1 MAX: 151 CM/SEC
BH CV ECHO MEAS - LV V1 VTI: 27.4 CM
BH CV ECHO MEAS - LVIDD: 6.3 CM
BH CV ECHO MEAS - LVIDS: 4.3 CM
BH CV ECHO MEAS - LVOT AREA: 3.4 CM2
BH CV ECHO MEAS - LVOT DIAM: 2.08 CM
BH CV ECHO MEAS - LVPWD: 1.1 CM
BH CV ECHO MEAS - MR MAX PG: 124.7 MMHG
BH CV ECHO MEAS - MR MAX VEL: 558.2 CM/SEC
BH CV ECHO MEAS - MV A MAX VEL: 0.1 CM/SEC
BH CV ECHO MEAS - MV DEC SLOPE: 479.9 CM/SEC2
BH CV ECHO MEAS - MV DEC TIME: 0.17 MSEC
BH CV ECHO MEAS - MV E MAX VEL: 41.3 CM/SEC
BH CV ECHO MEAS - MV E/A: 415.9
BH CV ECHO MEAS - MV MAX PG: 4 MMHG
BH CV ECHO MEAS - MV MEAN PG: 1.63 MMHG
BH CV ECHO MEAS - MV V2 VTI: 18.5 CM
BH CV ECHO MEAS - MVA(VTI): 5 CM2
BH CV ECHO MEAS - PA ACC TIME: 0.08 SEC
BH CV ECHO MEAS - PA PR(ACCEL): 44.7 MMHG
BH CV ECHO MEAS - PA V2 MAX: 95.6 CM/SEC
BH CV ECHO MEAS - PI END-D VEL: 91.6 CM/SEC
BH CV ECHO MEAS - RAP SYSTOLE: 8 MMHG
BH CV ECHO MEAS - RV MAX PG: 1.4 MMHG
BH CV ECHO MEAS - RV V1 MAX: 59.2 CM/SEC
BH CV ECHO MEAS - RV V1 VTI: 10.1 CM
BH CV ECHO MEAS - RVDD: 2.9 CM
BH CV ECHO MEAS - RVSP: 31.5 MMHG
BH CV ECHO MEAS - SI(MOD-SP4): 22.4 ML/M2
BH CV ECHO MEAS - SV(LVOT): 93 ML
BH CV ECHO MEAS - SV(MOD-SP4): 53.6 ML
BH CV ECHO MEAS - TR MAX PG: 23.5 MMHG
BH CV ECHO MEAS - TR MAX VEL: 241.6 CM/SEC
BILIRUB SERPL-MCNC: 0.4 MG/DL (ref 0–1.2)
BUN SERPL-MCNC: 15 MG/DL (ref 8–23)
BUN/CREAT SERPL: 12.4 (ref 7–25)
CALCIUM SPEC-SCNC: 8.4 MG/DL (ref 8.6–10.5)
CHLORIDE SERPL-SCNC: 102 MMOL/L (ref 98–107)
CHOLEST SERPL-MCNC: 116 MG/DL (ref 0–200)
CO2 SERPL-SCNC: 24 MMOL/L (ref 22–29)
CREAT SERPL-MCNC: 1.21 MG/DL (ref 0.76–1.27)
DEPRECATED RDW RBC AUTO: 43.3 FL (ref 37–54)
EGFRCR SERPLBLD CKD-EPI 2021: 62.8 ML/MIN/1.73
EOSINOPHIL # BLD AUTO: 0.2 10*3/MM3 (ref 0–0.4)
EOSINOPHIL NFR BLD AUTO: 2.9 % (ref 0.3–6.2)
ERYTHROCYTE [DISTWIDTH] IN BLOOD BY AUTOMATED COUNT: 17.1 % (ref 12.3–15.4)
GLOBULIN UR ELPH-MCNC: 2.5 GM/DL
GLUCOSE SERPL-MCNC: 139 MG/DL (ref 65–99)
HCT VFR BLD AUTO: 44.4 % (ref 37.5–51)
HDLC SERPL-MCNC: 30 MG/DL (ref 40–60)
HGB BLD-MCNC: 14.6 G/DL (ref 13–17.7)
INR PPP: 2.47 (ref 2–3)
LDLC SERPL CALC-MCNC: 57 MG/DL (ref 0–100)
LDLC/HDLC SERPL: 1.72 {RATIO}
LV EF 2D ECHO EST: 65 %
LYMPHOCYTES # BLD AUTO: 2.3 10*3/MM3 (ref 0.7–3.1)
LYMPHOCYTES NFR BLD AUTO: 33.1 % (ref 19.6–45.3)
MAXIMAL PREDICTED HEART RATE: 146 BPM
MCH RBC QN AUTO: 23.6 PG (ref 26.6–33)
MCHC RBC AUTO-ENTMCNC: 32.9 G/DL (ref 31.5–35.7)
MCV RBC AUTO: 71.8 FL (ref 79–97)
MONOCYTES # BLD AUTO: 0.6 10*3/MM3 (ref 0.1–0.9)
MONOCYTES NFR BLD AUTO: 8.3 % (ref 5–12)
NEUTROPHILS NFR BLD AUTO: 3.9 10*3/MM3 (ref 1.7–7)
NEUTROPHILS NFR BLD AUTO: 55.5 % (ref 42.7–76)
NRBC BLD AUTO-RTO: 0.1 /100 WBC (ref 0–0.2)
PLATELET # BLD AUTO: 150 10*3/MM3 (ref 140–450)
PMV BLD AUTO: 7.5 FL (ref 6–12)
POTASSIUM SERPL-SCNC: 3.7 MMOL/L (ref 3.5–5.2)
PROT SERPL-MCNC: 6.2 G/DL (ref 6–8.5)
PROTHROMBIN TIME: 24.2 SECONDS (ref 19.4–28.5)
RBC # BLD AUTO: 6.18 10*6/MM3 (ref 4.14–5.8)
SODIUM SERPL-SCNC: 138 MMOL/L (ref 136–145)
STRESS TARGET HR: 124 BPM
THEOPHYLLINE SERPL-MCNC: 6.5 MCG/ML (ref 10–20)
TRIGL SERPL-MCNC: 172 MG/DL (ref 0–150)
TSH SERPL DL<=0.05 MIU/L-ACNC: 3.67 UIU/ML (ref 0.27–4.2)
VIT B12 BLD-MCNC: 421 PG/ML (ref 211–946)
VLDLC SERPL-MCNC: 29 MG/DL (ref 5–40)
WBC NRBC COR # BLD: 7 10*3/MM3 (ref 3.4–10.8)

## 2022-04-10 PROCEDURE — 93306 TTE W/DOPPLER COMPLETE: CPT

## 2022-04-10 PROCEDURE — G0378 HOSPITAL OBSERVATION PER HR: HCPCS

## 2022-04-10 PROCEDURE — 94799 UNLISTED PULMONARY SVC/PX: CPT

## 2022-04-10 PROCEDURE — 93306 TTE W/DOPPLER COMPLETE: CPT | Performed by: INTERNAL MEDICINE

## 2022-04-10 PROCEDURE — 97161 PT EVAL LOW COMPLEX 20 MIN: CPT

## 2022-04-10 PROCEDURE — 94640 AIRWAY INHALATION TREATMENT: CPT

## 2022-04-10 PROCEDURE — 99214 OFFICE O/P EST MOD 30 MIN: CPT | Performed by: NURSE PRACTITIONER

## 2022-04-10 PROCEDURE — 70551 MRI BRAIN STEM W/O DYE: CPT

## 2022-04-10 RX ORDER — WARFARIN SODIUM 1 MG/1
2.5 TABLET ORAL 2 TIMES WEEKLY
COMMUNITY

## 2022-04-10 RX ORDER — WARFARIN SODIUM 5 MG/1
5 TABLET ORAL
COMMUNITY
End: 2022-09-07

## 2022-04-10 RX ORDER — BACLOFEN 10 MG/1
10 TABLET ORAL 2 TIMES DAILY PRN
Status: DISCONTINUED | OUTPATIENT
Start: 2022-04-10 | End: 2022-04-11 | Stop reason: HOSPADM

## 2022-04-10 RX ORDER — VERAPAMIL HYDROCHLORIDE 120 MG/1
240 CAPSULE, EXTENDED RELEASE ORAL ONCE
Status: COMPLETED | OUTPATIENT
Start: 2022-04-10 | End: 2022-04-10

## 2022-04-10 RX ORDER — ROSUVASTATIN CALCIUM 10 MG/1
40 TABLET, COATED ORAL NIGHTLY
Status: DISCONTINUED | OUTPATIENT
Start: 2022-04-10 | End: 2022-04-11 | Stop reason: HOSPADM

## 2022-04-10 RX ORDER — WARFARIN SODIUM 7.5 MG/1
7.5 TABLET ORAL
Status: DISCONTINUED | OUTPATIENT
Start: 2022-04-11 | End: 2022-04-11 | Stop reason: HOSPADM

## 2022-04-10 RX ORDER — VERAPAMIL HYDROCHLORIDE 240 MG/1
240 CAPSULE, EXTENDED RELEASE ORAL NIGHTLY
COMMUNITY
End: 2022-06-14

## 2022-04-10 RX ADMIN — Medication 10 ML: at 01:24

## 2022-04-10 RX ADMIN — DIGOXIN 250 MCG: 250 TABLET ORAL at 12:32

## 2022-04-10 RX ADMIN — MONTELUKAST 10 MG: 10 TABLET, FILM COATED ORAL at 01:19

## 2022-04-10 RX ADMIN — ATORVASTATIN CALCIUM 10 MG: 10 TABLET, FILM COATED ORAL at 08:37

## 2022-04-10 RX ADMIN — WARFARIN 5 MG: 5 TABLET ORAL at 17:48

## 2022-04-10 RX ADMIN — BACLOFEN 10 MG: 10 TABLET ORAL at 08:48

## 2022-04-10 RX ADMIN — BACLOFEN 10 MG: 10 TABLET ORAL at 01:19

## 2022-04-10 RX ADMIN — ROSUVASTATIN 40 MG: 10 TABLET, FILM COATED ORAL at 20:28

## 2022-04-10 RX ADMIN — GABAPENTIN 600 MG: 300 CAPSULE ORAL at 20:28

## 2022-04-10 RX ADMIN — LEVOTHYROXINE SODIUM 75 MCG: 0.07 TABLET ORAL at 08:38

## 2022-04-10 RX ADMIN — PANTOPRAZOLE SODIUM 40 MG: 40 TABLET, DELAYED RELEASE ORAL at 08:37

## 2022-04-10 RX ADMIN — BUDESONIDE AND FORMOTEROL FUMARATE DIHYDRATE 2 PUFF: 80; 4.5 AEROSOL RESPIRATORY (INHALATION) at 18:56

## 2022-04-10 RX ADMIN — Medication 10 ML: at 20:29

## 2022-04-10 RX ADMIN — GABAPENTIN 600 MG: 300 CAPSULE ORAL at 08:48

## 2022-04-10 RX ADMIN — Medication 10 ML: at 08:37

## 2022-04-10 RX ADMIN — THEOPHYLLINE ANHYDROUS 400 MG: 200 CAPSULE, EXTENDED RELEASE ORAL at 08:37

## 2022-04-10 RX ADMIN — VERAPAMIL HYDROCHLORIDE 240 MG: 120 CAPSULE, DELAYED RELEASE PELLETS ORAL at 01:19

## 2022-04-10 RX ADMIN — GABAPENTIN 600 MG: 300 CAPSULE ORAL at 01:19

## 2022-04-10 RX ADMIN — MONTELUKAST 10 MG: 10 TABLET, FILM COATED ORAL at 20:31

## 2022-04-10 RX ADMIN — TAMSULOSIN HYDROCHLORIDE 0.4 MG: 0.4 CAPSULE ORAL at 20:28

## 2022-04-10 RX ADMIN — SERTRALINE 150 MG: 100 TABLET, FILM COATED ORAL at 08:37

## 2022-04-10 NOTE — CONSULTS
Primary Care Provider: Uday Beltran MD     Consult requested by:  Dr. Lamb     Reason for Consultation: Neurological evaluation, visual deficit     History taken from: patient chart RN    Chief complaint: Transient right temporal field vision loss        SUBJECTIVE:    History of present illness: Background per H&P: This is a 76-year-old male who had an onset about 6 hours ago of visual loss in the right temporal field.  He states that it lasted for about 15 minutes and then gradually improved.  He states that he had a slight headache although was not unusual for him to have headaches.  He states that he has had a problem with visual field loss according to his ophthalmologist.  The ophthalmologist was concerned that maybe he had an vascular event going on but did not have a CT and MRI done.  The patient also complains of persistent dizziness since he had COVID a year ago.  He apparently is seeing a neurologist as well as ENT no etiology for this has been found.  He does have a history of persistent atrial fibrillation and is on Coumadin.  He has a past history of a kidney stone.  The patient has had bilateral corneal transplants.  The patient has also had a previous prostatectomy and has a problem with incontinence occasionally.    - Portions of the above HPI were copied from previous encounters and edited as appropriate. PMH as detailed below.     On this instance patient states he had some right peripheral vision loss when he was driving, lasted 20 minutes prior to resolving.  He denied any other symptoms including focal deficits, speech difficulty, balance or coordination problem.  Patient does see a neurologist for balance issues that is been going on for 1 year since his Covid diagnosis.  Patient states he feels great today.  He did also have a headache yesterday and also showed some squiggly lines in his vision which is very common and migraine headaches.  Patient states he feels back to his baseline  today, no issues. Patient states he has been on Coumadin for a very long time without any issues.  He does see Dr. Gillis and his scheduled for an appointment tomorrow morning.  His INR is are always therapeutic.    Review of Systems   Constitutional: Negative.    HENT: Negative.    Eyes: Positive for visual disturbance.   Respiratory: Negative.    Cardiovascular: Negative.    Gastrointestinal: Negative for diarrhea, nausea and vomiting.   Endocrine: Negative.    Musculoskeletal: Negative.    Skin: Negative.    Allergic/Immunologic: Negative.    Neurological: Negative for dizziness, tremors, seizures, syncope, facial asymmetry, speech difficulty, weakness, light-headedness, numbness and headaches.   Hematological: Negative.    Psychiatric/Behavioral: Negative for confusion.        PATIENT HISTORY:  Past Medical History:   Diagnosis Date   • Asthma    • Atrial fibrillation (HCC)    • Atypical chest pain 06/11/2020   • COPD (chronic obstructive pulmonary disease) (HCC)    • COVID    • COVID-19 01/13/2021   • Enlarged prostate    • Hyperlipidemia    • Kidney stone    • Near syncope 12/17/2020   • Sleep apnea    ,   Past Surgical History:   Procedure Laterality Date   • APPENDECTOMY     • CARDIAC CATHETERIZATION  2007    nonobstructive dz   • CARDIAC CATHETERIZATION N/A 9/17/2021    Procedure: Left Heart Cath;  Surgeon: Silvano Gillis MD;  Location: [facility] CATH INVASIVE LOCATION;  Service: Cardiology;  Laterality: N/A;   • CERVICAL EPIDURAL      with Dr. Harshil Rodgers mgt   • CHOLECYSTECTOMY     • EYE SURGERY Bilateral     corneal transplant left 09/02/2020 (Right eye 2014)   • JOINT REPLACEMENT Right     knee   • KIDNEY STONE SURGERY     • PROSTATE SURGERY     ,   Family History   Problem Relation Age of Onset   • Heart disease Mother    • Hypertension Mother    • Diabetes Father    • Stroke Father    • Hypertension Father    • Cancer Father         esophageal. prostate, skin   • Diabetes Sister    • Thyroid  disease Sister    • Heart disease Maternal Grandmother    • Diabetes Paternal Grandmother    ,   Social History     Tobacco Use   • Smoking status: Former Smoker   • Smokeless tobacco: Never Used   Vaping Use   • Vaping Use: Never used   Substance Use Topics   • Alcohol use: Yes     Comment: occasionally   • Drug use: Not Currently   ,   Prior to Admission medications    Medication Sig Start Date End Date Taking? Authorizing Provider   alfuzosin (UROXATRAL) 10 MG 24 hr tablet Take 10 mg by mouth 2 (Two) Times a Day. 9/23/19  Yes Shelby Haskins MD   baclofen (LIORESAL) 10 MG tablet Take 10 mg by mouth 2 (Two) Times a Day. 7/13/21  Yes Shelby Haskins MD   digoxin (LANOXIN) 250 MCG tablet TAKE ONE TABLET BY MOUTH EVERY DAY 11/18/21  Yes Silvano Gillis MD   EC-RX Testosterone 10 % cream APPLY FOUR CLICKS DAILY AS DIRECTED 12/17/20  Yes Shelby Haskins MD   fluorometholone (FML) 0.1 % ophthalmic suspension APPLY ONE DROP IN THE RIGHT EYE EVERY DAY 5/17/21  Yes Shelby Haskins MD   fluticasone (FLONASE) 50 MCG/ACT nasal spray 1 spray into the nostril(s) as directed by provider Daily.   Yes ProviderShelby MD   gabapentin (NEURONTIN) 300 MG capsule Take 600 mg by mouth 2 (Two) Times a Day. 9/13/19  Yes Shelby Haskins MD   levothyroxine (SYNTHROID, LEVOTHROID) 75 MCG tablet Take 75 mcg by mouth Daily. 9/23/19  Yes Shelby Haskins MD   montelukast (SINGULAIR) 10 MG tablet Take 10 mg by mouth Every Evening. 9/23/19  Yes Shelby Haskins MD   pantoprazole (PROTONIX) 40 MG EC tablet Take 1 tablet by mouth Daily. 2/22/22  Yes Silvano Gillis MD   pravastatin (PRAVACHOL) 40 MG tablet TAKE ONE TABLET BY MOUTH EVERY NIGHT AT BEDTIME 1/27/21  Yes Margarito Vargas MD   sertraline (ZOLOFT) 100 MG tablet Take 1.5 tablets by mouth Daily. 3/15/22  Yes Silvano Gillis MD   Testosterone 20 % cream APPLY FOUR CLICKS DAILY AS DIRECTED 6/15/21  Yes Jozef  MD Shelby   theophylline (UNIPHYL) 400 MG 24 hr tablet Take 400 mg by mouth Daily. 9/23/19  Yes ProviderShelby MD   verapamil ER (VERELAN) 240 MG 24 hr capsule TAKE ONE CAPSULE BY MOUTH TWICE DAILY  Patient taking differently: Take 240 mg by mouth Every Night. 10/4/21  Yes Silvano Gillis MD   warfarin (COUMADIN) 1 MG tablet TAKE ONE TABLET BY MOUTH DAILY AS DIRECTED 7/29/20  Yes Margarito Vargas MD   warfarin (COUMADIN) 5 MG tablet TAKE ONE TABLET BY MOUTH DAILY EXCEPT ON MONDAY AND THURSDAY TAKE ONE AND ONE-HALF TABLETS OR AS DIRECTED 3/25/22  Yes Silvano Gillis MD   albuterol sulfate  (90 Base) MCG/ACT inhaler INHALE 2 puffs every 4-6 hours as needed 7/6/21   Shelby Haskins MD   BREO ELLIPTA 100-25 MCG/INH inhaler Inhale 1 puff Daily. 9/24/19   Shelby Haskins MD   EPINEPHrine (EPIPEN) 0.3 MG/0.3ML solution auto-injector injection epinephrine 0.3 mg/0.3 mL injection, auto-injector    ProviderShelby MD    Allergies:  Penicillins and Rivaroxaban    Current Facility-Administered Medications   Medication Dose Route Frequency Provider Last Rate Last Admin   • acetaminophen (TYLENOL) tablet 650 mg  650 mg Oral Q6H PRN Nickie Reynoso APRN   650 mg at 04/09/22 2231   • albuterol (PROVENTIL) nebulizer solution 0.083% 2.5 mg/3mL  2.5 mg Nebulization Q6H PRN Nickie Reynoso APRN       • atorvastatin (LIPITOR) tablet 10 mg  10 mg Oral Daily Nickie Reynoso APRN   10 mg at 04/10/22 0837   • baclofen (LIORESAL) tablet 10 mg  10 mg Oral BID Nickie Reynoso APRN   10 mg at 04/10/22 0848   • budesonide-formoterol (SYMBICORT) 80-4.5 MCG/ACT inhaler 2 puff  2 puff Inhalation BID - RT Nickie Reynoso APRN       • digoxin (LANOXIN) tablet 250 mcg  250 mcg Oral Daily Nickie Reynoso APRN       • gabapentin (NEURONTIN) capsule 600 mg  600 mg Oral BID Nickie Reynoso APRN   600 mg at 04/10/22 0848   • hydrALAZINE (APRESOLINE) injection 10 mg  10 mg  Intravenous Q6H PRN Nickie Reynoso APRN       • levothyroxine (SYNTHROID, LEVOTHROID) tablet 75 mcg  75 mcg Oral Q AM Nickie Reynoso APRN   75 mcg at 04/10/22 0838   • montelukast (SINGULAIR) tablet 10 mg  10 mg Oral Nightly Nickie Reynoso APRN   10 mg at 04/10/22 0119   • ondansetron (ZOFRAN) injection 4 mg  4 mg Intravenous Q6H PRN Nickie Reynoso APRN       • pantoprazole (PROTONIX) EC tablet 40 mg  40 mg Oral Daily Nickie Reynoso APRN   40 mg at 04/10/22 0837   • sertraline (ZOLOFT) tablet 150 mg  150 mg Oral Daily Nickie Reynoso APRN   150 mg at 04/10/22 0837   • sodium chloride 0.9 % flush 10 mL  10 mL Intravenous PRN Ramon Lamb MD       • sodium chloride 0.9 % flush 10 mL  10 mL Intravenous PRN Ramon Lamb MD       • sodium chloride 0.9 % flush 10 mL  10 mL Intravenous Q12H Nickie Reynoso APRN   10 mL at 04/10/22 0837   • sodium chloride 0.9 % flush 10 mL  10 mL Intravenous PRN Nickie Reynoso APRN       • tamsulosin (FLOMAX) 24 hr capsule 0.4 mg  0.4 mg Oral Nightly Nickie Reynoso APRN       • theophylline (PAPA-24) 24 hr capsule 400 mg  400 mg Oral Q24H Nickie Reynoso APRN   400 mg at 04/10/22 0837   • verapamil ER (VERELAN) 24 hr capsule 240 mg  240 mg Oral BID Nickie Reynoso APRN       • warfarin (COUMADIN) tablet 5 mg  5 mg Oral Once per day on Sun Tue Wed Fri Sat Nickie Reynoso APRN       • [START ON 4/11/2022] warfarin (COUMADIN) tablet 7.5 mg  7.5 mg Oral Once per day on Mon Thu Nickie Reynoso APRN            ________________________________________________________        OBJECTIVE:    PHYSICAL EXAM:    Constitutional: The patient is in no apparent distress, bright awake and alert. There is no shortness of breath.     PSYCHIATRIC: Mood/affect normal, judgement normal, appropriate    HEENT: Normocephalic, atraumatic.     Chest: Breathing unlabored    Cardiac: Regular rate and rhythm.     Extremities:  No clubbing, cyanosis or  edema.    NEUROLOGICAL:    Cognition:   Fully oriented.  Fund of knowledge decent.  Concentration and attention normal.   Language normal with normal comprehension, fluent speech, intact repetition and naming.     Cranial nerves;    II - pupils bilaterally equal reacting to light,  No new Visual field deficits;  Fundoscopic exam- Not able to be done, non-dilated exam  III,IV,VI: EOMI with no diplopia  V: Normal facial sensations  VII: No facial asymmetry,  VIII: No New hearing abnormality  IX, X, XI: normal gag and shoulder shrug;  XII: tongue is in the midline.    Sensory:  Intact to light touch in all extremities.     Motor: Strength 5/5 bilaterally upper and lower extremities. No involuntary movements present. Normal tone and bulk.    Cerebellar: Finger to nose and mirror movements normal bilaterally.    Gait and balance: Deferred.     Physical exam performed by TRUMAN Mccullough.  ________________________________________________________   RESULTS REVIEW:    VITAL SIGNS:   Temp:  [97.5 °F (36.4 °C)-98 °F (36.7 °C)] 97.8 °F (36.6 °C)  Heart Rate:  [60-77] 67  Resp:  [15-22] 15  BP: (124-161)/() 159/102     LABS:      Lab 04/09/22  2309 04/09/22  1516 04/09/22  1435   WBC 7.00  --  8.00   HEMOGLOBIN 14.6  --  15.2   HEMOGLOBIN, POC  --  11.6*  --    HEMATOCRIT 44.4  --  46.4   HEMATOCRIT POC  --  34*  --    PLATELETS 150  --  160   NEUTROS ABS 3.90  --  4.50   LYMPHS ABS 2.30  --  2.60   MONOS ABS 0.60  --  0.70   EOS ABS 0.20  --  0.20   MCV 71.8*  --  72.0*   PROTIME 24.2  --  25.3         Lab 04/09/22  2309 04/09/22  1516 04/09/22  1435   SODIUM 138  --  139   POTASSIUM 3.7  --  4.5   CHLORIDE 102  --  102   CO2 24.0  --  26.0   POC ANION GAP ISTAT  --  21.0*  --    ANION GAP 12.0  --  11.0   BUN 15  --  16   CREATININE 1.21 0.60 1.15   EGFR 62.8 101.3 66.8   GLUCOSE 139*  --  87   CALCIUM 8.4*  --  8.8   TSH 3.670  --   --          Lab 04/09/22  2309 04/09/22  1435   TOTAL PROTEIN 6.2 7.0   ALBUMIN 3.70  4.10   GLOBULIN 2.5 2.9   ALT (SGPT) 16 16   AST (SGOT) 16 17   BILIRUBIN 0.4 0.4   ALK PHOS 55 59         Lab 04/09/22  2309 04/09/22  1435   TROPONIN T  --  <0.010   PROTIME 24.2 25.3   INR 2.47 2.59             Lab 04/09/22  1513   ABO TYPING A   RH TYPING Positive   ANTIBODY SCREEN Negative         UA    Urinalysis 1/29/22 1/29/22    1219 1219   Squamous Epithelial Cells, UA  0-2   Specific Gravity, UA 1.016    Ketones, UA Negative    Blood, UA Small (1+) (A)    Leukocytes, UA Moderate (2+) (A)    Nitrite, UA Negative    RBC, UA  3-5 (A)   WBC, UA  31-50 (A)   Bacteria, UA  None Seen   (A) Abnormal value              Lab Results   Component Value Date    TSH 3.670 04/09/2022    LDL 80 03/17/2022    HGBA1C 6.4 (H) 11/26/2019       IMAGING STUDIES:  CT Angiogram Neck    Result Date: 4/9/2022  Major arterial vasculature within head and neck appears widely patent, with no hemodynamically significant stenosis, dissection, thrombosis, or aneurysm.  Electronically Signed By-Sha Rangel MD On:4/9/2022 5:06 PM This report was finalized on [id number] by  Sha Rangel MD.    MRI Brain Without Contrast    Result Date: 4/10/2022  1.Punctate focus of subacute infarct within medial right parietal lobe. No hemorrhagic transformation or significant mass effect. 2.Findings suggestive of mild chronic small vessel ischemic disease.  Electronically Signed By-Sha Rangel MD On:4/10/2022 8:49 AM This report was finalized on [id number] by  Sha Rangel MD.    XR Chest 1 View    Result Date: 4/9/2022  No acute cardiopulmonary process identified.  Electronically Signed By-Sha Rangel MD On:4/9/2022 3:34 PM This report was finalized on [id number] by  Sha Rangel MD.    CT Head Without Contrast Stroke Protocol    Result Date: 4/9/2022  No acute intracranial process identified.  Electronically Signed By-Sha Rangel MD On:4/9/2022 4:08 PM This report was finalized on [id number] by  Sha  MD Juan Carlos.    CT Angiogram Head w AI Analysis of LVO    Result Date: 4/9/2022  Major arterial vasculature within head and neck appears widely patent, with no hemodynamically significant stenosis, dissection, thrombosis, or aneurysm.  Electronically Signed By-Sha Rangel MD On:4/9/2022 5:06 PM This report was finalized on [id number] by  Sha Rangel MD.    CT CEREBRAL PERFUSION WITH & WITHOUT CONTRAST    Result Date: 4/9/2022  No definite acute perfusion abnormality identified. Suggestion of artifact on the Tmax as described above.  Electronically Signed By-Sha Rangel MD On:4/9/2022 4:17 PM This report was finalized on [id number] by  Sha Rangel MD.      I reviewed the patient's new clinical results.    ________________________________________________________     PROBLEM LIST:    * No active hospital problems. *          Assessment/Plan   ASSESSMENT/PLAN:  1. Acute to subacute small punctate stroke within the medial right parietal lobe. This stroke is within the PCA distribution and looks to be embolic. Patient is on Coumadin with a therapeutic INR on admission.    - MRI brain: Punctate focus of subacute infarct within medial right parietal lobe. No hemorrhagic transformation or significant mass effect.  - CTA head and neck: Major arterial vasculature within head and neck appears widely patent, with no hemodynamically significant stenosis, dissection, thrombosis, or  aneurysm.  - Echo: pending- follow up with results outpatient   - EKG: Atrial Fibrillation, rate 49   - Labs: A1C: P, B12: P, LDL: 80, TSH: 3.670  - Antithrombotics: Continue Coumadin (patient wants to continue since he has been on it for years without issue). Discussed with patient and family that although Coumadin with a therapeutic INR does significant lower your chance for ischemic events, it can still occur. Patient plans on see his Cardiologist tomorrow for a scheduled appointment and plans to discuss his options for  staying on coumadin vs NOAC. Would not recommend adding ASA a this time due to increased risk of bleeding.   - Statin: Crestor 40 mg inpatient ( patient can continue Pravastatin 40 or 80 mg home dose once discharged)   -- PT/OT/ST as appropriate, Neuro checks per protocol, DVT prophylaxis, Stroke education    2. Chronic atrial fibrillation  - Chronic coumadin & digoxin therapy. INR 2.57 on admission.   - Recommend follow up with Dr. Gillis, patient's cardiologist to discuss other options including NOAC.     3.  Hypertension  - Continue home medications  - BP goal 130/90, avoid hypotension    4.  Hyperlipidemia  - Statin & dietary modifications    5.  Hypothyroidism  - TSH 3.670, continue home med    6.  Modification of stroke risk factors:   - Blood pressure should be less than 130/80 outpatient, HbA1c less than 6.5, LDL less than 70; b12>500 and smoking cessation if applicable. We would be grateful if the primary team / primary care physician would keep a close watch on the above targets.  - Stroke education  - Follow up with neurologist of choice    There are no further recommendations. Will sign off, please call with any questions or concerns. I discussed the patient's findings and my recommendations with patient, family and nursing staff    JAZZ Vaughan  04/10/22  09:43 EDT

## 2022-04-10 NOTE — NURSING NOTE
Stroke packet brought into room.  Personal risk factors for stroke discussed include:  Previous stroke, irregular heartbeat/Atrial fibrillation, high blood pressure.  Stroke workup still in process.

## 2022-04-10 NOTE — THERAPY EVALUATION
Patient Name: Efe Malloy  : 1945    MRN: [id number]                              Today's Date: 4/10/2022       Admit Date: 2022    Visit Dx:     ICD-10-CM ICD-9-CM   1. TIA (transient ischemic attack)  G45.9 435.9   2. Longstanding persistent atrial fibrillation (HCC)  I48.11 427.31   3. Visual disturbance  H53.9 368.9     Patient Active Problem List   Diagnosis   • Persistent atrial fibrillation (HCC)   • Essential hypertension   • COPD (chronic obstructive pulmonary disease) (HCC)   • Hyperglycemia   • Atypical chest pain   • Abnormal Pap smear of anus   • Congestive heart failure (HCC)   • Depressive disorder   • Gastroesophageal reflux disease   • Hyperlipidemia   • Hypothyroidism   • Neuropathy   • Obstructive sleep apnea syndrome   • Primary osteoarthritis of right knee   • Cervical spondylosis without myelopathy   • Cervico-occipital neuralgia   • Lumbosacral spondylosis without myelopathy   • Near syncope   • Arthritis   • Chronic idiopathic constipation   • Disorder of prostate   • Dysplasia of anus   • Gout   • Hearing loss   • Seasonal allergic rhinitis     Past Medical History:   Diagnosis Date   • Asthma    • Atrial fibrillation (HCC)    • Atypical chest pain 2020   • COPD (chronic obstructive pulmonary disease) (HCC)    • COVID    • COVID-19 2021   • Enlarged prostate    • Hyperlipidemia    • Kidney stone    • Near syncope 2020   • Sleep apnea      Past Surgical History:   Procedure Laterality Date   • APPENDECTOMY     • CARDIAC CATHETERIZATION      nonobstructive dz   • CARDIAC CATHETERIZATION N/A 2021    Procedure: Left Heart Cath;  Surgeon: Silvano Gillis MD;  Location: [facility] CATH INVASIVE LOCATION;  Service: Cardiology;  Laterality: N/A;   • CERVICAL EPIDURAL      with Dr. Harshil Rodgers mgt   • CHOLECYSTECTOMY     • EYE SURGERY Bilateral     corneal transplant left 2020 (Right eye )   • JOINT REPLACEMENT Right     knee   • KIDNEY STONE  SURGERY     • PROSTATE SURGERY        General Information     Row Name 04/10/22 1443          Physical Therapy Time and Intention    Document Type evaluation  -     Mode of Treatment physical therapy  -[facility] 04/10/22 1443          General Information    Patient Profile Reviewed yes  -     Prior Level of Function independent:;ADL's;driving;all household mobility;community mobility  -     Existing Precautions/Restrictions no known precautions/restrictions  -     Barriers to Rehab none identified  -JH     Row Name 04/10/22 1443          Living Environment    People in Home child(suellen), dependent;spouse  -JH     Row Name 04/10/22 1443          Cognition    Orientation Status (Cognition) oriented x 4  -           User Key  (r) = Recorded By, (t) = Taken By, (c) = Cosigned By    Initials Name Provider Type     Annita De La Fuente PT Physical Therapist               Mobility     Row Name 04/10/22 1444          Bed Mobility    Bed Mobility bed mobility (all) activities  -     All Activities, [place] (Bed Mobility) independent  -JH     Row Name 04/10/22 1444          Sit-Stand Transfer    Sit-Stand [place] (Transfers) independent  -JH     Row Name 04/10/22 1444          Gait/Stairs (Locomotion)    [place] Level (Gait) independent  -     Distance in Feet (Gait) 300'  -           User Key  (r) = Recorded By, (t) = Taken By, (c) = Cosigned By    Initials Name Provider Type     Annita De La Fuente PT Physical Therapist               Obj/Interventions     Row Name 04/10/22 1444          Range of Motion Comprehensive    General Range of Motion no range of motion deficits identified  -JH     Row Name 04/10/22 1444          Strength Comprehensive (MMT)    General Manual Muscle Testing (MMT) Assessment no strength deficits identified  -JH     Row Name 04/10/22 1444          Balance    Balance Assessment standing static balance;standing dynamic balance  -     Static Standing Balance independent  -      Dynamic Standing Balance standby assist  -JH     Row Name 04/10/22 1444          Sensory Assessment (Somatosensory)    Sensory Assessment (Somatosensory) sensation intact  -           User Key  (r) = Recorded By, (t) = Taken By, (c) = Cosigned By    Initials Name Provider Type    Annita Blackburn, PT Physical Therapist               Goals/Plan    No documentation.                Clinical Impression     Row Name 04/10/22 1444          Pain    Pretreatment Pain Rating 0/10 - no pain  -     Posttreatment Pain Rating 0/10 - no pain  -JH     Row Name 04/10/22 1444          Plan of Care Review    Plan of Care Reviewed With patient  -     Outcome Evaluation 75 yo male admitted with sudden onset R visual field loss ~ 15 minutes.  Pt is independent at baseline, reports dizziness that has persisted since COVID 1 year ago.  Pt lives with spouse and cares for disabled son, normally independent.  Pt with d/o R parietal lobe subacute infarct.  Pt is up ad avel, no functional deficits.  No further therapy recommendations.  -[facility] 04/10/22 1444          Therapy Assessment/Plan (PT)    Criteria for Skilled Interventions Met (PT) no;does not meet criteria for skilled intervention  -JH     Row Name 04/10/22 1444          Vital Signs    O2 Delivery Pre Treatment room air  -     O2 Delivery Intra Treatment room air  -     O2 Delivery Post Treatment room air  -[facility] 04/10/22 1444          Positioning and Restraints    Pre-Treatment Position in bed  -     Post Treatment Position bed  -     In Bed call light within reach;with nsg  [facility]           User Key  (r) = Recorded By, (t) = Taken By, (c) = Cosigned By    Initials Name Provider Type    Annita Blackburn, PT Physical Therapist               Outcome Measures     Row Name 04/10/22 1448          Modified Quay Scale    Pre-Stroke Modified [place]ay Scale 0 - No Symptoms at all.  -     Modified Isabella Scale 0 - No Symptoms at all.  -[facility] Name  04/10/22 1448          Functional Assessment    Outcome Measure Options Modified [place]  -           User Key  (r) = Recorded By, (t) = Taken By, (c) = Cosigned By    Initials Name Provider Type    Annita Blackburn PT Physical Therapist                               PT Recommendation and Plan     Plan of Care Reviewed With: patient  Outcome Evaluation: 77 yo male admitted with sudden onset R visual field loss ~ 15 minutes.  Pt is independent at baseline, reports dizziness that has persisted since COVID 1 year ago.  Pt lives with spouse and cares for disabled son, normally independent.  Pt with d/o R parietal lobe subacute infarct.  Pt is up ad avel, no functional deficits.  No further therapy recommendations.     Time Calculation:    PT Charges     Row Name 04/10/22 1449             Time Calculation    Start Time 1415  -      Stop Time 1428  -      Time Calculation (min) 13 min  -      PT Received On 04/10/22  -              Time Calculation- PT    Total Timed Code Minutes- PT 0 minute(s)  -            User Key  (r) = Recorded By, (t) = Taken By, (c) = Cosigned By    Initials Name Provider Type    Annita Blackburn PT Physical Therapist              Therapy Charges for Today     Code Description Service Date Service Provider Modifiers Qty    10603386008 HC PT EVAL LOW COMPLEXITY 2 4/10/2022 Annita De La Fuente PT GP 1          PT G-Codes  Outcome Measure Options: Modified [place]  Modified [place] Scale: 0 - No Symptoms at all.    Annita De La Fuente PT  4/10/2022

## 2022-04-10 NOTE — PLAN OF CARE
Goal Outcome Evaluation:     Patient alert/oriented x4 and on room air and wearing home CPAP while sleeping. NIH was a 0 this shift. No complaints of discomfort. Patient has no questions, concerns, or requests at this time.    Problem: Adult Inpatient Plan of Care  Goal: Plan of Care Review  Outcome: Ongoing, Progressing  Goal: Patient-Specific Goal (Individualized)  Outcome: Ongoing, Progressing  Goal: Absence of Hospital-Acquired Illness or Injury  Outcome: Ongoing, Progressing  Intervention: Identify and Manage Fall Risk  Recent Flowsheet Documentation  Taken 4/10/2022 0445 by Sania Grover, RN  Safety Promotion/Fall Prevention:   assistive device/personal items within reach   clutter free environment maintained   nonskid shoes/slippers when out of bed   safety round/check completed   room organization consistent  Taken 4/10/2022 0200 by Sania Grover RN  Safety Promotion/Fall Prevention:   assistive device/personal items within reach   clutter free environment maintained   nonskid shoes/slippers when out of bed   room organization consistent   safety round/check completed  Taken 4/10/2022 0025 by Sania Grover, RN  Safety Promotion/Fall Prevention:   assistive device/personal items within reach   clutter free environment maintained   nonskid shoes/slippers when out of bed   safety round/check completed   room organization consistent  Taken 4/9/2022 2231 by Sania Grover, RN  Safety Promotion/Fall Prevention:   assistive device/personal items within reach   clutter free environment maintained   nonskid shoes/slippers when out of bed   room organization consistent   safety round/check completed  Taken 4/9/2022 1940 by Sania Grover RN  Safety Promotion/Fall Prevention:   assistive device/personal items within reach   clutter free environment maintained   nonskid shoes/slippers when out of bed   room organization consistent   safety round/check completed  Intervention: Prevent Skin  Injury  Recent Flowsheet Documentation  Taken 4/10/2022 0445 by Sania Grover RN  Body Position: position changed independently  Skin Protection:   adhesive use limited   tubing/devices free from skin contact  Taken 4/10/2022 0025 by Sania Grover RN  Body Position: position changed independently  Skin Protection:   adhesive use limited   tubing/devices free from skin contact  Taken 4/9/2022 1940 by Sania Grover RN  Body Position: position changed independently  Skin Protection:   adhesive use limited   tubing/devices free from skin contact  Intervention: Prevent and Manage VTE (Venous Thromboembolism) Risk  Recent Flowsheet Documentation  Taken 4/10/2022 0445 by Sania Grover RN  Activity Management: activity adjusted per tolerance  Taken 4/10/2022 0025 by Sania Grover RN  Activity Management: activity adjusted per tolerance  Taken 4/9/2022 1940 by aSnia Grover RN  Activity Management:   ambulated in room   activity adjusted per tolerance  Intervention: Prevent Infection  Recent Flowsheet Documentation  Taken 4/10/2022 0445 by Sania Grover RN  Infection Prevention:   hand hygiene promoted   personal protective equipment utilized   rest/sleep promoted  Taken 4/10/2022 0200 by Sania Grover RN  Infection Prevention:   hand hygiene promoted   personal protective equipment utilized   rest/sleep promoted  Taken 4/10/2022 0025 by Sania Grover RN  Infection Prevention:   hand hygiene promoted   personal protective equipment utilized   rest/sleep promoted  Taken 4/9/2022 2231 by Sania Grover RN  Infection Prevention:   hand hygiene promoted   personal protective equipment utilized   rest/sleep promoted  Taken 4/9/2022 1940 by Sania Grover RN  Infection Prevention:   hand hygiene promoted   personal protective equipment utilized   rest/sleep promoted  Goal: Optimal Comfort and Wellbeing  Outcome: Ongoing, Progressing  Intervention: Monitor Pain and  Promote Comfort  Recent Flowsheet Documentation  Taken 4/9/2022 2231 by Sania Grover RN  Pain Management Interventions: see MAR  Intervention: Provide Person-Centered Care  Recent Flowsheet Documentation  Taken 4/10/2022 0445 by Sania Grover, RN  Trust Relationship/Rapport: care explained  Taken 4/10/2022 0025 by Sania Grover RN  Trust Relationship/Rapport: care explained  Taken 4/9/2022 1940 by Sania Grover RN  Trust Relationship/Rapport:   care explained   choices provided   emotional support provided   empathic listening provided   questions answered   thoughts/feelings acknowledged  Goal: Readiness for Transition of Care  Outcome: Ongoing, Progressing

## 2022-04-10 NOTE — DISCHARGE INSTRUCTIONS
"Per MRI/Dr. Rangel, \"Punctate focus of subacute infarct within medial right parietal lobe.\"    Personal risk factors for stroke discussed with the patient include:    Previous stroke, irregular heart beat/atrial fibrillation, high blood pressure.  "

## 2022-04-10 NOTE — PLAN OF CARE
Goal Outcome Evaluation:      Patient is alert and oriented x 4. Patient up ad avel. Patient had echo cardiogram done today. PT cleared patient to discharge home once medically ready.      Problem: Adult Inpatient Plan of Care  Goal: Plan of Care Review  Outcome: Ongoing, Progressing  Goal: Patient-Specific Goal (Individualized)  Outcome: Ongoing, Progressing  Goal: Absence of Hospital-Acquired Illness or Injury  Outcome: Ongoing, Progressing  Intervention: Identify and Manage Fall Risk  Recent Flowsheet Documentation  Taken 4/10/2022 1800 by Jody Ruiz RN  Safety Promotion/Fall Prevention:   assistive device/personal items within reach   clutter free environment maintained   safety round/check completed   room organization consistent   nonskid shoes/slippers when out of bed   lighting adjusted  Taken 4/10/2022 1600 by Jody Ruiz RN  Safety Promotion/Fall Prevention:   assistive device/personal items within reach   clutter free environment maintained   safety round/check completed   nonskid shoes/slippers when out of bed   lighting adjusted  Intervention: Prevent Infection  Recent Flowsheet Documentation  Taken 4/10/2022 1800 by Jody Ruiz RN  Infection Prevention:   single patient room provided   rest/sleep promoted   personal protective equipment utilized   hand hygiene promoted   equipment surfaces disinfected  Taken 4/10/2022 1600 by Jody Ruiz RN  Infection Prevention:   single patient room provided   rest/sleep promoted   personal protective equipment utilized   hand hygiene promoted   environmental surveillance performed   equipment surfaces disinfected  Goal: Optimal Comfort and Wellbeing  Outcome: Ongoing, Progressing  Goal: Readiness for Transition of Care  Outcome: Ongoing, Progressing     Problem: Adjustment to Illness (Stroke, Ischemic/Transient Ischemic Attack)  Goal: Optimal Coping  Outcome: Ongoing, Progressing     Problem: Bowel Elimination Impaired (Stroke,  Ischemic/Transient Ischemic Attack)  Goal: Effective Bowel Elimination  Outcome: Ongoing, Progressing     Problem: Cerebral Tissue Perfusion (Stroke, Ischemic/Transient Ischemic Attack)  Goal: Optimal Cerebral Tissue Perfusion  Outcome: Ongoing, Progressing     Problem: Cognitive Impairment (Stroke, Ischemic/Transient Ischemic Attack)  Goal: Optimal Cognitive Function  Outcome: Ongoing, Progressing     Problem: Communication Impairment (Stroke, Ischemic/Transient Ischemic Attack)  Goal: Improved Communication Skills  Outcome: Ongoing, Progressing     Problem: Functional Ability Impaired (Stroke, Ischemic/Transient Ischemic Attack)  Goal: Optimal Functional Ability  Outcome: Ongoing, Progressing     Problem: Respiratory Compromise (Stroke, Ischemic/Transient Ischemic Attack)  Goal: Effective Oxygenation and Ventilation  Outcome: Ongoing, Progressing     Problem: Sensorimotor Impairment (Stroke, Ischemic/Transient Ischemic Attack)  Goal: Improved Sensorimotor Function  Outcome: Ongoing, Progressing     Problem: Swallowing Impairment (Stroke, Ischemic/Transient Ischemic Attack)  Goal: Optimal Eating and Swallowing without Aspiration  Outcome: Ongoing, Progressing     Problem: Urinary Elimination Impaired (Stroke, Ischemic/Transient Ischemic Attack)  Goal: Effective Urinary Elimination  Outcome: Ongoing, Progressing

## 2022-04-10 NOTE — PLAN OF CARE
Goal Outcome Evaluation:  Plan of Care Reviewed With: patient           Outcome Evaluation: 77 yo male admitted with sudden onset R visual field loss ~ 15 minutes.  Pt is independent at baseline, reports dizziness that has persisted since COVID 1 year ago.  Pt lives with spouse and cares for disabled son, normally independent.  Pt with d/o R parietal lobe subacute infarct.  Pt is up ad avel, no functional deficits.  No further therapy recommendations.

## 2022-04-10 NOTE — H&P
Patient Care Team:  Uday Beltran MD as PCP - General (Family Medicine)    Chief Conplaint  Subjective     The patient is a 76 y.o. male presents with acute visual defect with acute embolic PCA CVA    HPI  Acute onset visual disturbance and occipital HA with evidence of acute CVA  Review of Systems  Review of Systems   Constitutional: Positive for activity change.   Respiratory: Negative.    Cardiovascular: Negative.    Neurological: Positive for weakness.       History  Past Medical History:   Diagnosis Date   • Asthma    • Atrial fibrillation (HCC)    • Atypical chest pain 06/11/2020   • COPD (chronic obstructive pulmonary disease) (HCC)    • COVID    • COVID-19 01/13/2021   • Enlarged prostate    • Hyperlipidemia    • Kidney stone    • Near syncope 12/17/2020   • Sleep apnea      Past Surgical History:   Procedure Laterality Date   • APPENDECTOMY     • CARDIAC CATHETERIZATION  2007    nonobstructive dz   • CARDIAC CATHETERIZATION N/A 9/17/2021    Procedure: Left Heart Cath;  Surgeon: Silvano Gillis MD;  [facility];  Service: Cardiology;  Laterality: N/A;   • CERVICAL EPIDURAL      with Dr. Chua Pain mgt   • CHOLECYSTECTOMY     • EYE SURGERY Bilateral     corneal transplant left 09/02/2020 (Right eye 2014)   • JOINT REPLACEMENT Right     knee   • KIDNEY STONE SURGERY     • PROSTATE SURGERY       Family History   Problem Relation Age of Onset   • Heart disease Mother    • Hypertension Mother    • Diabetes Father    • Stroke Father    • Hypertension Father    • Cancer Father         esophageal. prostate, skin   • Diabetes Sister    • Thyroid disease Sister    • Heart disease Maternal Grandmother    • Diabetes Paternal Grandmother      Social History     Tobacco Use   • Smoking status: Former Smoker   • Smokeless tobacco: Never Used   Vaping Use   • Vaping Use: Never used   Substance Use Topics   • Alcohol use: Yes     Comment: occasionally   • Drug use: Not Currently      Allergies:  Penicillins and Rivaroxaban    Objective     Vital Signs  Temp:  [97.5 °F (36.4 °C)-98 °F (36.7 °C)] 97.9 °F (36.6 °C)  Heart Rate:  [60-77] 72  Resp:  [15-22] 18  BP: (124-161)/() 132/92      Physical Exam:   Physical Exam  Constitutional:       Appearance: Normal appearance.   Cardiovascular:      Rate and Rhythm: Rhythm irregular.      Heart sounds: Normal heart sounds.   Neurological:      Mental Status: He is alert.              Results Review:   CBC    Results from last 7 days   Lab Units 04/09/22 2309 04/09/22 1516 04/09/22  1435   WBC 10*3/mm3 7.00  --  8.00   HEMOGLOBIN g/dL 14.6  --  15.2   HEMOGLOBIN, POC g/dL  --  11.6*  --    PLATELETS 10*3/mm3 150  --  160     BMP   Results from last 7 days   Lab Units 04/09/22 2309 04/09/22 1516 04/09/22  1435   SODIUM mmol/L 138  --  139   POTASSIUM mmol/L 3.7  --  4.5   CHLORIDE mmol/L 102  --  102   CO2 mmol/L 24.0  --  26.0   BUN mg/dL 15  --  16   CREATININE mg/dL 1.21 0.60 1.15   GLUCOSE mg/dL 139*  --  87     Cr Clearance Estimated Creatinine Clearance: 71.7 mL/min (by C-G formula based on SCr of 1.21 mg/dL).  Coag   Results from last 7 days   Lab Units 04/09/22 2309 04/09/22  1435   INR  2.47 2.59     HbA1C   Lab Results   Component Value Date    HGBA1C 6.4 (H) 11/26/2019     Blood Glucose   Glucose   Date/Time Value Ref Range Status   04/09/2022 1516 61 (L) 70 - 105 mg/dL Final   04/09/2022 1429 87 70 - 105 mg/dL Final     Comment:     Serial Number: [id number]:  [id number]     Infection     CMP   Results from last 7 days   Lab Units 04/09/22 2309 04/09/22 1516 04/09/22  1435   SODIUM mmol/L 138  --  139   POTASSIUM mmol/L 3.7  --  4.5   CHLORIDE mmol/L 102  --  102   CO2 mmol/L 24.0  --  26.0   BUN mg/dL 15  --  16   CREATININE mg/dL 1.21 0.60 1.15   GLUCOSE mg/dL 139*  --  87   ALBUMIN g/dL 3.70  --  4.10   BILIRUBIN mg/dL 0.4  --  0.4   ALK PHOS U/L 55  --  59   AST (SGOT) U/L 16  --  17   ALT (SGPT) U/L 16  --  16     UA       Radiology(recent) CT Angiogram Neck    Result Date: 4/9/2022  Major arterial vasculature within head and neck appears widely patent, with no hemodynamically significant stenosis, dissection, thrombosis, or aneurysm.  Electronically Signed By-Sha Rangel MD On:4/9/2022 5:06 PM This report was finalized on [id number] by  Sha Rangel MD.    MRI Brain Without Contrast    Result Date: 4/10/2022  1.Punctate focus of subacute infarct within medial right parietal lobe. No hemorrhagic transformation or significant mass effect. 2.Findings suggestive of mild chronic small vessel ischemic disease.  Electronically Signed By-Sha Rangel MD On:4/10/2022 8:49 AM This report was finalized on [id number] by  Sha Rangel MD.    XR Chest 1 View    Result Date: 4/9/2022  No acute cardiopulmonary process identified.  Electronically Signed By-Sha Rangel MD On:4/9/2022 3:34 PM This report was finalized on [id number] by  Sha Rangel MD.    CT Head Without Contrast Stroke Protocol    Result Date: 4/9/2022  No acute intracranial process identified.  Electronically Signed By-Sha Rangel MD On:4/9/2022 4:08 PM This report was finalized on [id number] by  Sha Rangel MD.    CT Angiogram Head w AI Analysis of LVO    Result Date: 4/9/2022  Major arterial vasculature within head and neck appears widely patent, with no hemodynamically significant stenosis, dissection, thrombosis, or aneurysm.  Electronically Signed By-Sha Rangel MD On:4/9/2022 5:06 PM This report was finalized on [id number] by  Sha Rangel MD.    CT CEREBRAL PERFUSION WITH & WITHOUT CONTRAST    Result Date: 4/9/2022  No definite acute perfusion abnormality identified. Suggestion of artifact on the Tmax as described above.  Electronically Signed ByWilliam Rangel MD On:4/9/2022 4:17 PM This report was finalized on [id number] by  Sha Rangel MD.       Assessment:    R parietal CVA  OAT  Panlobular  COPD with emphysema  Chronic, persistent AFIB  GERD without esophagitis  REGLA  Hypercoagulable state secondary to AFIB  Acquired hypothyroidism  Dyslipidemia  Primary essential HTN  Chronic constipation  Peripheral neuropathy  TERI    Plan:  Neurologic evaluation  Expected Length of Stay 1-2 days    I discussed the patients findings and my recommendations with patient and nursing staff.     Og Brannon MD  04/10/22  14:10 EDT

## 2022-04-11 ENCOUNTER — OFFICE VISIT (OUTPATIENT)
Dept: CARDIOLOGY | Facility: CLINIC | Age: 77
End: 2022-04-11

## 2022-04-11 VITALS
WEIGHT: 255.6 LBS | SYSTOLIC BLOOD PRESSURE: 112 MMHG | HEART RATE: 85 BPM | HEIGHT: 73 IN | BODY MASS INDEX: 33.88 KG/M2 | DIASTOLIC BLOOD PRESSURE: 88 MMHG | RESPIRATION RATE: 18 BRPM

## 2022-04-11 DIAGNOSIS — E78.2 MIXED HYPERLIPIDEMIA: ICD-10-CM

## 2022-04-11 DIAGNOSIS — I10 ESSENTIAL HYPERTENSION: ICD-10-CM

## 2022-04-11 DIAGNOSIS — R07.89 ATYPICAL CHEST PAIN: ICD-10-CM

## 2022-04-11 DIAGNOSIS — G45.9 TIA (TRANSIENT ISCHEMIC ATTACK): Primary | ICD-10-CM

## 2022-04-11 DIAGNOSIS — I48.19 PERSISTENT ATRIAL FIBRILLATION: ICD-10-CM

## 2022-04-11 LAB
HBA1C MFR BLD: 6.1 % (ref 3.5–5.6)
QT INTERVAL: 411 MS

## 2022-04-11 PROCEDURE — 99214 OFFICE O/P EST MOD 30 MIN: CPT | Performed by: INTERNAL MEDICINE

## 2022-04-11 RX ORDER — ASPIRIN 81 MG/1
81 TABLET ORAL DAILY
Qty: 90 TABLET | Refills: 3 | Status: SHIPPED | OUTPATIENT
Start: 2022-04-11

## 2022-04-11 NOTE — OUTREACH NOTE
Prep Survey    Flowsheet Row Responses   [facility] facility patient discharged from? [facility]   Is LACE score < 7 ? No   Emergency Room discharge w/ pulse ox? No   Eligibility Readm Mgmt   Discharge diagnosis Transient right temporal field vision loss    Does the patient have one of the following disease processes/diagnoses(primary or secondary)? Other   Does the patient have Home health ordered? No   Is there a DME ordered? No   Prep survey completed? Yes          DAFNE ROGEL - Registered Nurse

## 2022-04-11 NOTE — PROGRESS NOTES
Cardiology Clinic Note  Silvano Gillis MD, PhD    Subjective:     Encounter Date:04/11/2022      Patient ID: Efe Malloy is a 76 y.o. male.    Chief Complaint:  Chief Complaint   Patient presents with   • Follow-up       HPI:      I the pleasure to see this 76-year-old gentleman today as a new patient, he recently had a stress test ordered by another provider with some atypical chest pain.  He has had low blood pressures recently of even 70-80 systolic with new blood pressure medicine started recently.  His blood pressure today is 90/68 heart rates in the 80s.  He is 258 pounds and has had intentional weight loss down from 275.  He has a known history of chest pain with multiple stress test being ordered all unremarkable previously.  I reviewed interpreted his 1 from the last couple of weeks which demonstrates resting significant abnormalities with decreased uptake in the apex anterior inferior lateral walls broadly with improved stress perfusion but otherwise there is still the perfusion abnormality.  It is difficult to say whether it is reversible ischemia and he has never had definitive evaluation with letter catheterization given continued symptoms which he is requesting a definitive evaluation.  I did discuss letter catheterization risks and benefits and he would wish to proceed if offered.  He has chronic persistent atrial fibrillation which is rate controlled in the 80s to 90s today.  He is on anticoagulation with Coumadin goal INR 2-3.  There were no prosthetic valves in place.  He is on digoxin and verapamil for rate control.  He is also on hydralazine and losartan 100/HCTZ 25 was recently started as a new medicine.  Has been dizzy lightheaded and blood pressure at home again have been in the 70s to 80s and he held the losartan today as a new medicine.  He still has some atypical chest pain waxing and waning described as substernal pain without radiation diaphoresis.  He has some mild palpitations every  now and then but nothing sustained.     Today presents with episodes of dizziness and unstable gait though do not think he is cardiac related.  Blood pressure today is 112/88 heart rates in the 80s. He is a former smoker and continues abstinence after cessation remotely.  He is on alpha-blocker alfuzosin for urologic complaints with BPH and urinary retention status post prostate surgery with significant incontinence he says that he follows with urology regularly.  He continues on digoxin with monitoring with high risk medicines.    He is off losartan and HCTZ at this point, he is only on verapamil for atrial fibrillation persistently, he takes Coumadin for stroke risk reduction chronic persistent atrial fibrillation.  He was recently hospitalized with TIA and unilateral hemianopsia in the right visual fields of the right eye.   Previously he underwent invasive ischemic evaluation secondary to uninformative stress testing with continued episodes of chest pain.  This revealed borderline obstructive disease and a large ramus branch and both superior and inferior limbs.  Both IFR/FFR of both limbs was insignificant with respect to hemodynamic criteria as described.  This does not meet criteria for intervention.  There is likely eccentric plaque which is overestimated angiographically.,  0.98 in the superior limb and 0.95 in the inferior limb  With respect to IFR, therefore he was recommended for medical therapy.  EF was 60%.  With his recent TIA and already being on Coumadin it would be advised that he be on some degree of platelet inhibitor including low-dose aspirin which she is amenable for.  Neurology is in agreement with this in addition to continuation of his statin therapy.     Review of systems otherwise negative x14 point review of systems except as mentioned above     Historical data copied forward from previous encounters in EMR is unchanged     Left heart catheterization 9/17/2021  Findings next   #1 open  aortic pressure, 157/96  2.  Closing pressure was unchanged next   #3 LVEDP 12 to 16 mmHg  4.  Normal LV systolic function EF of 65 to 70%  5.  No transaortic gradient     Angiography next   1 left main large-caliber vessel no angiographic disease  2.  LAD medium caliber vessel coursing anteriorly with mild nonobstructive disease most severely 20 to 30% diffusely with diffuse luminal regularities  3.  Ramus intermedius is a very large caliber bifurcating vessel along anterolateral wall, proximally there is nonobstructive 20 to 30% luminal regularities, at the bifurcation of the superior and inferior limbs there is angiographic 70-80% in the superior limb, 50-60% in the inferior limb with only mild luminal regularities distally.  4.  Circumflex has 1 obtuse marginal branch, OM has 60% eccentric plaque, circumflex proper has mild luminal regularities only with DELFINO-3 flow throughout next   #5 RCA large caliber vessel with nonobstructive disease, mild luminal regularities only including PLV and PDA branches distally with DELFINO-3 flow     Conclusions and recommendations  1 borderline nonobstructive disease most severely in the ramus intermedius branch of the bifurcation involving superior and inferior sizable limbs.  IFR and FFR unremarkable in both limbs measured today as noted above.  Nonobstructive LAD disease, obtuse marginal disease and b luminal regularities only in the RCA.  2.  High normal LVEDP, no transaortic gradient, hyperdynamic LV function 65 to 70%  3.  Continue primary mention goals, aspirin statin beta-blocker, antianginals, may try Ranexa for small vessel disease not amenable to intervention or visible by angiography for microvascular dysfunction with negative work-up today  4.  Patient be discharged with discharge criteria met, follow-up cardiology 2 to 4 weeks for further optimization of his medical therapy     Silvano Gillis MD, PhD     =================================================  Exam  "today  Irregular, no rubs gallops no heave no left, faint 1 out of 6 unchanged systolic ejection murmur lower sternal border  No carotid bruits or JVD  No clubbing cyanosis or edema  Obese soft nontender nondistended bowel sounds are positive  Clear to auscultation bilaterally  Normocephalic/atraumatic pupils equal round  Neurologic grossly intact bilaterally  Pulses 2+ radials equal bilaterally  Essentially unchanged     Assessment plan per my encounter  Chest pain, no recurrence  IFR of the ramus at 0.98 and 0.95 with respect to superior and inferior limbs, no significant obstructive disease in other distributions with EF of 60 to 65%.  Obtuse marginal 60% eccentric plaque but nonflow limiting, RCA with luminal regularities, LAD 20 to 30% limb irregularities as well.  Secondary prevention goals       CHF, EF of 60% by LV gram, euvolemic appearing today     Persistent atrial fibrillation, digoxin and verapamil will be continued, get digoxin level  We will consider changing verapamil to metoprolol in the case  he has orthostasis     Hypertension but hypotensive on new medicines  Stop losartan  Off hydralazine  EF 60% with no clinical heart failure no class I indication for ARB  Continue verapamil for atrial fibrillation indications     Dizziness and hypotension  Remain off losartan HCTZ and hydralazine  Verapamil once daily only for A. fib as well as digoxin  We will consider change to beta-blockers if indicated from calcium channel blockers     Hyperlipidemia continue pravastatin, guide by fasting lipid panel for ASCVD risk calculations, escalate doses per lipid studies        Silvano Gillis MD, PhD     Further recommendation to follow findings and clinical course and response to therapy     Follow-up 6 months to 1 year            Objective:         /88 (BP Location: Left arm, Patient Position: Sitting)   Pulse 85   Resp 18   Ht 185.4 cm (73\")   Wt 116 kg (255 lb 9.6 oz)   BMI 33.72 kg/m²     The " pleasure to be involved in this patient's cardiovascular care.  Please call with any questions or concerns  Silvano Gillis MD, PhD    Most recent EKG as reviewed and interpreted by me:  Procedures     Most recent echo as reviewed and interpreted by me:  Results for orders placed during the hospital encounter of 04/09/22    Adult Transthoracic Echo Complete W/ Cont if Necessary Per Protocol    Interpretation Summary  · Left ventricular wall thickness is consistent with mild concentric hypertrophy.  · Estimated left ventricular EF = 65% Left ventricular systolic function is normal.  · There is calcification of the aortic valve mainly affecting the right coronary cusp(s).  · Saline test results are negative for right to left atrial level shunt.  · Estimated right ventricular systolic pressure from tricuspid regurgitation is normal (<35 mmHg).  · Left ventricular diastolic function is consistent with (grade II w/high LAP) pseudonormalization.      Most recent stress test as reviewed and interpreted by me:  Results for orders placed during the hospital encounter of 08/26/21    Stress Test With Myocardial Perfusion    Interpretation Summary  · Findings consistent with a normal ECG stress test.  · Diaphragmatic attenuation artifact is present.  · Left ventricular ejection fraction is mildly reduced. (Calculated EF = 51%).  · Impressions are consistent with a low risk study.  · Uptake abnormalities some score of 15 at rest and only some score of 8 post regadenoson suggest this is largely due to soft tissue attenuation artifact with no definite evidence of reversible ischemia      Most recent cardiac catheterization as reviewed interpreted by me:  Results for orders placed during the hospital encounter of 09/17/21    Cardiac Catheterization/Vascular Study    Narrative  Silvano Gillis MD, PhD  [facility] cardiology  Date of service 9-17-21    Procedure  1.  Left heart catheterization with coronary angiography left  ventriculography via right radial approach  2.  Fractional flow reserve of high diagonal versus ramus intermedius superior limb  3.  Fractional flow reserve ramus intermedius, inferior limb separate branch    Indication  Continued chest pain, uninformative noninvasive studies    After informed consent the patient was brought to the catheterization lab sterilely prepped and draped in usual fashion for the right wrist for right radial access via micropuncture modified Seldinger technique under ultrasound guidance.  5/6 slender sheath was placed with standard cocktail of heparin nitroglycerin and Cardene.  035 guidewire was advanced aortic valve followed by diagnostic catheterization with JL 3 and JR4 6 [place] catheters.  JR4 was used across aortic valve followed by hand-injection for LV gram, EDP assessment and pullback assessment the transaortic valve gradient.  Secondary to findings of possible obstructive coronary disease with large bifurcating ramus intermedius branch this is what made to perform FFR.  Patient was heparinized for ACT greater than 250 with 100 units/kg of heparin.  Standard FFR was zeroed outside the body, equalized in the left main followed by exam seen to first the inferior limb of the ramus intermedius, IFR was obtained 0.98, the wire was then pulled back followed again by equalization the left main, readvanced to the superior limb of the ramus intermedius separate branch followed by IFR which is 0.95, this was followed by FFR which was 0.87 with good correlated value.  Pullback to the left main revealed no drift at 0.99.  Final angiography revealed no wall trauma, no edge dissections, DELFINO-3 flow in all branches and all catheters and equipment were removed.  Sheath was removed with TR band for hemostasis.  Patient with Cath Lab chest pain-free hemodynamically electrically stable or talking staff neurologically grossly intact bilaterally.      Contrast 170 cc  Conscious sedation time 1 hour with  IV Versed and fentanyl administration nurse with complete ECG pulse oximetry and hemodynamic monitoring during the case observed by me  Blood loss less than 5 cc  Complications none    Findings next  #1 open aortic pressure, 157/96  2.  Closing pressure was unchanged next  #3 LVEDP 12 to 16 mmHg  4.  Normal LV systolic function EF of 65 to 70%  5.  No transaortic gradient    Angiography next  1 left main large-caliber vessel no angiographic disease  2.  LAD medium caliber vessel coursing anteriorly with mild nonobstructive disease most severely 20 to 30% diffusely with diffuse luminal regularities  3.  Ramus intermedius is a very large caliber bifurcating vessel along anterolateral wall, proximally there is nonobstructive 20 to 30% luminal regularities, at the bifurcation of the superior and inferior limbs there is angiographic 70-80% in the superior limb, 50-60% in the inferior limb with only mild luminal regularities distally.  4.  Circumflex has 1 obtuse marginal branch, OM has 60% eccentric plaque, circumflex proper has mild luminal regularities only with DELFINO-3 flow throughout next  #5 RCA large caliber vessel with nonobstructive disease, mild luminal regularities only including PLV and PDA branches distally with DELFINO-3 flow    Conclusions and recommendations  1 borderline nonobstructive disease most severely in the ramus intermedius branch of the bifurcation involving superior and inferior sizable limbs.  IFR and FFR unremarkable in both limbs measured today as noted above.  Nonobstructive LAD disease, obtuse marginal disease and b luminal regularities only in the RCA.  2.  High normal LVEDP, no transaortic gradient, hyperdynamic LV function 65 to 70%  3.  Continue primary mention goals, aspirin statin beta-blocker, antianginals, may try Ranexa for small vessel disease not amenable to intervention or visible by angiography for microvascular dysfunction with negative work-up today  4.  Patient be discharged  with discharge criteria met, follow-up cardiology 2 to 4 weeks for further optimization of his medical therapy    Silvano Gillis MD, PhD    The following portions of the patient's history were reviewed and updated as appropriate: allergies, current medications, past family history, past medical history, past social history, past surgical history and problem list.      ROS:  14 point review of systems negative except as mentioned above    Current Outpatient Medications:   •  albuterol sulfate  (90 Base) MCG/ACT inhaler, Inhale 2 puffs Every 6 (Six) Hours As Needed., Disp: , Rfl:   •  alfuzosin (UROXATRAL) 10 MG 24 hr tablet, Take 10 mg by mouth 2 (Two) Times a Day., Disp: , Rfl: 11  •  baclofen (LIORESAL) 10 MG tablet, Take 10 mg by mouth 2 (Two) Times a Day., Disp: , Rfl:   •  BREO ELLIPTA 100-25 MCG/INH inhaler, Inhale 1 puff Daily., Disp: , Rfl: 5  •  digoxin (LANOXIN) 250 MCG tablet, TAKE ONE TABLET BY MOUTH EVERY DAY, Disp: 90 tablet, Rfl: 1  •  EPINEPHrine (EPIPEN) 0.3 MG/0.3ML solution auto-injector injection, epinephrine 0.3 mg/0.3 mL injection, auto-injector, Disp: , Rfl:   •  fluorometholone (FML) 0.1 % ophthalmic suspension, Administer 1 drop to the right eye Daily., Disp: , Rfl:   •  fluticasone (FLONASE) 50 MCG/ACT nasal spray, 1 spray into the nostril(s) as directed by provider Daily., Disp: , Rfl:   •  gabapentin (NEURONTIN) 300 MG capsule, Take 600 mg by mouth 2 (Two) Times a Day., Disp: , Rfl: 3  •  levothyroxine (SYNTHROID, LEVOTHROID) 75 MCG tablet, Take 75 mcg by mouth Daily., Disp: , Rfl: 5  •  montelukast (SINGULAIR) 10 MG tablet, Take 10 mg by mouth Every Evening., Disp: , Rfl: 5  •  pantoprazole (PROTONIX) 40 MG EC tablet, Take 1 tablet by mouth Daily., Disp: 90 tablet, Rfl: 2  •  pravastatin (PRAVACHOL) 40 MG tablet, TAKE ONE TABLET BY MOUTH EVERY NIGHT AT BEDTIME, Disp: 90 tablet, Rfl: 3  •  sertraline (ZOLOFT) 100 MG tablet, Take 1.5 tablets by mouth Daily., Disp: 30 tablet, Rfl:  0  •  theophylline (UNIPHYL) 400 MG 24 hr tablet, Take 400 mg by mouth 2 (Two) Times a Day., Disp: , Rfl: 5  •  verapamil ER (VERELAN) 240 MG 24 hr capsule, Take 240 mg by mouth Every Night., Disp: , Rfl:   •  warfarin (COUMADIN) 1 MG tablet, Take 2.5 mg by mouth 2 (Two) Times a Week. Mon, Thur  with 5mg, Disp: , Rfl:   •  warfarin (COUMADIN) 5 MG tablet, Take 5 mg by mouth Daily., Disp: , Rfl:   •  aspirin (aspirin) 81 MG EC tablet, Take 1 tablet by mouth Daily., Disp: 90 tablet, Rfl: 3  No current facility-administered medications for this visit.    Problem List:  Patient Active Problem List   Diagnosis   • Persistent atrial fibrillation (HCC)   • Essential hypertension   • COPD (chronic obstructive pulmonary disease) (HCC)   • Hyperglycemia   • Atypical chest pain   • Abnormal Pap smear of anus   • Congestive heart failure (HCC)   • Depressive disorder   • Gastroesophageal reflux disease   • Hyperlipidemia   • Hypothyroidism   • Neuropathy   • Obstructive sleep apnea syndrome   • Primary osteoarthritis of right knee   • Cervical spondylosis without myelopathy   • Cervico-occipital neuralgia   • Lumbosacral spondylosis without myelopathy   • Near syncope   • Arthritis   • Chronic idiopathic constipation   • Disorder of prostate   • Dysplasia of anus   • Gout   • Hearing loss   • Seasonal allergic rhinitis   • TIA (transient ischemic attack)     Past Medical History:  Past Medical History:   Diagnosis Date   • Asthma    • Atrial fibrillation (HCC)    • Atypical chest pain 06/11/2020   • COPD (chronic obstructive pulmonary disease) (HCC)    • COVID    • COVID-19 01/13/2021   • Enlarged prostate    • Hyperlipidemia    • Kidney stone    • Near syncope 12/17/2020   • Sleep apnea      Past Surgical History:  Past Surgical History:   Procedure Laterality Date   • APPENDECTOMY     • CARDIAC CATHETERIZATION  2007    nonobstructive dz   • CARDIAC CATHETERIZATION N/A 9/17/2021    Procedure: Left Heart Cath;  Surgeon: Carlin  Silvano Lott MD;  Location: [facility] CATH INVASIVE LOC[facility];  Service: Cardiology;  Laterality: N/A;   • CERVICAL EPIDURAL      with Dr. Harshil Rodgers mgt   • CHOLECYSTECTOMY     • EYE SURGERY Bilateral     corneal transplant left 09/02/2020 (Right eye 2014)   • JOINT REPLACEMENT Right     knee   • KIDNEY STONE SURGERY     • PROSTATE SURGERY       Social History:  Social History     Socioeconomic History   • Marital status:    Tobacco Use   • Smoking status: Former Smoker   • Smokeless tobacco: Never Used   Vaping Use   • Vaping Use: Never used   Substance and Sexual Activity   • Alcohol use: Yes     Comment: occasionally   • Drug use: Not Currently   • Sexual activity: Defer     Allergies:  Allergies   Allergen Reactions   • Penicillins Other (See Comments)     AS A CHILD   • Rivaroxaban Other (See Comments)     Immunizations:  Immunization History   Administered Date(s) Administered   • FLUAD TRI 65YR+ 10/31/2019   • Fluzone High Dose =>65 Years ([employer] ONLY) 10/14/2016, 10/16/2017, 11/14/2018   • H1N1 Inj 12/22/2009   • Hepatitis A 09/17/2019, 11/19/2019   • Influenza Quad Vaccine (Inpatient) 09/22/2014   • Influenza, Unspecified 09/18/2013   • Pneumococcal Conjugate 13-Valent (PCV13) 10/12/2015   • Pneumococcal Polysaccharide (PPSV23) 09/24/2013   • Shingrix 09/17/2019, 11/19/2019   • Zostavax 02/12/2013            In-Office Procedure(s):  No orders to display        ASCVD RIsk Score::  The ASCVD Risk score (Lili AMBROCIO Jr., et al., 2013) failed to calculate for the following reasons:    The valid total cholesterol range is 130 to 320 mg/dL    Imaging:    Results for orders placed during the hospital encounter of 04/09/22    XR Chest 1 View    Narrative  XR CHEST 1 VW-    Date of Exam: 4/9/2022 3:28 PM    Indication: Acute Stroke Protocol (onset < 12 hrs).    Comparison Exams: June 11, 2020    Technique: Single AP chest radiograph    FINDINGS:  The lungs are clear. The heart and mediastinal contours appear  normal.  The pulmonary vasculature appears normal. The osseous structures appear  intact.    Impression  No acute cardiopulmonary process identified.    Electronically Signed By-Sha Rangel MD On:4/9/2022 3:34 PM  This report was finalized on [id number] by  Sha Rangel MD.       Results for orders placed during the hospital encounter of 04/09/22    CT CEREBRAL PERFUSION WITH & WITHOUT CONTRAST    Narrative  DATE OF EXAM:  4/9/2022 3:55 PM    PROCEDURE:  CT CEREBRAL PERFUSION W WO CONTRAST-    INDICATIONS:  Transient visual changes consistent with a stroke    COMPARISON:  CT head from earlier today    TECHNIQUE:  Routine transaxial cuts were obtained through the head without  administration of  contrast. Routine transaxial cuts were then obtained  through the head following the administration of 50 mL of Isovue 370  intravenously. Core blood volume, core blood flow, mean transit time,  and Tmax images were obtained utilizing the Rapid software protocol. A  limited CT angiogram of the head was also performed to measure the blood  vessel density.    FINDINGS:  There is fairly symmetrical abnormal perfusion on the Tmax within the  posterior fossa and bilateral temporo-occipital lobes, favored to be  artifactual. The cerebral blood flow appears normal.    Impression  No definite acute perfusion abnormality identified. Suggestion of  artifact on the Tmax as described above.    Electronically Signed By-Sha Rangel MD On:4/9/2022 4:17 PM  This report was finalized on [id number] by  Sha Rangel MD.      Results for orders placed during the hospital encounter of 04/09/22    CT CEREBRAL PERFUSION WITH & WITHOUT CONTRAST    Narrative  DATE OF EXAM:  4/9/2022 3:55 PM    PROCEDURE:  CT CEREBRAL PERFUSION W WO CONTRAST-    INDICATIONS:  Transient visual changes consistent with a stroke    COMPARISON:  CT head from earlier today    TECHNIQUE:  Routine transaxial cuts were obtained through the head  without  administration of  contrast. Routine transaxial cuts were then obtained  through the head following the administration of 50 mL of Isovue 370  intravenously. Core blood volume, core blood flow, mean transit time,  and Tmax images were obtained utilizing the Rapid software protocol. A  limited CT angiogram of the head was also performed to measure the blood  vessel density.    FINDINGS:  There is fairly symmetrical abnormal perfusion on the Tmax within the  posterior fossa and bilateral temporo-occipital lobes, favored to be  artifactual. The cerebral blood flow appears normal.    Impression  No definite acute perfusion abnormality identified. Suggestion of  artifact on the Tmax as described above.    Electronically Signed By-Sha Rangel MD On:4/9/2022 4:17 PM  This report was finalized on [id number] by  Sha Rangel MD.      Lab Review:   Admission on 04/09/2022, Discharged on 04/10/2022   Component Date Value   • Glucose 04/09/2022 87    • Extra Tube 04/09/2022 Hold for add-ons.    • Extra Tube 04/09/2022 Done    • Extra Tube 04/09/2022 Hold for add-ons.    • Extra Tube 04/09/2022 hold for add-on    • QT Interval 04/09/2022 411    • Glucose 04/09/2022 87    • BUN 04/09/2022 16    • Creatinine 04/09/2022 1.15    • Sodium 04/09/2022 139    • Potassium 04/09/2022 4.5    • Chloride 04/09/2022 102    • CO2 04/09/2022 26.0    • Calcium 04/09/2022 8.8    • Total Protein 04/09/2022 7.0    • Albumin 04/09/2022 4.10    • ALT (SGPT) 04/09/2022 16    • AST (SGOT) 04/09/2022 17    • Alkaline Phosphatase 04/09/2022 59    • Total Bilirubin 04/09/2022 0.4    • Globulin 04/09/2022 2.9    • A/G Ratio 04/09/2022 1.4    • BUN/Creatinine Ratio 04/09/2022 13.9    • Anion Gap 04/09/2022 11.0    • eGFR 04/09/2022 66.8    • Protime 04/09/2022 25.3    • INR 04/09/2022 2.59    • Troponin T 04/09/2022 <0.010    • ABO Type 04/09/2022 A    • RH type 04/09/2022 Positive    • Antibody Screen 04/09/2022 Negative    • T&S  Expiration Date 04/09/2022 4/12/2022 11:59:59 PM    • Digoxin 04/09/2022 1.10    • Glucose 04/09/2022 61 (A)   • BUN 04/09/2022 11    • Creatinine 04/09/2022 0.60    • Sodium 04/09/2022 148 (A)   • POC Potassium 04/09/2022 2.5 (A)   • Chloride 04/09/2022 111 (A)   • Total CO2 04/09/2022 19 (A)   • Anion Gap 04/09/2022 21.0 (A)   • Hemoglobin 04/09/2022 11.6 (A)   • Hematocrit 04/09/2022 34 (A)   • Ionized Calcium 04/09/2022 0.93 (A)   • eGFR 04/09/2022 101.3    • WBC 04/09/2022 8.00    • RBC 04/09/2022 6.44 (A)   • Hemoglobin 04/09/2022 15.2    • Hematocrit 04/09/2022 46.4    • MCV 04/09/2022 72.0 (A)   • MCH 04/09/2022 23.6 (A)   • MCHC 04/09/2022 32.8    • RDW 04/09/2022 16.8 (A)   • RDW-SD 04/09/2022 42.9    • MPV 04/09/2022 7.2    • Platelets 04/09/2022 160    • Neutrophil % 04/09/2022 55.9    • Lymphocyte % 04/09/2022 32.5    • Monocyte % 04/09/2022 8.1    • Eosinophil % 04/09/2022 2.6    • Basophil % 04/09/2022 0.9    • Neutrophils, Absolute 04/09/2022 4.50    • Lymphocytes, Absolute 04/09/2022 2.60    • Monocytes, Absolute 04/09/2022 0.70    • Eosinophils, Absolute 04/09/2022 0.20    • Basophils, Absolute 04/09/2022 0.10    • nRBC 04/09/2022 0.1    • COVID19 04/09/2022 Not Detected    • Protime 04/09/2022 24.2    • INR 04/09/2022 2.47    • Glucose 04/09/2022 139 (A)   • BUN 04/09/2022 15    • Creatinine 04/09/2022 1.21    • Sodium 04/09/2022 138    • Potassium 04/09/2022 3.7    • Chloride 04/09/2022 102    • CO2 04/09/2022 24.0    • Calcium 04/09/2022 8.4 (A)   • Total Protein 04/09/2022 6.2    • Albumin 04/09/2022 3.70    • ALT (SGPT) 04/09/2022 16    • AST (SGOT) 04/09/2022 16    • Alkaline Phosphatase 04/09/2022 55    • Total Bilirubin 04/09/2022 0.4    • Globulin 04/09/2022 2.5    • A/G Ratio 04/09/2022 1.5    • BUN/Creatinine Ratio 04/09/2022 12.4    • Anion Gap 04/09/2022 12.0    • eGFR 04/09/2022 62.8    • WBC 04/09/2022 7.00    • RBC 04/09/2022 6.18 (A)   • Hemoglobin 04/09/2022 14.6    • Hematocrit  04/09/2022 44.4    • MCV 04/09/2022 71.8 (A)   • MCH 04/09/2022 23.6 (A)   • MCHC 04/09/2022 32.9    • RDW 04/09/2022 17.1 (A)   • RDW-SD 04/09/2022 43.3    • MPV 04/09/2022 7.5    • Platelets 04/09/2022 150    • Neutrophil % 04/09/2022 55.5    • Lymphocyte % 04/09/2022 33.1    • Monocyte % 04/09/2022 8.3    • Eosinophil % 04/09/2022 2.9    • Basophil % 04/09/2022 0.2    • Neutrophils, Absolute 04/09/2022 3.90    • Lymphocytes, Absolute 04/09/2022 2.30    • Monocytes, Absolute 04/09/2022 0.60    • Eosinophils, Absolute 04/09/2022 0.20    • Basophils, Absolute 04/09/2022 0.00    • nRBC 04/09/2022 0.1    • Theophylline Level 04/09/2022 6.5 (A)   • TSH 04/09/2022 3.670    • Vitamin B-12 04/09/2022 421    • Total Cholesterol 04/09/2022 116    • Triglycerides 04/09/2022 172 (A)   • HDL Cholesterol 04/09/2022 30 (A)   • LDL Cholesterol  04/09/2022 57    • VLDL Cholesterol 04/09/2022 29    • LDL/HDL Ratio 04/09/2022 1.72    • Target HR (85%) 04/10/2022 124    • Max. Pred. HR (100%) 04/10/2022 146    • ACS 04/10/2022 1.99    • Ao root diam 04/10/2022 3.1    • Ao pk kayla 04/10/2022 158.0    • Ao V2 VTI 04/10/2022 29.2    • ANA(I,D) 04/10/2022 3.2    • EDV(cubed) 04/10/2022 252.5    • EDV(MOD-sp4) 04/10/2022 74.6    • EF(MOD-sp4) 04/10/2022 71.9    • ESV(cubed) 04/10/2022 81.4    • ESV(MOD-sp4) 04/10/2022 21.0    • IVS/LVPW 04/10/2022 1.32    • LV mass(C)d 04/10/2022 371.1    • LV V1 max PG 04/10/2022 9.1    • LV V1 mean PG 04/10/2022 4.6    • LV V1 max 04/10/2022 151.0    • LVPWd 04/10/2022 1.10    • MR max PG 04/10/2022 124.7    • MV dec slope 04/10/2022 479.9    • MV dec time 04/10/2022 0.17    • MV V2 VTI 04/10/2022 18.5    • MVA(VTI) 04/10/2022 5.0    • PA acc time 04/10/2022 0.08    • PA pr(Accel) 04/10/2022 44.7    • PA V2 max 04/10/2022 95.6    • PI end-d kayla 04/10/2022 91.6    • RAP systole 04/10/2022 8.0    • RV V1 max PG 04/10/2022 1.40    • RV V1 max 04/10/2022 59.2    • RV V1 VTI 04/10/2022 10.1    • RVIDd  04/10/2022 2.9    • RVSP(TR) 04/10/2022 31.5    • SI(MOD-sp4) 04/10/2022 22.4    • SV(LVOT) 04/10/2022 93.0    • SV(MOD-sp4) 04/10/2022 53.6    • TR max PG 04/10/2022 23.5    • Ao max PG 04/10/2022 10.0    • Ao mean PG 04/10/2022 6.0    • FS 04/10/2022 31.4    • IVSd 04/10/2022 1.45    • LV V1 VTI 04/10/2022 27.4    • LVIDd 04/10/2022 6.3    • LVIDs 04/10/2022 4.3    • LVOT area 04/10/2022 3.4    • LVOT diam 04/10/2022 2.08    • MV E/A 04/10/2022 415.9    • MV max PG 04/10/2022 4.0    • MV mean PG 04/10/2022 1.63    • MR max kayla 04/10/2022 558.2    • MV A max kayla 04/10/2022 0.10    • MV E max kayla 04/10/2022 41.3    • TR max kayla 04/10/2022 241.6    • LV Jones Vol (BSA correct* 04/10/2022 31.2    • LV Sys Vol (BSA correcte* 04/10/2022 8.8    • Echo EF Estimated 04/10/2022 65    Anticoagulation Visit on 03/28/2022   Component Date Value   • INR 03/28/2022 2.00    Lab on 03/17/2022   Component Date Value   • Total Cholesterol 03/17/2022 133    • Triglycerides 03/17/2022 89    • HDL Cholesterol 03/17/2022 36 (A)   • LDL Cholesterol  03/17/2022 80    • VLDL Cholesterol 03/17/2022 17    • LDL/HDL Ratio 03/17/2022 2.20    Anticoagulation Visit on 02/28/2022   Component Date Value   • INR 02/28/2022 2.20    Lab Requisition on 02/01/2022   Component Date Value   • Case Report 02/01/2022                      Value:Surgical Pathology Report                         Case: [id number]                                  Authorizing Provider:  Parish Meyer MD       Collected:           02/01/2022 02:00 PM          Ordering Location:     [facility]       Received:            02/02/2022 12:14 PM                                 LABORATORY                                                                   Pathologist:           Sha Mason MD                                                            Specimen:    Prostate                                                                                  • Final Diagnosis  02/01/2022                      Value:This result contains rich text formatting which cannot be displayed here.   • Gross Description 02/01/2022                      Value:This result contains rich text formatting which cannot be displayed here.   Admission on 01/29/2022, Discharged on 01/29/2022   Component Date Value   • Protime 01/29/2022 18.1 (A)   • INR 01/29/2022 1.70 (A)   • Glucose 01/29/2022 84    • BUN 01/29/2022 15    • Creatinine 01/29/2022 1.29 (A)   • Sodium 01/29/2022 140    • Potassium 01/29/2022 4.0    • Chloride 01/29/2022 101    • CO2 01/29/2022 28.0    • Calcium 01/29/2022 9.6    • eGFR Non  Amer 01/29/2022 54 (A)   • BUN/Creatinine Ratio 01/29/2022 11.6    • Anion Gap 01/29/2022 11.0    • Color, UA 01/29/2022 Yellow    • Appearance, UA 01/29/2022 Clear    • pH, UA 01/29/2022 <=5.0    • Specific Gravity, UA 01/29/2022 1.016    • Glucose, UA 01/29/2022 Negative    • Ketones, UA 01/29/2022 Negative    • Bilirubin, UA 01/29/2022 Negative    • Blood, UA 01/29/2022 Small (1+) (A)   • Protein, UA 01/29/2022 Negative    • Leuk Esterase, UA 01/29/2022 Moderate (2+) (A)   • Nitrite, UA 01/29/2022 Negative    • Urobilinogen, UA 01/29/2022 1.0 E.U./dL    • WBC 01/29/2022 8.00    • RBC 01/29/2022 6.74 (A)   • Hemoglobin 01/29/2022 16.3    • Hematocrit 01/29/2022 50.6    • MCV 01/29/2022 75.1 (A)   • MCH 01/29/2022 24.1 (A)   • MCHC 01/29/2022 32.1    • RDW 01/29/2022 17.0 (A)   • RDW-SD 01/29/2022 45.1    • MPV 01/29/2022 7.6    • Platelets 01/29/2022 153    • Neutrophil % 01/29/2022 62.0    • Lymphocyte % 01/29/2022 24.5    • Monocyte % 01/29/2022 11.2    • Eosinophil % 01/29/2022 1.6    • Basophil % 01/29/2022 0.7    • Neutrophils, Absolute 01/29/2022 4.90    • Lymphocytes, Absolute 01/29/2022 1.90    • Monocytes, Absolute 01/29/2022 0.90    • Eosinophils, Absolute 01/29/2022 0.10    • Basophils, Absolute 01/29/2022 0.10    • nRBC 01/29/2022 0.1    • RBC, UA 01/29/2022 3-5 (A)   • WBC, UA 01/29/2022  31-50 (A)   • Bacteria, UA 01/29/2022 None Seen    • Squamous Epithelial Cell* 01/29/2022 0-2    • Hyaline Casts, UA 01/29/2022 0-2    • Methodology 01/29/2022 Automated Microscopy    • Urine Culture 01/29/2022 >100,000 CFU/mL Mixed Sharmin Isolated    Anticoagulation Visit on 01/24/2022   Component Date Value   • INR 01/24/2022 1.80 (A)   Anticoagulation Visit on 12/27/2021   Component Date Value   • INR 12/27/2021 2.20 (A)   Anticoagulation Visit on 12/13/2021   Component Date Value   • INR 12/13/2021 1.70 (A)   Anticoagulation Visit on 11/15/2021   Component Date Value   • INR 11/15/2021 2.00 (A)   There may be more visits with results that are not included.     Recent labs reviewed and interpreted for clinical significance and application            Level of Care:           Silvano Gillis MD  04/11/22  .

## 2022-04-13 ENCOUNTER — READMISSION MANAGEMENT (OUTPATIENT)
Dept: CALL CENTER | Facility: HOSPITAL | Age: 77
End: 2022-04-13

## 2022-04-13 NOTE — OUTREACH NOTE
Medical Week 1 Survey    Flowsheet Row Responses   [facility] patient discharged from? [facility]   Does the patient have one of the following disease processes/diagnoses(primary or secondary)? Other   Week 1 attempt successful? Yes   Call start time 0943   Call end time 0945   Discharge diagnosis Transient right temporal field vision loss    Does the patient have all medications ordered at discharge? N/A   Is the patient taking all medications as directed (includes completed medication regime)? Yes   Comments regarding appointments Cardio appt completed.    Does the patient have a primary care provider?  Yes   Comments regarding PCP Next week appt   Has the patient kept scheduled appointments due by today? Yes   Has home health visited the patient within 72 hours of discharge? N/A   Psychosocial issues? No   Did the patient receive a copy of their discharge instructions? Yes   Nursing interventions Reviewed instructions with patient   What is the patient's perception of their health status since discharge? Improving   Is the patient/caregiver able to teach back signs and symptoms related to disease process for when to call PCP? Yes   Is the patient/caregiver able to teach back signs and symptoms related to disease process for when to call 911? Yes   Is the patient/caregiver able to teach back the hierarchy of who to call/visit for symptoms/problems? PCP, Specialist, Home health nurse, Urgent Care, ED, 911 Yes   If the patient is a current smoker, are they able to teach back resources for cessation? Not a smoker   Week 1 call completed? Yes   Wrap up additional comments Pt reports he is doing well. No issues with vision at this time. Pt reports he knows S/s of stroke and when to seek treatment.           SERENE BAHENA - Registered Nurse

## 2022-04-18 RX ORDER — PRAVASTATIN SODIUM 40 MG
40 TABLET ORAL
Qty: 90 TABLET | Refills: 3 | Status: SHIPPED | OUTPATIENT
Start: 2022-04-18 | End: 2023-03-20

## 2022-04-25 ENCOUNTER — ANTICOAGULATION VISIT (OUTPATIENT)
Dept: PHARMACY | Facility: HOSPITAL | Age: 77
End: 2022-04-25

## 2022-04-25 DIAGNOSIS — I48.19 PERSISTENT ATRIAL FIBRILLATION: Primary | ICD-10-CM

## 2022-04-25 LAB — INR PPP: 2.2 (ref 2–3)

## 2022-04-25 PROCEDURE — 36416 COLLJ CAPILLARY BLOOD SPEC: CPT

## 2022-04-25 PROCEDURE — G0463 HOSPITAL OUTPT CLINIC VISIT: HCPCS

## 2022-04-25 PROCEDURE — 85610 PROTHROMBIN TIME: CPT

## 2022-04-25 NOTE — PROGRESS NOTES
Anticoagulation Clinic Progress Note    INR Goal: 2 - 3  Today's INR: 2.2 (therapeutic)  Current warfarin dose: warfarin 5 mg PO daily, except warfarin 7.5 mg PO on Monday and Thursday .  Current total weekly dose = 40 mg per week.     INR History:  Anticoagulation Monitoring 2022 3/28/2022 2022   INR 2.20 2.00 2.20   INR Date 2022 3/28/2022 2022   INR Goal 2.0-3.0 2.0-3.0 2.0-3.0   Trend Same Same Same   Last Week Total 40 mg 40 mg 40 mg   Next Week Total 40 mg 40 mg 40 mg   Sun 5 mg 5 mg 5 mg   Mon 7.5 mg 7.5 mg 7.5 mg   Tue 5 mg 5 mg 5 mg   Wed 5 mg 5 mg 5 mg   Thu 7.5 mg 7.5 mg 7.5 mg   Fri 5 mg 5 mg 5 mg   Sat 5 mg 5 mg 5 mg   Visit Report - - -   Some recent data might be hidden       Anticoagulation Summary  As of 2022    INR goal:  2.0-3.0   TTR:  75.1 % (2.8 y)   INR used for dosin.20 (2022)   Warfarin maintenance plan:  7.5 mg every Mon, Thu; 5 mg all other days   Weekly warfarin total:  40 mg   Plan last modified:  Kavita Jolley, PharmD (2021)   Next INR check:  2022   Priority:  Maintenance   Target end date:      Indications    Persistent atrial fibrillation (HCC) [I48.19]             Anticoagulation Episode Summary     INR check location:      Preferred lab:      Send INR reminders to:  [facility] CLINICAL POOL    Comments:  Patient contract signed 11/15/21.      Anticoagulation Care Providers     Provider Role Specialty Phone number    Silvano Gillis MD  Cardiology [phone]          Clinic Interview:     Clinical Outcomes    Negatives:  Major bleeding event, Thromboembolic event, Anticoagulation-related hospital admission, Anticoagulation-related ED visit, Anticoagulation-related fatality       Patient Findings    Positives:  Change in medications (Started taking ciprofloxacin about a week ago (may increase INR). Also initiated aspirin 81 mg daily (may increase bleeding risk).), Hospital admission ([facility] -4/10 for TIA)    Negatives:  Signs/symptoms of thrombosis, Signs/symptoms of bleeding, Laboratory test error suspected, Change in health, Change in alcohol use, Change in activity, Upcoming invasive procedure, Emergency department visit, Upcoming dental procedure, Missed doses, Extra doses, Change in diet/appetite, Bruising, Other complaints           Current Medications:   Prior to Admission medications    Medication Sig Start Date End Date Taking? Authorizing Provider   albuterol sulfate  (90 Base) MCG/ACT inhaler Inhale 2 puffs Every 6 (Six) Hours As Needed. 7/6/21   Shelby Haskins MD   alfuzosin (UROXATRAL) 10 MG 24 hr tablet Take 10 mg by mouth 2 (Two) Times a Day. 9/23/19   Shelby Haskins MD   aspirin (aspirin) 81 MG EC tablet Take 1 tablet by mouth Daily. 4/11/22   Silvano Gillis MD   baclofen (LIORESAL) 10 MG tablet Take 10 mg by mouth 2 (Two) Times a Day. 7/13/21   Shelby Haskins MD   BREO ELLIPTA 100-25 MCG/INH inhaler Inhale 1 puff Daily. 9/24/19   Shelby Haskins MD   digoxin (LANOXIN) 250 MCG tablet TAKE ONE TABLET BY MOUTH EVERY DAY 11/18/21   Silvano Gillis MD   EPINEPHrine (EPIPEN) 0.3 MG/0.3ML solution auto-injector injection epinephrine 0.3 mg/0.3 mL injection, auto-injector    Shelby Haskins MD   fluorometholone (FML) 0.1 % ophthalmic suspension Administer 1 drop to the right eye Daily. 5/17/21   Shelby Haskins MD   fluticasone (FLONASE) 50 MCG/ACT nasal spray 1 spray into the nostril(s) as directed by provider Daily.    Shelby Haskins MD   gabapentin (NEURONTIN) 300 MG capsule Take 600 mg by mouth 2 (Two) Times a Day. 9/13/19   Shelby Haskins MD   levothyroxine (SYNTHROID, LEVOTHROID) 75 MCG tablet Take 75 mcg by mouth Daily. 9/23/19   Shelby Haskins MD   montelukast (SINGULAIR) 10 MG tablet Take 10 mg by mouth Every Evening. 9/23/19   Shelby Haskins MD   pantoprazole (PROTONIX) 40 MG EC tablet Take 1 tablet by mouth  Daily. 2/22/22   Silvano Gillis MD   pravastatin (PRAVACHOL) 40 MG tablet Take 1 tablet by mouth every night at bedtime. 4/18/22   Silvano Gillis MD   sertraline (ZOLOFT) 100 MG tablet Take 1.5 tablets by mouth Daily. 3/15/22   Silvano Gillis MD   theophylline (UNIPHYL) 400 MG 24 hr tablet Take 400 mg by mouth 2 (Two) Times a Day. 9/23/19   Sehlby Haskins MD   verapamil ER (VERELAN) 240 MG 24 hr capsule Take 240 mg by mouth Every Night.    Shelby Haskins MD   warfarin (COUMADIN) 1 MG tablet Take 2.5 mg by mouth 2 (Two) Times a Week. Mon, Thur  with 5mg    Shelby Haskins MD   warfarin (COUMADIN) 5 MG tablet Take 5 mg by mouth Daily.    Shelby Haskins MD       Medical history:   Past Medical History:   Diagnosis Date   • Asthma    • Atrial fibrillation (HCC)    • Atypical chest pain 06/11/2020   • COPD (chronic obstructive pulmonary disease) (HCC)    • COVID    • COVID-19 01/13/2021   • Enlarged prostate    • Hyperlipidemia    • Kidney stone    • Near syncope 12/17/2020   • Sleep apnea        Social history:   Social History     Tobacco Use   • Smoking status: Former Smoker   • Smokeless tobacco: Never Used   Substance Use Topics   • Alcohol use: Yes     Comment: occasionally       Allergies:    Penicillins and Rivaroxaban    Vaccine History:   Immunization History   Administered Date(s) Administered   • FLUAD TRI 65YR+ 10/31/2019   • Fluzone High Dose =>65 Years ([employer] ONLY) 10/14/2016, 10/16/2017, 11/14/2018   • H1N1 Inj 12/22/2009   • Hepatitis A 09/17/2019, 11/19/2019   • Influenza Quad Vaccine (Inpatient) 09/22/2014   • Influenza, Unspecified 09/18/2013   • Pneumococcal Conjugate 13-Valent (PCV13) 10/12/2015   • Pneumococcal Polysaccharide (PPSV23) 09/24/2013   • Shingrix 09/17/2019, 11/19/2019   • Zostavax 02/12/2013       This patient is managed via a collaborative agreement, pursuant to [id number]. The signed protocol is kept in the [facility] at  [facility], [address] [place] [address].    Education: Efe Malloy has been instructed to call if any changes in medications, doses, concerns, etc. Patient was provided dosing instructions and expressed verbal understanding and has no further questions at this time.    Plan:  1. Anticoagulation   - no change; warfarin 5 mg PO daily, except warfarin 7.5 mg PO on Monday and Thursday .   Total weekly dose = 40 mg.   - Discussed with patient that effects on interaction with ciprofloxacin are likely reflected in today's INR given patient has been taking for ~1 week. However, patient should continue to monitor for signs/symptoms of abnormal bleeding or bruising and contact [facility] if this occurs. Also discussed that ASA can increase bleeding risk as well.   - RTC in 4 week(s)          Ramez Myers Pharmacy Intern  4/25/2022  11:54 EDT

## 2022-04-30 NOTE — DISCHARGE SUMMARY
Date of Discharge:  4/30/2022    Discharge Diagnosis:       R parietal CVA  OAT  Panlobular COPD with emphysema  Chronic, persistent AFIB  GERD without esophagitis  REGLA  Hypercoagulable state secondary to AFIB  Acquired hypothyroidism  Dyslipidemia  Primary essential HTN  Chronic constipation  Peripheral neuropathy  TERI    Presenting Problem/History of Present Illness  Active Hospital Problems    Diagnosis  POA   • **TIA (transient ischemic attack) [G45.9]  Yes      Resolved Hospital Problems   No resolved problems to display.          Hospital Course  Patient is a 76 y.o. male who presented with an acute right parietal CVA.  The patient was evaluated and did well overall.  Therapy was asked to evaluate the patient he was found to have hypercoagulable state for which her anticoagulation was continued.  Medications were modified and risk factors were reviewed and the patient was deemed stable discharge home with medications per discharge reconciliation and will have close outpatient follow-up with us within 1 to 2 weeks time.      Procedures Performed         Consults:   Consults     Date and Time Order Name Status Description    4/9/2022  3:03 PM Inpatient Neurology Consult Stroke Completed           Pertinent Test Results:No radiology results for the last 7 days    Imaging Results (Last 7 Days)     Procedure Component Value Units Date/Time    MRI Brain Without Contrast [[id number]] Collected: 04/10/22 0845     Updated: 04/10/22 0851    Narrative:      MRI BRAIN WO CONTRAST-     Date of Exam: 4/10/2022 7:05 AM     Indication: Possible stroke; G45.9-Transient cerebral ischemic attack,  unspecified; I48.11-Longstanding persistent atrial fibrillation;  H53.9-Unspecified visual disturbance.     Comparison Exams: CT head from April 9, 2022     Technique: MRI brain without IV contrast     FINDINGS:  There is a punctate focus of subacute infarct within the medial right  parietal lobe. There is no hemorrhagic transformation.  The ventricles  appear stable in caliber, with no evidence of mass effect or midline  shift. The basal cisterns appear patent. There is mild generalized  parenchymal atrophy. Scattered foci of periventricular and subcortical  white matter FLAIR hyperintensities are nonspecific, but likely the  sequela of mild chronic small vessel ischemic disease.     The midline structures appear intact. Bilateral lens replacements are in  place. The intracranial vascular flow voids appear patent.       Impression:      1.Punctate focus of subacute infarct within medial right parietal lobe.  No hemorrhagic transformation or significant mass effect.  2.Findings suggestive of mild chronic small vessel ischemic disease.     Electronically Signed By-Sha Rangel MD On:4/10/2022 8:49 AM  This report was finalized on [id number] by  Sha Rangel MD.    CT Angiogram Head w AI Analysis of LVO [[id number]] Collected: 04/09/22 1658     Updated: 04/09/22 1708    Narrative:         DATE OF EXAM:  4/9/2022 3:53 PM     PROCEDURE:  CT ANGIOGRAM NECK-, CT ANGIOGRAM HEAD W AI ANALYSIS OF LVO-     INDICATIONS:   Stroke symptoms with transient visual change     COMPARISON:   CT head from earlier today     TECHNIQUE:  CTA of the head and CTA of the neck was performed after the intravenous  administration of 100 mL Isovue 370. Reconstructed coronal and sagittal  images were also obtained. In addition, a 3 D volume rendered image was  obtained after post processing. Automated exposure control and iterative  reconstruction methods were used. AI analysis of LVO was utilized for  the CTA Head imaging portion of the study.      FINDINGS:     There is a three-vessel aortic arch. The right common carotid artery is  widely patent. There is mild plaque at the right carotid bifurcation  that is not hemodynamically significant. The right internal carotid  artery is widely patent. The left common carotid artery is widely  patent. There is mild plaque at  the left carotid bifurcation that is not  hemodynamically significant. The left internal carotid artery is widely  patent. The right vertebral artery is widely patent. The left vertebral  artery is widely patent and ends giving off the left posterior inferior  cerebellar artery, normal variant. The right vertebral artery is  dominant.     The bilateral middle cerebral, bilateral anterior cerebral, and anterior  communicating arteries are widely patent. The basilar and bilateral  posterior cerebral arteries are widely patent. No definite posterior  communicating artery is identified on either side. No intracranial  aneurysm is identified. The visualized portions of the bilateral  superior cerebellar, anterior inferior cerebellar, and posterior  inferior cerebellar arteries are unremarkable.       Impression:      Major arterial vasculature within head and neck appears widely patent,  with no hemodynamically significant stenosis, dissection, thrombosis, or  aneurysm.     Electronically Signed By-Sha Rangel MD On:4/9/2022 5:06 PM  This report was finalized on [id number] by  Sha Rangel MD.    CT Angiogram Neck [[id number]] Collected: 04/09/22 1658     Updated: 04/09/22 1708    Narrative:         DATE OF EXAM:  4/9/2022 3:53 PM     PROCEDURE:  CT ANGIOGRAM NECK-, CT ANGIOGRAM HEAD W AI ANALYSIS OF LVO-     INDICATIONS:   Stroke symptoms with transient visual change     COMPARISON:   CT head from earlier today     TECHNIQUE:  CTA of the head and CTA of the neck was performed after the intravenous  administration of 100 mL Isovue 370. Reconstructed coronal and sagittal  images were also obtained. In addition, a 3 D volume rendered image was  obtained after post processing. Automated exposure control and iterative  reconstruction methods were used. AI analysis of LVO was utilized for  the CTA Head imaging portion of the study.      FINDINGS:     There is a three-vessel aortic arch. The right common carotid  artery is  widely patent. There is mild plaque at the right carotid bifurcation  that is not hemodynamically significant. The right internal carotid  artery is widely patent. The left common carotid artery is widely  patent. There is mild plaque at the left carotid bifurcation that is not  hemodynamically significant. The left internal carotid artery is widely  patent. The right vertebral artery is widely patent. The left vertebral  artery is widely patent and ends giving off the left posterior inferior  cerebellar artery, normal variant. The right vertebral artery is  dominant.     The bilateral middle cerebral, bilateral anterior cerebral, and anterior  communicating arteries are widely patent. The basilar and bilateral  posterior cerebral arteries are widely patent. No definite posterior  communicating artery is identified on either side. No intracranial  aneurysm is identified. The visualized portions of the bilateral  superior cerebellar, anterior inferior cerebellar, and posterior  inferior cerebellar arteries are unremarkable.       Impression:      Major arterial vasculature within head and neck appears widely patent,  with no hemodynamically significant stenosis, dissection, thrombosis, or  aneurysm.     Electronically Signed By-Sha Rangel MD On:4/9/2022 5:06 PM  This report was finalized on [id number] by  Sha Rangel MD.    CT CEREBRAL PERFUSION WITH & WITHOUT CONTRAST [[id number]] Collected: 04/09/22 1613     Updated: 04/09/22 1619    Narrative:         DATE OF EXAM:  4/9/2022 3:55 PM     PROCEDURE:   CT CEREBRAL PERFUSION W WO CONTRAST-     INDICATIONS:   Transient visual changes consistent with a stroke     COMPARISON:  CT head from earlier today     TECHNIQUE:   Routine transaxial cuts were obtained through the head without  administration of  contrast. Routine transaxial cuts were then obtained  through the head following the administration of 50 mL of Isovue 370  intravenously. Core  blood volume, core blood flow, mean transit time,  and Tmax images were obtained utilizing the Rapid software protocol. A  limited CT angiogram of the head was also performed to measure the blood  vessel density.      FINDINGS:  There is fairly symmetrical abnormal perfusion on the Tmax within the  posterior fossa and bilateral temporo-occipital lobes, favored to be  artifactual. The cerebral blood flow appears normal.        Impression:      No definite acute perfusion abnormality identified. Suggestion of  artifact on the Tmax as described above.     Electronically Signed By-Sha Rangel MD On:4/9/2022 4:17 PM  This report was finalized on [id number] by  Sha Rangel MD.    CT Head Without Contrast Stroke Protocol [[id number]] Collected: 04/09/22 1606     Updated: 04/09/22 1610    Narrative:      CT HEAD WO CONTRAST STROKE PROTOCOL-     Date of Exam: 4/9/2022 3:31 PM     Indication: Stroke, follow up.     Comparison Exams: June 11, 2020     Technique: CT head without IV contrast. Automated exposure control and  iterative reconstruction methods were used.     FINDINGS:  No acute intracranial hemorrhage or extra-axial collection is  identified. The ventricles appear normal in caliber, with no evidence of  mass effect or midline shift. The basal cisterns appear patent. The  gray-white differentiation appears preserved. There is generalized  parenchymal atrophy.     The calvarium appears intact. The visualized paranasal sinuses are  clear. The mastoid air cells are well-aerated.       Impression:      No acute intracranial process identified.     Electronically Signed By-Sha Rangel MD On:4/9/2022 4:08 PM  This report was finalized on [id number] by  Sha Rangel MD.    XR Chest 1 View [[id number]] Collected: 04/09/22 1534     Updated: 04/09/22 1536    Narrative:      XR CHEST 1 VW-     Date of Exam: 4/9/2022 3:28 PM     Indication: Acute Stroke Protocol (onset < 12 hrs).     Comparison Exams:  June 11, 2020     Technique: Single AP chest radiograph     FINDINGS:  The lungs are clear. The heart and mediastinal contours appear normal.  The pulmonary vasculature appears normal. The osseous structures appear  intact.       Impression:      No acute cardiopulmonary process identified.     Electronically Signed By-Sha Rangel MD On:4/9/2022 3:34 PM  This report was finalized on [id number] by  Sha Rangel MD.              Condition on Discharge:  Fair    Vital Signs       Physical Exam:     General Appearance:    Alert, cooperative, in no acute distress   Head:    Normocephalic, without obvious abnormality, atraumatic   Eyes:           Conjunctivae and sclerae normal, no   icterus, no pallor, corneas clear, PERRLA   Throat:   No oral lesions, no thrush, oral mucosa moist   Neck:   No adenopathy, supple, trachea midline, no thyromegaly, no   carotid bruit, no JVD   Lungs:     Clear to auscultation,respirations regular, even and                  unlabored    Heart:    Regular rhythm and normal rate, normal S1 and S2, no            murmur, no gallop, no rub, no click   Chest Wall:    No abnormalities observed   Abdomen:     Normal bowel sounds, no masses, no organomegaly, soft        non-tender, non-distended, no guarding, no rebound                tenderness   Rectal:     Deferred   Extremities:   Moves all extremities well, no edema, no cyanosis, no             redness   Pulses:   Pulses palpable and equal bilaterally   Skin:   No bleeding, bruising or rash   Lymph nodes:   No palpable adenopathy   Neurologic:   Cranial nerves 2 - 12 grossly intact, sensation intact, DTR       present and equal bilaterally         Discharge Disposition  Home or Self Care    Discharge Medications     Discharge Medications      Continue These Medications      Instructions Start Date   albuterol sulfate  (90 Base) MCG/ACT inhaler  Commonly known as: PROVENTIL HFA;VENTOLIN HFA;PROAIR HFA   2 puffs, Inhalation,  Every 6 Hours PRN      alfuzosin 10 MG 24 hr tablet  Commonly known as: UROXATRAL   10 mg, Oral, 2 Times Daily      baclofen 10 MG tablet  Commonly known as: LIORESAL   10 mg, Oral, 2 Times Daily      Breo Ellipta 100-25 MCG/INH inhaler  Generic drug: Fluticasone Furoate-Vilanterol   1 puff, Inhalation, Daily - RT      digoxin 250 MCG tablet  Commonly known as: LANOXIN   TAKE ONE TABLET BY MOUTH EVERY DAY      EPINEPHrine 0.3 MG/0.3ML solution auto-injector injection  Commonly known as: EPIPEN   epinephrine 0.3 mg/0.3 mL injection, auto-injector      fluorometholone 0.1 % ophthalmic suspension  Commonly known as: FML   1 drop, Right Eye, Daily      fluticasone 50 MCG/ACT nasal spray  Commonly known as: FLONASE   1 spray, Nasal, Daily      gabapentin 300 MG capsule  Commonly known as: NEURONTIN   600 mg, Oral, 2 Times Daily      levothyroxine 75 MCG tablet  Commonly known as: SYNTHROID, LEVOTHROID   75 mcg, Oral, Daily      montelukast 10 MG tablet  Commonly known as: SINGULAIR   10 mg, Oral, Every Evening      pantoprazole 40 MG EC tablet  Commonly known as: PROTONIX   40 mg, Oral, Daily      sertraline 100 MG tablet  Commonly known as: ZOLOFT   150 mg, Oral, Daily      theophylline 400 MG 24 hr tablet  Commonly known as: UNIPHYL   400 mg, Oral, 2 Times Daily      verapamil  MG 24 hr capsule  Commonly known as: VERELAN   240 mg, Oral, Nightly      warfarin 5 MG tablet  Commonly known as: COUMADIN   5 mg, Oral, Daily Warfarin      warfarin 1 MG tablet  Commonly known as: COUMADIN   2.5 mg, Oral, 2 Times Weekly, Mon, Thur  with 5mg         Stop These Medications    EC-RX Testosterone 10 % cream            Discharge Diet:   Cardiac low vitamin K  Activity at Discharge:   As tolerated  Follow-up Appointments  Future Appointments   Date Time Provider Department Center   5[facility]23[facility]2022 11:30 [facility]   10/11/2022 11:45 AM Silvano Gillis MD [facility]         Test Results  Pending at Discharge       Og Brannon MD  04/30/22  08:18 EDT

## 2022-05-06 ENCOUNTER — TELEPHONE (OUTPATIENT)
Dept: CARDIOLOGY | Facility: CLINIC | Age: 77
End: 2022-05-06

## 2022-05-06 NOTE — TELEPHONE ENCOUNTER
Dr. Arianne Sanchez with neurology at [facility] called wanting to speak with you regarding switching patient from warfarin to eliquis with recent stroke in April.

## 2022-05-13 RX ORDER — DIGOXIN 250 MCG
TABLET ORAL
Qty: 90 TABLET | Refills: 1 | Status: SHIPPED | OUTPATIENT
Start: 2022-05-13 | End: 2022-06-06

## 2022-05-23 ENCOUNTER — ANTICOAGULATION VISIT (OUTPATIENT)
Dept: PHARMACY | Facility: HOSPITAL | Age: 77
End: 2022-05-23

## 2022-05-23 DIAGNOSIS — I48.19 PERSISTENT ATRIAL FIBRILLATION: Primary | ICD-10-CM

## 2022-05-23 LAB — INR PPP: 2.3 (ref 2–3)

## 2022-05-23 PROCEDURE — 85610 PROTHROMBIN TIME: CPT

## 2022-05-23 PROCEDURE — G0463 HOSPITAL OUTPT CLINIC VISIT: HCPCS

## 2022-05-23 PROCEDURE — 36416 COLLJ CAPILLARY BLOOD SPEC: CPT

## 2022-05-23 NOTE — PROGRESS NOTES
I have reviewed the notes, assessments, and/or procedures performed by the pharmacy intern, I concur with her/his documentation of Efe Malloy.

## 2022-05-23 NOTE — PROGRESS NOTES
Anticoagulation Clinic Progress Note    INR Goal: 2 - 3  Today's INR: 2.3 (therapeutic)  Current warfarin dose: warfarin 7.5 mg PO daily, except warfarin 5 mg PO on  and .  Current total weekly dose = 40 mg per week.     Per telephone note in [facility] on , patient's neurologist, Dr. Sanchez, discussed with patient's cardiologist, Dr. Gillis, about possibly switching warfarin to apixaban in this patient. Per patient report today, he has an appointment to see Dr. Sanchez again in a few weeks and he is planning to discuss this further with her. Patient reports in the past he had rectal bleeding with one of the anticoagulants but he is unsure of which one. Patient also states he is planning to have his daughter (who is a pharmacist) figure out the cost of apixaban for him prior to switching.    INR History:  Anticoagulation Monitoring 3/28/2022 2022 2022   INR 2.00 2.2 2.30   INR Date 3/28/2022 2022 2022   INR Goal 2.0-3.0 2.0-3.0 2.0-3.0   Trend Same Same Same   Last Week Total 40 mg 40 mg 40 mg   Next Week Total 40 mg 40 mg 40 mg   Sun 5 mg 5 mg 5 mg   Mon 7.5 mg 7.5 mg 7.5 mg   Tue 5 mg 5 mg 5 mg   Wed 5 mg 5 mg 5 mg   Thu 7.5 mg 7.5 mg 7.5 mg   Fri 5 mg 5 mg 5 mg   Sat 5 mg 5 mg 5 mg   Visit Report - - -   Some recent data might be hidden       Anticoagulation Summary  As of 2022    INR goal:  2.0-3.0   TTR:  75.7 % (2.9 y)   INR used for dosin.30 (2022)   Warfarin maintenance plan:  7.5 mg every Mon, Thu; 5 mg all other days   Weekly warfarin total:  40 mg   No change documented:  Ramez Myers, Pharmacy Intern   Plan last modified:  Kavita Jolley, PharmD (2021)   Next INR check:  2022   Priority:  Maintenance   Target end date:      Indications    Persistent atrial fibrillation (HCC) [I48.19]             Anticoagulation Episode Summary     INR check location:      Preferred lab:      Send INR reminders to:  [facility] CLINICAL POOL    Comments:   Patient contract signed 11/15/21.      Anticoagulation Care Providers     Provider Role Specialty Phone number    Silvano Gillis MD  Cardiology [phone]          Clinic Interview:   Patient Findings     Negatives:  Signs/symptoms of thrombosis, Signs/symptoms of bleeding,   Laboratory test error suspected, Change in health, Change in alcohol use,   Change in activity, Upcoming invasive procedure, Emergency department   visit, Upcoming dental procedure, Missed doses, Extra doses, Change in   medications, Change in diet/appetite, Hospital admission, Bruising, Other   complaints      Clinical Outcomes     Negatives:  Major bleeding event, Thromboembolic event,   Anticoagulation-related hospital admission, Anticoagulation-related ED   visit, Anticoagulation-related fatality        Current Medications:   Prior to Admission medications    Medication Sig Start Date End Date Taking? Authorizing Provider   albuterol sulfate  (90 Base) MCG/ACT inhaler Inhale 2 puffs Every 6 (Six) Hours As Needed. 7/6/21   Shelby Haskins MD   alfuzosin (UROXATRAL) 10 MG 24 hr tablet Take 10 mg by mouth 2 (Two) Times a Day. 9/23/19   Shelby Haskins MD   aspirin (aspirin) 81 MG EC tablet Take 1 tablet by mouth Daily. 4/11/22   Silvano Gillis MD   baclofen (LIORESAL) 10 MG tablet Take 10 mg by mouth 2 (Two) Times a Day. 7/13/21   Shelby Haskins MD   BREO ELLIPTA 100-25 MCG/INH inhaler Inhale 1 puff Daily. 9/24/19   Shelby Haskins MD   digoxin (LANOXIN) 250 MCG tablet TAKE ONE TABLET BY MOUTH EVERY DAY 5/13/22   Silvano Gillis MD   EPINEPHrine (EPIPEN) 0.3 MG/0.3ML solution auto-injector injection epinephrine 0.3 mg/0.3 mL injection, auto-injector    Shelby Haskins MD   fluorometholone (FML) 0.1 % ophthalmic suspension Administer 1 drop to the right eye Daily. 5/17/21   Shelby Haskins MD   fluticasone (FLONASE) 50 MCG/ACT nasal spray 1 spray into the nostril(s) as  directed by provider Daily.    Shelby Haskins MD   gabapentin (NEURONTIN) 300 MG capsule Take 600 mg by mouth 2 (Two) Times a Day. 9/13/19   Shelby Haskins MD   levothyroxine (SYNTHROID, LEVOTHROID) 75 MCG tablet Take 75 mcg by mouth Daily. 9/23/19   Shelby Haskins MD   montelukast (SINGULAIR) 10 MG tablet Take 10 mg by mouth Every Evening. 9/23/19   Shelby Haskins MD   pantoprazole (PROTONIX) 40 MG EC tablet Take 1 tablet by mouth Daily. 2/22/22   Silvano Gillis MD   pravastatin (PRAVACHOL) 40 MG tablet Take 1 tablet by mouth every night at bedtime. 4/18/22   Silvano Gillis MD   sertraline (ZOLOFT) 100 MG tablet Take 1.5 tablets by mouth Daily. 3/15/22   Silvano Gillis MD   theophylline (UNIPHYL) 400 MG 24 hr tablet Take 400 mg by mouth 2 (Two) Times a Day. 9/23/19   Shelby Hasikns MD   verapamil ER (VERELAN) 240 MG 24 hr capsule Take 240 mg by mouth Every Night.    Shelby Haskins MD   warfarin (COUMADIN) 1 MG tablet Take 2.5 mg by mouth 2 (Two) Times a Week. Alex Majano  with 5mg    Shelby Haskins MD   warfarin (COUMADIN) 5 MG tablet Take 5 mg by mouth Daily.    Shelby Haskins MD       Medical history:   Past Medical History:   Diagnosis Date   • Asthma    • Atrial fibrillation (HCC)    • Atypical chest pain 06/11/2020   • COPD (chronic obstructive pulmonary disease) (HCC)    • COVID    • COVID-19 01/13/2021   • Enlarged prostate    • Hyperlipidemia    • Kidney stone    • Near syncope 12/17/2020   • Sleep apnea        Social history:   Social History     Tobacco Use   • Smoking status: Former Smoker   • Smokeless tobacco: Never Used   Substance Use Topics   • Alcohol use: Yes     Comment: occasionally       Allergies:    Penicillins and Rivaroxaban    Vaccine History:   Immunization History   Administered Date(s) Administered   • FLUAD TRI 65YR+ 10/31/2019   • Fluzone High Dose =>65 Years ([employer] ONLY) 10/14/2016, 10/16/2017,  11/14/2018   • H1N1 Inj 12/22/2009   • Hepatitis A 09/17/2019, 11/19/2019   • Influenza Quad Vaccine (Inpatient) 09/22/2014   • Influenza, Unspecified 09/18/2013   • Pneumococcal Conjugate 13-Valent (PCV13) 10/12/2015   • Pneumococcal Polysaccharide (PPSV23) 09/24/2013   • Shingrix 09/17/2019, 11/19/2019   • Zostavax 02/12/2013       This patient is managed via a collaborative agreement, pursuant to IC [id number]. The signed protocol is kept in the [facility] at [address] [place] [address].    Education: Efe Malloy has been instructed to call if any changes in medications, doses, concerns, etc. Patient was provided dosing instructions and expressed verbal understanding and has no further questions at this time.    Plan:  1. Anticoagulation   - no change; warfarin 5 mg PO daily, except warfarin 7.5 mg PO on Mondays and Thursdays. Total weekly dose = 40 mg.   - Instructed patient to inform [facility] if he decides to switch to apixaban as INR will need to be <2 prior to initiation of this.   - RTC in 4 week(s)        Will Louis, Pharmacy Intern  5/23/2022  15:30 EDT

## 2022-06-06 RX ORDER — DIGOXIN 250 MCG
TABLET ORAL
Qty: 90 TABLET | Refills: 1 | Status: SHIPPED | OUTPATIENT
Start: 2022-06-06 | End: 2023-01-05 | Stop reason: SDUPTHER

## 2022-06-14 RX ORDER — VERAPAMIL HYDROCHLORIDE 240 MG/1
CAPSULE, EXTENDED RELEASE ORAL
Qty: 180 CAPSULE | Refills: 1 | Status: SHIPPED | OUTPATIENT
Start: 2022-06-14 | End: 2022-06-28 | Stop reason: SDUPTHER

## 2022-06-20 ENCOUNTER — ANTICOAGULATION VISIT (OUTPATIENT)
Dept: PHARMACY | Facility: HOSPITAL | Age: 77
End: 2022-06-20

## 2022-06-20 DIAGNOSIS — I48.19 PERSISTENT ATRIAL FIBRILLATION: Primary | ICD-10-CM

## 2022-06-20 LAB — INR PPP: 2.7 (ref 2–3)

## 2022-06-20 PROCEDURE — G0463 HOSPITAL OUTPT CLINIC VISIT: HCPCS

## 2022-06-20 PROCEDURE — 85610 PROTHROMBIN TIME: CPT

## 2022-06-20 PROCEDURE — 36416 COLLJ CAPILLARY BLOOD SPEC: CPT

## 2022-06-20 NOTE — PROGRESS NOTES
Anticoagulation Clinic Progress Note    INR Goal: 2 - 3  Today's INR: 2.7 (therapeutic)  Current warfarin dose: warfarin 5 mg PO daily, except warfarin 7.5 mg PO on Monday and .  Current total weekly dose = 40 mg per week.     INR History:  Anticoagulation Monitoring 2022   INR 2.2 2.3 2.70   INR Date 2022   INR Goal 2.0-3.0 2.0-3.0 2.0-3.0   Trend Same Same Same   Last Week Total 40 mg 40 mg 40 mg   Next Week Total 40 mg 40 mg 40 mg   Sun 5 mg 5 mg 5 mg   Mon 7.5 mg 7.5 mg 7.5 mg   Tue 5 mg 5 mg 5 mg   Wed 5 mg 5 mg 5 mg   Thu 7.5 mg 7.5 mg 7.5 mg   Fri 5 mg 5 mg 5 mg   Sat 5 mg 5 mg 5 mg   Visit Report - - -   Some recent data might be hidden       Anticoagulation Summary  As of 2022    INR goal:  2.0-3.0   TTR:  76.3 % (3 y)   INR used for dosin.70 (2022)   Warfarin maintenance plan:  7.5 mg every Mon, Thu; 5 mg all other days   Weekly warfarin total:  40 mg   Plan last modified:  Kavita Jolley, PharmD (2021)   Next INR check:     Priority:  Maintenance   Target end date:      Indications    Persistent atrial fibrillation (HCC) [I48.19]             Anticoagulation Episode Summary     INR check location:      Preferred lab:      Send INR reminders to:  [facility] CLINICAL POOL    Comments:  Patient contract signed 11/15/21.      Anticoagulation Care Providers     Provider Role Specialty Phone number    Silvano Gillis MD  Cardiology [phone]          Clinic Interview:   Patient Findings     Negatives:  Signs/symptoms of thrombosis, Signs/symptoms of bleeding,   Laboratory test error suspected, Change in health, Change in alcohol use,   Change in activity, Upcoming invasive procedure, Emergency department   visit, Upcoming dental procedure, Missed doses, Extra doses, Change in   medications, Change in diet/appetite, Hospital admission, Bruising, Other   complaints      Clinical Outcomes     Negatives:  Major  bleeding event, Thromboembolic event,   Anticoagulation-related hospital admission, Anticoagulation-related ED   visit, Anticoagulation-related fatality        Current Medications:   Prior to Admission medications    Medication Sig Start Date End Date Taking? Authorizing Provider   albuterol sulfate  (90 Base) MCG/ACT inhaler Inhale 2 puffs Every 6 (Six) Hours As Needed. 7/6/21   Shelby Haskins MD   alfuzosin (UROXATRAL) 10 MG 24 hr tablet Take 10 mg by mouth 2 (Two) Times a Day. 9/23/19   Shelby Haskins MD   aspirin (aspirin) 81 MG EC tablet Take 1 tablet by mouth Daily. 4/11/22   Silvano Gillis MD   baclofen (LIORESAL) 10 MG tablet Take 10 mg by mouth 2 (Two) Times a Day. 7/13/21   Shelby Haskins MD   BREHENRY ELLIPTA 100-25 MCG/INH inhaler Inhale 1 puff Daily. 9/24/19   Shelby Haskins MD   digoxin (LANOXIN) 250 MCG tablet TAKE ONE TABLET BY MOUTH EVERY DAY 6/6/22   Silvano Gillis MD   EPINEPHrine (EPIPEN) 0.3 MG/0.3ML solution auto-injector injection epinephrine 0.3 mg/0.3 mL injection, auto-injector    Shelby Haskins MD   fluorometholone (FML) 0.1 % ophthalmic suspension Administer 1 drop to the right eye Daily. 5/17/21   Shelby Haskins MD   fluticasone (FLONASE) 50 MCG/ACT nasal spray 1 spray into the nostril(s) as directed by provider Daily.    Shelby Haskins MD   gabapentin (NEURONTIN) 300 MG capsule Take 600 mg by mouth 2 (Two) Times a Day. 9/13/19   Shelby Haskins MD   levothyroxine (SYNTHROID, LEVOTHROID) 75 MCG tablet Take 75 mcg by mouth Daily. 9/23/19   Shelby Haskins MD   montelukast (SINGULAIR) 10 MG tablet Take 10 mg by mouth Every Evening. 9/23/19   Shelby Haskins MD   pantoprazole (PROTONIX) 40 MG EC tablet Take 1 tablet by mouth Daily. 2/22/22   Silvano Gillis MD   pravastatin (PRAVACHOL) 40 MG tablet Take 1 tablet by mouth every night at bedtime. 4/18/22   Silvano Gillis MD    sertraline (ZOLOFT) 100 MG tablet Take 1.5 tablets by mouth Daily. 3/15/22   Silvano Gillis MD   theophylline (UNIPHYL) 400 MG 24 hr tablet Take 400 mg by mouth 2 (Two) Times a Day. 9/23/19   Shelby Haskins MD   verapamil ER (VERELAN) 240 MG 24 hr capsule TAKE ONE CAPSULE BY MOUTH TWICE DAILY 6/14/22   Silvano Gillis MD   warfarin (COUMADIN) 1 MG tablet Take 2.5 mg by mouth 2 (Two) Times a Week. Mon, Thur  with 5mg    Shelby Haskins MD   warfarin (COUMADIN) 5 MG tablet Take 5 mg by mouth Daily.    Shelby Haskins MD       Medical history:   Past Medical History:   Diagnosis Date   • Asthma    • Atrial fibrillation (HCC)    • Atypical chest pain 06/11/2020   • COPD (chronic obstructive pulmonary disease) (HCC)    • COVID    • COVID-19 01/13/2021   • Enlarged prostate    • Hyperlipidemia    • Kidney stone    • Near syncope 12/17/2020   • Sleep apnea        Social history:   Social History     Tobacco Use   • Smoking status: Former Smoker   • Smokeless tobacco: Never Used   Substance Use Topics   • Alcohol use: Yes     Comment: occasionally       Allergies:    Penicillins and Rivaroxaban    Vaccine History:   Immunization History   Administered Date(s) Administered   • FLUAD TRI 65YR+ 10/31/2019   • Fluzone High Dose =>65 Years ([employer] ONLY) 10/14/2016, 10/16/2017, 11/14/2018   • H1N1 Inj 12/22/2009   • Hepatitis A 09/17/2019, 11/19/2019   • Influenza Quad Vaccine (Inpatient) 09/22/2014   • Influenza, Unspecified 09/18/2013   • Pneumococcal Conjugate 13-Valent (PCV13) 10/12/2015   • Pneumococcal Polysaccharide (PPSV23) 09/24/2013   • Shingrix 09/17/2019, 11/19/2019   • Zostavax 02/12/2013       This patient is managed via a collaborative agreement, pursuant to IC [id number]. The signed protocol is kept in the [facility] at [address] [place] [address].    Education: Efe Malloy has been instructed to call if any changes in medications, doses,  concerns, etc. Patient was provided dosing instructions and expressed verbal understanding and has no further questions at this time.    Plan:  1. Anticoagulation   - no change; warfarin 5 mg PO daily, except warfarin 7.5 mg PO on Monday and Thursdays.   Total weekly dose = 40 mg.     -Patient stated Dr. Gillis approved switched to Eliquis. Patient will see neurology Thursday for final approval.     -Instructed patient to continue warfarin. Hold on Saturday and Sunday and come into clinic Monday to see if INR less than 2 to be able to start Eliquis.      Bony Chapman, PharmD  6/20/2022  11:41 EDT

## 2022-06-24 RX ORDER — SERTRALINE HYDROCHLORIDE 100 MG/1
TABLET, FILM COATED ORAL
Qty: 45 TABLET | Refills: 0 | OUTPATIENT
Start: 2022-06-24

## 2022-06-27 ENCOUNTER — APPOINTMENT (OUTPATIENT)
Dept: PHARMACY | Facility: HOSPITAL | Age: 77
End: 2022-06-27

## 2022-06-27 RX ORDER — SERTRALINE HYDROCHLORIDE 100 MG/1
TABLET, FILM COATED ORAL
Qty: 45 TABLET | Refills: 0 | OUTPATIENT
Start: 2022-06-27

## 2022-06-28 RX ORDER — VERAPAMIL HYDROCHLORIDE 240 MG/1
240 CAPSULE, EXTENDED RELEASE ORAL NIGHTLY
Qty: 90 CAPSULE | Refills: 1 | Status: SHIPPED | OUTPATIENT
Start: 2022-06-28 | End: 2023-02-13

## 2022-07-05 RX ORDER — SERTRALINE HYDROCHLORIDE 100 MG/1
TABLET, FILM COATED ORAL
Qty: 45 TABLET | Refills: 0 | OUTPATIENT
Start: 2022-07-05

## 2022-07-18 ENCOUNTER — ANTICOAGULATION VISIT (OUTPATIENT)
Dept: PHARMACY | Facility: HOSPITAL | Age: 77
End: 2022-07-18

## 2022-07-18 DIAGNOSIS — I48.19 PERSISTENT ATRIAL FIBRILLATION: Primary | ICD-10-CM

## 2022-07-18 LAB — INR PPP: 2.8 (ref 2–3)

## 2022-07-18 PROCEDURE — 85610 PROTHROMBIN TIME: CPT

## 2022-07-18 PROCEDURE — G0463 HOSPITAL OUTPT CLINIC VISIT: HCPCS

## 2022-07-18 PROCEDURE — 36416 COLLJ CAPILLARY BLOOD SPEC: CPT

## 2022-07-18 NOTE — PROGRESS NOTES
Anticoagulation Clinic Progress Note    INR Goal: 2 - 3  Today's INR: 2.8 (therapeutic)  Current warfarin dose: warfarin 5 mg PO daily, except warfarin 7.5 mg PO on Monday and Thursday. Current total weekly dose = 40 mg per week.     At last appointment, patient discussed possibility of switching from warfarin to apixaban for anticoagulation. Patient reports he will not be switching due to cost of apixaban.    INR History:  Anticoagulation Monitoring 2022   INR 2.3 2.70 2.80   INR Date 2022   INR Goal 2.0-3.0 2.0-3.0 2.0-3.0   Trend Same Same Same   Last Week Total 40 mg 40 mg 40 mg   Next Week Total 40 mg 40 mg 40 mg   Sun 5 mg 5 mg 5 mg   Mon 7.5 mg 7.5 mg 7.5 mg   Tue 5 mg 5 mg 5 mg   Wed 5 mg 5 mg 5 mg   Thu 7.5 mg 7.5 mg 7.5 mg   Fri 5 mg 5 mg 5 mg   Sat 5 mg 5 mg 5 mg   Visit Report - - -   Some recent data might be hidden       Anticoagulation Summary  As of 2022    INR goal:  2.0-3.0   TTR:  76.9 % (3 y)   INR used for dosin.80 (2022)   Warfarin maintenance plan:  7.5 mg every Mon, Thu; 5 mg all other days   Weekly warfarin total:  40 mg   No change documented:  Ynes Howard, Pharmacy Intern   Plan last modified:  Kavita Jolley, PharmD (2021)   Next INR check:  8/15/2022   Priority:  Maintenance   Target end date:      Indications    Persistent atrial fibrillation (HCC) [I48.19]             Anticoagulation Episode Summary     INR check location:      Preferred lab:      Send INR reminders to:  [facility] CLINICAL POOL    Comments:  Patient contract signed 11/15/21.      Anticoagulation Care Providers     Provider Role Specialty Phone number    Silvano Gillis MD  Cardiology [phone]          Clinic Interview:   Clinical Outcomes    Negatives:  Major bleeding event, Thromboembolic event, Anticoagulation-related hospital admission, Anticoagulation-related ED visit, Anticoagulation-related fatality       Patient  Findings    Positives:  Change in health (Diagnosed with COVID right after last appointment (likely no effect on today's INR)), Change in medications (Course of Paxlovid in the past month but has not had for at least a few weeks (no effect on today's INR))   Negatives:  Signs/symptoms of thrombosis, Signs/symptoms of bleeding, Laboratory test error suspected, Change in alcohol use, Change in activity, Upcoming invasive procedure, Emergency department visit, Upcoming dental procedure, Missed doses, Extra doses, Change in diet/appetite, Hospital admission, Bruising, Other complaints            Current Medications:   Prior to Admission medications    Medication Sig Start Date End Date Taking? Authorizing Provider   albuterol sulfate  (90 Base) MCG/ACT inhaler Inhale 2 puffs Every 6 (Six) Hours As Needed. 7/6/21   Shelby Haskins MD   alfuzosin (UROXATRAL) 10 MG 24 hr tablet Take 10 mg by mouth 2 (Two) Times a Day. 9/23/19   Shelby Haskins MD   aspirin (aspirin) 81 MG EC tablet Take 1 tablet by mouth Daily. 4/11/22   Silvano Gillis MD   baclofen (LIORESAL) 10 MG tablet Take 10 mg by mouth 2 (Two) Times a Day. 7/13/21   Shelby Haskins MD   BREO ELLIPTA 100-25 MCG/INH inhaler Inhale 1 puff Daily. 9/24/19   Shelby Haskins MD   digoxin (LANOXIN) 250 MCG tablet TAKE ONE TABLET BY MOUTH EVERY DAY 6/6/22   Silvano Gillis MD   EPINEPHrine (EPIPEN) 0.3 MG/0.3ML solution auto-injector injection epinephrine 0.3 mg/0.3 mL injection, auto-injector    Shelby Haskins MD   fluorometholone (FML) 0.1 % ophthalmic suspension Administer 1 drop to the right eye Daily. 5/17/21   Shelby Haskins MD   fluticasone (FLONASE) 50 MCG/ACT nasal spray 1 spray into the nostril(s) as directed by provider Daily.    Shelby Haskins MD   gabapentin (NEURONTIN) 300 MG capsule Take 600 mg by mouth 2 (Two) Times a Day. 9/13/19   Shelby Haskins MD   levothyroxine (SYNTHROID,  LEVOTHROID) 75 MCG tablet Take 75 mcg by mouth Daily. 9/23/19   Shelby Haskins MD   montelukast (SINGULAIR) 10 MG tablet Take 10 mg by mouth Every Evening. 9/23/19   Shelby Haskins MD   pantoprazole (PROTONIX) 40 MG EC tablet Take 1 tablet by mouth Daily. 2/22/22   Silvano Gillis MD   pravastatin (PRAVACHOL) 40 MG tablet Take 1 tablet by mouth every night at bedtime. 4/18/22   Silvano Gillis MD   sertraline (ZOLOFT) 100 MG tablet Take 1.5 tablets by mouth Daily. 3/15/22   Silvano Gillis MD   theophylline (UNIPHYL) 400 MG 24 hr tablet Take 400 mg by mouth 2 (Two) Times a Day. 9/23/19   Shelby Haskins MD   verapamil ER (VERELAN) 240 MG 24 hr capsule Take 1 capsule by mouth Every Night. 6/28/22   Silvano Gillis MD   warfarin (COUMADIN) 1 MG tablet Take 2.5 mg by mouth 2 (Two) Times a Week. Mon, Thur  with 5mg    Shelby Haskins MD   warfarin (COUMADIN) 5 MG tablet Take 5 mg by mouth Daily.    Shelby Haskins MD       Medical history:   Past Medical History:   Diagnosis Date   • Asthma    • Atrial fibrillation (HCC)    • Atypical chest pain 06/11/2020   • COPD (chronic obstructive pulmonary disease) (HCC)    • COVID    • COVID-19 01/13/2021   • Enlarged prostate    • Hyperlipidemia    • Kidney stone    • Near syncope 12/17/2020   • Sleep apnea        Social history:   Social History     Tobacco Use   • Smoking status: Former Smoker   • Smokeless tobacco: Never Used   Substance Use Topics   • Alcohol use: Yes     Comment: occasionally       Allergies:    Penicillins and Rivaroxaban    Vaccine History:   Immunization History   Administered Date(s) Administered   • FLUAD TRI 65YR+ 10/31/2019   • Fluzone High Dose =>65 Years ([employer] ONLY) 10/14/2016, 10/16/2017, 11/14/2018   • H1N1 Inj 12/22/2009   • Hepatitis A 09/17/2019, 11/19/2019   • Influenza Quad Vaccine (Inpatient) 09/22/2014   • Influenza, Unspecified 09/18/2013   • Pneumococcal Conjugate  13-Valent (PCV13) 10/12/2015   • Pneumococcal Polysaccharide (PPSV23) 09/24/2013   • Shingrix 09/17/2019, 11/19/2019   • Zostavax 02/12/2013       This patient is managed via a collaborative agreement, pursuant to IC [id number]. The signed protocol is kept in the [facility] at [address] [place] [address].    Education: Efe Malloy has been instructed to call if any changes in medications, doses, concerns, etc. Patient was provided dosing instructions and expressed verbal understanding and has no further questions at this time.    Plan:  1. Anticoagulation   - no change; warfarin 5 mg PO daily, except warfarin 7.5 mg PO on Monday and Thursday.  Total weekly dose = 40 mg.   - Discussed with patient that INR is on higher end of goal range and that if he would like to incorporate a few servings of leafy greens into his diet this would help lower INR some. Discussed signs/symptoms to monitor for given INR on higher end of goal.  - RTC in 4 week(s)      CATHY ZHANG, Pharmacy Intern  7/18/2022  11:40 EDT

## 2022-08-15 ENCOUNTER — APPOINTMENT (OUTPATIENT)
Dept: PHARMACY | Facility: HOSPITAL | Age: 77
End: 2022-08-15

## 2022-08-16 ENCOUNTER — ANTICOAGULATION VISIT (OUTPATIENT)
Dept: PHARMACY | Facility: HOSPITAL | Age: 77
End: 2022-08-16

## 2022-08-16 DIAGNOSIS — I48.19 PERSISTENT ATRIAL FIBRILLATION: Primary | ICD-10-CM

## 2022-08-16 LAB — INR PPP: 3.3 (ref 2–3)

## 2022-08-16 PROCEDURE — 36416 COLLJ CAPILLARY BLOOD SPEC: CPT

## 2022-08-16 PROCEDURE — G0463 HOSPITAL OUTPT CLINIC VISIT: HCPCS

## 2022-08-16 PROCEDURE — 85610 PROTHROMBIN TIME: CPT

## 2022-08-16 NOTE — PROGRESS NOTES
Anticoagulation Clinic Progress Note    INR Goal: 2 - 3  Today's INR: 3.3 (supratherapeutic)  Current warfarin dose: warfarin 5 mg PO daily, except warfarin 7.5 mg PO on Monday and Thursdays.  Current total weekly dose = 40 mg per week.     INR History:  Anticoagulation Monitoring 6/20/2022 7/18/2022 8/16/2022   INR 2.70 2.8 3.30   INR Date 6/20/2022 7/18/2022 8/16/2022   INR Goal 2.0-3.0 2.0-3.0 2.0-3.0   Trend Same Same Down   Last Week Total 40 mg 40 mg 40 mg   Next Week Total 40 mg 40 mg 37.5 mg   Sun 5 mg 5 mg 5 mg   Mon 7.5 mg 7.5 mg 7.5 mg   Tue 5 mg 5 mg 5 mg   Wed 5 mg 5 mg 5 mg   Thu 7.5 mg 7.5 mg 5 mg   Fri 5 mg 5 mg 5 mg   Sat 5 mg 5 mg 5 mg   Visit Report - - -   Some recent data might be hidden       Anticoagulation Summary  As of 8/16/2022    INR goal:  2.0-3.0   TTR:  76.0 % (3.1 y)   INR used for dosing:  3.30 (8/16/2022)   Warfarin maintenance plan:  7.5 mg every Mon; 5 mg all other days   Weekly warfarin total:  37.5 mg   Plan last modified:  Meeta Chapman, PharmD (8/16/2022)   Next INR check:  9/6/2022   Priority:  Maintenance   Target end date:      Indications    Persistent atrial fibrillation (HCC) [I48.19]             Anticoagulation Episode Summary     INR check location:      Preferred lab:      Send INR reminders to:  [facility] CLINICAL POOL    Comments:  Patient contract signed 11/15/21.      Anticoagulation Care Providers     Provider Role Specialty Phone number    Silvano Gillis MD  Cardiology [phone]          Clinic Interview:   Patient Findings     Negatives:  Signs/symptoms of thrombosis, Signs/symptoms of bleeding,   Laboratory test error suspected, Change in health, Change in alcohol use,   Change in activity, Upcoming invasive procedure, Emergency department   visit, Upcoming dental procedure, Missed doses, Extra doses, Change in   medications, Change in diet/appetite, Hospital admission, Bruising, Other   complaints      Clinical Outcomes      Negatives:  Major bleeding event, Thromboembolic event,   Anticoagulation-related hospital admission, Anticoagulation-related ED   visit, Anticoagulation-related fatality        Current Medications:   Prior to Admission medications    Medication Sig Start Date End Date Taking? Authorizing Provider   albuterol sulfate  (90 Base) MCG/ACT inhaler Inhale 2 puffs Every 6 (Six) Hours As Needed. 7/6/21   Shelby Haskins MD   alfuzosin (UROXATRAL) 10 MG 24 hr tablet Take 10 mg by mouth 2 (Two) Times a Day. 9/23/19   Shelby Haskins MD   aspirin (aspirin) 81 MG EC tablet Take 1 tablet by mouth Daily. 4/11/22   Silvano Gillis MD   baclofen (LIORESAL) 10 MG tablet Take 10 mg by mouth 2 (Two) Times a Day. 7/13/21   Shelby Haskins MD BREO ELLIPTA 100-25 MCG/INH inhaler Inhale 1 puff Daily. 9/24/19   Shelby Haskins MD   digoxin (LANOXIN) 250 MCG tablet TAKE ONE TABLET BY MOUTH EVERY DAY 6/6/22   Silvano Gillis MD   EPINEPHrine (EPIPEN) 0.3 MG/0.3ML solution auto-injector injection epinephrine 0.3 mg/0.3 mL injection, auto-injector    Shelby Haskins MD   fluorometholone (FML) 0.1 % ophthalmic suspension Administer 1 drop to the right eye Daily. 5/17/21   Shelby Haskins MD   fluticasone (FLONASE) 50 MCG/ACT nasal spray 1 spray into the nostril(s) as directed by provider Daily.    Shelby Haskins MD   gabapentin (NEURONTIN) 300 MG capsule Take 600 mg by mouth 2 (Two) Times a Day. 9/13/19   Shelby Haskins MD   levothyroxine (SYNTHROID, LEVOTHROID) 75 MCG tablet Take 75 mcg by mouth Daily. 9/23/19   Shleby Haskins MD   montelukast (SINGULAIR) 10 MG tablet Take 10 mg by mouth Every Evening. 9/23/19   Shelby Haskins MD   pantoprazole (PROTONIX) 40 MG EC tablet Take 1 tablet by mouth Daily. 2/22/22   Silvano Gillis MD   pravastatin (PRAVACHOL) 40 MG tablet Take 1 tablet by mouth every night at bedtime. 4/18/22   Silvano Gillis  MD Oren   sertraline (ZOLOFT) 100 MG tablet Take 1.5 tablets by mouth Daily. 3/15/22   Silvano Gillis MD   theophylline (UNIPHYL) 400 MG 24 hr tablet Take 400 mg by mouth 2 (Two) Times a Day. 9/23/19   Shelby Haskins MD   verapamil ER (VERELAN) 240 MG 24 hr capsule Take 1 capsule by mouth Every Night. 6/28/22   Silvano Gillis MD   warfarin (COUMADIN) 1 MG tablet Take 2.5 mg by mouth 2 (Two) Times a Week. Mon, Thur  with 5mg    Shelby Haskins MD   warfarin (COUMADIN) 5 MG tablet Take 5 mg by mouth Daily.    Shelby Haskins MD       Medical history:   Past Medical History:   Diagnosis Date   • Asthma    • Atrial fibrillation (HCC)    • Atypical chest pain 06/11/2020   • COPD (chronic obstructive pulmonary disease) (HCC)    • COVID    • COVID-19 01/13/2021   • Enlarged prostate    • Hyperlipidemia    • Kidney stone    • Near syncope 12/17/2020   • Sleep apnea        Social history:   Social History     Tobacco Use   • Smoking status: Former Smoker   • Smokeless tobacco: Never Used   Substance Use Topics   • Alcohol use: Yes     Comment: occasionally       Allergies:    Penicillins and Rivaroxaban    Vaccine History:   Immunization History   Administered Date(s) Administered   • FLUAD TRI 65YR+ 10/31/2019   • Fluzone High Dose =>65 Years ([employer] ONLY) 10/14/2016, 10/16/2017, 11/14/2018   • H1N1 Inj 12/22/2009   • Hepatitis A 09/17/2019, 11/19/2019   • Influenza Quad Vaccine (Inpatient) 09/22/2014   • Influenza, Unspecified 09/18/2013   • Pneumococcal Conjugate 13-Valent (PCV13) 10/12/2015   • Pneumococcal Polysaccharide (PPSV23) 09/24/2013   • Shingrix 09/17/2019, 11/19/2019   • Zostavax 02/12/2013       This patient is managed via a collaborative agreement, pursuant to IC [id number]. The signed protocol is kept in the [facility] at [address] [place] [address].    Education: Efe Malloy has been instructed to call if any changes in medications,  doses, concerns, etc. Patient was provided dosing instructions and expressed verbal understanding and has no further questions at this time.    Plan:  1. Anticoagulation   - decrease by 6 %; warfarin 5 mg PO daily, except warfarin 7.5 mg PO on Mondays.  NEW Total weekly dose = 37.5 mg.   - RTC in 3 week(s)        Bony Chapman, RosettaD  8/16/2022  11:54 EDT

## 2022-09-06 ENCOUNTER — ANTICOAGULATION VISIT (OUTPATIENT)
Dept: PHARMACY | Facility: HOSPITAL | Age: 77
End: 2022-09-06

## 2022-09-06 DIAGNOSIS — I48.19 PERSISTENT ATRIAL FIBRILLATION: Primary | ICD-10-CM

## 2022-09-06 LAB — INR PPP: 2.7 (ref 2–3)

## 2022-09-06 PROCEDURE — 85610 PROTHROMBIN TIME: CPT

## 2022-09-06 PROCEDURE — 36416 COLLJ CAPILLARY BLOOD SPEC: CPT

## 2022-09-06 PROCEDURE — G0463 HOSPITAL OUTPT CLINIC VISIT: HCPCS

## 2022-09-06 NOTE — PROGRESS NOTES
Anticoagulation Clinic Progress Note    INR Goal: 2 - 3  Today's INR: 2.7 (therapeutic)  Current warfarin dose: warfarin 5 mg PO daily, except warfarin 7.5 mg PO on .  Current total weekly dose = 37.5 mg per week.     INR History:  Anticoagulation Monitoring 2022   INR 2.8 3.30 2.70   INR Date 2022   INR Goal 2.0-3.0 2.0-3.0 2.0-3.0   Trend Same Down Same   Last Week Total 40 mg 40 mg 37.5 mg   Next Week Total 40 mg 37.5 mg 37.5 mg   Sun 5 mg 5 mg 5 mg   Mon 7.5 mg 7.5 mg 7.5 mg   Tue 5 mg 5 mg 5 mg   Wed 5 mg 5 mg 5 mg   Thu 7.5 mg 5 mg 5 mg   Fri 5 mg 5 mg 5 mg   Sat 5 mg 5 mg 5 mg   Visit Report - - -   Some recent data might be hidden       Anticoagulation Summary  As of 2022    INR goal:  2.0-3.0   TTR:  75.5 % (3.2 y)   INR used for dosin.70 (2022)   Warfarin maintenance plan:  7.5 mg every Mon; 5 mg all other days   Weekly warfarin total:  37.5 mg   No change documented:  Meeta Chapman, PharmD   Plan last modified:  Meeta Chapman, PharmD (2022)   Next INR check:  10/3/2022   Priority:  Maintenance   Target end date:      Indications    Persistent atrial fibrillation (HCC) [I48.19]             Anticoagulation Episode Summary     INR check location:      Preferred lab:      Send INR reminders to:  [facility] CLINICAL POOL    Comments:  Patient contract signed 11/15/21.      Anticoagulation Care Providers     Provider Role Specialty Phone number    Silvano Gillis MD  Cardiology [phone]          Clinic Interview:   Patient Findings     Negatives:  Signs/symptoms of thrombosis, Signs/symptoms of bleeding,   Laboratory test error suspected, Change in health, Change in alcohol use,   Change in activity, Upcoming invasive procedure, Emergency department   visit, Upcoming dental procedure, Missed doses, Extra doses, Change in   medications, Change in diet/appetite, Hospital admission, Bruising, Other   complaints       Clinical Outcomes     Negatives:  Major bleeding event, Thromboembolic event,   Anticoagulation-related hospital admission, Anticoagulation-related ED   visit, Anticoagulation-related fatality        Current Medications:   Prior to Admission medications    Medication Sig Start Date End Date Taking? Authorizing Provider   albuterol sulfate  (90 Base) MCG/ACT inhaler Inhale 2 puffs Every 6 (Six) Hours As Needed. 7/6/21   Shelby Haskins MD   alfuzosin (UROXATRAL) 10 MG 24 hr tablet Take 10 mg by mouth 2 (Two) Times a Day. 9/23/19   Shelby Haskins MD   aspirin (aspirin) 81 MG EC tablet Take 1 tablet by mouth Daily. 4/11/22   Silvano Gillis MD   baclofen (LIORESAL) 10 MG tablet Take 10 mg by mouth 2 (Two) Times a Day. 7/13/21   Shelby Haskins MD   BREHENRY ELLIPTA 100-25 MCG/INH inhaler Inhale 1 puff Daily. 9/24/19   Shelby Haskins MD   digoxin (LANOXIN) 250 MCG tablet TAKE ONE TABLET BY MOUTH EVERY DAY 6/6/22   Silvano Gillis MD   EPINEPHrine (EPIPEN) 0.3 MG/0.3ML solution auto-injector injection epinephrine 0.3 mg/0.3 mL injection, auto-injector    Shelby Haskins MD   fluorometholone (FML) 0.1 % ophthalmic suspension Administer 1 drop to the right eye Daily. 5/17/21   Shelby Haskins MD   fluticasone (FLONASE) 50 MCG/ACT nasal spray 1 spray into the nostril(s) as directed by provider Daily.    Shelby Haskins MD   gabapentin (NEURONTIN) 300 MG capsule Take 600 mg by mouth 2 (Two) Times a Day. 9/13/19   Shelby Haskins MD   levothyroxine (SYNTHROID, LEVOTHROID) 75 MCG tablet Take 75 mcg by mouth Daily. 9/23/19   Shelby Haskins MD   montelukast (SINGULAIR) 10 MG tablet Take 10 mg by mouth Every Evening. 9/23/19   Shelby Haskins MD   pantoprazole (PROTONIX) 40 MG EC tablet Take 1 tablet by mouth Daily. 2/22/22   Silvano Gillis MD   pravastatin (PRAVACHOL) 40 MG tablet Take 1 tablet by mouth every night at bedtime.  4/18/22   Silvano Gillis MD   sertraline (ZOLOFT) 100 MG tablet Take 1.5 tablets by mouth Daily. 3/15/22   Silvano Gillis MD   theophylline (UNIPHYL) 400 MG 24 hr tablet Take 400 mg by mouth 2 (Two) Times a Day. 9/23/19   Shelby Haskins MD   verapamil ER (VERELAN) 240 MG 24 hr capsule Take 1 capsule by mouth Every Night. 6/28/22   Silvano Gillis MD   warfarin (COUMADIN) 1 MG tablet Take 2.5 mg by mouth 2 (Two) Times a Week. Mon, Thur  with 5mg    Shelby Haskins MD   warfarin (COUMADIN) 5 MG tablet Take 5 mg by mouth Daily.    Shelby Haskins MD       Medical history:   Past Medical History:   Diagnosis Date   • Asthma    • Atrial fibrillation (HCC)    • Atypical chest pain 06/11/2020   • COPD (chronic obstructive pulmonary disease) (HCC)    • COVID    • COVID-19 01/13/2021   • Enlarged prostate    • Hyperlipidemia    • Kidney stone    • Near syncope 12/17/2020   • Sleep apnea        Social history:   Social History     Tobacco Use   • Smoking status: Former Smoker   • Smokeless tobacco: Never Used   Substance Use Topics   • Alcohol use: Yes     Comment: occasionally       Allergies:    Penicillins and Rivaroxaban    Vaccine History:   Immunization History   Administered Date(s) Administered   • FLUAD TRI 65YR+ 10/31/2019   • Fluzone High Dose =>65 Years ([facility] ONLY) 10/14/2016, 10/16/2017, 11/14/2018   • H1N1 Inj 12/22/2009   • Hepatitis A 09/17/2019, 11/19/2019   • Influenza Quad Vaccine (Inpatient) 09/22/2014   • Influenza, Unspecified 09/18/2013   • Pneumococcal Conjugate 13-Valent (PCV13) 10/12/2015   • Pneumococcal Polysaccharide (PPSV23) 09/24/2013   • Shingrix 09/17/2019, 11/19/2019   • Zostavax 02/12/2013       This patient is managed via a collaborative agreement, pursuant to IC 25-26-16. The signed protocol is kept in the [facility] at [address] [place] [address].    Education: Efe Malloy has been instructed to call if  any changes in medications, doses, concerns, etc. Patient was provided dosing instructions and expressed verbal understanding and has no further questions at this time.    Plan:  1. Anticoagulation   - no change; warfarin 5 mg PO daily, except warfarin 7.5 mg PO on Mondays.   Total weekly dose = 37.5 mg.   - RTC in 4 week(s)    Rosetta ConnorD  9/6/2022  12:58 EDT

## 2022-09-07 DIAGNOSIS — I48.19 OTHER PERSISTENT ATRIAL FIBRILLATION: ICD-10-CM

## 2022-09-07 RX ORDER — WARFARIN SODIUM 5 MG/1
TABLET ORAL
Qty: 96 TABLET | Refills: 1 | Status: SHIPPED | OUTPATIENT
Start: 2022-09-07 | End: 2023-02-13

## 2022-10-03 ENCOUNTER — ANTICOAGULATION VISIT (OUTPATIENT)
Dept: PHARMACY | Facility: HOSPITAL | Age: 77
End: 2022-10-03

## 2022-10-03 DIAGNOSIS — I48.19 PERSISTENT ATRIAL FIBRILLATION: Primary | ICD-10-CM

## 2022-10-03 LAB — INR PPP: 2.1 (ref 2–3)

## 2022-10-03 PROCEDURE — 36416 COLLJ CAPILLARY BLOOD SPEC: CPT

## 2022-10-03 PROCEDURE — G0463 HOSPITAL OUTPT CLINIC VISIT: HCPCS

## 2022-10-03 PROCEDURE — 85610 PROTHROMBIN TIME: CPT

## 2022-10-03 NOTE — PROGRESS NOTES
Anticoagulation Clinic Progress Note    INR Goal: 2 - 3  Today's INR: 2.1 (therapeutic)  Current warfarin dose: warfarin 5 mg PO daily, except warfarin 7.5 mg PO on Monday.  Current total weekly dose = 37.5 mg per week.     INR History:  Anticoagulation Monitoring 2022 2022 10/3/2022   INR 3.30 2.70 2.10   INR Date 2022 2022 10/3/2022   INR Goal 2.0-3.0 2.0-3.0 2.0-3.0   Trend Down Same Same   Last Week Total 40 mg 37.5 mg 37.5 mg   Next Week Total 37.5 mg 37.5 mg 37.5 mg   Sun 5 mg 5 mg 5 mg   Mon 7.5 mg 7.5 mg 7.5 mg   Tue 5 mg 5 mg 5 mg   Wed 5 mg 5 mg 5 mg   Thu 5 mg 5 mg 5 mg   Fri 5 mg 5 mg 5 mg   Sat 5 mg 5 mg 5 mg   Visit Report - - -   Some recent data might be hidden       Anticoagulation Summary  As of 10/3/2022    INR goal:  2.0-3.0   TTR:  76.1 % (3.3 y)   INR used for dosin.10 (10/3/2022)   Warfarin maintenance plan:  7.5 mg every Mon; 5 mg all other days   Weekly warfarin total:  37.5 mg   No change documented:  Kavita Jolley, PharmD   Plan last modified:  Meeta Chapman, PharmD (2022)   Next INR check:  10/25/2022   Priority:  Maintenance   Target end date:      Indications    Persistent atrial fibrillation (HCC) [I48.19]             Anticoagulation Episode Summary     INR check location:      Preferred lab:      Send INR reminders to:  [facility] CLINICAL POOL    Comments:  Patient contract signed 11/15/21.      Anticoagulation Care Providers     Provider Role Specialty Phone number    Silvano Gillis MD  Cardiology [phone]          Clinic Interview:   Patient Findings     Positives:  Missed doses (Missed dose approximately 2 weeks ago but   patient is unsure of exact date (no effect on today's INR))    Negatives:  Signs/symptoms of thrombosis, Signs/symptoms of bleeding,   Laboratory test error suspected, Change in health, Change in alcohol use,   Change in activity, Upcoming invasive procedure, Emergency department   visit, Upcoming dental  procedure, Extra doses, Change in medications   (Cefdinir x 10 days for ear infection with last dose being today (may   increase INR)), Change in diet/appetite, Hospital admission, Bruising,   Other complaints      Clinical Outcomes     Negatives:  Major bleeding event, Thromboembolic event,   Anticoagulation-related hospital admission, Anticoagulation-related ED   visit, Anticoagulation-related fatality        Current Medications:   Prior to Admission medications    Medication Sig Start Date End Date Taking? Authorizing Provider   albuterol sulfate  (90 Base) MCG/ACT inhaler Inhale 2 puffs Every 6 (Six) Hours As Needed. 7/6/21   Shelby Haskins MD   alfuzosin (UROXATRAL) 10 MG 24 hr tablet Take 10 mg by mouth 2 (Two) Times a Day. 9/23/19   Shelby Haskins MD   aspirin (aspirin) 81 MG EC tablet Take 1 tablet by mouth Daily. 4/11/22   Silvano Gillsi MD   baclofen (LIORESAL) 10 MG tablet Take 10 mg by mouth 2 (Two) Times a Day. 7/13/21   Shelby Haskins MD   BREO ELLIPTA 100-25 MCG/INH inhaler Inhale 1 puff Daily. 9/24/19   Shelby Haskins MD   digoxin (LANOXIN) 250 MCG tablet TAKE ONE TABLET BY MOUTH EVERY DAY 6/6/22   Silvano Gillis MD   EPINEPHrine (EPIPEN) 0.3 MG/0.3ML solution auto-injector injection epinephrine 0.3 mg/0.3 mL injection, auto-injector    Shelby Haskins MD   fluorometholone (FML) 0.1 % ophthalmic suspension Administer 1 drop to the right eye Daily. 5/17/21   Shelby Haskins MD   fluticasone (FLONASE) 50 MCG/ACT nasal spray 1 spray into the nostril(s) as directed by provider Daily.    Shelby Haskins MD   gabapentin (NEURONTIN) 300 MG capsule Take 600 mg by mouth 2 (Two) Times a Day. 9/13/19   Shelby Haskins MD   levothyroxine (SYNTHROID, LEVOTHROID) 75 MCG tablet Take 75 mcg by mouth Daily. 9/23/19   Shelby Haskins MD   montelukast (SINGULAIR) 10 MG tablet Take 10 mg by mouth Every Evening. 9/23/19   Jozef  MD Shelby   pantoprazole (PROTONIX) 40 MG EC tablet Take 1 tablet by mouth Daily. 2/22/22   Silvano Gillis MD   pravastatin (PRAVACHOL) 40 MG tablet Take 1 tablet by mouth every night at bedtime. 4/18/22   Silvano Gillis MD   sertraline (ZOLOFT) 100 MG tablet Take 1.5 tablets by mouth Daily. 3/15/22   Silvano Gillis MD   theophylline (UNIPHYL) 400 MG 24 hr tablet Take 400 mg by mouth 2 (Two) Times a Day. 9/23/19   Shelby Haskins MD   verapamil ER (VERELAN) 240 MG 24 hr capsule Take 1 capsule by mouth Every Night. 6/28/22   Silvano Gillis MD   warfarin (COUMADIN) 1 MG tablet Take 2.5 mg by mouth 2 (Two) Times a Week. Mon, Thur  with 5mg    Shelby Haskins MD   warfarin (COUMADIN) 5 MG tablet TAKE ONE TABLET BY MOUTH DAILY EXCEPT ON MONDAY AND THURSDAY TAKE ONE AND ONE-HALF TABLETS OR AS DIRECTED 9/7/22   Silvano Gillis MD       Medical history:   Past Medical History:   Diagnosis Date   • Asthma    • Atrial fibrillation (HCC)    • Atypical chest pain 06/11/2020   • COPD (chronic obstructive pulmonary disease) (HCC)    • COVID    • COVID-19 01/13/2021   • Enlarged prostate    • Hyperlipidemia    • Kidney stone    • Near syncope 12/17/2020   • Sleep apnea        Social history:   Social History     Tobacco Use   • Smoking status: Former Smoker   • Smokeless tobacco: Never Used   Substance Use Topics   • Alcohol use: Yes     Comment: occasionally       Allergies:    Penicillins and Rivaroxaban    Vaccine History:   Immunization History   Administered Date(s) Administered   • FLUAD TRI 65YR+ 10/31/2019   • Fluzone High Dose =>65 Years ([employer] ONLY) 10/14/2016, 10/16/2017, 11/14/2018   • H1N1 Inj 12/22/2009   • Hepatitis A 09/17/2019, 11/19/2019   • Influenza Quad Vaccine (Inpatient) 09/22/2014   • Influenza, Unspecified 09/18/2013   • Pneumococcal Conjugate 13-Valent (PCV13) 10/12/2015   • Pneumococcal Polysaccharide (PPSV23) 09/24/2013   • Shingrix  09/17/2019, 11/19/2019   • Zostavax 02/12/2013       This patient is managed via a collaborative agreement, pursuant to IC [id number]. The signed protocol is kept in the [facility] at [address] [place] [address].    Education: Efe Malloy has been instructed to call if any changes in medications, doses, concerns, etc. Patient was provided dosing instructions and expressed verbal understanding and has no further questions at this time.    Plan:  1. Anticoagulation   - no change; warfarin 5 mg PO daily, except warfarin 7.5 mg PO on Monday.  Total weekly dose = 37.5 mg.   - RTC in 3 week(s) in coordination with appointment with Dr. Carlin Jolley, PharmD  10/3/2022  11:20 EDT

## 2022-10-13 ENCOUNTER — DISEASE STATE MANAGEMENT VISIT (OUTPATIENT)
Dept: PHARMACY | Facility: HOSPITAL | Age: 77
End: 2022-10-13

## 2022-10-13 NOTE — PROGRESS NOTES
I have reviewed the notes, assessments, and/or procedures performed by the pharmacy technician, I concur with her/his documentation of Efe Malloy.

## 2022-10-13 NOTE — PROGRESS NOTES
Medication Management Clinic Vaccination Assessment    Initial vaccination needs assessment completed and recommendations discussed telephonically with patient on 10/13/22.     Allergies:    Penicillins and Rivaroxaban    Vaccine History:   Immunization History   Administered Date(s) Administered   • FLUAD TRI 65YR+ 10/31/2019   • Fluzone High Dose =>65 Years ([employer] ONLY) 10/14/2016, 10/16/2017, 11/14/2018   • H1N1 Inj 12/22/2009   • Hepatitis A 09/17/2019, 11/19/2019   • Influenza Quad Vaccine (Inpatient) 09/22/2014   • Influenza, Unspecified 09/18/2013   • Pneumococcal Conjugate 13-Valent (PCV13) 10/12/2015   • Pneumococcal Polysaccharide (PPSV23) 09/24/2013   • Shingrix 09/17/2019, 11/19/2019   • Zostavax 02/12/2013       Assessment:    Patient was screened for indications and contraindications to the following vaccinations:     • Influenza: Up-to-date.   • COVID-19: Up-to-date.   • HPV: No indication for this vaccination.   • HAV: Up-to-date.   • HBV: No indication for this vaccination.   • Tdap: Up-to-date.   • PCV13, PCV15, or PCV20: Up-to-date.   • PPSV23: Up-to-date.   • MMR: No indication for this vaccination.   • Varicella: No indication for this vaccination.   • Zoster: Up-to-date.   • Hib: No indication for this vaccination.   • Meningococcal: No indication for this vaccination.     Plan:  1. Patient is currently up-to-date on all recommended vaccinations.    Sadaf Mitchell, Pharmacy Technician  10/13/2022  15:15 EDT

## 2022-10-24 PROBLEM — E78.2 MIXED HYPERLIPIDEMIA: Status: ACTIVE | Noted: 2020-06-11

## 2022-10-25 ENCOUNTER — ANTICOAGULATION VISIT (OUTPATIENT)
Dept: PHARMACY | Facility: HOSPITAL | Age: 77
End: 2022-10-25

## 2022-10-25 ENCOUNTER — OFFICE VISIT (OUTPATIENT)
Dept: CARDIOLOGY | Facility: CLINIC | Age: 77
End: 2022-10-25

## 2022-10-25 VITALS
DIASTOLIC BLOOD PRESSURE: 88 MMHG | HEART RATE: 66 BPM | OXYGEN SATURATION: 98 % | WEIGHT: 269 LBS | HEIGHT: 73 IN | BODY MASS INDEX: 35.65 KG/M2 | SYSTOLIC BLOOD PRESSURE: 133 MMHG

## 2022-10-25 DIAGNOSIS — E78.2 MIXED HYPERLIPIDEMIA: ICD-10-CM

## 2022-10-25 DIAGNOSIS — I10 ESSENTIAL HYPERTENSION: ICD-10-CM

## 2022-10-25 DIAGNOSIS — I48.19 PERSISTENT ATRIAL FIBRILLATION: Primary | ICD-10-CM

## 2022-10-25 DIAGNOSIS — G45.9 TIA (TRANSIENT ISCHEMIC ATTACK): ICD-10-CM

## 2022-10-25 LAB — INR PPP: 2.8 (ref 2–3)

## 2022-10-25 PROCEDURE — 85610 PROTHROMBIN TIME: CPT

## 2022-10-25 PROCEDURE — 36416 COLLJ CAPILLARY BLOOD SPEC: CPT

## 2022-10-25 PROCEDURE — G0463 HOSPITAL OUTPT CLINIC VISIT: HCPCS

## 2022-10-25 PROCEDURE — 99214 OFFICE O/P EST MOD 30 MIN: CPT | Performed by: INTERNAL MEDICINE

## 2022-10-25 NOTE — PROGRESS NOTES
Anticoagulation Clinic Progress Note    INR Goal: 2 - 3  Today's INR: 2.8 (therapeutic)  Current warfarin dose: warfarin 5 mg PO daily, except warfarin 7.5 mg PO on Monday.  Current total weekly dose = 37.5 mg per week.     INR History:  Anticoagulation Monitoring 2022 10/3/2022 10/25/2022   INR 2.70 2.10 2.80   INR Date 2022 10/3/2022 10/25/2022   INR Goal 2.0-3.0 2.0-3.0 2.0-3.0   Trend Same Same Same   Last Week Total 37.5 mg 37.5 mg 37.5 mg   Next Week Total 37.5 mg 37.5 mg 37.5 mg   Sun 5 mg 5 mg 5 mg   Mon 7.5 mg 7.5 mg 7.5 mg   Tue 5 mg 5 mg 5 mg   Wed 5 mg 5 mg 5 mg   Thu 5 mg 5 mg 5 mg   Fri 5 mg 5 mg 5 mg   Sat 5 mg 5 mg 5 mg   Visit Report - - -   Some recent data might be hidden       Anticoagulation Summary  As of 10/25/2022    INR goal:  2.0-3.0   TTR:  76.5 % (3.3 y)   INR used for dosin.80 (10/25/2022)   Warfarin maintenance plan:  7.5 mg every Mon; 5 mg all other days   Weekly warfarin total:  37.5 mg   No change documented:  Kavita Jolley, PharmD   Plan last modified:  Meeta Chapman, PharmD (2022)   Next INR check:  2022   Priority:  Maintenance   Target end date:      Indications    Persistent atrial fibrillation (HCC) [I48.19]             Anticoagulation Episode Summary     INR check location:      Preferred lab:      Send INR reminders to:  [facility] CLINICAL POOL    Comments:  Patient contract signed 11/15/21.      Anticoagulation Care Providers     Provider Role Specialty Phone number    Silvano Gillis MD  Cardiology [phone]          Clinic Interview:   Patient Findings     Negatives:  Signs/symptoms of thrombosis, Signs/symptoms of bleeding,   Laboratory test error suspected, Change in health, Change in alcohol use,   Change in activity, Upcoming invasive procedure, Emergency department   visit, Upcoming dental procedure, Missed doses, Extra doses, Change in   medications, Change in diet/appetite, Hospital admission, Bruising, Other    complaints      Clinical Outcomes     Negatives:  Major bleeding event, Thromboembolic event,   Anticoagulation-related hospital admission, Anticoagulation-related ED   visit, Anticoagulation-related fatality        Current Medications:   Prior to Admission medications    Medication Sig Start Date End Date Taking? Authorizing Provider   albuterol sulfate  (90 Base) MCG/ACT inhaler Inhale 2 puffs Every 6 (Six) Hours As Needed. 7/6/21   Shelby Haskins MD   alfuzosin (UROXATRAL) 10 MG 24 hr tablet Take 10 mg by mouth 2 (Two) Times a Day. 9/23/19   Shelby Haskins MD   aspirin (aspirin) 81 MG EC tablet Take 1 tablet by mouth Daily. 4/11/22   Silvano Gillis MD   baclofen (LIORESAL) 10 MG tablet Take 10 mg by mouth 2 (Two) Times a Day. 7/13/21   Shelby Haskins MD   BREHENRY ELLIPTA 100-25 MCG/INH inhaler Inhale 1 puff Daily. 9/24/19   Shelby Haskins MD   digoxin (LANOXIN) 250 MCG tablet TAKE ONE TABLET BY MOUTH EVERY DAY 6/6/22   Silvano Gillis MD   EPINEPHrine (EPIPEN) 0.3 MG/0.3ML solution auto-injector injection epinephrine 0.3 mg/0.3 mL injection, auto-injector    Shelby Haskins MD   fluorometholone (FML) 0.1 % ophthalmic suspension Administer 1 drop to the right eye Daily. 5/17/21   Shelby Haskins MD   fluticasone (FLONASE) 50 MCG/ACT nasal spray 1 spray into the nostril(s) as directed by provider Daily.    Shelby Haskins MD   gabapentin (NEURONTIN) 300 MG capsule Take 600 mg by mouth 2 (Two) Times a Day. 9/13/19   Shelby Haskins MD   levothyroxine (SYNTHROID, LEVOTHROID) 75 MCG tablet Take 75 mcg by mouth Daily. 9/23/19   Shelby Haskins MD   montelukast (SINGULAIR) 10 MG tablet Take 10 mg by mouth Every Evening. 9/23/19   Shelby Haskins MD   pantoprazole (PROTONIX) 40 MG EC tablet Take 1 tablet by mouth Daily. 2/22/22   Silvano Gillis MD   pravastatin (PRAVACHOL) 40 MG tablet Take 1 tablet by mouth every night at  bedtime. 4/18/22   Silvano Gillis MD   sertraline (ZOLOFT) 100 MG tablet Take 1.5 tablets by mouth Daily. 3/15/22   Silvano Gillis MD   theophylline (UNIPHYL) 400 MG 24 hr tablet Take 400 mg by mouth 2 (Two) Times a Day. 9/23/19   Shelby Haskins MD   verapamil ER (VERELAN) 240 MG 24 hr capsule Take 1 capsule by mouth Every Night. 6/28/22   Silvano Gillis MD   warfarin (COUMADIN) 1 MG tablet Take 2.5 mg by mouth 2 (Two) Times a Week. Mon, Thur  with 5mg    Shelby Haskins MD   warfarin (COUMADIN) 5 MG tablet TAKE ONE TABLET BY MOUTH DAILY EXCEPT ON MONDAY AND THURSDAY TAKE ONE AND ONE-HALF TABLETS OR AS DIRECTED 9/7/22   Silvano Gillis MD       Medical history:   Past Medical History:   Diagnosis Date   • Asthma    • Atrial fibrillation (HCC)    • Atypical chest pain 06/11/2020   • COPD (chronic obstructive pulmonary disease) (HCC)    • COVID    • COVID-19 01/13/2021   • Enlarged prostate    • Hyperlipidemia    • Hypertension    • Kidney stone    • Near syncope 12/17/2020   • Sleep apnea        Social history:   Social History     Tobacco Use   • Smoking status: Former     Types: Cigarettes   • Smokeless tobacco: Never   Substance Use Topics   • Alcohol use: Yes     Comment: occasionally       Allergies:    Penicillins and Rivaroxaban    Vaccine History:   Immunization History   Administered Date(s) Administered   • FLUAD TRI 65YR+ 10/31/2019   • Fluzone High Dose =>65 Years ([employer] ONLY) 10/14/2016, 10/16/2017, 11/14/2018   • H1N1 Inj 12/22/2009   • Hepatitis A 09/17/2019, 11/19/2019   • Influenza Quad Vaccine (Inpatient) 09/22/2014   • Influenza, Unspecified 09/18/2013   • Pneumococcal Conjugate 13-Valent (PCV13) 10/12/2015   • Pneumococcal Polysaccharide (PPSV23) 09/24/2013   • Shingrix 09/17/2019, 11/19/2019   • Zostavax 02/12/2013       This patient is managed via a collaborative agreement, pursuant to IC 25-26-16. The signed protocol is kept in the [facility] at [facility], [address] [place] [address], [place] [address].    Education: Efe Malloy has been instructed to call if any changes in medications, doses, concerns, etc. Patient was provided dosing instructions and expressed verbal understanding and has no further questions at this time.    Plan:  1. Anticoagulation   - no change; warfarin 5 mg PO daily, except warfarin 7.5 mg PO on Monday.  Total weekly dose = 37.5 mg.   - RTC in 4 week(s)    Rosetta MadisonD  10/25/2022  12:50 EDT

## 2022-11-22 ENCOUNTER — ANTICOAGULATION VISIT (OUTPATIENT)
Dept: PHARMACY | Facility: HOSPITAL | Age: 77
End: 2022-11-22

## 2022-11-22 DIAGNOSIS — I48.19 PERSISTENT ATRIAL FIBRILLATION: Primary | ICD-10-CM

## 2022-11-22 LAB — INR PPP: 2.3 (ref 0.9–1.1)

## 2022-11-22 PROCEDURE — G0463 HOSPITAL OUTPT CLINIC VISIT: HCPCS

## 2022-11-22 PROCEDURE — 36416 COLLJ CAPILLARY BLOOD SPEC: CPT

## 2022-11-22 PROCEDURE — 85610 PROTHROMBIN TIME: CPT

## 2022-11-22 NOTE — PROGRESS NOTES
Anticoagulation Clinic Progress Note    INR Goal: 2 - 3  Today's INR: 2.3 (therapeutic)  Current warfarin dose: warfarin 5 mg PO daily, except warfarin 7.5 mg PO on Monday.  Current total weekly dose = 37.5 mg per week.     INR History:  Anticoagulation Monitoring 10/3/2022 10/25/2022 2022   INR 2.10 2.80 2.30   INR Date 10/3/2022 10/25/2022 2022   INR Goal 2.0-3.0 2.0-3.0 2.0-3.0   Trend Same Same Same   Last Week Total 37.5 mg 37.5 mg 37.5 mg   Next Week Total 37.5 mg 37.5 mg 37.5 mg   Sun 5 mg 5 mg 5 mg   Mon 7.5 mg 7.5 mg 7.5 mg   Tue 5 mg 5 mg 5 mg   Wed 5 mg 5 mg 5 mg   Thu 5 mg 5 mg 5 mg   Fri 5 mg 5 mg 5 mg   Sat 5 mg 5 mg 5 mg   Visit Report - - -   Some recent data might be hidden       Anticoagulation Summary  As of 2022    INR goal:  2.0-3.0   TTR:  77.0 % (3.4 y)   INR used for dosin.30 (2022)   Warfarin maintenance plan:  7.5 mg every Mon; 5 mg all other days; Starting 2022   Weekly warfarin total:  37.5 mg   No change documented:  Elsa Diallo, Pharmacy Intern   Plan last modified:  Meeta Chapman, PharmD (2022)   Next INR check:  2022   Priority:  Maintenance   Target end date:      Indications    Persistent atrial fibrillation (HCC) [I48.19]             Anticoagulation Episode Summary     INR check location:      Preferred lab:      Send INR reminders to:  [facility] CLINICAL POOL    Comments:  Patient contract signed 11/15/21.      Anticoagulation Care Providers     Provider Role Specialty Phone number    Silvano Gillis MD  Cardiology [phone]          Clinic Interview:   Patient Findings     Negatives:  Signs/symptoms of thrombosis, Signs/symptoms of bleeding,   Laboratory test error suspected, Change in health, Change in alcohol use,   Change in activity, Upcoming invasive procedure, Emergency department   visit, Upcoming dental procedure, Missed doses, Extra doses, Change in   medications, Change in diet/appetite,  Hospital admission, Bruising, Other   complaints      Clinical Outcomes     Negatives:  Major bleeding event, Thromboembolic event,   Anticoagulation-related hospital admission, Anticoagulation-related ED   visit, Anticoagulation-related fatality        Current Medications:   Prior to Admission medications    Medication Sig Start Date End Date Taking? Authorizing Provider   albuterol sulfate  (90 Base) MCG/ACT inhaler Inhale 2 puffs Every 6 (Six) Hours As Needed. 7/6/21   hSelby Haskins MD   alfuzosin (UROXATRAL) 10 MG 24 hr tablet Take 10 mg by mouth 2 (Two) Times a Day. 9/23/19   Shelby Haskins MD   aspirin (aspirin) 81 MG EC tablet Take 1 tablet by mouth Daily. 4/11/22   Silvano Gillis MD   baclofen (LIORESAL) 10 MG tablet Take 10 mg by mouth 2 (Two) Times a Day. 7/13/21   Shelby Haskins MD   BREHENRY ELLIPTA 100-25 MCG/INH inhaler Inhale 1 puff Daily. 9/24/19   Shelby Haskins MD   digoxin (LANOXIN) 250 MCG tablet TAKE ONE TABLET BY MOUTH EVERY DAY 6/6/22   Silvano Gillis MD   EPINEPHrine (EPIPEN) 0.3 MG/0.3ML solution auto-injector injection epinephrine 0.3 mg/0.3 mL injection, auto-injector    Shelby Haskins MD   fluorometholone (FML) 0.1 % ophthalmic suspension Administer 1 drop to the right eye Daily. 5/17/21   Shelby Haskins MD   fluticasone (FLONASE) 50 MCG/ACT nasal spray 1 spray into the nostril(s) as directed by provider Daily.    Shelby Haskins MD   gabapentin (NEURONTIN) 300 MG capsule Take 600 mg by mouth 2 (Two) Times a Day. 9/13/19   Shelby Haskins MD   levothyroxine (SYNTHROID, LEVOTHROID) 75 MCG tablet Take 75 mcg by mouth Daily. 9/23/19   Shelby Haskins MD   montelukast (SINGULAIR) 10 MG tablet Take 10 mg by mouth Every Evening. 9/23/19   Shelby Haskins MD   pantoprazole (PROTONIX) 40 MG EC tablet Take 1 tablet by mouth Daily. 2/22/22   Silvano Gillis MD   pravastatin (PRAVACHOL) 40 MG tablet  Take 1 tablet by mouth every night at bedtime. 4/18/22   Silvano Gillis MD   sertraline (ZOLOFT) 100 MG tablet Take 1.5 tablets by mouth Daily. 3/15/22   Silvano Gillis MD   theophylline (UNIPHYL) 400 MG 24 hr tablet Take 400 mg by mouth 2 (Two) Times a Day. 9/23/19   Shelby Haskins MD   verapamil ER (VERELAN) 240 MG 24 hr capsule Take 1 capsule by mouth Every Night. 6/28/22   Silvano Gillis MD   warfarin (COUMADIN) 1 MG tablet Take 2.5 mg by mouth 2 (Two) Times a Week. Mon, Thur  with 5mg    Shelby Haskins MD   warfarin (COUMADIN) 5 MG tablet TAKE ONE TABLET BY MOUTH DAILY EXCEPT ON MONDAY AND THURSDAY TAKE ONE AND ONE-HALF TABLETS OR AS DIRECTED 9/7/22   Silvano Gillis MD       Medical history:   Past Medical History:   Diagnosis Date   • Asthma    • Atrial fibrillation (HCC)    • Atypical chest pain 06/11/2020   • COPD (chronic obstructive pulmonary disease) (HCC)    • COVID    • COVID-19 01/13/2021   • Enlarged prostate    • Hyperlipidemia    • Hypertension    • Kidney stone    • Near syncope 12/17/2020   • Sleep apnea        Social history:   Social History     Tobacco Use   • Smoking status: Former     Types: Cigarettes   • Smokeless tobacco: Never   Substance Use Topics   • Alcohol use: Yes     Comment: occasionally       Allergies:    Penicillins and Rivaroxaban    Vaccine History:   Immunization History   Administered Date(s) Administered   • FLUAD TRI 65YR+ 10/31/2019   • Fluzone High Dose =>65 Years ([employer] ONLY) 10/14/2016, 10/16/2017, 11/14/2018   • H1N1 Inj 12/22/2009   • Hepatitis A 09/17/2019, 11/19/2019   • Influenza Quad Vaccine (Inpatient) 09/22/2014   • Influenza, Unspecified 09/18/2013   • Pneumococcal Conjugate 13-Valent (PCV13) 10/12/2015   • Pneumococcal Polysaccharide (PPSV23) 09/24/2013   • Shingrix 09/17/2019, 11/19/2019   • Zostavax 02/12/2013       This patient is managed via a collaborative agreement, pursuant to IC [id number]. The  signed protocol is kept in the [facility] at [address] [place] [address].    Education: Efe Malloy has been instructed to call if any changes in medications, doses, concerns, etc. Patient was provided dosing instructions and expressed verbal understanding and has no further questions at this time.    Plan:  1. Anticoagulation   - no change; warfarin 5 mg PO daily, except warfarin 7.5 mg PO on Monday.   Total weekly dose = 37.5 mg.   - RTC in 4 week(s)    Elsa Diallo, Pharmacy Intern  11/22/2022  12:41 EST

## 2022-12-05 ENCOUNTER — TREATMENT (OUTPATIENT)
Dept: PHYSICAL THERAPY | Facility: CLINIC | Age: 77
End: 2022-12-05

## 2022-12-05 DIAGNOSIS — R42 VERTIGO: ICD-10-CM

## 2022-12-05 DIAGNOSIS — R26.89 BALANCE PROBLEM: Primary | ICD-10-CM

## 2022-12-05 PROCEDURE — 97162 PT EVAL MOD COMPLEX 30 MIN: CPT | Performed by: PHYSICAL THERAPIST

## 2022-12-05 PROCEDURE — 95992 CANALITH REPOSITIONING PROC: CPT | Performed by: PHYSICAL THERAPIST

## 2022-12-05 PROCEDURE — 97112 NEUROMUSCULAR REEDUCATION: CPT | Performed by: PHYSICAL THERAPIST

## 2022-12-05 NOTE — PROGRESS NOTES
Physical Therapy Initial Evaluation and Plan of Care     [address] [place] [address], [address] [place]  [address]    Patient: Efe Malloy   : 1945  Diagnosis/ICD-10 Code:  Vertigo [R42]  Referring practitioner: Arianne Sanchez MD  Date of Initial Visit: 2022  Today's Date: 2022  Patient seen for 1 sessions           Subjective Questionnaire: DHI: 54      Subjective Evaluation    History of Present Illness  Mechanism of injury: Pt had a stroke back in the spring.  A few weeks ago he had vertigo and he was afraid he was having another stroke.  He went to [facility] because he could not get in to [facility] or his PCP.  He was tested there and they ruled out 2nd CVA.  He had lost vision in his left eye from the first stroke but it is back to normal.  He has had balance issues since the stroke and he has developed dizziness with spinning, off balance, woozy feeling.  He fell into the closet in his room.  He slept for several hours then when he got up he was still spinning.  He gets worse with bending, sit to lying down, looking up, lying down to sitting up and has spinning.     He has difficulty with sleeping, dressing, showering, walking    Pain  Current pain ratin         PRECAUTIONS: CVA 2022; COPD; A fib; asthma; HTN    Objective     Additional subjective information:   Vestibular testing completed:  none   Vision problems/correction: trifocals   Hearing problems: bilat hearing aids   History of falls: x 1      Symptoms last a matter of:  Less than a minute   Symptoms feel like:  Spinning, off balance   Concurrent symptoms:  HA; blurry vision; dec memory      Objective:     Oculomotor and VOR:      Spontaneous nystagmus:  neg    Gaze-evoked nystagmus:  neg    Skew deviation:  neg    Head-shake nystagmus:    Smooth pursuit:    Saccades:    VORc:    Head thrust:  R/L        SVA:  deferred    DVA:  Horiz:              Vert:     LE strength:  Right:  5/5             Left:  5/5   LE AROM:  Right:  WFL                       Left:  WFL     Cervical AROM: rot:  Min limit L; mod limit R  Pain bilat                                                Flex:  Minimal limit pain                                                 Ext:  Moderate limit pain     Vertebral artery screen:  Negative bilat     Cerebellar function:  UE:  finger to nose: deferred                                                       LUCILA:                                     LE:  LUCILA:                                                       Heel to shin:     BPPV Assessment:    Roll Test:  Right:  neg           Left:  neg    [place] Hallpike:  Right:  Pos for upbeating nystagmus x 10 sec                          Left:  neg     MCTSIB:  cond 1:  deferred  `                           cond 2:                              cond 3:                              cond 4:    Gait:  Pt amb (I) with STC in right hand and note wide LACEY, waddling type gait, some veering R and L     Rx:  rx with modified epley maneuver and instr pt in post-treatment restrictions for the next 24 hours      Assessment & Plan     Assessment  Impairments: abnormal gait, impaired balance and safety issue  Functional Limitations: carrying objects, lifting, sleeping, walking, stooping and reaching overhead  Assessment details: Pt is a 76 y/o male with a dx of vertigo and imbalance.  He has a CVA in April that affected his right eye only.  A second CVA was ruled out a few weeks ago when he had the onset of spinning dizziness.     He has difficulty with sleeping, dressing, showering, walking    Pt exhibits the above impairments and functional limitations and would benefit from skilled PT to address those areas and maximize function.    Goals  Plan Goals: STGs:  6 weeks  1.  Pt to tolerate initial HEP/post-treatment regimen.  2.  Pt to tolerate advancement of HEP as indicated.  3.  Pt to report at least 25% improvement in spinning and off balance symptoms.  4.  Resolve BPPV as indicated by negative bilateral Roll  Tests and [place] Hallpike.   5.  Pt to participate in static and dynamic balance assessment after BPPV resolved.    LTGs:  By DC  1.  Pt to be (I) with HEP to manage own condition.  2.  Pt to report at least 80% improvement in spinning symptoms to enable him to sleep normal amount of time and in his normal location.  3.  Pt to improve function as indicated by improved score on DHI as compared to eval to allow pt to ambulate (I) without assistive device.  4.  Pt to improve balance as indicated by improved performance on MCTSIB and/or DGI.  5.  Pt to report at least 80% improvement in overall off balance symptoms to allow pt to shower and dress in his normal manner without LOB or feeling off balance.    Plan  Therapy options: will be seen for skilled therapy services  Planned therapy interventions: neuromuscular re-education, motor coordination training, home exercise program, gait training and balance/weight-bearing training  Frequency: 2x week  Plan details: 12 weeks       See flowsheet for treatment details.    History # of Personal Factors and/or Comorbidities: MODERATE (1-2)  Examination of Body System(s): # of elements: MODERATE (3)  Clinical Presentation: EVOLVING  Clinical Decision Making: MODERATE      Timed:         Manual Therapy:         mins  06672;     Therapeutic Exercise:         mins  34158;     Neuromuscular Den:12        mins  92778;    Therapeutic Activity:          mins  82939;     Gait Training:           mins  56604;     Ultrasound:          mins  35359;    Ionto:                                   mins   71661  Self Care:                            mins   92425  Canalith Repos:          12        mins   81704    Un-Timed:  Electrical Stimulation:         mins  74881 ( );  Traction          mins 18690  Low Eval          Mins  95030  Mod Eval   40       Mins  91420  High Eval                            Mins  79509  Re-Eval                               mins  66287    Timed Treatment: 24      mins   Total Treatment:  64      mins    PT SIGNATURE: Annita Flores, PT   [place] PT Lic. # [id number]    DATE TREATMENT INITIATED: 12/5/2022    Initial Certification  Certification Period: 3/4/2023  I certify that the therapy services are furnished while this patient is under my care.  The services outlined above are required by this patient, and will be reviewed every 90 days.     PHYSICIAN: Arianne Sanchez MD  NPI: [id number]                                       DATE:     Please sign and return via fax to [phone].. Thank you, [facility].

## 2022-12-12 ENCOUNTER — TREATMENT (OUTPATIENT)
Dept: PHYSICAL THERAPY | Facility: CLINIC | Age: 77
End: 2022-12-12

## 2022-12-12 DIAGNOSIS — R42 VERTIGO: Primary | ICD-10-CM

## 2022-12-12 DIAGNOSIS — R26.89 BALANCE PROBLEM: ICD-10-CM

## 2022-12-12 PROCEDURE — 97112 NEUROMUSCULAR REEDUCATION: CPT | Performed by: PHYSICAL THERAPIST

## 2022-12-12 NOTE — PROGRESS NOTES
Physical Therapy Daily Treatment Note    Patient: Efe Malloy   : 1945  Referring practitioner: Arianne Sanchez MD  Today's Date: 2022    VISIT#: 2    Subjective Evaluation    History of Present Illness  Mechanism of injury: Pt reports he drove here today.  He did not take any meds for dizziness today.  The last time he had the dizziness was before he came here for treatment.   Sometimes he feels some weird feeling but not spinning         Objective     See Exercise, Manual, and Modality Logs for complete treatment.     R DH:  Neg  L DH:  Neg  Roll test:  Neg bilat    SVA:    DVA:  Horiz:               Vert:      MCTSIB:  Deferred today      Patient Education:  instr in VOR x 1 sitting for HEP    Assessment & Plan     Assessment    Assessment details: caitlin well today with apparent       Progress per Plan of Care        Timed:         Manual Therapy:         mins  25381;     Therapeutic Exercise:         mins  80954;     Neuromuscular Den: 40       mins  17640;    Therapeutic Activity:          mins  80717;     Gait Training:           mins  59018;     Ultrasound:          mins  48565;    Ionto                                   mins   02635  Self Care                            mins   20522  Canalith Repos                   mins  32470    Un-Timed:  Electrical Stimulation:         mins  33117 ( );  Traction          mins 95338  Low Eval          Mins  67663  Mod Eval          Mins  80713  High Eval                            Mins  35921  Re-Eval                               mins  82953    Timed Treatment: 40     mins   Total Treatment:   40     mins    Annita Flores PT  Physical Therapist  [place] PT Lic. # [id number]

## 2022-12-16 NOTE — PROGRESS NOTES
Cardiology Clinic Note  Silvano Gillis MD, PhD    Subjective:     Encounter Date:10/25/2022      Patient ID: Efe Malloy is a 77 y.o. male.    Chief Complaint:  Chief Complaint   Patient presents with   • Atrial Fibrillation   • Congestive Heart Failure   • Hypertension   • Hyperlipidemia   • Follow-up       HPI:    I the pleasure to see this 76-year-old gentleman today as a new patient, he recently had a stress test ordered by another provider with some atypical chest pain.  He has had low blood pressures recently of even 70-80 systolic with new blood pressure medicine started recently.  His blood pressure today is 90/68 heart rates in the 80s.  He is 258 pounds and has had intentional weight loss down from 275.  He has a known history of chest pain with multiple stress test being ordered all unremarkable previously.  I reviewed interpreted his 1 from the last couple of weeks which demonstrates resting significant abnormalities with decreased uptake in the apex anterior inferior lateral walls broadly with improved stress perfusion but otherwise there is still the perfusion abnormality.  It is difficult to say whether it is reversible ischemia and he has never had definitive evaluation with letter catheterization given continued symptoms which he is requesting a definitive evaluation.  I did discuss letter catheterization risks and benefits and he would wish to proceed if offered.  He has chronic persistent atrial fibrillation which is rate controlled in the 80s to 90s today.  He is on anticoagulation with Coumadin goal INR 2-3.  There were no prosthetic valves in place.  He is on digoxin and verapamil for rate control.  He is also on hydralazine and losartan 100/HCTZ 25 was recently started as a new medicine.  Has been dizzy lightheaded and blood pressure at home again have been in the 70s to 80s and he held the losartan today as a new medicine.  He still has some atypical chest pain waxing and waning described  as substernal pain without radiation diaphoresis.  He has some mild palpitations every now and then but nothing sustained.     Last clinic he presented with episodes of dizziness and unstable gait though do not think he is cardiac related. Blood pressure today is 112/88 heart rates in the 80s. He is a former smoker and continues abstinence after cessation remotely.  He is on alpha-blocker alfuzosin for urologic complaints with BPH and urinary retention status post prostate surgery with significant incontinence he says that he follows with urology regularly.  He continues on digoxin with monitoring with high risk medicines.    He is off losartan and HCTZ at this point, he is only on verapamil for atrial fibrillation persistently, he takes Coumadin for stroke risk reduction chronic persistent atrial fibrillation.  He was recently hospitalized with TIA and unilateral hemianopsia in the right visual fields of the right eye.   Previously he underwent invasive ischemic evaluation secondary to uninformative stress testing with continued episodes of chest pain.  This revealed borderline obstructive disease and a large ramus branch and both superior and inferior limbs.  Both IFR/FFR of both limbs was insignificant with respect to hemodynamic criteria as described.  This does not meet criteria for intervention and was not hemodynamically significant.  There is likely eccentric plaque which is overestimated angiographically.,  0.98 in the superior limb and 0.95 in the inferior limb  With respect to IFR, therefore he was recommended for medical therapy.  EF was 60%.  With his recent TIA and already being on Coumadin it would be advised that he be on some degree of platelet inhibitor including low-dose aspirin which she is amenable for.  Neurology is in agreement with this in addition to continuation of his statin therapy.  He has no new symptoms today no new issues.  Blood pressure 133/88, diet and exercise caloric restriction  activity heart healthy diet was all reinforced with borderline CAD    Follow-up echo April 2022  Mild concentric LVH, EF 65%, mild calcification the aortic valve, negative bubble study for any shunt, grade 1-2 diastolic dysfunction     Review of systems otherwise negative x14 point review of systems except as mentioned above     Historical data copied forward from previous encounters in EMR is unchanged     Left heart catheterization 9/17/2021  Findings next   #1 open aortic pressure, 157/96  2.  Closing pressure was unchanged next   #3 LVEDP 12 to 16 mmHg  4.  Normal LV systolic function EF of 65 to 70%  5.  No transaortic gradient     Angiography next   1 left main large-caliber vessel no angiographic disease  2.  LAD medium caliber vessel coursing anteriorly with mild nonobstructive disease most severely 20 to 30% diffusely with diffuse luminal regularities  3.  Ramus intermedius is a very large caliber bifurcating vessel along anterolateral wall, proximally there is nonobstructive 20 to 30% luminal regularities, at the bifurcation of the superior and inferior limbs there is angiographic 70-80% in the superior limb, 50-60% in the inferior limb with only mild luminal regularities distally.  4.  Circumflex has 1 obtuse marginal branch, OM has 60% eccentric plaque, circumflex proper has mild luminal regularities only with DELFINO-3 flow throughout next   #5 RCA large caliber vessel with nonobstructive disease, mild luminal regularities only including PLV and PDA branches distally with DELFINO-3 flow     Conclusions and recommendations  1 borderline nonobstructive disease most severely in the ramus intermedius branch of the bifurcation involving superior and inferior sizable limbs.  IFR and FFR unremarkable in both limbs measured today as noted above.  Nonobstructive LAD disease, obtuse marginal disease and b luminal regularities only in the RCA.  2.  High normal LVEDP, no transaortic gradient, hyperdynamic LV function 65  to 70%  3.  Continue primary mention goals, aspirin statin beta-blocker, antianginals, may try Ranexa for small vessel disease not amenable to intervention or visible by angiography for microvascular dysfunction with negative work-up today  4.  Patient be discharged with discharge criteria met, follow-up cardiology 2 to 4 weeks for further optimization of his medical therapy     Silvano Gillis MD, PhD     =================================================  Exam today  Irregular, A. fib no rubs gallops no heave no left, faint 1 out of 6 unchanged systolic ejection murmur lower sternal border  No carotid bruits or JVD  No clubbing cyanosis or edema  Obese soft nontender nondistended bowel sounds are positive  Clear to auscultation bilaterally  Normocephalic/atraumatic pupils equal round  Neurologic grossly intact bilaterally  Pulses 2+ radials equal bilaterally  Essentially unchanged     Assessment plan per my encounter  Chest pain, no recurrence  IFR of the ramus at 0.98 and 0.95 with respect to superior and inferior limbs, no significant obstructive disease in other distributions with EF of 60 to 65%.  Obtuse marginal 60% eccentric plaque but nonflow limiting, RCA with luminal regularities, LAD 20 to 30% limb irregularities as well.  Secondary prevention goals     CHF, EF of 60% by LV gram, euvolemic appearing today     Persistent atrial fibrillation, digoxin and verapamil will be continued, get digoxin level  We will consider changing verapamil to metoprolol in the case  he has orthostasis blood pressure stable at 133 systolic today     Hypertension, stable  Stop losartan  Off hydralazine  EF 60% with no clinical heart failure no class I indication for ARB  Continue verapamil for atrial fibrillation indications     Dizziness and hypotension  Remain off losartan HCTZ and hydralazine, symptoms are improved  Verapamil once daily only for A. fib as well as digoxin  We will consider change to beta-blockers if indicated  "from calcium channel blockers if recurrent     Hyperlipidemia continue pravastatin, guide by fasting lipid panel for ASCVD risk calculations, escalate doses per lipid studies    Diet and exercise as allowed by functional status  Weight reduction 10% over the next year he is at 269 pounds from 255 previously    continue smoking abstinence        Silvano Gillis MD, PhD     Further recommendation to follow findings and clinical course and response to therapy     Follow-up 6 months to 1 year       Historical data copied forward from previous encounters in EMR including the history, exam, and assessment/plan has been reviewed and is unchanged unless noted otherwise.    Cardiac medicines reviewed with risk, benefits, and necessity of each discussed.    Risk and benefit of cardiac testing reviewed including death heart attack stroke pain bleeding infection need for vascular /cardiovascular surgery were discussed and the patient     Objective:         /88 (BP Location: Left arm, Patient Position: Sitting, Cuff Size: Large Adult)   Pulse 66   Ht 185.4 cm (72.99\")   Wt 122 kg (269 lb)   SpO2 98%   BMI 35.50 kg/m²     Diagnoses and all orders for this visit:    1. Persistent atrial fibrillation (HCC) (Primary)  -     ECG 12 Lead    2. Essential hypertension  -     ECG 12 Lead    3. Mixed hyperlipidemia  -     ECG 12 Lead    4. TIA (transient ischemic attack)  -     ECG 12 Lead            The pleasure to be involved in this patient's cardiovascular care.  Please call with any questions or concerns  Silvano Gillis MD, PhD    Most recent EKG as reviewed and interpreted by me:    ECG 12 Lead    Date/Time: 10/25/2022 4:54 AM  Performed by: Silvano Gillis MD  Authorized by: Silvano Gillis MD   Comparison: not compared with previous ECG   Previous ECG: no previous ECG available  Rhythm: atrial fibrillation  Rate: normal  Conduction: left anterior fascicular block  Other findings: non-specific ST-T wave " changes    Clinical impression: abnormal EKG             Most recent echo as reviewed and interpreted by me:  Results for orders placed during the hospital encounter of 04/09/22    Adult Transthoracic Echo Complete W/ Cont if Necessary Per Protocol    Interpretation Summary  · Left ventricular wall thickness is consistent with mild concentric hypertrophy.  · Estimated left ventricular EF = 65% Left ventricular systolic function is normal.  · There is calcification of the aortic valve mainly affecting the right coronary cusp(s).  · Saline test results are negative for right to left atrial level shunt.  · Estimated right ventricular systolic pressure from tricuspid regurgitation is normal (<35 mmHg).  · Left ventricular diastolic function is consistent with (grade II w/high LAP) pseudonormalization.      Most recent stress test as reviewed and interpreted by me:  Results for orders placed during the hospital encounter of 08/26/21    Stress Test With Myocardial Perfusion    Interpretation Summary  · Findings consistent with a normal ECG stress test.  · Diaphragmatic attenuation artifact is present.  · Left ventricular ejection fraction is mildly reduced. (Calculated EF = 51%).  · Impressions are consistent with a low risk study.  · Uptake abnormalities some score of 15 at rest and only some score of 8 post regadenoson suggest this is largely due to soft tissue attenuation artifact with no definite evidence of reversible ischemia      Most recent cardiac catheterization as reviewed interpreted by me:  Results for orders placed during the hospital encounter of 09/17/21    Cardiac Catheterization/Vascular Study    Narrative  Silvano Gillis MD, PhD  [facility]  Date of service 9-17-21    Procedure  1.  Left heart catheterization with coronary angiography left ventriculography via right radial approach  2.  Fractional flow reserve of high diagonal versus ramus intermedius superior limb  3.  Fractional flow  reserve ramus intermedius, inferior limb separate branch    Indication  Continued chest pain, uninformative noninvasive studies    After informed consent the patient was brought to the catheterization lab sterilely prepped and draped in usual fashion for the right wrist for right radial access via micropuncture modified Seldinger technique under ultrasound guidance.  5/6 slender sheath was placed with standard cocktail of heparin nitroglycerin and Cardene.  035 guidewire was advanced aortic valve followed by diagnostic catheterization with JL 3 and JR4 6 Georgian catheters.  JR4 was used across aortic valve followed by hand-injection for LV gram, EDP assessment and pullback assessment the transaortic valve gradient.  Secondary to findings of possible obstructive coronary disease with large bifurcating ramus intermedius branch this is what made to perform FFR.  Patient was heparinized for ACT greater than 250 with 100 units/kg of heparin.  Standard FFR was zeroed outside the body, equalized in the left main followed by exam seen to first the inferior limb of the ramus intermedius, IFR was obtained 0.98, the wire was then pulled back followed again by equalization the left main, readvanced to the superior limb of the ramus intermedius separate branch followed by IFR which is 0.95, this was followed by FFR which was 0.87 with good correlated value.  Pullback to the left main revealed no drift at 0.99.  Final angiography revealed no wall trauma, no edge dissections, DELFINO-3 flow in all branches and all catheters and equipment were removed.  Sheath was removed with TR band for hemostasis.  Patient with Cath Lab chest pain-free hemodynamically electrically stable or talking staff neurologically grossly intact bilaterally.      Contrast 170 cc  Conscious sedation time 1 hour with IV Versed and fentanyl administration nurse with complete ECG pulse oximetry and hemodynamic monitoring during the case observed by me  Blood loss  less than 5 cc  Complications none    Findings next  #1 open aortic pressure, 157/96  2.  Closing pressure was unchanged next  #3 LVEDP 12 to 16 mmHg  4.  Normal LV systolic function EF of 65 to 70%  5.  No transaortic gradient    Angiography next  1 left main large-caliber vessel no angiographic disease  2.  LAD medium caliber vessel coursing anteriorly with mild nonobstructive disease most severely 20 to 30% diffusely with diffuse luminal regularities  3.  Ramus intermedius is a very large caliber bifurcating vessel along anterolateral wall, proximally there is nonobstructive 20 to 30% luminal regularities, at the bifurcation of the superior and inferior limbs there is angiographic 70-80% in the superior limb, 50-60% in the inferior limb with only mild luminal regularities distally.  4.  Circumflex has 1 obtuse marginal branch, OM has 60% eccentric plaque, circumflex proper has mild luminal regularities only with DELFINO-3 flow throughout next  #5 RCA large caliber vessel with nonobstructive disease, mild luminal regularities only including PLV and PDA branches distally with DELFINO-3 flow    Conclusions and recommendations  1 borderline nonobstructive disease most severely in the ramus intermedius branch of the bifurcation involving superior and inferior sizable limbs.  IFR and FFR unremarkable in both limbs measured today as noted above.  Nonobstructive LAD disease, obtuse marginal disease and b luminal regularities only in the RCA.  2.  High normal LVEDP, no transaortic gradient, hyperdynamic LV function 65 to 70%  3.  Continue primary mention goals, aspirin statin beta-blocker, antianginals, may try Ranexa for small vessel disease not amenable to intervention or visible by angiography for microvascular dysfunction with negative work-up today  4.  Patient be discharged with discharge criteria met, follow-up cardiology 2 to 4 weeks for further optimization of his medical therapy    Silvano Gillis MD, PhD    The  following portions of the patient's history were reviewed and updated as appropriate: allergies, current medications, past family history, past medical history, past social history, past surgical history and problem list.      ROS:  14 point review of systems negative except as mentioned above    Current Outpatient Medications:   •  albuterol sulfate  (90 Base) MCG/ACT inhaler, Inhale 2 puffs Every 6 (Six) Hours As Needed., Disp: , Rfl:   •  alfuzosin (UROXATRAL) 10 MG 24 hr tablet, Take 10 mg by mouth 2 (Two) Times a Day., Disp: , Rfl: 11  •  aspirin (aspirin) 81 MG EC tablet, Take 1 tablet by mouth Daily., Disp: 90 tablet, Rfl: 3  •  baclofen (LIORESAL) 10 MG tablet, Take 10 mg by mouth 2 (Two) Times a Day., Disp: , Rfl:   •  BREO ELLIPTA 100-25 MCG/INH inhaler, Inhale 1 puff Daily., Disp: , Rfl: 5  •  digoxin (LANOXIN) 250 MCG tablet, TAKE ONE TABLET BY MOUTH EVERY DAY, Disp: 90 tablet, Rfl: 1  •  EPINEPHrine (EPIPEN) 0.3 MG/0.3ML solution auto-injector injection, epinephrine 0.3 mg/0.3 mL injection, auto-injector, Disp: , Rfl:   •  fluorometholone (FML) 0.1 % ophthalmic suspension, Administer 1 drop to the right eye Daily., Disp: , Rfl:   •  fluticasone (FLONASE) 50 MCG/ACT nasal spray, 1 spray into the nostril(s) as directed by provider Daily., Disp: , Rfl:   •  gabapentin (NEURONTIN) 300 MG capsule, Take 600 mg by mouth 2 (Two) Times a Day., Disp: , Rfl: 3  •  levothyroxine (SYNTHROID, LEVOTHROID) 75 MCG tablet, Take 75 mcg by mouth Daily., Disp: , Rfl: 5  •  montelukast (SINGULAIR) 10 MG tablet, Take 10 mg by mouth Every Evening., Disp: , Rfl: 5  •  pantoprazole (PROTONIX) 40 MG EC tablet, Take 1 tablet by mouth Daily., Disp: 90 tablet, Rfl: 2  •  pravastatin (PRAVACHOL) 40 MG tablet, Take 1 tablet by mouth every night at bedtime., Disp: 90 tablet, Rfl: 3  •  sertraline (ZOLOFT) 100 MG tablet, Take 1.5 tablets by mouth Daily., Disp: 30 tablet, Rfl: 0  •  theophylline (UNIPHYL) 400 MG 24 hr tablet, Take  400 mg by mouth 2 (Two) Times a Day., Disp: , Rfl: 5  •  verapamil ER (VERELAN) 240 MG 24 hr capsule, Take 1 capsule by mouth Every Night., Disp: 90 capsule, Rfl: 1  •  warfarin (COUMADIN) 1 MG tablet, Take 2.5 mg by mouth 2 (Two) Times a Week. Mon, Thur  with 5mg, Disp: , Rfl:   •  warfarin (COUMADIN) 5 MG tablet, TAKE ONE TABLET BY MOUTH DAILY EXCEPT ON MONDAY AND THURSDAY TAKE ONE AND ONE-HALF TABLETS OR AS DIRECTED, Disp: 96 tablet, Rfl: 1    Problem List:  Patient Active Problem List   Diagnosis   • Persistent atrial fibrillation (HCC)   • Essential hypertension   • COPD (chronic obstructive pulmonary disease) ([facility])   • Hyperglycemia   • Atypical chest pain   • Abnormal Pap smear of anus   • Congestive heart failure (HCC)   • Depressive disorder   • Gastroesophageal reflux disease   • Mixed hyperlipidemia   • Hypothyroidism   • Neuropathy   • Obstructive sleep apnea syndrome   • Primary osteoarthritis of right knee   • Cervical spondylosis without myelopathy   • Cervico-occipital neuralgia   • Lumbosacral spondylosis without myelopathy   • Near syncope   • Arthritis   • Chronic idiopathic constipation   • Disorder of prostate   • Dysplasia of anus   • Gout   • Hearing loss   • Seasonal allergic rhinitis   • TIA (transient ischemic attack)     Past Medical History:  Past Medical History:   Diagnosis Date   • Allergic    • Anxiety    • Arthritis    • Asthma    • Atrial fibrillation (HCC)    • Atypical chest pain 06/11/2020   • Cataract    • COPD (chronic obstructive pulmonary disease) ([facility])    • COVID    • COVID-19 01/13/2021   • Depression    • Enlarged prostate    • Headache    • Hyperlipidemia    • Hypertension    • Kidney stone    • Near syncope 12/17/2020   • Prostatitis    • Shortness of breath    • Sleep apnea    • Stroke ([facility])    • Urinary tract infection      Past Surgical History:  Past Surgical History:   Procedure Laterality Date   • APPENDECTOMY     • CARDIAC CATHETERIZATION  2007    nonobstructive  dz   • CARDIAC CATHETERIZATION N/A 9/17/2021    Procedure: Left Heart Cath;  Surgeon: Silvano Gillis MD;  Location: [facility] CATH INVASIVE LOCATION;  Service: Cardiology;  Laterality: N/A;   • CERVICAL EPIDURAL      with Dr. Harshil Rodgers mgt   • CHOLECYSTECTOMY     • EYE SURGERY Bilateral     corneal transplant left 09/02/2020 (Right eye 2014)   • JOINT REPLACEMENT Right     knee   • KIDNEY STONE SURGERY     • PROSTATE SURGERY       Social History:  Social History     Socioeconomic History   • Marital status:    Tobacco Use   • Smoking status: Former     Types: Cigarettes   • Smokeless tobacco: Never   Vaping Use   • Vaping Use: Never used   Substance and Sexual Activity   • Alcohol use: Yes     Comment: occasionally   • Drug use: Not Currently   • Sexual activity: Defer     Allergies:  Allergies   Allergen Reactions   • Penicillins Other (See Comments)     AS A CHILD   • Rivaroxaban Other (See Comments)     Immunizations:  Immunization History   Administered Date(s) Administered   • Covid-19 (Pfizer) Gray Cap 05/18/2021, 11/26/2021   • FLUAD TRI 65YR+ 10/31/2019   • Fluzone High Dose =>65 Years ([employer] ONLY) 10/14/2016, 10/16/2017, 11/14/2018   • H1N1 Inj 12/22/2009   • Hepatitis A 09/17/2019, 11/19/2019   • Influenza Quad Vaccine (Inpatient) 09/22/2014   • Influenza, Unspecified 09/18/2013   • Pneumococcal Conjugate 13-Valent (PCV13) 10/12/2015   • Pneumococcal Polysaccharide (PPSV23) 09/24/2013   • Shingrix 09/17/2019, 11/19/2019   • Zostavax 02/12/2013            In-Office Procedure(s):  ECG 12 Lead    (Results Pending)        ASCVD RIsk Score::  The ASCVD Risk score ([place] DK, et al., 2019) failed to calculate for the following reasons:    The patient has a prior MI or stroke diagnosis    Imaging:    Results for orders placed during the hospital encounter of 04/09/22    XR Chest 1 View    Narrative  XR CHEST 1 VW-    Date of Exam: 4/9/2022 3:28 PM    Indication: Acute Stroke Protocol (onset < 12  hrs).    Comparison Exams: June 11, 2020    Technique: Single AP chest radiograph    FINDINGS:  The lungs are clear. The heart and mediastinal contours appear normal.  The pulmonary vasculature appears normal. The osseous structures appear  intact.    Impression  No acute cardiopulmonary process identified.    Electronically Signed By-Sha Rangel MD On:4/9/2022 3:34 PM  This report was finalized on [id number] by  Sha Rangel MD.       Results for orders placed during the hospital encounter of 04/09/22    CT CEREBRAL PERFUSION WITH & WITHOUT CONTRAST    Narrative  DATE OF EXAM:  4/9/2022 3:55 PM    PROCEDURE:  CT CEREBRAL PERFUSION W WO CONTRAST-    INDICATIONS:  Transient visual changes consistent with a stroke    COMPARISON:  CT head from earlier today    TECHNIQUE:  Routine transaxial cuts were obtained through the head without  administration of  contrast. Routine transaxial cuts were then obtained  through the head following the administration of 50 mL of Isovue 370  intravenously. Core blood volume, core blood flow, mean transit time,  and Tmax images were obtained utilizing the Rapid software protocol. A  limited CT angiogram of the head was also performed to measure the blood  vessel density.    FINDINGS:  There is fairly symmetrical abnormal perfusion on the Tmax within the  posterior fossa and bilateral temporo-occipital lobes, favored to be  artifactual. The cerebral blood flow appears normal.    Impression  No definite acute perfusion abnormality identified. Suggestion of  artifact on the Tmax as described above.    Electronically Signed By-Sha Rangel MD On:4/9/2022 4:17 PM  This report was finalized on [id number] by  Sha Rangel MD.      Results for orders placed during the hospital encounter of 04/09/22    CT CEREBRAL PERFUSION WITH & WITHOUT CONTRAST    Narrative  DATE OF EXAM:  4/9/2022 3:55 PM    PROCEDURE:  CT CEREBRAL PERFUSION W WO CONTRAST-    INDICATIONS:  Transient  visual changes consistent with a stroke    COMPARISON:  CT head from earlier today    TECHNIQUE:  Routine transaxial cuts were obtained through the head without  administration of  contrast. Routine transaxial cuts were then obtained  through the head following the administration of 50 mL of Isovue 370  intravenously. Core blood volume, core blood flow, mean transit time,  and Tmax images were obtained utilizing the Rapid software protocol. A  limited CT angiogram of the head was also performed to measure the blood  vessel density.    FINDINGS:  There is fairly symmetrical abnormal perfusion on the Tmax within the  posterior fossa and bilateral temporo-occipital lobes, favored to be  artifactual. The cerebral blood flow appears normal.    Impression  No definite acute perfusion abnormality identified. Suggestion of  artifact on the Tmax as described above.    Electronically Signed By-Sha Rangel MD On:4/9/2022 4:17 PM  This report was finalized on [id number] by  Sha Rangel MD.      Lab Review:   Anticoagulation Visit on 10/25/2022   Component Date Value   • INR 10/25/2022 2.80    Anticoagulation Visit on 10/03/2022   Component Date Value   • INR 10/03/2022 2.10    Anticoagulation Visit on 09/06/2022   Component Date Value   • INR 09/06/2022 2.70    Anticoagulation Visit on 08/16/2022   Component Date Value   • INR 08/16/2022 3.30 (A)    Anticoagulation Visit on 07/18/2022   Component Date Value   • INR 07/18/2022 2.8    Anticoagulation Visit on 06/20/2022   Component Date Value   • INR 06/20/2022 2.70 (A)    Anticoagulation Visit on 05/23/2022   Component Date Value   • INR 05/23/2022 2.3      Recent labs reviewed and interpreted for clinical significance and application            Level of Care:           Silvano Gillis MD  12/16/22  .

## 2022-12-21 ENCOUNTER — ANTICOAGULATION VISIT (OUTPATIENT)
Dept: PHARMACY | Facility: HOSPITAL | Age: 77
End: 2022-12-21

## 2022-12-21 ENCOUNTER — TREATMENT (OUTPATIENT)
Dept: PHYSICAL THERAPY | Facility: CLINIC | Age: 77
End: 2022-12-21

## 2022-12-21 DIAGNOSIS — R42 VERTIGO: Primary | ICD-10-CM

## 2022-12-21 DIAGNOSIS — I48.19 PERSISTENT ATRIAL FIBRILLATION: Primary | ICD-10-CM

## 2022-12-21 DIAGNOSIS — R26.89 BALANCE PROBLEM: ICD-10-CM

## 2022-12-21 LAB — INR PPP: 2.8 (ref 0.9–1.1)

## 2022-12-21 PROCEDURE — 85610 PROTHROMBIN TIME: CPT

## 2022-12-21 PROCEDURE — G0463 HOSPITAL OUTPT CLINIC VISIT: HCPCS

## 2022-12-21 PROCEDURE — 97112 NEUROMUSCULAR REEDUCATION: CPT | Performed by: PHYSICAL THERAPIST

## 2022-12-21 PROCEDURE — 36416 COLLJ CAPILLARY BLOOD SPEC: CPT

## 2022-12-21 NOTE — PROGRESS NOTES
Anticoagulation Clinic Progress Note    INR Goal: 2 - 3  Today's INR: 2.8 (therapeutic)  Current warfarin dose: warfarin 5 mg PO daily, except warfarin 7.5 mg PO on Monday.  Current total weekly dose = 37.5 mg per week.     Patient experienced an episode of vertigo a few weeks ago and was seen at [facility] ED to assess for stroke. No signs of stroke were found and there were no medication changes.    INR History:  Anticoagulation Monitoring 10/25/2022 2022 2022   INR 2.80 2.30 2.80   INR Date 10/25/2022 2022 2022   INR Goal 2.0-3.0 2.0-3.0 2.0-3.0   Trend Same Same Same   Last Week Total 37.5 mg 37.5 mg 37.5 mg   Next Week Total 37.5 mg 37.5 mg 37.5 mg   Sun 5 mg 5 mg 5 mg   Mon 7.5 mg 7.5 mg 7.5 mg   Tue 5 mg 5 mg 5 mg   Wed 5 mg 5 mg 5 mg   Thu 5 mg 5 mg 5 mg   Fri 5 mg 5 mg 5 mg   Sat 5 mg 5 mg 5 mg   Visit Report - - -   Some recent data might be hidden       Anticoagulation Summary  As of 2022    INR goal:  2.0-3.0   TTR:  77.5 % (3.5 y)   INR used for dosin.80 (2022)   Warfarin maintenance plan:  7.5 mg every Mon; 5 mg all other days; Starting 2022   Weekly warfarin total:  37.5 mg   No change documented:  Kamila Knapp, Pharmacy Intern   Plan last modified:  Meeta Chapman, PharmD (2022)   Next INR check:  2023   Priority:  Maintenance   Target end date:      Indications    Persistent atrial fibrillation (HCC) [I48.19]             Anticoagulation Episode Summary     INR check location:      Preferred lab:      Send INR reminders to:  [facility] CLINICAL POOL    Comments:  Patient contract signed 11/15/21.      Anticoagulation Care Providers     Provider Role Specialty Phone number    Silvano Gillis MD  Cardiology [phone]          Clinic Interview:   Patient Findings     Positives:  Emergency department visit (Patient seen at [facility] ED for an   episode of vertigo a few weeks ago. Stroke workup performed and found to   be negative.)     Negatives:  Signs/symptoms of thrombosis, Signs/symptoms of bleeding,   Laboratory test error suspected, Change in health, Change in alcohol use,   Change in activity, Upcoming invasive procedure, Upcoming dental   procedure, Missed doses, Extra doses, Change in medications, Change in   diet/appetite, Hospital admission, Bruising, Other complaints      Clinical Outcomes     Negatives:  Major bleeding event, Thromboembolic event,   Anticoagulation-related hospital admission, Anticoagulation-related ED   visit, Anticoagulation-related fatality        Current Medications:   Prior to Admission medications    Medication Sig Start Date End Date Taking? Authorizing Provider   albuterol sulfate  (90 Base) MCG/ACT inhaler Inhale 2 puffs Every 6 (Six) Hours As Needed. 7/6/21   Shelby Haskins MD   alfuzosin (UROXATRAL) 10 MG 24 hr tablet Take 10 mg by mouth 2 (Two) Times a Day. 9/23/19   Shelby Haskins MD   aspirin (aspirin) 81 MG EC tablet Take 1 tablet by mouth Daily. 4/11/22   Silvano Gillis MD   baclofen (LIORESAL) 10 MG tablet Take 10 mg by mouth 2 (Two) Times a Day. 7/13/21   Shelby Haskins MD   BREO ELLIPTA 100-25 MCG/INH inhaler Inhale 1 puff Daily. 9/24/19   Shelby Haskins MD   digoxin (LANOXIN) 250 MCG tablet TAKE ONE TABLET BY MOUTH EVERY DAY 6/6/22   Silvano Gillis MD   EPINEPHrine (EPIPEN) 0.3 MG/0.3ML solution auto-injector injection epinephrine 0.3 mg/0.3 mL injection, auto-injector    Shelby Haskins MD   fluorometholone (FML) 0.1 % ophthalmic suspension Administer 1 drop to the right eye Daily. 5/17/21   Shelby Haskins MD   fluticasone (FLONASE) 50 MCG/ACT nasal spray 1 spray into the nostril(s) as directed by provider Daily.    Shelby Haskins MD   gabapentin (NEURONTIN) 300 MG capsule Take 600 mg by mouth 2 (Two) Times a Day. 9/13/19   Shelby Haskins MD   levothyroxine (SYNTHROID, LEVOTHROID) 75 MCG tablet Take 75 mcg by  mouth Daily. 9/23/19   Shelby Haskins MD   montelukast (SINGULAIR) 10 MG tablet Take 10 mg by mouth Every Evening. 9/23/19   Shelby Haskins MD   pantoprazole (PROTONIX) 40 MG EC tablet Take 1 tablet by mouth Daily. 2/22/22   Silvano Gillis MD   pravastatin (PRAVACHOL) 40 MG tablet Take 1 tablet by mouth every night at bedtime. 4/18/22   Silvano Gillis MD   sertraline (ZOLOFT) 100 MG tablet Take 1.5 tablets by mouth Daily. 3/15/22   Silvano Gillis MD   theophylline (UNIPHYL) 400 MG 24 hr tablet Take 400 mg by mouth 2 (Two) Times a Day. 9/23/19   Shelby Haskins MD   verapamil ER (VERELAN) 240 MG 24 hr capsule Take 1 capsule by mouth Every Night. 6/28/22   Silvano Gillis MD   warfarin (COUMADIN) 1 MG tablet Take 2.5 mg by mouth 2 (Two) Times a Week. Mon, Thur  with 5mg    Shelby Haskins MD   warfarin (COUMADIN) 5 MG tablet TAKE ONE TABLET BY MOUTH DAILY EXCEPT ON MONDAY AND THURSDAY TAKE ONE AND ONE-HALF TABLETS OR AS DIRECTED 9/7/22   Silvano Gillis MD       Medical history:   Past Medical History:   Diagnosis Date   • Allergic    • Anxiety    • Arthritis    • Asthma    • Atrial fibrillation (HCC)    • Atypical chest pain 06/11/2020   • Cataract    • COPD (chronic obstructive pulmonary disease) (HCC)    • COVID    • COVID-19 01/13/2021   • Depression    • Enlarged prostate    • Headache    • Hyperlipidemia    • Hypertension    • Kidney stone    • Near syncope 12/17/2020   • Prostatitis    • Shortness of breath    • Sleep apnea    • Stroke (HCC)    • Urinary tract infection        Social history:   Social History     Tobacco Use   • Smoking status: Former     Types: Cigarettes   • Smokeless tobacco: Never   Substance Use Topics   • Alcohol use: Yes     Comment: occasionally       Allergies:    Penicillins and Rivaroxaban    Vaccine History:   Immunization History   Administered Date(s) Administered   • Covid-19 (Pfizer) Gray Cap 05/18/2021,  11/26/2021   • FLUAD TRI 65YR+ 10/31/2019   • Fluzone High Dose =>65 Years ([facility] ONLY) 10/14/2016, 10/16/2017, 11/14/2018   • H1N1 Inj 12/22/2009   • Hepatitis A 09/17/2019, 11/19/2019   • Influenza Quad Vaccine (Inpatient) 09/22/2014   • Influenza, Unspecified 09/18/2013   • Pneumococcal Conjugate 13-Valent (PCV13) 10/12/2015   • Pneumococcal Polysaccharide (PPSV23) 09/24/2013   • Shingrix 09/17/2019, 11/19/2019   • Zostavax 02/12/2013       This patient is managed via a collaborative agreement, pursuant to IC [id number]. The signed protocol is kept in the [facility] at [address] [place] [address].    Education: Efe Malloy has been instructed to call if any changes in medications, doses, concerns, etc. Patient was provided dosing instructions and expressed verbal understanding and has no further questions at this time.    Plan:  1. Anticoagulation   - no change; warfarin 5 mg PO daily, except warfarin 7.5 mg PO on Monday.   Total weekly dose = 37.5 mg.   - RTC in 4 week(s)    Kamila Knapp, Pharmacy Intern  12/21/2022  14:55 EST

## 2022-12-21 NOTE — PROGRESS NOTES
Physical Therapy Daily Treatment Note    Patient: Efe Malloy   : 1945  Referring practitioner: Arianne Sanchez MD  Today's Date: 2022    VISIT#: 3    Subjective Evaluation    History of Present Illness  Mechanism of injury: Pt reports he has been ok.  He has had the wooziness  couple of times during sitting to standing.  He tries to come up slowly to be careful.      Pain  Current pain ratin             Objective     See Exercise, Manual, and Modality Logs for complete treatment.     Review of VOR x 1:  Performed all with good quality but was counting with head movements vs timing with external device.  Instr him to use timer/phone for timing.  Repeated with  PT timing pt and he was able to move faster both horiz and vert.       MCTSIB:  cond 1:   30 sec; slight sway  `                 cond 2:  30 sec;  Min sway                     cond 3:  30 sec;  Min sway                     cond 4:  10-15 sec;  Touched shoulder (L) to the wall x 3    Patient Education:  Cont with VOR x 1 with timing with device and add cond 2 MCTSIB for HEP    Assessment & Plan     Assessment    Assessment details: caitlin well with all aspects of care today  New HEP for balance      Progress per Plan of Care        Timed:         Manual Therapy:         mins  22841;     Therapeutic Exercise:         mins  91044;     Neuromuscular Den: 40       mins  31971;    Therapeutic Activity:          mins  16349;     Gait Training:           mins  92675;     Ultrasound:          mins  39410;    Ionto                                   mins   07086  Self Care                            mins   11706  Canalith Repos                   mins  95734    Un-Timed:  Electrical Stimulation:         mins  57528 ( );  Traction          mins 82502  Low Eval          Mins  06783  Mod Eval          Mins  68155  High Eval                            Mins  16536  Re-Eval                               mins  03997    Timed Treatment: 40     mins   Total  Treatment:   40     mins    Annita Flores, PT  Physical Therapist  [place] PT Lic. # [id number]

## 2022-12-22 ENCOUNTER — APPOINTMENT (OUTPATIENT)
Dept: PHARMACY | Facility: HOSPITAL | Age: 77
End: 2022-12-22

## 2022-12-29 ENCOUNTER — TREATMENT (OUTPATIENT)
Dept: PHYSICAL THERAPY | Facility: CLINIC | Age: 77
End: 2022-12-29

## 2022-12-29 DIAGNOSIS — R26.89 BALANCE PROBLEM: ICD-10-CM

## 2022-12-29 DIAGNOSIS — R42 VERTIGO: Primary | ICD-10-CM

## 2022-12-29 PROCEDURE — 95992 CANALITH REPOSITIONING PROC: CPT | Performed by: PHYSICAL THERAPIST

## 2022-12-29 PROCEDURE — 97112 NEUROMUSCULAR REEDUCATION: CPT | Performed by: PHYSICAL THERAPIST

## 2022-12-29 NOTE — PROGRESS NOTES
Physical Therapy Daily Treatment Note    Patient: Efe Malloy   : 1945   Diagnosis/ICD-10 Code:  Vertigo [R42]  Referring practitioner: Arianne Sanchez MD  Today's Date: 2022    VISIT#: 4    Subjective Evaluation    History of Present Illness  Mechanism of injury: Pt reports he has been doing pretty good until this morning in the shower when he bent over.  It happened yesterday too when he sat up from bed and feels like he was going to spin but he didn't. Right now he feels ok but he was not walking right this morning.  He has not taken any meds but he brought them today just in case.  When he does the exercises with the card, he feels like he is moving the card.  He feels better overall still.  He got a lumbar shot last week and it helped the pain some and that helps him to move better in the am.      Pain  Current pain ratin  Location: LBP         Objective     See Exercise, Manual, and Modality Logs for complete treatment.     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 with Rx as in note      Progress per Plan of Care        Timed:         Manual Therapy:         mins  82292;     Therapeutic Exercise:         mins  16071;     Neuromuscular Den:  30      mins  66000;    Therapeutic Activity:          mins  67734;     Gait Training:           mins  06989;     Ultrasound:          mins  82553;    Ionto                                   mins   75679  Self Care                            mins   44677  Canalith Repos           12        mins  99380    Un-Timed:  Electrical Stimulation:         mins  26682 ( );  Traction          mins 89280  Low Eval          Mins  37089  Mod Eval          Mins   35545  High Eval                            Mins  68340  Re-Eval                               mins  11959    Timed Treatment: 42     mins   Total Treatment:  42      mins    Annita Flores, PT  Physical Therapist  [place] PT Lic. # [id number]

## 2023-01-05 ENCOUNTER — TREATMENT (OUTPATIENT)
Dept: PHYSICAL THERAPY | Facility: CLINIC | Age: 78
End: 2023-01-05
Payer: MEDICARE

## 2023-01-05 DIAGNOSIS — R42 VERTIGO: ICD-10-CM

## 2023-01-05 DIAGNOSIS — R26.89 BALANCE PROBLEM: Primary | ICD-10-CM

## 2023-01-05 PROCEDURE — 97112 NEUROMUSCULAR REEDUCATION: CPT | Performed by: PHYSICAL THERAPIST

## 2023-01-05 RX ORDER — DIGOXIN 250 MCG
250 TABLET ORAL DAILY
Qty: 90 TABLET | Refills: 1 | Status: SHIPPED | OUTPATIENT
Start: 2023-01-05

## 2023-01-05 NOTE — PROGRESS NOTES
Physical Therapy Daily Treatment Note    Patient: Efe Malloy   : 1945  Referring practitioner: Arianne Sanchez MD  Diagnoses: Balance problem [R26.89]  Today's Date: 2023    VISIT#: 5    Subjective Evaluation    History of Present Illness  Mechanism of injury: He feels better after last session as far as the spinning goes but he is still wobbling around when he walks.  He is being careful with all of his movements since to make sure he does not spin.  He has felt like he might spin a couple of times since he was here last.     Pain  Current pain ratin         Objective     See Exercise, Manual, and Modality Logs for complete treatment.     Re-assessment:  All canals negative today  Reviewed VOR ex:  Review with cues to time with an external device and to focus on the ex instead of the time  Reviewed balance ex:  Pt performed well.  Again encouraged him to use an external device to time it vs counting in his head    Assessment & Plan     Assessment    Assessment details: All canals negative today  Pt to cont with VOR and balance ex      Progress per Plan of Care        Timed:         Manual Therapy:         mins  98847;     Therapeutic Exercise:         mins  70028;     Neuromuscular Den: 40       mins  71820;    Therapeutic Activity:          mins  71392;     Gait Training:           mins  99389;     Ultrasound:          mins  90678;    Ionto                                   mins   06076  Self Care                            mins   11645  Canalith Repos                   mins  59477    Un-Timed:  Electrical Stimulation:         mins  30781 ( );  Traction          mins 52737  Low Eval          Mins  10544  Mod Eval          Mins  83042  High Eval                            Mins  34173  Re-Eval                               mins  08463    Timed Treatment: 40     mins   Total Treatment:   40     mins    Annita Flores PT  Physical Therapist  [place] PT Lic. # [id number]

## 2023-01-05 NOTE — TELEPHONE ENCOUNTER
Caller: Efe Malloy    Relationship: Self    Best call back number: [phone]    Requested Prescriptions:   Requested Prescriptions     Pending Prescriptions Disp Refills   • digoxin (LANOXIN) 250 MCG tablet 90 tablet 1     Sig: Take 1 tablet by mouth Daily.        Pharmacy where request should be sent:  [place]     Additional details provided by patient: PT SAID THAT HIS PHARMACY HAD ALREADY SENT 3 REFILL REQUESTS. IF THERE IS ANYTHING PREVENTING REFILL, PLEASE CALL AND INFORM PATIENT.     Does the patient have less than a 3 day supply:  [x] Yes  [] No    Would you like a call back once the refill request has been completed: [] Yes [x] No    If the office needs to give you a call back, can they leave a voicemail: [] Yes [x] No    Anna Eden Rep   01/05/23 09:25 EST

## 2023-01-09 ENCOUNTER — TREATMENT (OUTPATIENT)
Dept: PHYSICAL THERAPY | Facility: CLINIC | Age: 78
End: 2023-01-09
Payer: MEDICARE

## 2023-01-09 DIAGNOSIS — R26.89 BALANCE PROBLEM: Primary | ICD-10-CM

## 2023-01-09 DIAGNOSIS — R42 VERTIGO: ICD-10-CM

## 2023-01-09 PROCEDURE — 97112 NEUROMUSCULAR REEDUCATION: CPT | Performed by: PHYSICAL THERAPIST

## 2023-01-09 NOTE — PROGRESS NOTES
Physical Therapy Daily Treatment Note    Patient: Efe Malloy   : 1945  Referring practitioner: Arianne Sanchez MD  Diagnoses: Balance problem [R26.89]  Today's Date: 2023    VISIT#: 6    Subjective Evaluation    History of Present Illness  Mechanism of injury: He has not had any dizziness/spinning since he was here last time.  He still feels very unsteady on his feet and slows his pace and moves very carefully.  In the shower if he looks up he feels off balance but spinning off and on.      Pain  Current pain ratin         Objective     See Exercise, Manual, and Modality Logs for complete treatment.     Review of VOR ex:  Pt with inc speed and good accuracy today; progressed VOR x 2  Reviewed balance:  Cont with minimal sway without LOB noted    Patient Education:  inst pt to do VOR x 2 at home and to stay with same balance level as current again until next visit    Assessment & Plan     Assessment    Assessment details: Pt caitlin well today and was able to progress with VOR today      Progress per Plan of Care        Timed:         Manual Therapy:         mins  42420;     Therapeutic Exercise:         mins  94502;     Neuromuscular Den:  40      mins  60862;    Therapeutic Activity:          mins  19589;     Gait Training:           mins  87569;     Ultrasound:          mins  00070;    Ionto                                   mins   91078  Self Care                            mins   05815  Canalith Repos                   mins  77681    Un-Timed:  Electrical Stimulation:         mins  77146 ( );  Traction          mins 01082  Low Eval          Mins  31490  Mod Eval          Mins  00518  High Eval                            Mins  77562  Re-Eval                               mins  96388    Timed Treatment: 40     mins   Total Treatment:   40     mins    Annita Flores PT  Physical Therapist  [place] PT Lic. # [id number]

## 2023-01-12 ENCOUNTER — TREATMENT (OUTPATIENT)
Dept: PHYSICAL THERAPY | Facility: CLINIC | Age: 78
End: 2023-01-12
Payer: MEDICARE

## 2023-01-12 DIAGNOSIS — R26.89 BALANCE PROBLEM: Primary | ICD-10-CM

## 2023-01-12 DIAGNOSIS — R42 VERTIGO: ICD-10-CM

## 2023-01-12 PROCEDURE — 97112 NEUROMUSCULAR REEDUCATION: CPT | Performed by: PHYSICAL THERAPIST

## 2023-01-12 NOTE — PROGRESS NOTES
Physical Therapy Progress Note/Insurance Note/Medicare Note  [address], [address] [place]  [address]    Patient: Efe Malloy   : 1945  Referring practitioner: Arianne Sanchez MD  Diagnoses: Balance problem [R26.89]  Today's Date: 2023    VISIT#: 7    Subjective Evaluation    History of Present Illness  Mechanism of injury: Pt reports he feels about 50-60% improved from the start of PT.  He still has off balance feelings, wooziness, unsteadiness but it has improved.    DHI:  24    Pain  Current pain ratin         Objective     Pt had resolution of BPPV then had a recurrence with more treatment.  He is currently negative for BPPV with all canals clear.                Oculomotor and VOR:                            Spontaneous nystagmus:  neg                          Gaze-evoked nystagmus:  neg                          Skew deviation:  neg                          Head-shake nystagmus:                          Smooth pursuit:                          Saccades:                          VORc:                          Head thrust:  R/L                                                    SVA:  20/15                          DVA:  Horiz:  20/20                                    Vert:  20/30    * not assessed at [facility]*  But has improved from assessment on 22    Not all at normal level yet                 LE strength:  Right:  5/5                                   Left:  5/5              LE AROM:  Right:  WFL                                 Left:  WFL                 Cervical AROM: rot:  Min limit L; mod limit R  Pain bilat                                                Flex:  Minimal limit pain                                                 Ext:  Moderate limit pain                 Vertebral artery screen:  Negative bilat                 Cerebellar function:  UE:  finger to nose: deferred                                                       LUCILA:                                                 LE:  LUCILA:                                                       Heel to shin:                 BPPV Assessment:                          Roll Test:  Right:  neg                                            Left:  neg                          [place]pike:  Right:  neg                                                Left:  neg                  MCTSIB:  cond 1:   30 sec no inc sway  `                       cond 2:  30 sec;  slight  Sway (improved)                            cond 3:  30 sec;  Min sway                              cond 4:  30 sec;  mod sway (improved)      Gait:  Pt amb (I) without assistive device and note more narrow LACEY, less waddling type gait, some veering R and L but less than at eval.  (improved)     See Exercise, Manual, and Modality Logs for complete treatment.     Review of HEP;  Cont with same eye ex and balance ex       Assessment & Plan     Assessment    Assessment details: Pt has met 4/5 STGs and 2/5 LTGs at this time.  Pt continues to have issues with balance, feeling unsteady.  Pt would benefit from continued skilled PT to address impairments, dec pain, work toward meeting remaining goals and maximize function.     STGs:  6 weeks  1.  Pt to tolerate initial HEP/post-treatment regimen.  MET  2.  Pt to tolerate advancement of HEP as indicated.  MET  3.  Pt to report at least 25% improvement in spinning and off balance symptoms.  MET  4.  Resolve BPPV as indicated by negative bilateral Roll Tests and Raul Hallpike. MET  5.  Pt to participate in static and dynamic balance assessment after BPPV resolved. PROGRESSING    LTGs:  By DC  1.  Pt to be (I) with HEP to manage own condition. PROGRESSING  2.  Pt to report at least 80% improvement in spinning symptoms to enable him to sleep normal amount of time and in his normal location. MET  3.  Pt to improve function as indicated by improved score on DHI as compared to eval to allow pt to ambulate (I) without assistive device. MET  4.  Pt to improve  balance as indicated by improved performance on MCTSIB and/or DGI. PROGRESSING  5.  Pt to report at least 80% improvement in overall off balance symptoms to allow pt to shower and dress in his normal manner without LOB or feeling off balance PROGRESSING      Progress per Plan of Care        Timed:         Manual Therapy:         mins  11398;     Therapeutic Exercise:         mins  08270;     Neuromuscular Den:  40      mins  13282;    Therapeutic Activity:          mins  94422;     Gait Training:           mins  69792;     Ultrasound:          mins  45868;    Ionto                                   mins   21052  Self Care                            mins   62285  Canalith Repos                   mins  80754    Un-Timed:  Electrical Stimulation:         mins  81524 ( );  Traction          mins 80095  Low Eval          Mins  78357  Mod Eval          Mins  46907  High Eval                            Mins  98814  Re-Eval                               mins  30076    Timed Treatment: 40     mins   Total Treatment:   40     mins    Annita Flores PT  Physical Therapist  [place] PT Lic. # [id number]

## 2023-01-16 ENCOUNTER — TREATMENT (OUTPATIENT)
Dept: PHYSICAL THERAPY | Facility: CLINIC | Age: 78
End: 2023-01-16
Payer: MEDICARE

## 2023-01-16 DIAGNOSIS — R42 VERTIGO: ICD-10-CM

## 2023-01-16 DIAGNOSIS — R26.89 BALANCE PROBLEM: Primary | ICD-10-CM

## 2023-01-16 PROCEDURE — 97112 NEUROMUSCULAR REEDUCATION: CPT | Performed by: PHYSICAL THERAPIST

## 2023-01-16 NOTE — PROGRESS NOTES
Physical Therapy Daily Treatment Note    Patient: Efe Malloy   : 1945  Referring practitioner: Arianne Sanchez MD  Diagnoses: Balance problem [R26.89]  Today's Date: 2023    VISIT#: 8    Subjective Evaluation    History of Present Illness  Mechanism of injury: Pt reports he has been doing the ex as instructions     Pain  Current pain ratin         Objective     See Exercise, Manual, and Modality Logs for complete treatment.     Cont with NMR as noted;  Added to HEP based on results of DGI    DGI:      Assessment & Plan     Assessment    Assessment details: caitlin well today with DGI and HEP issued based on score      Progress per Plan of Care        Timed:         Manual Therapy:         mins  34681;     Therapeutic Exercise:         mins  22000;     Neuromuscular Den: 40       mins  70571;    Therapeutic Activity:          mins  20219;     Gait Training:           mins  03339;     Ultrasound:          mins  37224;    Ionto                                   mins   28540  Self Care                            mins   63512  Canalith Repos                   mins  24973    Un-Timed:  Electrical Stimulation:         mins  79962 (MC );  Traction          mins 00858  Low Eval          Mins  59578  Mod Eval          Mins  20405  High Eval                            Mins  74112  Re-Eval                               mins  34090    Timed Treatment: 40     mins   Total Treatment:   40     mins    Annita Flores PT  Physical Therapist  [place] PT Lic. # [id number]

## 2023-01-19 ENCOUNTER — ANTICOAGULATION VISIT (OUTPATIENT)
Dept: PHARMACY | Facility: HOSPITAL | Age: 78
End: 2023-01-19
Payer: MEDICARE

## 2023-01-19 DIAGNOSIS — I48.19 PERSISTENT ATRIAL FIBRILLATION: Primary | ICD-10-CM

## 2023-01-19 LAB — INR PPP: 2.8 (ref 0.9–1.1)

## 2023-01-19 PROCEDURE — 85610 PROTHROMBIN TIME: CPT

## 2023-01-19 PROCEDURE — G0463 HOSPITAL OUTPT CLINIC VISIT: HCPCS

## 2023-01-19 PROCEDURE — 36416 COLLJ CAPILLARY BLOOD SPEC: CPT

## 2023-01-19 NOTE — PROGRESS NOTES
Anticoagulation Clinic Progress Note    INR Goal: 2 - 3  Today's INR: 2.8 (therapeutic)  Current warfarin dose: warfarin 5 mg PO daily, except warfarin 7.5 mg PO on Monday. Current total weekly dose = 37.5 mg per week.     INR History:  Anticoagulation Monitoring 2022   INR 2.30 2.80 2.80   INR Date 2022   INR Goal 2.0-3.0 2.0-3.0 2.0-3.0   Trend Same Same Same   Last Week Total 37.5 mg 37.5 mg 37.5 mg   Next Week Total 37.5 mg 37.5 mg 37.5 mg   Sun 5 mg 5 mg 5 mg   Mon 7.5 mg 7.5 mg 7.5 mg   Tue 5 mg 5 mg 5 mg   Wed 5 mg 5 mg 5 mg   Thu 5 mg 5 mg 5 mg   Fri 5 mg 5 mg 5 mg   Sat 5 mg 5 mg 5 mg   Visit Report - - -   Some recent data might be hidden       Anticoagulation Summary  As of 2023    INR goal:  2.0-3.0   TTR:  78.0 % (3.6 y)   INR used for dosin.80 (2023)   Warfarin maintenance plan:  7.5 mg every Mon; 5 mg all other days; Starting 2023   Weekly warfarin total:  37.5 mg   No change documented:  Kamila Knapp, Pharmacy Intern   Plan last modified:  Meeta Chapman, PharmD (2022)   Next INR check:  2023   Priority:  Maintenance   Target end date:      Indications    Persistent atrial fibrillation (HCC) [I48.19]             Anticoagulation Episode Summary     INR check location:      Preferred lab:      Send INR reminders to:  [facility] CLINICAL POOL    Comments:  Patient contract signed 11/15/21.      Anticoagulation Care Providers     Provider Role Specialty Phone number    Silvano Gillis MD  Cardiology [phone]          Clinic Interview:   Patient Findings     Negatives:  Signs/symptoms of thrombosis, Signs/symptoms of bleeding,   Laboratory test error suspected, Change in health, Change in alcohol use,   Change in activity, Upcoming invasive procedure, Emergency department   visit, Upcoming dental procedure, Missed doses, Extra doses, Change in   medications, Change in diet/appetite, Hospital  admission, Bruising, Other   complaints      Clinical Outcomes     Negatives:  Major bleeding event, Thromboembolic event,   Anticoagulation-related hospital admission, Anticoagulation-related ED   visit, Anticoagulation-related fatality        Current Medications:   Prior to Admission medications    Medication Sig Start Date End Date Taking? Authorizing Provider   albuterol sulfate  (90 Base) MCG/ACT inhaler Inhale 2 puffs Every 6 (Six) Hours As Needed. 7/6/21   Shelby Haskins MD   alfuzosin (UROXATRAL) 10 MG 24 hr tablet Take 10 mg by mouth 2 (Two) Times a Day. 9/23/19   Shelby Haskins MD   aspirin (aspirin) 81 MG EC tablet Take 1 tablet by mouth Daily. 4/11/22   Silvano Gillis MD   baclofen (LIORESAL) 10 MG tablet Take 10 mg by mouth 2 (Two) Times a Day. 7/13/21   Shelby Haskins MD   BREHENRY ELLIPTA 100-25 MCG/INH inhaler Inhale 1 puff Daily. 9/24/19   Shelby Haskins MD   digoxin (LANOXIN) 250 MCG tablet Take 1 tablet by mouth Daily. 1/5/23   Silvano Gillis MD   EPINEPHrine (EPIPEN) 0.3 MG/0.3ML solution auto-injector injection epinephrine 0.3 mg/0.3 mL injection, auto-injector    Shelby Haskins MD   fluorometholone (FML) 0.1 % ophthalmic suspension Administer 1 drop to the right eye Daily. 5/17/21   Shelby Haskins MD   fluticasone (FLONASE) 50 MCG/ACT nasal spray 1 spray into the nostril(s) as directed by provider Daily.    Shelby Haskins MD   gabapentin (NEURONTIN) 300 MG capsule Take 600 mg by mouth 2 (Two) Times a Day. 9/13/19   Shelby Haskins MD   levothyroxine (SYNTHROID, LEVOTHROID) 75 MCG tablet Take 75 mcg by mouth Daily. 9/23/19   Shelby Haskins MD   montelukast (SINGULAIR) 10 MG tablet Take 10 mg by mouth Every Evening. 9/23/19   Shelby Haskins MD   pantoprazole (PROTONIX) 40 MG EC tablet Take 1 tablet by mouth Daily. 2/22/22   Silvano Gillis MD   pravastatin (PRAVACHOL) 40 MG tablet Take 1 tablet by  mouth every night at bedtime. 4/18/22   Silvano Gillis MD   sertraline (ZOLOFT) 100 MG tablet Take 1.5 tablets by mouth Daily. 3/15/22   Silvano Gillis MD   theophylline (UNIPHYL) 400 MG 24 hr tablet Take 400 mg by mouth 2 (Two) Times a Day. 9/23/19   Shelby Haskins MD   verapamil ER (VERELAN) 240 MG 24 hr capsule Take 1 capsule by mouth Every Night. 6/28/22   Silvano Gillis MD   warfarin (COUMADIN) 1 MG tablet Take 2.5 mg by mouth 2 (Two) Times a Week. Mon, Thur  with 5mg    Shelby Haskins MD   warfarin (COUMADIN) 5 MG tablet TAKE ONE TABLET BY MOUTH DAILY EXCEPT ON MONDAY AND THURSDAY TAKE ONE AND ONE-HALF TABLETS OR AS DIRECTED 9/7/22   Silvano Gillis MD       Medical history:   Past Medical History:   Diagnosis Date   • Allergic    • Anxiety    • Arthritis    • Asthma    • Atrial fibrillation (HCC)    • Atypical chest pain 06/11/2020   • Cataract    • COPD (chronic obstructive pulmonary disease) (HCC)    • COVID    • COVID-19 01/13/2021   • Depression    • Enlarged prostate    • Headache    • Hyperlipidemia    • Hypertension    • Kidney stone    • Near syncope 12/17/2020   • Prostatitis    • Shortness of breath    • Sleep apnea    • Stroke (HCC)    • Urinary tract infection        Social history:   Social History     Tobacco Use   • Smoking status: Former     Types: Cigarettes   • Smokeless tobacco: Never   Substance Use Topics   • Alcohol use: Yes     Comment: occasionally       Allergies:    Penicillins and Rivaroxaban    Vaccine History:   Immunization History   Administered Date(s) Administered   • Covid-19 (Pfizer) Gray Cap 05/18/2021, 11/26/2021   • FLUAD TRI 65YR+ 10/31/2019   • Fluzone High Dose =>65 Years ([facility] ONLY) 10/14/2016, 10/16/2017, 11/14/2018   • H1N1 Inj 12/22/2009   • Hepatitis A 09/17/2019, 11/19/2019   • Influenza Quad Vaccine (Inpatient) 09/22/2014   • Influenza, Unspecified 09/18/2013   • Pneumococcal Conjugate 13-Valent (PCV13)  10/12/2015   • Pneumococcal Polysaccharide (PPSV23) 09/24/2013   • Shingrix 09/17/2019, 11/19/2019   • Zostavax 02/12/2013       This patient is managed via a collaborative agreement, pursuant to IC [id number]. The signed protocol is kept in the [facility] at [address] [place] [address].    Education: Efe Malloy has been instructed to call if any changes in medications, doses, concerns, etc. Patient was provided dosing instructions and expressed verbal understanding and has no further questions at this time.    Plan:  1. Anticoagulation   - no change; warfarin 5 mg PO daily, except warfarin 7.5 mg PO on Monday. Total weekly dose = 37.5 mg.   - RTC in 4 week(s)    Kamila Knapp, Pharmacy Intern  1/19/2023  13:19 EST

## 2023-01-23 ENCOUNTER — TREATMENT (OUTPATIENT)
Dept: PHYSICAL THERAPY | Facility: CLINIC | Age: 78
End: 2023-01-23
Payer: MEDICARE

## 2023-01-23 DIAGNOSIS — R26.89 BALANCE PROBLEM: Primary | ICD-10-CM

## 2023-01-23 DIAGNOSIS — R42 VERTIGO: ICD-10-CM

## 2023-01-23 PROCEDURE — 97112 NEUROMUSCULAR REEDUCATION: CPT | Performed by: PHYSICAL THERAPIST

## 2023-01-23 PROCEDURE — 97530 THERAPEUTIC ACTIVITIES: CPT | Performed by: PHYSICAL THERAPIST

## 2023-01-23 NOTE — PROGRESS NOTES
Physical Therapy Daily Treatment Note    Patient: Efe Malloy   : 1945  Referring practitioner: Arianne Sanchez MD  Diagnoses: Balance problem [R26.89]  Today's Date: 2023    VISIT#: 9    Subjective Evaluation    History of Present Illness  Mechanism of injury: Pt only did ex once yesterday but otherwise he does them 3 times.  He had some feelings of being off balance in general especially yesterday.    Pain  Current pain ratin         Objective     See Exercise, Manual, and Modality Logs for complete treatment.     Cont with NMR, act today as noted;  Progressed with static balance ex     Pt required max cues for tech on VOR ex today due to not moving card or not moving his head, looking at the target vs eyes with head    Assessment & Plan     Assessment    Assessment details: caitlin well with all aspects of treatment but with difficulty with VOR ex today      Progress per Plan of Care        Timed:         Manual Therapy:         mins  06270;     Therapeutic Exercise:         mins  31154;     Neuromuscular Den:  30      mins  55467;    Therapeutic Activity:    12      mins  13344;     Gait Training:           mins  28690;     Ultrasound:          mins  99868;    Ionto                                   mins   26508  Self Care                            mins   82456  Canalith Repos                   mins  26836    Un-Timed:  Electrical Stimulation:         mins  13533 ( );  Traction          mins 09095  Low Eval          Mins  24966  Mod Eval          Mins  22636  High Eval                            Mins  80420  Re-Eval                               mins  53651    Timed Treatment: 42     mins   Total Treatment:   42     mins    Annita Flores PT  Physical Therapist  [place] PT Lic. # [id number]

## 2023-01-25 ENCOUNTER — TREATMENT (OUTPATIENT)
Dept: PHYSICAL THERAPY | Facility: CLINIC | Age: 78
End: 2023-01-25
Payer: MEDICARE

## 2023-01-25 DIAGNOSIS — R26.89 BALANCE PROBLEM: Primary | ICD-10-CM

## 2023-01-25 DIAGNOSIS — R42 VERTIGO: ICD-10-CM

## 2023-01-25 PROCEDURE — 97112 NEUROMUSCULAR REEDUCATION: CPT | Performed by: PHYSICAL THERAPIST

## 2023-01-25 NOTE — PROGRESS NOTES
"Physical Therapy Daily Treatment Note    Patient: Efe Malloy   : 1945  Referring practitioner: Arianne Sanchez MD  Diagnoses: Balance problem [R26.89]  Today's Date: 2023    VISIT#: 10    Subjective Evaluation    History of Present Illness  Mechanism of injury: Pt did not have a good day yesterday while shopping; he had that \"off\"  Feeling again and had to go home earlier than planned.  He does not feel it today so far.    Pain  Current pain ratin         Objective     See Exercise, Manual, and Modality Logs for complete treatment.     Cont with NMR with review of current HEP and added new ex to HEP    Patient Education:  Max cues both verbal and tactile required for new exercise    Assessment & Plan     Assessment    Assessment details: Pt caitlin well with review and new ex today      Progress per Plan of Care        Timed:         Manual Therapy:         mins  99994;     Therapeutic Exercise:         mins  74837;     Neuromuscular Den: 40       mins  89663;    Therapeutic Activity:          mins  06485;     Gait Training:           mins  16660;     Ultrasound:          mins  50061;    Ionto                                   mins   14820  Self Care                            mins   32427  Canalith Repos                   mins  79830    Un-Timed:  Electrical Stimulation:         mins  99098 ( );  Traction          mins 27394  Low Eval          Mins  22168  Mod Eval          Mins  97255  High Eval                            Mins  03550  Re-Eval                               mins  20331    Timed Treatment: 40     mins   Total Treatment:   40    mins    Annita Flores PT  Physical Therapist  [place] PT Lic. # [id number]  "

## 2023-02-01 ENCOUNTER — TREATMENT (OUTPATIENT)
Dept: PHYSICAL THERAPY | Facility: CLINIC | Age: 78
End: 2023-02-01
Payer: MEDICARE

## 2023-02-01 DIAGNOSIS — R42 VERTIGO: ICD-10-CM

## 2023-02-01 DIAGNOSIS — R26.89 BALANCE PROBLEM: Primary | ICD-10-CM

## 2023-02-01 PROCEDURE — 97112 NEUROMUSCULAR REEDUCATION: CPT | Performed by: PHYSICAL THERAPIST

## 2023-02-01 PROCEDURE — 97530 THERAPEUTIC ACTIVITIES: CPT | Performed by: PHYSICAL THERAPIST

## 2023-02-01 NOTE — PROGRESS NOTES
Physical Therapy Progress Note/Insurance Auth Note  [address], [address] [place]  [address]    Patient: Efe Malloy   : 1945  Referring practitioner: Arianne Sanchez MD  Diagnoses: Balance problem [R26.89]  Today's Date: 2023    VISIT#: 11    Subjective Evaluation    History of Present Illness  Mechanism of injury: Pt rates improvement at  75% from the start of PT.  He would have rated it higher as he had a terrible day last Friday and he had to take the Meclizine.  He held off on the exercises until Saturday and he was better and was able to do them.  He does not understand what happened for sure but it has not been back since then.  He had congestion and drainage start about the same time.  He had been sick a few weeks ago and he had meds to take with a second Rx if needed. So he took that second one and it has helped.      Pain  Current pain ratin         Objective     ** Pt had resolution of BPPV then had a recurrence with more treatment.  He is currently negative for BPPV with all canals clear **                 Oculomotor and VOR:                            Spontaneous nystagmus:  neg                          Gaze-evoked nystagmus:  neg                          Skew deviation:  neg                          Head-shake nystagmus: neg                          Smooth pursuit:  neg                          Saccades:  positive                          VORc:  neg                          Head thrust:  R/L  Not assessed                                                    SVA:  20/15                          DVA:  Horiz:                                      Vert:   (improved)                                           LE strength:  Right:  5/5                                   Left:  5/5              LE AROM:  Right:  WFL                                 Left:  WFL                 Cervical AROM: rot:  Min limit L; mod limit R  Pain  bilat                                                Flex:  Minimal limit pain                                                 Ext:  Moderate limit pain                 Vertebral artery screen:  Negative bilat                 Cerebellar function:  UE:  finger to nose:  Impaired right; normal left                                                       LUCILA:  Normal bilat                                                LE:  LUCILA:  impaired right; normal                                                                       BPPV Assessment:                          Roll Test:  Right:  neg                                            Left:  neg                          Raul Hallpike:  Right:  neg                                                Left:  neg                  MCTSIB:  cond 1:   30 sec no inc sway  `                             cond 2:  30 sec;  no inc  Sway (improved)                                cond 3:  30 sec;  Min sway                                  cond 4:  30 sec;  min inc  sway (improved)      Gait:  Pt amb (I) without assistive device and note more narrow LACEY, less waddling type gait, some veering R and L but less than at eval.      DGI:  21/24 with deficits in gait with horizontal and vertical movements; gait with stop/pivot turns     See Exercise, Manual, and Modality Logs for complete treatment.     Patient Education:  Cont with same exercises without progression today due to flare up over the last several days.     Assessment/Plan     Pt has met 5/5 STGs and 2/5 LTGs at this time.  Pt continues to have issues with balance, feeling unsteady.  He has met 1 more STG and no more LTGs at this re-assessment.  He would have met 2 more LTGs if he had would not have had the flare up over the past several days.  Pt would benefit from continued skilled PT to address impairments, work toward meeting remaining goals and maximize function.      STGs:  6 weeks  1.  Pt to tolerate initial HEP/post-treatment regimen.   MET  2.  Pt to tolerate advancement of HEP as indicated.  MET  3.  Pt to report at least 25% improvement in spinning and off balance symptoms.  MET  4.  Resolve BPPV as indicated by negative bilateral Roll Tests and Raul Hallpike. MET  5.  Pt to participate in static and dynamic balance assessment after BPPV MET    LTGs:  By DC  1.  Pt to be (I) with HEP to manage own condition. PROGRESSING  2.  Pt to report at least 80% improvement in spinning symptoms to enable him to sleep normal amount of time and in his normal location. MET  3.  Pt to improve function as indicated by improved score on DHI as compared to eval to allow pt to ambulate (I) without assistive device. MET  4.  Pt to improve balance as indicated by improved performance on MCTSIB and/or DGI. PROGRESSING  5.  Pt to report at least 80% improvement in overall off balance symptoms to allow pt to shower and dress in his normal manner without LOB or feeling off balance.  PROGRESSING      Progress per Plan of Care        Timed:         Manual Therapy:         mins  98392;     Therapeutic Exercise:         mins  45750;     Neuromuscular Den: 30       mins  79895;    Therapeutic Activity:   10       mins  46188;     Gait Training:           mins  33257;     Ultrasound:          mins  28445;    Ionto                                   mins   11752  Self Care                            mins   93397  Canalith Repos                   mins  14374    Un-Timed:  Electrical Stimulation:         mins  61428 ( );  Traction          mins 12197  Low Eval          Mins  31241  Mod Eval          Mins  51190  High Eval                           Mins  01269  Re-Eval                               mins  82176    Timed Treatment:  40    mins   Total Treatment:   40     mins    Annita Flores PT  Physical Therapist  [place] PT Lic. # [id number]

## 2023-02-02 ENCOUNTER — TELEPHONE (OUTPATIENT)
Dept: CARDIOLOGY | Facility: CLINIC | Age: 78
End: 2023-02-02

## 2023-02-02 NOTE — TELEPHONE ENCOUNTER
Caller: Efe Malloy    Relationship to patient: Self    Best call back number: [phone]    Patient is needing: PATIENT RECEIVED LETTER IN MAIL FROM [employer] THAT HE EXCEEDED HIS LIMIT ON THE MEDICATION DIGOXIN. PATIENT REQUESTING THE OFFICE CONTACT HIS INSURANCE STATING THAT THE PATIENT NEEDS TO STAY ON THIS MEDICATION SO HE CAN CONTINUE.

## 2023-02-06 ENCOUNTER — TREATMENT (OUTPATIENT)
Dept: PHYSICAL THERAPY | Facility: CLINIC | Age: 78
End: 2023-02-06
Payer: MEDICARE

## 2023-02-06 DIAGNOSIS — R26.89 BALANCE PROBLEM: Primary | ICD-10-CM

## 2023-02-06 DIAGNOSIS — R42 VERTIGO: ICD-10-CM

## 2023-02-06 PROCEDURE — 97112 NEUROMUSCULAR REEDUCATION: CPT | Performed by: PHYSICAL THERAPIST

## 2023-02-06 NOTE — PROGRESS NOTES
Physical Therapy Daily Treatment Note    Patient: Efe Maloly   : 1945  Referring practitioner: Arianne Sanchez MD  Diagnoses: Balance problem [R26.89]  Today's Date: 2023    VISIT#: 12    Subjective Evaluation    History of Present Illness  Mechanism of injury: Pt is still congested and has a gravelly voice.  He did not sleep well last night.  He is done with the ABT and is not taking anything for the congestion otherwise.  He has been doing the exercises except for one day he was busy with great grandkids.    Pain  Current pain ratin           Objective     See Exercise, Manual, and Modality Logs for complete treatment.     Cont with NMR and progressed as noted in flow sheet      Assessment & Plan     Assessment    Assessment details: caitlin well today with progressions for HEP      Progress per Plan of Care        Timed:         Manual Therapy:         mins  24905;     Therapeutic Exercise:         mins  91430;     Neuromuscular Den:  28      mins  55356;    Therapeutic Activity:          mins  66034;     Gait Training:           mins  91650;     Ultrasound:          mins  57630;    Ionto                                   mins   11922  Self Care                            mins   07507  Canalith Repos                   mins  51022    Un-Timed:  Electrical Stimulation:         mins  28055 ( );  Traction          mins 65783  Low Eval          Mins  87951  Mod Eval          Mins  54761  High Eval                            Mins  70952  Re-Eval                              mins  70759    Timed Treatment: 28     mins   Total Treatment:   28     mins    Annita Flores PT  Physical Therapist  [place] PT Lic. # [id number]

## 2023-02-09 ENCOUNTER — TELEPHONE (OUTPATIENT)
Dept: PHYSICAL THERAPY | Facility: CLINIC | Age: 78
End: 2023-02-09

## 2023-02-12 DIAGNOSIS — I48.19 OTHER PERSISTENT ATRIAL FIBRILLATION: ICD-10-CM

## 2023-02-13 ENCOUNTER — TREATMENT (OUTPATIENT)
Dept: PHYSICAL THERAPY | Facility: CLINIC | Age: 78
End: 2023-02-13
Payer: MEDICARE

## 2023-02-13 DIAGNOSIS — R26.89 BALANCE PROBLEM: Primary | ICD-10-CM

## 2023-02-13 DIAGNOSIS — R42 VERTIGO: ICD-10-CM

## 2023-02-13 PROCEDURE — 97112 NEUROMUSCULAR REEDUCATION: CPT | Performed by: PHYSICAL THERAPIST

## 2023-02-13 RX ORDER — WARFARIN SODIUM 5 MG/1
TABLET ORAL
Qty: 96 TABLET | Refills: 1 | Status: SHIPPED | OUTPATIENT
Start: 2023-02-13

## 2023-02-13 RX ORDER — VERAPAMIL HYDROCHLORIDE 240 MG/1
CAPSULE, EXTENDED RELEASE ORAL
Qty: 90 CAPSULE | Refills: 1 | Status: SHIPPED | OUTPATIENT
Start: 2023-02-13

## 2023-02-13 NOTE — PROGRESS NOTES
Physical Therapy Daily Treatment Note    Patient: Efe Malloy   : 1945  Referring practitioner: Arianne Sanchez MD  Diagnoses: Balance problem [R26.89]  Today's Date: 2023    VISIT#: 13    Subjective Evaluation    History of Present Illness  Mechanism of injury: Pt is still congested and with drainage and he has been struggling with his overall balance with a woozy feeling.  He has struggled with the patterned background that was added new last session.  He has not been feeling well overall.  He is using a new ear drop (does not remember what it is for) and he will go back in 3 weeks.      Pain  Current pain ratin         Objective     See Exercise, Manual, and Modality Logs for complete treatment.     Cont with NMR and reviewed newest VOR exercise ad added vertical motions to it      Assessment & Plan     Assessment    Assessment details: Pt has regressed some due to continued congestion             Timed:         Manual Therapy:         mins  71826;     Therapeutic Exercise:         mins  59710;     Neuromuscular Den: 24       mins  60902;    Therapeutic Activity:          mins  66447;     Gait Training:           mins  44364;     Ultrasound:          mins  24137;    Ionto                                   mins   92750  Self Care                            mins   81260  Canalith Repos                   mins  63025    Un-Timed:  Electrical Stimulation:         mins  68618 ( );  Traction          mins 37690  Low Eval          Mins  52954  Mod Eval          Mins  75303  High Eval                            Mins  26297  Re-Eval                               mins  17336    Timed Treatment: 24     mins   Total Treatment:   24     mins    Annita Flores PT  Physical Therapist  [place] PT Lic. # [id number]

## 2023-02-15 ENCOUNTER — TREATMENT (OUTPATIENT)
Dept: PHYSICAL THERAPY | Facility: CLINIC | Age: 78
End: 2023-02-15
Payer: MEDICARE

## 2023-02-15 DIAGNOSIS — R42 VERTIGO: ICD-10-CM

## 2023-02-15 DIAGNOSIS — R26.89 BALANCE PROBLEM: Primary | ICD-10-CM

## 2023-02-15 PROCEDURE — 97530 THERAPEUTIC ACTIVITIES: CPT | Performed by: PHYSICAL THERAPIST

## 2023-02-15 PROCEDURE — 97112 NEUROMUSCULAR REEDUCATION: CPT | Performed by: PHYSICAL THERAPIST

## 2023-02-15 NOTE — PROGRESS NOTES
Physical Therapy Daily Treatment Note    Patient: Efe Malloy   : 1945  Referring practitioner: Arianne Sanchez MD  Diagnoses: Balance problem [R26.89]  Today's Date: 2/15/2023    VISIT#: 14    Subjective Evaluation    History of Present Illness  Mechanism of injury: Feels like the congestion is getting some better.  He has felt pretty good since the last visit as far as the dizziness goes    Pain  Current pain ratin         Objective     See Exercise, Manual, and Modality Logs for complete treatment.     Cont with NMR and act today as noted;  Practiced tech on walking with head vertical  Added to HEP as noted    Assessment & Plan     Assessment    Assessment details: caitlin well today and was able to correct tech on ex with cues and caitlin new ex well      Progress per Plan of Care        Timed:         Manual Therapy:         mins  55346;     Therapeutic Exercise:         mins  30928;     Neuromuscular Den: 13       mins  12720;    Therapeutic Activity:  10        mins  90744;     Gait Training:           mins  88080;     Ultrasound:          mins  63908;    Ionto                                   mins   28804  Self Care                            mins   82194  Canalith Repos                   mins  19936    Un-Timed:  Electrical Stimulation:         mins  64078 ( );  Traction          mins 80805  Low Eval          Mins  25808  Mod Eval          Mins  06766  High Eval                            Mins  53285  Re-Eval                               mins  21411    Timed Treatment: 23     mins   Total Treatment:   23     mins    Annita Flores PT  Physical Therapist  [place] PT Lic. # [id number]

## 2023-02-16 ENCOUNTER — ANTICOAGULATION VISIT (OUTPATIENT)
Dept: PHARMACY | Facility: HOSPITAL | Age: 78
End: 2023-02-16
Payer: MEDICARE

## 2023-02-16 DIAGNOSIS — I48.19 PERSISTENT ATRIAL FIBRILLATION: Primary | ICD-10-CM

## 2023-02-16 LAB — INR PPP: 3.1 (ref 2–3)

## 2023-02-16 PROCEDURE — G0463 HOSPITAL OUTPT CLINIC VISIT: HCPCS

## 2023-02-16 PROCEDURE — 85610 PROTHROMBIN TIME: CPT

## 2023-02-16 PROCEDURE — 36416 COLLJ CAPILLARY BLOOD SPEC: CPT

## 2023-02-16 NOTE — PROGRESS NOTES
Anticoagulation Clinic Progress Note    INR Goal: 2 - 3  Today's INR: 3.1 (therapeutic)  Current warfarin dose: warfarin 5 mg PO daily, except warfarin 7.5 mg PO on Monday.  Current total weekly dose = 37.5 mg per week.     INR History:  Anticoagulation Monitoring 12/21/2022 1/19/2023 2/16/2023   INR 2.80 2.80 3.10   INR Date 12/21/2022 1/19/2023 2/16/2023   INR Goal 2.0-3.0 2.0-3.0 2.0-3.0   Trend Same Same Same   Last Week Total 37.5 mg 37.5 mg 37.5 mg   Next Week Total 37.5 mg 37.5 mg 37.5 mg   Sun 5 mg 5 mg 5 mg   Mon 7.5 mg 7.5 mg 7.5 mg   Tue 5 mg 5 mg 5 mg   Wed 5 mg 5 mg 5 mg   Thu 5 mg 5 mg 5 mg   Fri 5 mg 5 mg 5 mg   Sat 5 mg 5 mg 5 mg   Visit Report - - -   Some recent data might be hidden       Anticoagulation Summary  As of 2/16/2023    INR goal:  2.0-3.0   TTR:  77.8 % (3.6 y)   INR used for dosing:  3.10 (2/16/2023)   Warfarin maintenance plan:  7.5 mg every Mon; 5 mg all other days; Starting 2/16/2023   Weekly warfarin total:  37.5 mg   No change documented:  Meeta Chapman, PharmD   Plan last modified:  Meeta Chapman, PharmD (8/16/2022)   Next INR check:  3/16/2023   Priority:  Maintenance   Target end date:      Indications    Persistent atrial fibrillation (HCC) [I48.19]             Anticoagulation Episode Summary     INR check location:      Preferred lab:      Send INR reminders to:  [facility] CLINICAL POOL    Comments:  Patient contract signed 11/15/21.      Anticoagulation Care Providers     Provider Role Specialty Phone number    Silvano Gillis MD  Cardiology [phone]          Clinic Interview:   Patient Findings     Negatives:  Signs/symptoms of thrombosis, Signs/symptoms of bleeding,   Laboratory test error suspected, Change in health, Change in alcohol use,   Change in activity, Upcoming invasive procedure, Emergency department   visit, Upcoming dental procedure, Missed doses, Extra doses, Change in   medications, Change in diet/appetite, Hospital admission,  Bruising, Other   complaints      Clinical Outcomes     Negatives:  Major bleeding event, Thromboembolic event,   Anticoagulation-related hospital admission, Anticoagulation-related ED   visit, Anticoagulation-related fatality        Current Medications:   Prior to Admission medications    Medication Sig Start Date End Date Taking? Authorizing Provider   albuterol sulfate  (90 Base) MCG/ACT inhaler Inhale 2 puffs Every 6 (Six) Hours As Needed. 7/6/21   Shelby Haskins MD   alfuzosin (UROXATRAL) 10 MG 24 hr tablet Take 10 mg by mouth 2 (Two) Times a Day. 9/23/19   Shelby Haskins MD   aspirin (aspirin) 81 MG EC tablet Take 1 tablet by mouth Daily. 4/11/22   Silvano Gillis MD   baclofen (LIORESAL) 10 MG tablet Take 10 mg by mouth 2 (Two) Times a Day. 7/13/21   Shelby Haskins MD   BREHENRY ELLIPTA 100-25 MCG/INH inhaler Inhale 1 puff Daily. 9/24/19   Shelby Haskins MD   digoxin (LANOXIN) 250 MCG tablet Take 1 tablet by mouth Daily. 1/5/23   Silvano Gillis MD   EPINEPHrine (EPIPEN) 0.3 MG/0.3ML solution auto-injector injection epinephrine 0.3 mg/0.3 mL injection, auto-injector    Shelby Haskins MD   fluorometholone (FML) 0.1 % ophthalmic suspension Administer 1 drop to the right eye Daily. 5/17/21   Shelby Haskins MD   fluticasone (FLONASE) 50 MCG/ACT nasal spray 1 spray into the nostril(s) as directed by provider Daily.    Shelby Haskins MD   gabapentin (NEURONTIN) 300 MG capsule Take 600 mg by mouth 2 (Two) Times a Day. 9/13/19   Shelby Haskins MD   levothyroxine (SYNTHROID, LEVOTHROID) 75 MCG tablet Take 75 mcg by mouth Daily. 9/23/19   Shelby Haskins MD   montelukast (SINGULAIR) 10 MG tablet Take 10 mg by mouth Every Evening. 9/23/19   Shelby Haskins MD   pantoprazole (PROTONIX) 40 MG EC tablet Take 1 tablet by mouth Daily. 2/22/22   Silvano Gillis MD   pravastatin (PRAVACHOL) 40 MG tablet Take 1 tablet by mouth  every night at bedtime. 4/18/22   Silvano Gillis MD   sertraline (ZOLOFT) 100 MG tablet Take 1.5 tablets by mouth Daily. 3/15/22   Silvano Gillis MD   theophylline (UNIPHYL) 400 MG 24 hr tablet Take 400 mg by mouth 2 (Two) Times a Day. 9/23/19   Shelby Haskins MD   verapamil ER (VERELAN) 240 MG 24 hr capsule TAKE ONE CAPSULE BY MOUTH EVERY NIGHT AT BEDTIME 2/13/23   Silvano Gillis MD   warfarin (COUMADIN) 1 MG tablet Take 2.5 mg by mouth 2 (Two) Times a Week. Mon, Thur  with 5mg    Shelby Haskins MD   warfarin (COUMADIN) 5 MG tablet TAKE ONE TABLET BY MOUTH DAILY EXCEPT ON MONDAY AND THURSDAY TAKE ONE AND ONE-HALF TABLETS OR AS DIRECTED 2/13/23   Silvano Gillis MD       Medical history:   Past Medical History:   Diagnosis Date   • Allergic    • Anxiety    • Arthritis    • Asthma    • Atrial fibrillation (HCC)    • Atypical chest pain 06/11/2020   • Cataract    • COPD (chronic obstructive pulmonary disease) (HCC)    • COVID    • COVID-19 01/13/2021   • Depression    • Enlarged prostate    • Headache    • Hyperlipidemia    • Hypertension    • Kidney stone    • Near syncope 12/17/2020   • Prostatitis    • Shortness of breath    • Sleep apnea    • Stroke (HCC)    • Urinary tract infection        Social history:   Social History     Tobacco Use   • Smoking status: Former     Types: Cigarettes   • Smokeless tobacco: Never   Substance Use Topics   • Alcohol use: Yes     Comment: occasionally       Allergies:    Penicillins and Rivaroxaban    Vaccine History:   Immunization History   Administered Date(s) Administered   • Covid-19 (Pfizer) Gray Cap 05/18/2021, 11/26/2021   • FLUAD TRI 65YR+ 10/31/2019   • Fluzone High Dose =>65 Years (Vaxcare ONLY) 10/14/2016, 10/16/2017, 11/14/2018   • H1N1 Inj 12/22/2009   • Hepatitis A 09/17/2019, 11/19/2019   • Influenza Quad Vaccine (Inpatient) 09/22/2014   • Influenza, Unspecified 09/18/2013   • Pneumococcal Conjugate 13-Valent  (PCV13) 10/12/2015   • Pneumococcal Polysaccharide (PPSV23) 09/24/2013   • Shingrix 09/17/2019, 11/19/2019   • Zostavax 02/12/2013       This patient is managed via a collaborative agreement, pursuant to IC 25-26-16. The signed protocol is kept in the [facility] at [address] [place] [address].    Education: Efe Malloy has been instructed to call if any changes in medications, doses, concerns, etc. Patient was provided dosing instructions and expressed verbal understanding and has no further questions at this time.    Plan:  1. Anticoagulation   - no change; warfarin 5 mg PO daily, except warfarin 7.5 mg PO on Mondays.   Total weekly dose = 37.5 mg.   - RTC in 4 week(s)    -Instructed patient to monitor for signs/symptoms of bleeding/bruising  -Instructed patient to eat extra servings of leafy greens this week    Bony Chapman, PharmD  2/16/2023  13:20 EST

## 2023-03-02 ENCOUNTER — TREATMENT (OUTPATIENT)
Dept: PHYSICAL THERAPY | Facility: CLINIC | Age: 78
End: 2023-03-02
Payer: MEDICARE

## 2023-03-02 DIAGNOSIS — R26.89 BALANCE PROBLEM: Primary | ICD-10-CM

## 2023-03-02 DIAGNOSIS — R42 VERTIGO: ICD-10-CM

## 2023-03-02 PROCEDURE — 97112 NEUROMUSCULAR REEDUCATION: CPT | Performed by: PHYSICAL THERAPIST

## 2023-03-02 NOTE — PROGRESS NOTES
Re-Evaluation/ Re-Certification/Insurance Auth Note  [address] [facility] [address], [address] [place]  [address]        Patient: Efe Malloy   : 1945  Diagnosis/ICD-10 Code:  Balance problem [R26.89]  Referring practitioner: Arianne Sanchez MD  Today's Date: 3/2/2023  Patient seen for 15 sessions      Subjective:     Subjective Questionnaire: DHI: 24                                                  DGI:  Initially on 23 was 21/24 with deficits in gait with        horiz and vert head turns; gait with pivot turns                  Did not re-assess today due to pt having back pain       and feeling overall unsteady  Clinical Progress: improved  Home Program Compliance: Yes  Treatment has included: therapeutic exercise and neuromuscular re-education    Subjective Evaluation    History of Present Illness  Mechanism of injury: Pt reports he has been dealing with congestion still with a gravelly voice.  He missed PT due to injuring his back and was seen by the MD he did not get an injection but he did expect to get one that day.  The office did not check for insurance auth before he went and that is why he did not get it.  They cannot get back in until April.  He got meds from his PCP that helped. He has been in so much pain he cannot really do much outside the home.  He was out one day and felt so bad with being unsteady he had to go home.    Pain  Current pain ratin  Location: back pain         Objective      ** Pt had resolution of BPPV then had a recurrence with more treatment.  He is currently negative for BPPV with all canals clear.  He then had an URI with congestion and ear pressure and worsening vertigo symptoms but is now improved.  He has had a flare up of back pain and was not able to get an injection.**                 Oculomotor and VOR:                            Spontaneous nystagmus:  neg                          Gaze-evoked nystagmus:  neg                          Skew deviation:   neg                          Head-shake nystagmus: neg                          Smooth pursuit:  neg                          Saccades:  positive                          VORc:  neg                          Head thrust:  R/L  Not assessed                                                    SVA:  20/15                          DVA:  Horiz:  20/30  (regressed)                                    Vert:  20/30  (regressed)                                           LE strength:  Right:  5/5                                   Left:  5/5              LE AROM:  Right:  WFL                                 Left:  WFL                 Cervical AROM: rot:  Min limit L; mod limit R  Pain bilat                                                Flex:  Minimal limit pain                                                 Ext:  Moderate limit pain                 Vertebral artery screen:  Negative bilat                 Cerebellar function:  UE:  finger to nose:  Impaired right; normal left                                                       LUCILA:  Normal bilat                                                LE:  LUCILA:  impaired right; normal                                                                       BPPV Assessment:                          Roll Test:  Right:  neg                                            Left:  neg                          [place] Hallpike:  Right:  neg                                                Left:  neg                  MCTSIB:  cond 1:   30 sec no inc sway  `                             cond 2:  30 sec;  min inc sway  (regressed)                                cond 3:  30 sec;  Min sway                                  cond 4:  30 sec;  mod inc  sway (regressed)      Gait:  Pt amb (I) without assistive device and note more narrow LACEY, less waddling type gait, some veering R and L but less than at eval.      Assessment & Plan       Plan  Therapy options: will be seen for skilled therapy services  Planned  therapy interventions: motor coordination training, neuromuscular re-education, manual therapy, home exercise program, gait training and balance/weight-bearing training  Frequency: 2x week  Plan details: 12 weeks         Pt has met 4/5 STGs and 1/5 LTGs at this time.  Pt continues to have issues with balance, feeling unsteady.  He has no longer met all of the goals previously met.  He has had flare up of back pain, continued sinus congestion, gravelly voice from drainage, regression of VOR performance and static balance as evidenced by MCTSIB assessment.   Pt would benefit from continued skilled PT to address impairments, work toward meeting remaining goals and maximize function.       STGs:  6 weeks  1.  Pt to tolerate initial HEP/post-treatment regimen.  MET  2.  Pt to tolerate advancement of HEP as indicated.  MET  3.  Pt to report at least 25% improvement in spinning and off balance symptoms.  NO LONGER MET  4.  Resolve BPPV as indicated by negative bilateral Roll Tests and Raul Hallpike. MET  5.  Pt to participate in static and dynamic balance assessment after BPPV MET    LTGs:  By DC  1.  Pt to be (I) with HEP to manage own condition. PROGRESSING  2.  Pt to report at least 80% improvement in spinning symptoms to enable him to sleep normal amount of time and in his normal location. MET  3.  Pt to improve function as indicated by improved score on DHI as compared to eval to allow pt to ambulate (I) without assistive device. NO LONGER MET  4.  Pt to improve balance as indicated by improved performance on MCTSIB and/or DGI. PROGRESSING  5.  Pt to report at least 80% improvement in overall off balance symptoms to allow pt to shower and dress in his normal manner without LOB or feeling off balance.  PROGRESSING      Progress toward previous goals: Partially Met      Recommendations: Continue as planned    Certification Period: 5/30/2023    Prognosis to achieve goals: good    PT Signature: Annita Flores, PT  [place] PT Lic.  # [id number]    Based upon review of the patient's progress and continued therapy plan, it is my medical opinion that Efe Malloy should continue physical therapy treatment at [facility] [address] [facility] PHYSICAL THERAPY  [address] [facility] [place] [address] [phone].    Signature: __________________________________  Arianne Sanchez MD  Please sign and return via fax to [phone].. Thank you, [facility].    Timed:         Manual Therapy:         mins  70939;     Therapeutic Exercise:         mins  23225;     Neuromuscular Den: 38       mins  48709;    Therapeutic Activity:          mins  75043;     Gait Training:           mins  00492;     Ultrasound:          mins  22441;    Ionto                                   mins   13448  Self Care                            mins   00039  Canalith Repos:                  mins   02807    Un-Timed:  Electrical Stimulation:         mins  18804 ( );  Traction          mins 79385  Low Eval          Mins  42958  Mod Eval          Mins  46978  High Eval                            Mins  87846  Re-Eval                               mins  11160    Timed Treatment: 38     mins   Total Treatment:   38     mins

## 2023-03-09 ENCOUNTER — TREATMENT (OUTPATIENT)
Dept: PHYSICAL THERAPY | Facility: CLINIC | Age: 78
End: 2023-03-09
Payer: MEDICARE

## 2023-03-09 DIAGNOSIS — R42 VERTIGO: ICD-10-CM

## 2023-03-09 DIAGNOSIS — R26.89 BALANCE PROBLEM: Primary | ICD-10-CM

## 2023-03-09 PROCEDURE — 97112 NEUROMUSCULAR REEDUCATION: CPT | Performed by: PHYSICAL THERAPIST

## 2023-03-09 NOTE — PROGRESS NOTES
Physical Therapy Daily Treatment Note    Patient: Efe Malloy   : 1945  Referring practitioner: Arianne Sanchez MD  Diagnoses: Balance problem [R26.89]  Today's Date: 3/9/2023    VISIT#: 16    Subjective Evaluation    History of Present Illness  Mechanism of injury: Pt reports he has been doing ok but is still really congested.  He has an MD appt Tuesday. He has been having a lot of unsteadiness lately.    Pt is considering going to a different ENT that is in [place] but is connected to the group here.  He has a good reputation.  He is doing his doing his HEP once per day except if he feels really bad he does not do them.      Pain  Current pain ratin         Objective     See Exercise, Manual, and Modality Logs for complete treatment.       Cont with NMR today as noted    Pt with significant congestion and drainage affecting his balance    Patient Education:  Try to work on balance with EC head up EO/EC to mimic washing hair in the shower    Assessment & Plan     Assessment    Assessment details: caitlin well today but still with significant congestion that seems to be affecting his balance      Progress per Plan of Care        Timed:         Manual Therapy:        mins  44508;     Therapeutic Exercise:         mins  40630;     Neuromuscular Den: 38       mins  07204;    Therapeutic Activity:          mins  42792;     Gait Training:           mins  73164;     Ultrasound:          mins  41279;    Ionto                                   mins   42671  Self Care                            mins   10333  Canalith Repos                  mins  94443    Un-Timed:  Electrical Stimulation:         mins  07234 ( );  Traction          mins 48983  Low Eval          Mins  56401  Mod Eval          Mins  20451  High Eval                            Mins  51042  Re-Eval                               mins  76332    Timed Treatment: 38    mins   Total Treatment:   38     mins    Annita Flores PT  Physical  Therapist  [place] PT Lic. # [id number]

## 2023-03-13 ENCOUNTER — TREATMENT (OUTPATIENT)
Dept: PHYSICAL THERAPY | Facility: CLINIC | Age: 78
End: 2023-03-13
Payer: MEDICARE

## 2023-03-13 DIAGNOSIS — R42 VERTIGO: ICD-10-CM

## 2023-03-13 DIAGNOSIS — R26.89 BALANCE PROBLEM: Primary | ICD-10-CM

## 2023-03-13 PROCEDURE — 97112 NEUROMUSCULAR REEDUCATION: CPT | Performed by: PHYSICAL THERAPIST

## 2023-03-13 NOTE — PROGRESS NOTES
Physical Therapy Daily Treatment Note    Patient: Efe Malloy   : 1945  Referring practitioner: Arianne Sanchez MD  Diagnoses: Balance problem [R26.89]  Today's Date: 3/13/2023    VISIT#: 17    Subjective Evaluation    History of Present Illness  Mechanism of injury: Pt will see MD tomorrow for the congestion. He will hopefully get some meds.  He missed his last allergy shot due to back pain onset.  He feels some unsteadiness during general things at home.  His head feels not right in general.  He has had tests and other than the first stroke area.      Pain  Current pain ratin  Location: LB         Objective     See Exercise, Manual, and Modality Logs for complete treatment.     Re-assessment:  Roll test:  Neg B                               david hallpike:  Neg B    Patient Education:  Cont with all ex issued for home previously    Assessment & Plan     Assessment    Assessment details: caitlin well today.      Progress per Plan of Care        Timed:         Manual Therapy:         mins  21639;     Therapeutic Exercise:         mins  11523;     Neuromuscular Den:  32      mins  46707;    Therapeutic Activity:          mins  23262;     Gait Training:           mins  22348;     Ultrasound:          mins  46630;    Ionto                                   mins   63884  Self Care                            mins   87085  Canalith Repos                   mins  55839    Un-Timed:  Electrical Stimulation:         mins  28706 ( );  Traction          mins 47675  Low Eval          Mins  05857  Mod Eval          Mins  21757  High Eval                            Mins  68798  Re-Eval                               mins  69001    Timed Treatment: 32     mins   Total Treatment:   32     mins    Annita Flores PT  Physical Therapist  [place] PT Lic. # [id number]

## 2023-03-16 ENCOUNTER — ANTICOAGULATION VISIT (OUTPATIENT)
Dept: PHARMACY | Facility: HOSPITAL | Age: 78
End: 2023-03-16
Payer: MEDICARE

## 2023-03-16 DIAGNOSIS — I48.19 PERSISTENT ATRIAL FIBRILLATION: Primary | ICD-10-CM

## 2023-03-16 LAB — INR PPP: 2.8 (ref 2–3)

## 2023-03-16 PROCEDURE — 36416 COLLJ CAPILLARY BLOOD SPEC: CPT

## 2023-03-16 PROCEDURE — G0463 HOSPITAL OUTPT CLINIC VISIT: HCPCS

## 2023-03-16 PROCEDURE — 85610 PROTHROMBIN TIME: CPT

## 2023-03-16 NOTE — PROGRESS NOTES
Anticoagulation Clinic Progress Note    INR Goal: 2 - 3  Today's INR: 2.8 (therapeutic)  Current warfarin dose: warfarin 5 mg PO daily, except warfarin 7.5 mg PO on Monday. Current total weekly dose = 37.5 mg per week.     INR History:  Anticoagulation Monitoring 2023 2023 3/16/2023   INR 2.80 3.10 2.80   INR Date 2023 2023 3/16/2023   INR Goal 2.0-3.0 2.0-3.0 2.0-3.0   Trend Same Same Same   Last Week Total 37.5 mg 37.5 mg 37.5 mg   Next Week Total 37.5 mg 37.5 mg 37.5 mg   Sun 5 mg 5 mg 5 mg   Mon 7.5 mg 7.5 mg 7.5 mg   Tue 5 mg 5 mg 5 mg   Wed 5 mg 5 mg 5 mg   Thu 5 mg 5 mg 5 mg   Fri 5 mg 5 mg 5 mg   Sat 5 mg 5 mg 5 mg   Visit Report - - -   Some recent data might be hidden       Anticoagulation Summary  As of 3/16/2023    INR goal:  2.0-3.0   TTR:  77.6 % (3.7 y)   INR used for dosin.80 (3/16/2023)   Warfarin maintenance plan:  7.5 mg every Mon; 5 mg all other days; Starting 3/16/2023   Weekly warfarin total:  37.5 mg   No change documented:  Kavita Jolley, PharmD   Plan last modified:  Meeta Chapman, PharmD (2022)   Next INR check:  2023   Priority:  Maintenance   Target end date:      Indications    Persistent atrial fibrillation (HCC) [I48.19]             Anticoagulation Episode Summary     INR check location:      Preferred lab:      Send INR reminders to:  [facility] CLINICAL POOL    Comments:  Patient contract signed 11/15/21.      Anticoagulation Care Providers     Provider Role Specialty Phone number    Silvano Gillis MD  Cardiology [phone]          Clinic Interview:   Patient Findings     Negatives:  Signs/symptoms of thrombosis, Signs/symptoms of bleeding,   Laboratory test error suspected, Change in health, Change in alcohol use,   Change in activity, Upcoming invasive procedure, Emergency department   visit, Upcoming dental procedure, Missed doses, Extra doses, Change in   medications, Change in diet/appetite, Hospital admission,  Bruising, Other   complaints      Clinical Outcomes     Negatives:  Major bleeding event, Thromboembolic event,   Anticoagulation-related hospital admission, Anticoagulation-related ED   visit, Anticoagulation-related fatality        Current Medications:   Prior to Admission medications    Medication Sig Start Date End Date Taking? Authorizing Provider   albuterol sulfate  (90 Base) MCG/ACT inhaler Inhale 2 puffs Every 6 (Six) Hours As Needed. 7/6/21   Shelby Haskins MD   alfuzosin (UROXATRAL) 10 MG 24 hr tablet Take 10 mg by mouth 2 (Two) Times a Day. 9/23/19   Shelby Haskins MD   aspirin (aspirin) 81 MG EC tablet Take 1 tablet by mouth Daily. 4/11/22   Silvano Gillis MD   baclofen (LIORESAL) 10 MG tablet Take 10 mg by mouth 2 (Two) Times a Day. 7/13/21   Shelby Haskins MD   BREHENRY ELLIPTA 100-25 MCG/INH inhaler Inhale 1 puff Daily. 9/24/19   Shelby Haskins MD   digoxin (LANOXIN) 250 MCG tablet Take 1 tablet by mouth Daily. 1/5/23   Silvano Gillis MD   EPINEPHrine (EPIPEN) 0.3 MG/0.3ML solution auto-injector injection epinephrine 0.3 mg/0.3 mL injection, auto-injector    Shelby Haskins MD   fluorometholone (FML) 0.1 % ophthalmic suspension Administer 1 drop to the right eye Daily. 5/17/21   Shelby Haskins MD   fluticasone (FLONASE) 50 MCG/ACT nasal spray 1 spray into the nostril(s) as directed by provider Daily.    Shelby Haskins MD   gabapentin (NEURONTIN) 300 MG capsule Take 600 mg by mouth 2 (Two) Times a Day. 9/13/19   Shelby Haskins MD   levothyroxine (SYNTHROID, LEVOTHROID) 75 MCG tablet Take 75 mcg by mouth Daily. 9/23/19   Shelby Haskins MD   montelukast (SINGULAIR) 10 MG tablet Take 10 mg by mouth Every Evening. 9/23/19   Shelby Haskins MD   pantoprazole (PROTONIX) 40 MG EC tablet Take 1 tablet by mouth Daily. 2/22/22   Silvano Gillis MD   pravastatin (PRAVACHOL) 40 MG tablet Take 1 tablet by mouth  every night at bedtime. 4/18/22   Silvano Gillis MD   sertraline (ZOLOFT) 100 MG tablet Take 1.5 tablets by mouth Daily. 3/15/22   Silvano Gillis MD   theophylline (UNIPHYL) 400 MG 24 hr tablet Take 400 mg by mouth 2 (Two) Times a Day. 9/23/19   Shelby Haskins MD   verapamil ER (VERELAN) 240 MG 24 hr capsule TAKE ONE CAPSULE BY MOUTH EVERY NIGHT AT BEDTIME 2/13/23   Silvano Gillis MD   warfarin (COUMADIN) 1 MG tablet Take 2.5 mg by mouth 2 (Two) Times a Week. Mon, Thur  with 5mg    Shelby Haskins MD   warfarin (COUMADIN) 5 MG tablet TAKE ONE TABLET BY MOUTH DAILY EXCEPT ON MONDAY AND THURSDAY TAKE ONE AND ONE-HALF TABLETS OR AS DIRECTED 2/13/23   Silvano Gillis MD       Medical history:   Past Medical History:   Diagnosis Date   • Allergic    • Anxiety    • Arthritis    • Asthma    • Atrial fibrillation (HCC)    • Atypical chest pain 06/11/2020   • Cataract    • COPD (chronic obstructive pulmonary disease) (HCC)    • COVID    • COVID-19 01/13/2021   • Depression    • Enlarged prostate    • Headache    • Hyperlipidemia    • Hypertension    • Kidney stone    • Near syncope 12/17/2020   • Prostatitis    • Shortness of breath    • Sleep apnea    • Stroke (HCC)    • Urinary tract infection        Social history:   Social History     Tobacco Use   • Smoking status: Former     Types: Cigarettes   • Smokeless tobacco: Never   Substance Use Topics   • Alcohol use: Yes     Comment: occasionally       Allergies:    Penicillins and Rivaroxaban    Vaccine History:   Immunization History   Administered Date(s) Administered   • Covid-19 (Pfizer) Gray Cap 05/18/2021, 11/26/2021   • FLUAD TRI 65YR+ 10/31/2019   • Fluzone High Dose =>65 Years (Vaxcare ONLY) 10/14/2016, 10/16/2017, 11/14/2018   • H1N1 Inj 12/22/2009   • Hepatitis A 09/17/2019, 11/19/2019   • Influenza Quad Vaccine (Inpatient) 09/22/2014   • Influenza, Unspecified 09/18/2013   • Pneumococcal Conjugate 13-Valent  (PCV13) 10/12/2015   • Pneumococcal Polysaccharide (PPSV23) 09/24/2013   • Shingrix 09/17/2019, 11/19/2019   • Zostavax 02/12/2013       This patient is managed via a collaborative agreement, pursuant to IC 25-26-16. The signed protocol is kept in the [facility] at [address] [place] [address].    Education: Efe Malloy has been instructed to call if any changes in medications, doses, concerns, etc. Patient was provided dosing instructions and expressed verbal understanding and has no further questions at this time.    Plan:  1. Anticoagulation   - no change; warfarin 5 mg PO daily, except warfarin 7.5 mg PO on Monday. Total weekly dose = 37.5 mg.   - RTC in 4 week(s)    Kavita Jolley, RosettaD  3/16/2023  13:12 EDT

## 2023-03-20 RX ORDER — PRAVASTATIN SODIUM 40 MG
TABLET ORAL
Qty: 90 TABLET | Refills: 3 | Status: SHIPPED | OUTPATIENT
Start: 2023-03-20

## 2023-03-23 ENCOUNTER — TREATMENT (OUTPATIENT)
Dept: PHYSICAL THERAPY | Facility: CLINIC | Age: 78
End: 2023-03-23
Payer: MEDICARE

## 2023-03-23 DIAGNOSIS — R26.89 BALANCE PROBLEM: Primary | ICD-10-CM

## 2023-03-23 DIAGNOSIS — R42 VERTIGO: ICD-10-CM

## 2023-03-23 PROCEDURE — 97112 NEUROMUSCULAR REEDUCATION: CPT | Performed by: PHYSICAL THERAPIST

## 2023-03-23 NOTE — PROGRESS NOTES
Physical Therapy Daily Treatment Note    Patient: Efe Malloy   : 1945  Referring practitioner: Arianne Sanchez MD  Diagnoses: Balance problem [R26.89]  Today's Date: 3/23/2023    VISIT#: 18    Subjective Evaluation    History of Present Illness  Mechanism of injury: Pt reports he saw the MD for the congestion and they told him to get back on the allergy spray for his sinuses at night.  He has used it every night and he feels like it has helped him at night so far. He has terrible drainage at night but not during the day.  He still has some of the woozy feelings off and on.  He has not had any more spinning since the last session. He had a severe HA last night that can only be relieved by heat and Tylenol.    Pain  Current pain ratin         Objective     See Exercise, Manual, and Modality Logs for complete treatment.     Reviewed VOR x 2 with patterned background  Cont with NMR as noted; pt performed well today compared to the last several visits    Patient Education:  Cont with current HEP and progress with inc time or reps as able    Assessment & Plan     Assessment    Assessment details: caitlin well with all ex today; improved from last several visits when he was not feeling well      Progress per Plan of Care        Timed:         Manual Therapy:         mins  76868;     Therapeutic Exercise:         mins  57787;     Neuromuscular Den:  32      mins  29355;    Therapeutic Activity:          mins  56420;     Gait Training:           mins  29221;     Ultrasound:          mins  66887;    Ionto                                   mins   35851  Self Care                            mins   39179  Canalith Repos                   mins  34050    Un-Timed:  Electrical Stimulation:         mins  44880 ( );  Traction          mins 01080  Low Eval          Mins  61021  Mod Eval          Mins  35877  High Eval                            Mins  94285  Re-Eval                               mins  88762    Timed  Treatment: 32     mins   Total Treatment:   32     mins    Annita Flores, PT  Physical Therapist  [place] PT Lic. # [id number]

## 2023-03-29 ENCOUNTER — TREATMENT (OUTPATIENT)
Dept: PHYSICAL THERAPY | Facility: CLINIC | Age: 78
End: 2023-03-29
Payer: MEDICARE

## 2023-03-29 DIAGNOSIS — R26.89 BALANCE PROBLEM: Primary | ICD-10-CM

## 2023-03-29 DIAGNOSIS — R42 VERTIGO: ICD-10-CM

## 2023-03-29 PROCEDURE — 97112 NEUROMUSCULAR REEDUCATION: CPT | Performed by: PHYSICAL THERAPIST

## 2023-03-29 NOTE — PROGRESS NOTES
Physical Therapy Progress Note/Insurance Note  [facility], [address] [place]  [address]    Patient: Efe Malloy   : 1945  Referring practitioner: Arianne Sanchez MD  Diagnoses: Balance problem [R26.89]  Today's Date: 3/29/2023    VISIT#: 19    Subjective Evaluation    History of Present Illness  Mechanism of injury: Pt reports he feels better with the congestion and drainage since he started back on the nose spray.  But he still has some and it still bothers him.  He has severe allergies in general and this is his bad time of the year.  He rates improvement overall at 50-75% as he no longer spinning and his balance has improved.    Pain  Current pain ratin         Objective     ** Pt had resolution of BPPV then had a recurrence with more treatment.  He is currently negative for BPPV with all canals clear.  He then had an URI with congestion and ear pressure and worsening vertigo symptoms but is now improved.  He started back on a nose spray he has used in the past and it seems to be helping some.  He has had a flare up of back pain and was not able to get an injection.**                 Oculomotor and VOR:                            Spontaneous nystagmus:  neg                          Gaze-evoked nystagmus:  neg                          Skew deviation:  neg                          Head-shake nystagmus: neg                          Smooth pursuit:  neg                          Saccades:  positive but decreased amplitude  (improved)                          VORc:  neg                          Head thrust:  R/L  Not assessed                                                    SVA:  15                          DVA:  Horiz:  20/20  (improved)                                    Vert:  20/20  (improved)                                           LE strength:  Right:  5/5                                   Left:  55              LE AROM:  Right:  WFL                                 Left:   WFL                 Cervical AROM: rot:  Min limit L; mod limit R  Pain bilat                                                Flex:  Minimal limit pain                                                 Ext:  Moderate limit pain                 Vertebral artery screen:  Negative bilat                 Cerebellar function:  UE:  finger to nose:  Impaired right; normal left                                                       LUCILA:  Normal bilat                                                LE:  LUCILA:  impaired right; normal                                                                       BPPV Assessment:                          Roll Test:  Right:  neg                                            Left:  neg                          [place] Hallpike:  Right:  neg                                                Left:  neg                  MCTSIB:  cond 1:   30 sec no inc sway  `                             cond 2:  30 sec;  no inc sway  (improved)                                cond 3:  30 sec;  Min sway                                  cond 4:  30 sec;  min inc  sway (improved)      Gait:  Pt amb (I) without assistive device and note more narrow LACEY, less waddling type gait, some veering R and L but less than at eval.         See Exercise, Manual, and Modality Logs for complete treatment.       Assessment/Plan     Pt has met 5/5 STGs and 2/5 LTGs at this time.  Pt continues to have issues with balance, feeling unsteady.  He has now met all of the goals previously met.  He has had flare up of back pain, continued sinus congestion, gravelly voice from drainage, regression of VOR performance and static balance as evidenced by MCTSIB assessment.   He has now improved overall but pt would benefit from continued skilled PT to address impairments, work toward meeting remaining goals and maximize function.     ** Pt is getting close to being appropriate to dec frequency to 1 x per week or less.  Approved visits would be most  beneficial to have an expiration at least 8 weeks out.  **        STGs:  6 weeks  1.  Pt to tolerate initial HEP/post-treatment regimen.  MET  2.  Pt to tolerate advancement of HEP as indicated.  MET  3.  Pt to report at least 25% improvement in spinning and off balance symptoms.   MET  4.  Resolve BPPV as indicated by negative bilateral Roll Tests and Raul Hallpike. MET  5.  Pt to participate in static and dynamic balance assessment after BPPV MET    LTGs:  By DC  1.  Pt to be (I) with HEP to manage own condition. PROGRESSING  2.  Pt to report at least 80% improvement in spinning symptoms to enable him to sleep normal amount of time and in his normal location. MET  3.  Pt to improve function as indicated by improved score on DHI as compared to eval to allow pt to ambulate (I) without assistive device. MET  4.  Pt to improve balance as indicated by improved performance on MCTSIB and/or DGI.  PROGRESSING  5.  Pt to report at least 80% improvement in overall off balance symptoms to allow pt to shower and dress in his normal manner without LOB or feeling off balance.  PROGRESSING      Progress per Plan of Care        Timed:         Manual Therapy:         mins  49387;     Therapeutic Exercise:         mins  45066;     Neuromuscular Den:  28      mins  63746;    Therapeutic Activity:          mins  90020;     Gait Training:           mins  68727;     Ultrasound:          mins  92086;    Ionto                                   mins   39077  Self Care                            mins   97019  Canalith Repos                   mins  15665    Un-Timed:  Electrical Stimulation:         mins  10579 ( );  Traction          mins 18841  Low Eval          Mins  65817  Mod Eval          Mins  56520  High Eval                            Mins  13793  Re-Eval                               mins  41362    Timed Treatment: 28     mins   Total Treatment:  28      mins    Annita Flores PT  Physical Therapist  [place] PT Lic. # [id number]

## 2023-04-03 ENCOUNTER — TREATMENT (OUTPATIENT)
Dept: PHYSICAL THERAPY | Facility: CLINIC | Age: 78
End: 2023-04-03
Payer: MEDICARE

## 2023-04-03 DIAGNOSIS — R42 VERTIGO: ICD-10-CM

## 2023-04-03 DIAGNOSIS — R26.89 BALANCE PROBLEM: Primary | ICD-10-CM

## 2023-04-03 PROCEDURE — 97112 NEUROMUSCULAR REEDUCATION: CPT | Performed by: PHYSICAL THERAPIST

## 2023-04-03 NOTE — PROGRESS NOTES
Physical Therapy Daily Treatment Note    Patient: Efe Malloy   : 1945  Referring practitioner: Arianne Sanchez MD  Diagnoses: Balance problem [R26.89]  Today's Date: 4/3/2023    VISIT#: 20    Subjective Evaluation    History of Present Illness  Mechanism of injury: Pt reports he has been doing OK since he was here last time.  He is not as gravelly as before.  He is using his nose spray as ordered.      Pain  Current pain ratin         Objective     See Exercise, Manual, and Modality Logs for complete treatment.     Cont with NMR as noted    Assessment & Plan     Assessment    Assessment details: caitlin well today with all ex as noted      Progress per Plan of Care        Timed:         Manual Therapy:         mins  54505;     Therapeutic Exercise:         mins  15063;     Neuromuscular Den:  28      mins  73133;    Therapeutic Activity:          mins  31581;     Gait Training:           mins  09927;     Ultrasound:          mins  48749;    Ionto                                   mins   65163  Self Care                            mins   87792  Canalith Repos                   mins  52037    Un-Timed:  Electrical Stimulation:         mins  48837 ( );  Traction          mins 82975  Low Eval          Mins  16651  Mod Eval          Mins  34711  High Eval                            Mins  61900  Re-Eval                               mins  55026    Timed Treatment: 28     mins   Total Treatment:   28     mins    Annita Flores PT  Physical Therapist  [place] PT Lic. # [id number]

## 2023-04-14 ENCOUNTER — ANTICOAGULATION VISIT (OUTPATIENT)
Dept: PHARMACY | Facility: HOSPITAL | Age: 78
End: 2023-04-14
Payer: MEDICARE

## 2023-04-14 DIAGNOSIS — I48.19 PERSISTENT ATRIAL FIBRILLATION: Primary | ICD-10-CM

## 2023-04-14 LAB — INR PPP: 3.2 (ref 2–3)

## 2023-04-14 PROCEDURE — 36416 COLLJ CAPILLARY BLOOD SPEC: CPT

## 2023-04-14 PROCEDURE — G0463 HOSPITAL OUTPT CLINIC VISIT: HCPCS

## 2023-04-14 PROCEDURE — 85610 PROTHROMBIN TIME: CPT

## 2023-04-14 NOTE — PROGRESS NOTES
Anticoagulation Clinic Progress Note    INR Goal: 2 - 3  Today's INR: 3.2 (supratherapeutic)  Current warfarin dose: warfarin 5 mg PO daily, except warfarin 7.5 mg PO on Monday. Current total weekly dose = 37.5 mg per week.     INR History:      10/25/2022    12:30 PM 11/22/2022    12:30 PM 12/21/2022     2:00 PM 1/19/2023     1:00 PM 2/16/2023     1:00 PM 3/16/2023     1:00 PM 4/14/2023    11:30 AM   Anticoagulation Monitoring   INR 2.80 2.30 2.80 2.80 3.10 2.80 3.20   INR Date 10/25/2022 11/22/2022 12/21/2022 1/19/2023 2/16/2023 3/16/2023 4/14/2023   INR Goal 2.0-3.0 2.0-3.0 2.0-3.0 2.0-3.0 2.0-3.0 2.0-3.0 2.0-3.0   Trend Same Same Same Same Same Same Down   Last Week Total 37.5 mg 37.5 mg 37.5 mg 37.5 mg 37.5 mg 37.5 mg 37.5 mg   Next Week Total 37.5 mg 37.5 mg 37.5 mg 37.5 mg 37.5 mg 37.5 mg 35 mg   Sun 5 mg 5 mg 5 mg 5 mg 5 mg 5 mg 5 mg   Mon 7.5 mg 7.5 mg 7.5 mg 7.5 mg 7.5 mg 7.5 mg 5 mg   Tue 5 mg 5 mg 5 mg 5 mg 5 mg 5 mg 5 mg   Wed 5 mg 5 mg 5 mg 5 mg 5 mg 5 mg 5 mg   Thu 5 mg 5 mg 5 mg 5 mg 5 mg 5 mg 5 mg   Fri 5 mg 5 mg 5 mg 5 mg 5 mg 5 mg 5 mg   Sat 5 mg 5 mg 5 mg 5 mg 5 mg 5 mg 5 mg   Visit Report Report             Anticoagulation Summary  As of 4/14/2023    INR goal:  2.0-3.0   TTR:  77.0 % (3.8 y)   INR used for dosing:  3.20 (4/14/2023)   Warfarin maintenance plan:  5 mg every day; Starting 4/14/2023   Weekly warfarin total:  35 mg   Plan last modified:  Kavita Jolley, PharmD (4/14/2023)   Next INR check:  4/25/2023   Priority:  Maintenance   Target end date:      Indications    Persistent atrial fibrillation [I48.19]             Anticoagulation Episode Summary     INR check location:      Preferred lab:      Send INR reminders to:  [facility] CLINICAL POOL    Comments:  Patient contract signed 11/15/21.      Anticoagulation Care Providers     Provider Role Specialty Phone number    Silvano Gillis MD  Cardiology [phone]          Clinic Interview:   Patient Findings      Negatives:  Signs/symptoms of thrombosis, Signs/symptoms of bleeding,   Laboratory test error suspected, Change in health, Change in alcohol use,   Change in activity, Upcoming invasive procedure, Emergency department   visit, Upcoming dental procedure, Missed doses, Extra doses, Change in   medications, Change in diet/appetite, Hospital admission, Bruising, Other   complaints      Clinical Outcomes     Negatives:  Major bleeding event, Thromboembolic event,   Anticoagulation-related hospital admission, Anticoagulation-related ED   visit, Anticoagulation-related fatality        Current Medications:   Prior to Admission medications    Medication Sig Start Date End Date Taking? Authorizing Provider   albuterol sulfate  (90 Base) MCG/ACT inhaler Inhale 2 puffs Every 6 (Six) Hours As Needed. 7/6/21   Shelby Haskins MD   alfuzosin (UROXATRAL) 10 MG 24 hr tablet Take 10 mg by mouth 2 (Two) Times a Day. 9/23/19   Shelby Haskins MD   aspirin (aspirin) 81 MG EC tablet Take 1 tablet by mouth Daily. 4/11/22   Silvano Gillis MD   baclofen (LIORESAL) 10 MG tablet Take 10 mg by mouth 2 (Two) Times a Day. 7/13/21   Shelby Haskins MD   BREO ELLIPTA 100-25 MCG/INH inhaler Inhale 1 puff Daily. 9/24/19   Shelby Hsakins MD   digoxin (LANOXIN) 250 MCG tablet Take 1 tablet by mouth Daily. 1/5/23   Silvano Gillis MD   EPINEPHrine (EPIPEN) 0.3 MG/0.3ML solution auto-injector injection epinephrine 0.3 mg/0.3 mL injection, auto-injector    Shelby Haskins MD   fluorometholone (FML) 0.1 % ophthalmic suspension Administer 1 drop to the right eye Daily. 5/17/21   Shelby Haskins MD   fluticasone (FLONASE) 50 MCG/ACT nasal spray 1 spray into the nostril(s) as directed by provider Daily.    Shelby Haskins MD   gabapentin (NEURONTIN) 300 MG capsule Take 600 mg by mouth 2 (Two) Times a Day. 9/13/19   Shelby Haskins MD   levothyroxine (SYNTHROID, LEVOTHROID) 75 MCG  tablet Take 75 mcg by mouth Daily. 9/23/19   Shelby Haskins MD   montelukast (SINGULAIR) 10 MG tablet Take 10 mg by mouth Every Evening. 9/23/19   Shelby Haskins MD   pantoprazole (PROTONIX) 40 MG EC tablet Take 1 tablet by mouth Daily. 2/22/22   Silvano Gillis MD   pravastatin (PRAVACHOL) 40 MG tablet TAKE ONE TABLET BY MOUTH EVERY NIGHT AT BEDTIME 3/20/23   Silvano Gillis MD   sertraline (ZOLOFT) 100 MG tablet Take 1.5 tablets by mouth Daily. 3/15/22   Silvano Gillis MD   theophylline (UNIPHYL) 400 MG 24 hr tablet Take 400 mg by mouth 2 (Two) Times a Day. 9/23/19   Shelby Haskins MD   verapamil ER (VERELAN) 240 MG 24 hr capsule TAKE ONE CAPSULE BY MOUTH EVERY NIGHT AT BEDTIME 2/13/23   Silvano Gillis MD   warfarin (COUMADIN) 1 MG tablet Take 2.5 mg by mouth 2 (Two) Times a Week. Mon, Thur  with 5mg    Shelby Haskins MD   warfarin (COUMADIN) 5 MG tablet TAKE ONE TABLET BY MOUTH DAILY EXCEPT ON MONDAY AND THURSDAY TAKE ONE AND ONE-HALF TABLETS OR AS DIRECTED 2/13/23   Silvano Gillis MD       Medical history:   Past Medical History:   Diagnosis Date   • Allergic    • Anxiety    • Arthritis    • Asthma    • Atrial fibrillation    • Atypical chest pain 06/11/2020   • Cataract    • COPD (chronic obstructive pulmonary disease)    • COVID    • COVID-19 01/13/2021   • Depression    • Enlarged prostate    • Headache    • Hyperlipidemia    • Hypertension    • Kidney stone    • Near syncope 12/17/2020   • Prostatitis    • Shortness of breath    • Sleep apnea    • Stroke    • Urinary tract infection        Social history:   Social History     Tobacco Use   • Smoking status: Former     Types: Cigarettes   • Smokeless tobacco: Never   Substance Use Topics   • Alcohol use: Yes     Comment: occasionally       Allergies:    Penicillins and Rivaroxaban    Vaccine History:   Immunization History   Administered Date(s) Administered   • Covid-19 (Pfizer) Gray  Cap 05/18/2021, 11/26/2021   • FLUAD TRI 65YR+ 10/31/2019   • Fluzone High Dose =>65 Years ([facility] ONLY) 10/14/2016, 10/16/2017, 11/14/2018   • H1N1 Inj 12/22/2009   • Hepatitis A 09/17/2019, 11/19/2019   • Influenza Quad Vaccine (Inpatient) 09/22/2014   • Influenza, Unspecified 09/18/2013   • Pneumococcal Conjugate 13-Valent (PCV13) 10/12/2015   • Pneumococcal Polysaccharide (PPSV23) 09/24/2013   • Shingrix 09/17/2019, 11/19/2019   • Zostavax 02/12/2013       This patient is managed via a collaborative agreement, pursuant to IC [id number]. The signed protocol is kept in the [facility] at [address] [place] [address].    Education: Efe Malloy has been instructed to call if any changes in medications, doses, concerns, etc. Patient was provided dosing instructions and expressed verbal understanding and has no further questions at this time.    Plan:  1. Anticoagulation   - INR has remained on higher end of goal range or supratherapeutic since December 2022. Therefore, will decrease by 7%; warfarin 5 mg PO daily. New total weekly dose = 35 mg.   - RTC in 1.5 week(s) in coordination with appointment with Dr. Gillis.    Kavita Jolley, PharmD  4/14/2023  11:54 EDT

## 2023-04-17 ENCOUNTER — TREATMENT (OUTPATIENT)
Dept: PHYSICAL THERAPY | Facility: CLINIC | Age: 78
End: 2023-04-17
Payer: MEDICARE

## 2023-04-17 DIAGNOSIS — R26.89 BALANCE PROBLEM: Primary | ICD-10-CM

## 2023-04-17 DIAGNOSIS — R42 VERTIGO: ICD-10-CM

## 2023-04-17 NOTE — PROGRESS NOTES
Physical Therapy Daily Treatment Note/Discharge Summary    Patient: Efe Malloy   : 1945  Referring practitioner: Arianne Sanchez MD  Diagnoses: Balance problem [R26.89]  Today's Date: 2023    VISIT#: 21    Subjective Evaluation    History of Present Illness  Mechanism of injury: Pt reports he has been doing really well.  He is not sure he needs to continue to come to PT as he is doing everything (I) for his balance and he is doing better with all of his mobility. He has improved with overall balance and mobility at 90%.         Objective     Oculomotor and VOR:                            Spontaneous nystagmus:  neg                          Gaze-evoked nystagmus:  neg                          Skew deviation:  neg                          Head-shake nystagmus: neg                          Smooth pursuit:  neg                          Saccades:  positive but decreased amplitude                            VORc:  neg                          Head thrust:  R/L  Not assessed                                                    SVA:  20/15                          DVA:  Horiz:                                        Vert:                                               LE strength:  Right:  5/5                                   Left:  5/5              LE AROM:  Right:  WFL                                 Left:  WFL                 Cervical AROM: rot:  Min limit L; mod limit R  Pain bilat                                                Flex:  Minimal limit pain                                                 Ext:  Moderate limit pain                 Vertebral artery screen:  Negative bilat                 Cerebellar function:  UE:  finger to nose:  Impaired right; normal left                                                       LUCILA:  Normal bilat                                                LE:  LUCILA:  impaired right; normal                                                                       BPPV  Assessment:                          Roll Test:  Right:  neg                                            Left:  neg                          [place] Hallpike:  Right:  neg                                                Left:  neg                  MCTSIB:  cond 1:   30 sec no inc sway  `                             cond 2:  30 sec;  no inc sway                                  cond 3:  30 sec;  Min sway                                  cond 4:  30 sec;  min inc sway       Gait:  Pt amb (I) without assistive device and note more narrow LACEY, less waddling type gait, some veering R and L but less than at eval.      Assessment/Plan     STGs:  6 weeks  1.  Pt to tolerate initial HEP/post-treatment regimen.  MET  2.  Pt to tolerate advancement of HEP as indicated.  MET  3.  Pt to report at least 25% improvement in spinning and off balance symptoms.   MET  4.  Resolve BPPV as indicated by negative bilateral Roll Tests and Raul Hallpike. MET  5.  Pt to participate in static and dynamic balance assessment after BPPV MET    LTGs:  By DC  1.  Pt to be (I) with HEP to manage own condition. MET  2.  Pt to report at least 80% improvement in spinning symptoms to enable him to sleep normal amount of time and in his normal location. MET  3.  Pt to improve function as indicated by improved score on DHI as compared to eval to allow pt to ambulate (I) without assistive device. MET  4.  Pt to improve balance as indicated by improved performance on MCTSIB and/or DGI.  MET  5.  Pt to report at least 80% improvement in overall off balance symptoms to allow pt to shower and dress in his normal manner without LOB or feeling off balance. MET    Pt has met 5/5 STGs and 5/5 LTGs at this time.  Pt has met all goals as set at eval.  He is (I) with HEP and is ready for DC.      Progress per Plan of Care        Timed:         Manual Therapy:         mins  40899;     Therapeutic Exercise:         mins  00899;     Neuromuscular Den: 24       mins  10818;     Therapeutic Activity:          mins  26664;     Gait Training:           mins  46816;     Ultrasound:          mins  41313;    Ionto                                   mins   64090  Self Care                            mins   90677  Canalith Repos                   mins  97340    Un-Timed:  Electrical Stimulation:         mins  34437 ( );  Traction          mins 06885  Low Eval          Mins  00526  Mod Eval          Mins  60967  High Eval                            Mins  87324  Re-Eval                               mins  48241    Timed Treatment: 24     mins   Total Treatment:   24     mins    Annita Flores PT  Physical Therapist  [place] PT Lic. # [id number]

## 2023-04-25 ENCOUNTER — ANTICOAGULATION VISIT (OUTPATIENT)
Dept: PHARMACY | Facility: HOSPITAL | Age: 78
End: 2023-04-25
Payer: MEDICARE

## 2023-04-25 ENCOUNTER — OFFICE VISIT (OUTPATIENT)
Dept: CARDIOLOGY | Facility: CLINIC | Age: 78
End: 2023-04-25
Payer: MEDICARE

## 2023-04-25 VITALS
WEIGHT: 270 LBS | BODY MASS INDEX: 35.78 KG/M2 | DIASTOLIC BLOOD PRESSURE: 71 MMHG | SYSTOLIC BLOOD PRESSURE: 116 MMHG | HEIGHT: 73 IN | HEART RATE: 69 BPM | RESPIRATION RATE: 18 BRPM

## 2023-04-25 DIAGNOSIS — R07.89 ATYPICAL CHEST PAIN: ICD-10-CM

## 2023-04-25 DIAGNOSIS — I48.19 OTHER PERSISTENT ATRIAL FIBRILLATION: Primary | ICD-10-CM

## 2023-04-25 DIAGNOSIS — I10 ESSENTIAL HYPERTENSION: ICD-10-CM

## 2023-04-25 DIAGNOSIS — I48.19 PERSISTENT ATRIAL FIBRILLATION: ICD-10-CM

## 2023-04-25 DIAGNOSIS — E78.2 MIXED HYPERLIPIDEMIA: ICD-10-CM

## 2023-04-25 DIAGNOSIS — G45.9 TIA (TRANSIENT ISCHEMIC ATTACK): ICD-10-CM

## 2023-04-25 DIAGNOSIS — I48.19 PERSISTENT ATRIAL FIBRILLATION: Primary | ICD-10-CM

## 2023-04-25 LAB — INR PPP: 2.1 (ref 2–3)

## 2023-04-25 PROCEDURE — 36416 COLLJ CAPILLARY BLOOD SPEC: CPT

## 2023-04-25 PROCEDURE — 85610 PROTHROMBIN TIME: CPT

## 2023-04-25 PROCEDURE — G0463 HOSPITAL OUTPT CLINIC VISIT: HCPCS

## 2023-04-25 RX ORDER — PRAVASTATIN SODIUM 80 MG/1
80 TABLET ORAL
Qty: 90 TABLET | Refills: 1 | Status: SHIPPED | OUTPATIENT
Start: 2023-04-25

## 2023-04-25 NOTE — PROGRESS NOTES
Anticoagulation Clinic Progress Note    INR Goal: 2 - 3  Today's INR: 2.1 (therapeutic)  Current warfarin dose: warfarin 5 mg PO daily Current total weekly dose = 35 mg per week.     INR History:      2022    12:30 PM 2022     2:00 PM 2023     1:00 PM 2023     1:00 PM 3/16/2023     1:00 PM 2023    11:30 AM 2023     1:30 PM   Anticoagulation Monitoring   INR 2.30 2.80 2.80 3.10 2.80 3.20 2.10   INR Date 2022 2022 2023 2023 3/16/2023 2023 2023   INR Goal 2.0-3.0 2.0-3.0 2.0-3.0 2.0-3.0 2.0-3.0 2.0-3.0 2.0-3.0   Trend Same Same Same Same Same Down Same   Last Week Total 37.5 mg 37.5 mg 37.5 mg 37.5 mg 37.5 mg 37.5 mg 35 mg   Next Week Total 37.5 mg 37.5 mg 37.5 mg 37.5 mg 37.5 mg 35 mg 35 mg   Sun 5 mg 5 mg 5 mg 5 mg 5 mg 5 mg 5 mg   Mon 7.5 mg 7.5 mg 7.5 mg 7.5 mg 7.5 mg 5 mg 5 mg   Tue 5 mg 5 mg 5 mg 5 mg 5 mg 5 mg 5 mg   Wed 5 mg 5 mg 5 mg 5 mg 5 mg 5 mg 5 mg   Thu 5 mg 5 mg 5 mg 5 mg 5 mg 5 mg 5 mg   Fri 5 mg 5 mg 5 mg 5 mg 5 mg 5 mg 5 mg   Sat 5 mg 5 mg 5 mg 5 mg 5 mg 5 mg 5 mg       Anticoagulation Summary  As of 2023    INR goal:  2.0-3.0   TTR:  77.0 % (3.8 y)   INR used for dosin.10 (2023)   Warfarin maintenance plan:  5 mg every day; Starting 2023   Weekly warfarin total:  35 mg   No change documented:  Meeta Chapman, PharmD   Plan last modified:  Kavita Jolley, PharmD (2023)   Next INR check:  2023   Priority:  Maintenance   Target end date:      Indications    Persistent atrial fibrillation [I48.19]             Anticoagulation Episode Summary     INR check location:      Preferred lab:      Send INR reminders to:  [facility] CLINICAL POOL    Comments:  Patient contract signed 11/15/21.      Anticoagulation Care Providers     Provider Role Specialty Phone number    Silvano Gillis MD  Cardiology [phone]          Clinic Interview:   Patient Findings     Negatives:  Signs/symptoms of  thrombosis, Signs/symptoms of bleeding,   Laboratory test error suspected, Change in health, Change in alcohol use,   Change in activity, Upcoming invasive procedure, Emergency department   visit, Upcoming dental procedure, Missed doses, Extra doses, Change in   medications, Change in diet/appetite, Hospital admission, Bruising, Other   complaints      Clinical Outcomes     Negatives:  Major bleeding event, Thromboembolic event,   Anticoagulation-related hospital admission, Anticoagulation-related ED   visit, Anticoagulation-related fatality        Current Medications:   Prior to Admission medications    Medication Sig Start Date End Date Taking? Authorizing Provider   albuterol sulfate  (90 Base) MCG/ACT inhaler Inhale 2 puffs Every 6 (Six) Hours As Needed. 7/6/21   Shelby Haskins MD   alfuzosin (UROXATRAL) 10 MG 24 hr tablet Take 10 mg by mouth 2 (Two) Times a Day. 9/23/19   Shelby Haskins MD   aspirin (aspirin) 81 MG EC tablet Take 1 tablet by mouth Daily. 4/11/22   Silvano Gillis MD   baclofen (LIORESAL) 10 MG tablet Take 10 mg by mouth 2 (Two) Times a Day. 7/13/21   Shelby Haskins MD   BREO ELLIPTA 100-25 MCG/INH inhaler Inhale 1 puff Daily. 9/24/19   Shelby Haskins MD   digoxin (LANOXIN) 250 MCG tablet Take 1 tablet by mouth Daily. 1/5/23   Silvano Gillis MD   EPINEPHrine (EPIPEN) 0.3 MG/0.3ML solution auto-injector injection epinephrine 0.3 mg/0.3 mL injection, auto-injector    Shelby Haskins MD   fluorometholone (FML) 0.1 % ophthalmic suspension Administer 1 drop to the right eye Daily. 5/17/21   Shelby Haskins MD   fluticasone (FLONASE) 50 MCG/ACT nasal spray 1 spray into the nostril(s) as directed by provider Daily.    Shelby Haskins MD   gabapentin (NEURONTIN) 300 MG capsule Take 600 mg by mouth 2 (Two) Times a Day. 9/13/19   Shelby Haskins MD   levothyroxine (SYNTHROID, LEVOTHROID) 75 MCG tablet Take 75 mcg by mouth Daily.  9/23/19   Shelby Haskins MD   montelukast (SINGULAIR) 10 MG tablet Take 10 mg by mouth Every Evening. 9/23/19   Shelby Haskins MD   pantoprazole (PROTONIX) 40 MG EC tablet Take 1 tablet by mouth Daily. 2/22/22   Silvano Gillis MD   pravastatin (PRAVACHOL) 40 MG tablet TAKE ONE TABLET BY MOUTH EVERY NIGHT AT BEDTIME 3/20/23   Silvano Gillis MD   sertraline (ZOLOFT) 100 MG tablet Take 1.5 tablets by mouth Daily. 3/15/22   Silvano Gillis MD   theophylline (UNIPHYL) 400 MG 24 hr tablet Take 400 mg by mouth 2 (Two) Times a Day. 9/23/19   Shelby Haskins MD   verapamil ER (VERELAN) 240 MG 24 hr capsule TAKE ONE CAPSULE BY MOUTH EVERY NIGHT AT BEDTIME 2/13/23   Silvano Gillis MD   warfarin (COUMADIN) 1 MG tablet Take 2.5 mg by mouth 2 (Two) Times a Week. Mon, Thur  with 5mg    Shelby Haskins MD   warfarin (COUMADIN) 5 MG tablet TAKE ONE TABLET BY MOUTH DAILY EXCEPT ON MONDAY AND THURSDAY TAKE ONE AND ONE-HALF TABLETS OR AS DIRECTED 2/13/23   Silvano Gillis MD       Medical history:   Past Medical History:   Diagnosis Date   • Allergic    • Anxiety    • Arthritis    • Asthma    • Atrial fibrillation    • Atypical chest pain 06/11/2020   • Cataract    • COPD (chronic obstructive pulmonary disease)    • COVID    • COVID-19 01/13/2021   • Depression    • Enlarged prostate    • Headache    • Hyperlipidemia    • Hypertension    • Kidney stone    • Near syncope 12/17/2020   • Prostatitis    • Shortness of breath    • Sleep apnea    • Stroke    • Urinary tract infection        Social history:   Social History     Tobacco Use   • Smoking status: Former     Types: Cigarettes   • Smokeless tobacco: Never   Substance Use Topics   • Alcohol use: Yes     Comment: occasionally       Allergies:    Penicillins and Rivaroxaban    Vaccine History:   Immunization History   Administered Date(s) Administered   • FLUAD TRI 65YR+ 10/31/2019   • Fluzone High Dose =>65  Years ([employer] ONLY) 10/14/2016, 10/16/2017, 11/14/2018   • H1N1 Inj 12/22/2009   • Hepatitis A 09/17/2019, 11/19/2019   • Influenza Quad Vaccine (Inpatient) 09/22/2014   • Influenza, Unspecified 09/18/2013   • Pneumococcal Conjugate 13-Valent (PCV13) 10/12/2015   • Pneumococcal Polysaccharide (PPSV23) 09/24/2013   • Shingrix 09/17/2019, 11/19/2019   • Zostavax 02/12/2013       This patient is managed via a collaborative agreement, pursuant to IC [id number]. The signed protocol is kept in the [facility] at [address] [place] [address].    Education: Efe Malloy has been instructed to call if any changes in medications, doses, concerns, etc. Patient was provided dosing instructions and expressed verbal understanding and has no further questions at this time.    Plan:  1. Anticoagulation   - no change; warfarin 5 mg PO daily  Total weekly dose = 35 mg.   - RTC in 4 week(s)    Bony Chapman, PharmD  4/25/2023  13:35 EDT

## 2023-05-01 NOTE — PROGRESS NOTES
Cardiology Clinic Note  Silvano Gillis MD, PhD    Subjective:     Encounter Date:04/25/2023      Patient ID: Efe Malloy is a 77 y.o. male.    Chief Complaint:  Chief Complaint   Patient presents with   • Follow-up       HPI:    I the pleasure to see this 77-year-old gentleman today in cardiology follow-up, previously had a stress test ordered by another provider with some atypical chest pain.   He has a known history of chest pain with multiple stress test being ordered all unremarkable previously.  I reviewed interpreted the most recent which demonstrated resting significant abnormalities with decreased uptake in the apex anterior inferior lateral walls broadly with improved stress perfusion but otherwise there is still the perfusion abnormality.  It is difficult to say whether it is reversible ischemia and he has never had definitive evaluation with coronary angiography.   He has chronic persistent atrial fibrillation which is rate controlled He is on anticoagulation with Coumadin goal INR 2-3.  He has no history of prosthetic valves in place.    He is a former smoker and continues abstinence after cessation remotely.  He is on alpha-blocker alfuzosin for urologic complaints with BPH and urinary retention status post prostate surgery with significant incontinence he says that he follows with urology regularly.  He was recently hospitalized with TIA and unilateral hemianopsia in the right visual fields of the right eye.  he underwent invasive ischemic evaluation secondary to uninformative stress testing with continued episodes of chest pain.  Left heart catheterization September 2021revealed borderline obstructive disease and a large ramus branch and both superior and inferior limbs.  Both IFR/FFR of both limbs was insignificant with respect to hemodynamic criteria as described.  This does not meet criteria for intervention and was not hemodynamically significant.  There is likely eccentric plaque which is  overestimated angiographically.,  0.98 in the superior limb and 0.95 in the inferior limb  With respect to IFR, therefore he was recommended for medical therapy.  EF was 60%.  He is doing well today, no CV complaints presently with complex medical history as described above.     Follow-up echo April 2022  Mild concentric LVH, EF 65%, mild calcification the aortic valve, negative bubble study for any shunt, grade 1-2 diastolic dysfunction     Review of systems otherwise negative x14 point review of systems except as mentioned above     Historical data copied forward from previous encounters in EMR is unchanged     Left heart catheterization 9/17/2021  Findings next   #1 open aortic pressure, 157/96  2.  Closing pressure was unchanged next   #3 LVEDP 12 to 16 mmHg  4.  Normal LV systolic function EF of 65 to 70%  5.  No transaortic gradient     Angiography next   1 left main large-caliber vessel no angiographic disease  2.  LAD medium caliber vessel coursing anteriorly with mild nonobstructive disease most severely 20 to 30% diffusely with diffuse luminal regularities  3.  Ramus intermedius is a very large caliber bifurcating vessel along anterolateral wall, proximally there is nonobstructive 20 to 30% luminal regularities, at the bifurcation of the superior and inferior limbs there is angiographic 70-80% in the superior limb, 50-60% in the inferior limb with only mild luminal regularities distally.  4.  Circumflex has 1 obtuse marginal branch, OM has 60% eccentric plaque, circumflex proper has mild luminal regularities only with DELFINO-3 flow throughout next   #5 RCA large caliber vessel with nonobstructive disease, mild luminal regularities only including PLV and PDA branches distally with DELFINO-3 flow     Conclusions and recommendations  1 borderline nonobstructive disease most severely in the ramus intermedius branch of the bifurcation involving superior and inferior sizable limbs.  IFR and FFR unremarkable in both  limbs measured today as noted above.  Nonobstructive LAD disease, obtuse marginal disease and b luminal regularities only in the RCA.  2.  High normal LVEDP, no transaortic gradient, hyperdynamic LV function 65 to 70%  3.  Continue primary mention goals, aspirin statin beta-blocker, antianginals, may try Ranexa for small vessel disease not amenable to intervention or visible by angiography for microvascular dysfunction with negative work-up today  4.  Patient be discharged with discharge criteria met, follow-up cardiology 2 to 4 weeks for further optimization of his medical therapy     Silvano Gillis MD, PhD     =================================================  Exam today  Irregular, A. fib no rubs gallops no heave no left, faint 1 out of 6 unchanged systolic ejection murmur lower sternal border  No carotid bruits or JVD  No clubbing cyanosis or edema  Obese soft nontender nondistended bowel sounds are positive  Clear to auscultation bilaterally  Normocephalic/atraumatic pupils equal round  Neurologic grossly intact bilaterally  Pulses 2+ radials equal bilaterally  Essentially unchanged     Assessment plan per my encounter  Chest pain, no recurrence  IFR of the ramus at 0.98 and 0.95 with respect to superior and inferior limbs, no significant obstructive disease in other distributions with EF of 60 to 65%.  Obtuse marginal 60% eccentric plaque but nonflow limiting, RCA with luminal regularities, LAD 20 to 30% limb irregularities as well.  Secondary prevention goals    Increase pravastatin 80 daily, advance to high intensity statin if tolerated such as atorvastatin or add Zetia and repeat fasting lipid panel, goal LDL less than 70     CHF, EF of 60% by LV gram, euvolemic appearing today     Persistent atrial fibrillation, digoxin and verapamil will be continued, get digoxin level intermittently  We will consider changing verapamil to metoprolol in the case  he has orthostasis blood pressure stable 116/71  "today     Hypertension, stable  Stop losartan previously  Off hydralazine  EF 60% with no clinical heart failure no class I indication for ARB  Continue verapamil for atrial fibrillation indications     Dizziness and hypotension  Remain off losartan HCTZ and hydralazine, symptoms are improved  Verapamil once daily only for A. fib as well as digoxin  We will consider change to beta-blockers if indicated from calcium channel blockers if recurrent     Hyperlipidemia continue pravastatin, guide by fasting lipid panel for ASCVD risk calculations, escalate doses per lipid studies     Diet and exercise as allowed by functional status  Weight reduction 10% over the next year he is at 269 pounds similar to prior encounter     continue smoking abstinence        Silvano Gillis MD, PhD       Objective:         /71 (BP Location: Right arm, Patient Position: Sitting)   Pulse 69   Resp 18   Ht 185.4 cm (73\")   Wt 122 kg (270 lb)   BMI 35.62 kg/m²         The pleasure to be involved in this patient's cardiovascular care.  Please call with any questions or concerns  Silvano Gillis MD, PhD    Most recent EKG as reviewed and interpreted by me:  Procedures     Most recent echo as reviewed and interpreted by me:  Results for orders placed during the hospital encounter of 04/09/22    Adult Transthoracic Echo Complete W/ Cont if Necessary Per Protocol    Interpretation Summary  · Left ventricular wall thickness is consistent with mild concentric hypertrophy.  · Estimated left ventricular EF = 65% Left ventricular systolic function is normal.  · There is calcification of the aortic valve mainly affecting the right coronary cusp(s).  · Saline test results are negative for right to left atrial level shunt.  · Estimated right ventricular systolic pressure from tricuspid regurgitation is normal (<35 mmHg).  · Left ventricular diastolic function is consistent with (grade II w/high LAP) pseudonormalization.      Most recent stress " test as reviewed and interpreted by me:  Results for orders placed during the hospital encounter of 08/26/21    Stress Test With Myocardial Perfusion    Interpretation Summary  · Findings consistent with a normal ECG stress test.  · Diaphragmatic attenuation artifact is present.  · Left ventricular ejection fraction is mildly reduced. (Calculated EF = 51%).  · Impressions are consistent with a low risk study.  · Uptake abnormalities some score of 15 at rest and only some score of 8 post regadenoson suggest this is largely due to soft tissue attenuation artifact with no definite evidence of reversible ischemia      Most recent cardiac catheterization as reviewed interpreted by me:  Results for orders placed during the hospital encounter of 09/17/21    Cardiac Catheterization/Vascular Study    Narrative  Silvano Gillis MD, PhD  [facility] cardiology  Date of service 9-17-21    Procedure  1.  Left heart catheterization with coronary angiography left ventriculography via right radial approach  2.  Fractional flow reserve of high diagonal versus ramus intermedius superior limb  3.  Fractional flow reserve ramus intermedius, inferior limb separate branch    Indication  Continued chest pain, uninformative noninvasive studies    After informed consent the patient was brought to the catheterization lab sterilely prepped and draped in usual fashion for the right wrist for right radial access via micropuncture modified Seldinger technique under ultrasound guidance.  5/6 slender sheath was placed with standard cocktail of heparin nitroglycerin and Cardene.  035 guidewire was advanced aortic valve followed by diagnostic catheterization with JL 3 and JR4 6 Turkish catheters.  JR4 was used across aortic valve followed by hand-injection for LV gram, EDP assessment and pullback assessment the transaortic valve gradient.  Secondary to findings of possible obstructive coronary disease with large bifurcating ramus intermedius branch  this is what made to perform FFR.  Patient was heparinized for ACT greater than 250 with 100 units/kg of heparin.  Standard FFR was zeroed outside the body, equalized in the left main followed by exam seen to first the inferior limb of the ramus intermedius, IFR was obtained 0.98, the wire was then pulled back followed again by equalization the left main, readvanced to the superior limb of the ramus intermedius separate branch followed by IFR which is 0.95, this was followed by FFR which was 0.87 with good correlated value.  Pullback to the left main revealed no drift at 0.99.  Final angiography revealed no wall trauma, no edge dissections, DELFINO-3 flow in all branches and all catheters and equipment were removed.  Sheath was removed with TR band for hemostasis.  Patient with Cath Lab chest pain-free hemodynamically electrically stable or talking staff neurologically grossly intact bilaterally.      Contrast 170 cc  Conscious sedation time 1 hour with IV Versed and fentanyl administration nurse with complete ECG pulse oximetry and hemodynamic monitoring during the case observed by me  Blood loss less than 5 cc  Complications none    Findings next  #1 open aortic pressure, 157/96  2.  Closing pressure was unchanged next  #3 LVEDP 12 to 16 mmHg  4.  Normal LV systolic function EF of 65 to 70%  5.  No transaortic gradient    Angiography next  1 left main large-caliber vessel no angiographic disease  2.  LAD medium caliber vessel coursing anteriorly with mild nonobstructive disease most severely 20 to 30% diffusely with diffuse luminal regularities  3.  Ramus intermedius is a very large caliber bifurcating vessel along anterolateral wall, proximally there is nonobstructive 20 to 30% luminal regularities, at the bifurcation of the superior and inferior limbs there is angiographic 70-80% in the superior limb, 50-60% in the inferior limb with only mild luminal regularities distally.  4.  Circumflex has 1 obtuse marginal  branch, OM has 60% eccentric plaque, circumflex proper has mild luminal regularities only with DELFINO-3 flow throughout next  #5 RCA large caliber vessel with nonobstructive disease, mild luminal regularities only including PLV and PDA branches distally with DELFINO-3 flow    Conclusions and recommendations  1 borderline nonobstructive disease most severely in the ramus intermedius branch of the bifurcation involving superior and inferior sizable limbs.  IFR and FFR unremarkable in both limbs measured today as noted above.  Nonobstructive LAD disease, obtuse marginal disease and b luminal regularities only in the RCA.  2.  High normal LVEDP, no transaortic gradient, hyperdynamic LV function 65 to 70%  3.  Continue primary mention goals, aspirin statin beta-blocker, antianginals, may try Ranexa for small vessel disease not amenable to intervention or visible by angiography for microvascular dysfunction with negative work-up today  4.  Patient be discharged with discharge criteria met, follow-up cardiology 2 to 4 weeks for further optimization of his medical therapy    Silvano Gillis MD, PhD    The following portions of the patient's history were reviewed and updated as appropriate: allergies, current medications, past family history, past medical history, past social history, past surgical history and problem list.      ROS:  14 point review of systems negative except as mentioned above    Current Outpatient Medications:   •  albuterol sulfate  (90 Base) MCG/ACT inhaler, Inhale 2 puffs Every 6 (Six) Hours As Needed., Disp: , Rfl:   •  alfuzosin (UROXATRAL) 10 MG 24 hr tablet, Take 1 tablet by mouth 2 (Two) Times a Day., Disp: , Rfl: 11  •  aspirin (aspirin) 81 MG EC tablet, Take 1 tablet by mouth Daily., Disp: 90 tablet, Rfl: 3  •  baclofen (LIORESAL) 10 MG tablet, Take 1 tablet by mouth 2 (Two) Times a Day., Disp: , Rfl:   •  BREO ELLIPTA 100-25 MCG/INH inhaler, Inhale 1 puff Daily., Disp: , Rfl: 5  •  digoxin  (LANOXIN) 250 MCG tablet, Take 1 tablet by mouth Daily., Disp: 90 tablet, Rfl: 1  •  EPINEPHrine (EPIPEN) 0.3 MG/0.3ML solution auto-injector injection, epinephrine 0.3 mg/0.3 mL injection, auto-injector, Disp: , Rfl:   •  fluorometholone (FML) 0.1 % ophthalmic suspension, Administer 1 drop to the right eye Daily., Disp: , Rfl:   •  gabapentin (NEURONTIN) 300 MG capsule, Take 2 capsules by mouth 2 (Two) Times a Day., Disp: , Rfl: 3  •  levothyroxine (SYNTHROID, LEVOTHROID) 75 MCG tablet, Take 1 tablet by mouth Daily., Disp: , Rfl: 5  •  montelukast (SINGULAIR) 10 MG tablet, Take 1 tablet by mouth Every Evening., Disp: , Rfl: 5  •  pantoprazole (PROTONIX) 40 MG EC tablet, Take 1 tablet by mouth Daily., Disp: 90 tablet, Rfl: 2  •  pravastatin (PRAVACHOL) 80 MG tablet, Take 1 tablet by mouth every night at bedtime., Disp: 90 tablet, Rfl: 1  •  sertraline (ZOLOFT) 100 MG tablet, Take 1.5 tablets by mouth Daily., Disp: 30 tablet, Rfl: 0  •  theophylline (UNIPHYL) 400 MG 24 hr tablet, Take 1 tablet by mouth Daily., Disp: , Rfl: 5  •  verapamil ER (VERELAN) 240 MG 24 hr capsule, TAKE ONE CAPSULE BY MOUTH EVERY NIGHT AT BEDTIME (Patient taking differently: 2 (Two) Times a Day.), Disp: 90 capsule, Rfl: 1  •  warfarin (COUMADIN) 1 MG tablet, Take 2.5 tablets by mouth 2 (Two) Times a Week. Mon, Thur  with 5mg, Disp: , Rfl:   •  warfarin (COUMADIN) 5 MG tablet, TAKE ONE TABLET BY MOUTH DAILY EXCEPT ON MONDAY AND THURSDAY TAKE ONE AND ONE-HALF TABLETS OR AS DIRECTED, Disp: 96 tablet, Rfl: 1    Problem List:  Patient Active Problem List   Diagnosis   • Persistent atrial fibrillation   • Essential hypertension   • COPD (chronic obstructive pulmonary disease)   • Hyperglycemia   • Atypical chest pain   • Abnormal Pap smear of anus   • Congestive heart failure   • Depressive disorder   • Gastroesophageal reflux disease   • Mixed hyperlipidemia   • Hypothyroidism   • Neuropathy   • Obstructive sleep apnea syndrome   • Primary  osteoarthritis of right knee   • Cervical spondylosis without myelopathy   • Cervico-occipital neuralgia   • Lumbosacral spondylosis without myelopathy   • Near syncope   • Arthritis   • Chronic idiopathic constipation   • Disorder of prostate   • Dysplasia of anus   • Gout   • Hearing loss   • Seasonal allergic rhinitis   • TIA (transient ischemic attack)     Past Medical History:  Past Medical History:   Diagnosis Date   • Allergic    • Anxiety    • Arthritis    • Asthma    • Atrial fibrillation    • Atypical chest pain 06/11/2020   • Cataract    • COPD (chronic obstructive pulmonary disease)    • COVID    • COVID-19 01/13/2021   • Depression    • Enlarged prostate    • Headache    • Hyperlipidemia    • Hypertension    • Kidney stone    • Near syncope 12/17/2020   • Prostatitis    • Shortness of breath    • Sleep apnea    • Stroke    • Urinary tract infection      Past Surgical History:  Past Surgical History:   Procedure Laterality Date   • APPENDECTOMY     • CARDIAC CATHETERIZATION  2007    nonobstructive dz   • CARDIAC CATHETERIZATION N/A 9/17/2021    Procedure: Left Heart Cath;  Surgeon: Silvano Gillis MD;  Location: [facility] CATH INVASIVE LOC[facility];  Service: Cardiology;  Laterality: N/A;   • CERVICAL EPIDURAL      with Dr. Harshil Rodgers mgt   • CHOLECYSTECTOMY     • EYE SURGERY Bilateral     corneal transplant left 09/02/2020 (Right eye 2014)   • JOINT REPLACEMENT Right     knee   • KIDNEY STONE SURGERY     • PROSTATE SURGERY       Social History:  Social History     Socioeconomic History   • Marital status:    Tobacco Use   • Smoking status: Former     Types: Cigarettes   • Smokeless tobacco: Never   Vaping Use   • Vaping Use: Never used   Substance and Sexual Activity   • Alcohol use: Yes     Comment: occasionally   • Drug use: Not Currently   • Sexual activity: Defer     Allergies:  Allergies   Allergen Reactions   • Penicillins Other (See Comments)     AS A CHILD   • Rivaroxaban Other (See  Comments)     Immunizations:  Immunization History   Administered Date(s) Administered   • FLUAD TRI 65YR+ 10/31/2019   • Fluzone High Dose =>65 Years ([employer] ONLY) 10/14/2016, 10/16/2017, 11/14/2018   • H1N1 Inj 12/22/2009   • Hepatitis A 09/17/2019, 11/19/2019   • Influenza Quad Vaccine (Inpatient) 09/22/2014   • Influenza, Unspecified 09/18/2013   • Pneumococcal Conjugate 13-Valent (PCV13) 10/12/2015   • Pneumococcal Polysaccharide (PPSV23) 09/24/2013   • Shingrix 09/17/2019, 11/19/2019   • Zostavax 02/12/2013            In-Office Procedure(s):  No orders to display        ASCVD RIsk Score::  The ASCVD Risk score (Daisy JAIN, et al., 2019) failed to calculate for the following reasons:    The patient has a prior MI or stroke diagnosis    Imaging:    Results for orders placed during the hospital encounter of 04/09/22    XR Chest 1 View    Narrative  XR CHEST 1 VW-    Date of Exam: 4/9/2022 3:28 PM    Indication: Acute Stroke Protocol (onset < 12 hrs).    Comparison Exams: June 11, 2020    Technique: Single AP chest radiograph    FINDINGS:  The lungs are clear. The heart and mediastinal contours appear normal.  The pulmonary vasculature appears normal. The osseous structures appear  intact.    Impression  No acute cardiopulmonary process identified.    Electronically Signed By-Sha Rangel MD On:4/9/2022 3:34 PM  This report was finalized on [id number] by  Sha Rangel MD.       Results for orders placed during the hospital encounter of 04/09/22    CT CEREBRAL PERFUSION WITH & WITHOUT CONTRAST    Narrative  DATE OF EXAM:  4/9/2022 3:55 PM    PROCEDURE:  CT CEREBRAL PERFUSION W WO CONTRAST-    INDICATIONS:  Transient visual changes consistent with a stroke    COMPARISON:  CT head from earlier today    TECHNIQUE:  Routine transaxial cuts were obtained through the head without  administration of  contrast. Routine transaxial cuts were then obtained  through the head following the administration of 50 mL of  Isovue 370  intravenously. Core blood volume, core blood flow, mean transit time,  and Tmax images were obtained utilizing the Rapid software protocol. A  limited CT angiogram of the head was also performed to measure the blood  vessel density.    FINDINGS:  There is fairly symmetrical abnormal perfusion on the Tmax within the  posterior fossa and bilateral temporo-occipital lobes, favored to be  artifactual. The cerebral blood flow appears normal.    Impression  No definite acute perfusion abnormality identified. Suggestion of  artifact on the Tmax as described above.    Electronically Signed By-Sha Rangel MD On:4/9/2022 4:17 PM  This report was finalized on [id number] by  Sha Rangel MD.      Results for orders placed during the hospital encounter of 04/09/22    CT CEREBRAL PERFUSION WITH & WITHOUT CONTRAST    Narrative  DATE OF EXAM:  4/9/2022 3:55 PM    PROCEDURE:  CT CEREBRAL PERFUSION W WO CONTRAST-    INDICATIONS:  Transient visual changes consistent with a stroke    COMPARISON:  CT head from earlier today    TECHNIQUE:  Routine transaxial cuts were obtained through the head without  administration of  contrast. Routine transaxial cuts were then obtained  through the head following the administration of 50 mL of Isovue 370  intravenously. Core blood volume, core blood flow, mean transit time,  and Tmax images were obtained utilizing the Rapid software protocol. A  limited CT angiogram of the head was also performed to measure the blood  vessel density.    FINDINGS:  There is fairly symmetrical abnormal perfusion on the Tmax within the  posterior fossa and bilateral temporo-occipital lobes, favored to be  artifactual. The cerebral blood flow appears normal.    Impression  No definite acute perfusion abnormality identified. Suggestion of  artifact on the Tmax as described above.    Electronically Signed By-Sha Rangel MD On:4/9/2022 4:17 PM  This report was finalized on [id number] by   Sha Rangel MD.      Lab Review:   Anticoagulation Visit on 04/25/2023   Component Date Value   • INR 04/25/2023 2.10    Anticoagulation Visit on 04/14/2023   Component Date Value   • INR 04/14/2023 3.20 (A)    Anticoagulation Visit on 03/16/2023   Component Date Value   • INR 03/16/2023 2.80    Anticoagulation Visit on 02/16/2023   Component Date Value   • INR 02/16/2023 3.10    Anticoagulation Visit on 01/19/2023   Component Date Value   • INR 01/19/2023 2.80 (A)    Anticoagulation Visit on 12/21/2022   Component Date Value   • INR 12/21/2022 2.80 (A)    Anticoagulation Visit on 11/22/2022   Component Date Value   • INR 11/22/2022 2.30 (A)      Recent labs reviewed and interpreted for clinical significance and application            Level of Care:           Silvano Gillis MD  05/01/23  .

## 2023-05-23 ENCOUNTER — ANTICOAGULATION VISIT (OUTPATIENT)
Dept: PHARMACY | Facility: HOSPITAL | Age: 78
End: 2023-05-23
Payer: MEDICARE

## 2023-05-23 DIAGNOSIS — I48.19 PERSISTENT ATRIAL FIBRILLATION: Primary | ICD-10-CM

## 2023-05-23 LAB — INR PPP: 2.1 (ref 2–3)

## 2023-05-23 PROCEDURE — 85610 PROTHROMBIN TIME: CPT

## 2023-05-23 PROCEDURE — 36416 COLLJ CAPILLARY BLOOD SPEC: CPT

## 2023-05-23 PROCEDURE — G0463 HOSPITAL OUTPT CLINIC VISIT: HCPCS

## 2023-05-23 NOTE — PROGRESS NOTES
Anticoagulation Clinic Progress Note    INR Goal: 2 - 3  Today's INR: 2.1 (therapeutic)  Current warfarin dose: warfarin 5 mg PO daily. Current total weekly dose = 35 mg per week.     INR History:      2022     2:00 PM 2023     1:00 PM 2023     1:00 PM 3/16/2023     1:00 PM 2023    11:30 AM 2023     1:30 PM 2023     1:00 PM   Anticoagulation Monitoring   INR 2.80 2.80 3.10 2.80 3.20 2.10 2.10   INR Date 2022 2023 2023 3/16/2023 2023 2023 2023   INR Goal 2.0-3.0 2.0-3.0 2.0-3.0 2.0-3.0 2.0-3.0 2.0-3.0 2.0-3.0   Trend Same Same Same Same Down Same Same   Last Week Total 37.5 mg 37.5 mg 37.5 mg 37.5 mg 37.5 mg 35 mg 35 mg   Next Week Total 37.5 mg 37.5 mg 37.5 mg 37.5 mg 35 mg 35 mg 35 mg   Sun 5 mg 5 mg 5 mg 5 mg 5 mg 5 mg 5 mg   Mon 7.5 mg 7.5 mg 7.5 mg 7.5 mg 5 mg 5 mg 5 mg   Tue 5 mg 5 mg 5 mg 5 mg 5 mg 5 mg 5 mg   Wed 5 mg 5 mg 5 mg 5 mg 5 mg 5 mg 5 mg   Thu 5 mg 5 mg 5 mg 5 mg 5 mg 5 mg 5 mg   Fri 5 mg 5 mg 5 mg 5 mg 5 mg 5 mg 5 mg   Sat 5 mg 5 mg 5 mg 5 mg 5 mg 5 mg 5 mg   Visit Report      Report        Anticoagulation Summary  As of 2023    INR goal:  2.0-3.0   TTR:  77.5 % (3.9 y)   INR used for dosin.10 (2023)   Warfarin maintenance plan:  5 mg every day; Starting 2023   Weekly warfarin total:  35 mg   No change documented:  Kavita Jolley, PharmD   Plan last modified:  Kavita Jolley, PharmD (2023)   Next INR check:  2023   Priority:  Maintenance   Target end date:      Indications    Persistent atrial fibrillation [I48.19]             Anticoagulation Episode Summary     INR check location:      Preferred lab:      Send INR reminders to:  [facility] CLINICAL POOL    Comments:  Patient contract signed 11/15/21.      Anticoagulation Care Providers     Provider Role Specialty Phone number    Silvano Gillis MD  Cardiology [phone]          Clinic Interview:   Patient Findings     Positives:  Change  in medications (Pravastatin dose increased ~1 month   ago (may increase INR but effects likely already reflected in today's   INR))    Negatives:  Signs/symptoms of thrombosis, Signs/symptoms of bleeding,   Laboratory test error suspected, Change in health, Change in alcohol use,   Change in activity, Upcoming invasive procedure, Emergency department   visit, Upcoming dental procedure, Missed doses, Extra doses, Change in   diet/appetite, Hospital admission, Bruising, Other complaints      Clinical Outcomes     Negatives:  Major bleeding event, Thromboembolic event,   Anticoagulation-related hospital admission, Anticoagulation-related ED   visit, Anticoagulation-related fatality        Current Medications:   Prior to Admission medications    Medication Sig Start Date End Date Taking? Authorizing Provider   albuterol sulfate  (90 Base) MCG/ACT inhaler Inhale 2 puffs Every 6 (Six) Hours As Needed. 7/6/21   Shelby Haskins MD   alfuzosin (UROXATRAL) 10 MG 24 hr tablet Take 1 tablet by mouth 2 (Two) Times a Day. 9/23/19   Shelby Haskins MD   aspirin (aspirin) 81 MG EC tablet Take 1 tablet by mouth Daily. 4/11/22   Silvano Gillis MD   baclofen (LIORESAL) 10 MG tablet Take 1 tablet by mouth 2 (Two) Times a Day. 7/13/21   Shelby Haskins MD   BREO ELLIPTA 100-25 MCG/INH inhaler Inhale 1 puff Daily. 9/24/19   Shelby Haskins MD   digoxin (LANOXIN) 250 MCG tablet Take 1 tablet by mouth Daily. 1/5/23   Silvano Gillis MD   EPINEPHrine (EPIPEN) 0.3 MG/0.3ML solution auto-injector injection epinephrine 0.3 mg/0.3 mL injection, auto-injector    Shelby Haskins MD   fluorometholone (FML) 0.1 % ophthalmic suspension Administer 1 drop to the right eye Daily. 5/17/21   Shelby Haskins MD   gabapentin (NEURONTIN) 300 MG capsule Take 2 capsules by mouth 2 (Two) Times a Day. 9/13/19   Shelby Haskins MD   levothyroxine (SYNTHROID, LEVOTHROID) 75 MCG tablet Take 1  tablet by mouth Daily. 9/23/19   Shelby Haskins MD   montelukast (SINGULAIR) 10 MG tablet Take 1 tablet by mouth Every Evening. 9/23/19   Shelby Haskins MD   pantoprazole (PROTONIX) 40 MG EC tablet Take 1 tablet by mouth Daily. 2/22/22   Silvano Gillis MD   pravastatin (PRAVACHOL) 80 MG tablet Take 1 tablet by mouth every night at bedtime. 4/25/23   Silvano Gillis MD   sertraline (ZOLOFT) 100 MG tablet Take 1.5 tablets by mouth Daily. 3/15/22   Silvano Gillis MD   theophylline (UNIPHYL) 400 MG 24 hr tablet Take 1 tablet by mouth Daily. 9/23/19   Shelby Haskins MD   verapamil ER (VERELAN) 240 MG 24 hr capsule TAKE ONE CAPSULE BY MOUTH EVERY NIGHT AT BEDTIME  Patient taking differently: 2 (Two) Times a Day. 2/13/23   Silvano Gillis MD   warfarin (COUMADIN) 1 MG tablet Take 2.5 tablets by mouth 2 (Two) Times a Week. Mon, Thur  with 5mg    Shelby Haskins MD   warfarin (COUMADIN) 5 MG tablet TAKE ONE TABLET BY MOUTH DAILY EXCEPT ON MONDAY AND THURSDAY TAKE ONE AND ONE-HALF TABLETS OR AS DIRECTED 2/13/23   Silvano Gillis MD       Medical history:   Past Medical History:   Diagnosis Date   • Allergic    • Anxiety    • Arthritis    • Asthma    • Atrial fibrillation    • Atypical chest pain 06/11/2020   • Cataract    • COPD (chronic obstructive pulmonary disease)    • COVID    • COVID-19 01/13/2021   • Depression    • Enlarged prostate    • Headache    • Hyperlipidemia    • Hypertension    • Kidney stone    • Near syncope 12/17/2020   • Prostatitis    • Shortness of breath    • Sleep apnea    • Stroke    • Urinary tract infection        Social history:   Social History     Tobacco Use   • Smoking status: Former     Types: Cigarettes   • Smokeless tobacco: Never   Substance Use Topics   • Alcohol use: Yes     Comment: occasionally       Allergies:    Penicillins and Rivaroxaban    Vaccine History:   Immunization History   Administered Date(s)  Administered   • Covid-19 (Pfizer) Gray Cap Monovalent 05/18/2021, 11/17/2021, 11/26/2021   • FLUAD TRI 65YR+ 10/31/2019   • Fluzone High Dose =>65 Years (Vaxcare ONLY) 10/14/2016, 10/16/2017, 11/14/2018   • H1N1 Inj 12/22/2009   • Hepatitis A 09/17/2019, 11/19/2019   • Influenza Quad Vaccine (Inpatient) 09/22/2014   • Influenza, Unspecified 09/18/2013   • Pneumococcal Conjugate 13-Valent (PCV13) 10/12/2015   • Pneumococcal Polysaccharide (PPSV23) 09/24/2013   • Shingrix 09/17/2019, 11/19/2019   • Zostavax 02/12/2013       This patient is managed via a collaborative agreement, pursuant to IC 25-26-16. The signed protocol is kept in the [facility] at [address] [place] [address].    Education: Efe Malloy has been instructed to call if any changes in medications, doses, concerns, etc. Patient was provided dosing instructions and expressed verbal understanding and has no further questions at this time.    Plan:  1. Anticoagulation   - no change; warfarin 5 mg PO daily. Total weekly dose = 35 mg.   - RTC in 4 week(s)    Rosetta MadisonD  5/23/2023  13:04 EDT

## 2023-06-09 RX ORDER — DIGOXIN 250 MCG
TABLET ORAL
Qty: 90 TABLET | Refills: 1 | Status: SHIPPED | OUTPATIENT
Start: 2023-06-09

## 2023-06-13 ENCOUNTER — TELEPHONE (OUTPATIENT)
Dept: PHARMACY | Facility: HOSPITAL | Age: 78
End: 2023-06-13
Payer: MEDICARE

## 2023-06-13 NOTE — TELEPHONE ENCOUNTER
Patient called to report that he is having an injection in his neck by Dr. Nate Flores on 6/20 for which he will be holding warfarin for 5 days prior and resuming that night. Rescheduled patient for 7 days after resuming warfarin (6/27). Informed Dr. Gillis's medical assistant of this.

## 2023-07-25 ENCOUNTER — ANTICOAGULATION VISIT (OUTPATIENT)
Dept: PHARMACY | Facility: HOSPITAL | Age: 78
End: 2023-07-25
Payer: MEDICARE

## 2023-07-25 DIAGNOSIS — I48.19 PERSISTENT ATRIAL FIBRILLATION: Primary | ICD-10-CM

## 2023-07-25 LAB — INR PPP: 2.3 (ref 2–3)

## 2023-07-25 PROCEDURE — G0463 HOSPITAL OUTPT CLINIC VISIT: HCPCS

## 2023-07-25 PROCEDURE — 36416 COLLJ CAPILLARY BLOOD SPEC: CPT

## 2023-07-25 PROCEDURE — 85610 PROTHROMBIN TIME: CPT

## 2023-07-25 NOTE — PROGRESS NOTES
Anticoagulation Clinic Progress Note    INR Goal: 2 - 3  Today's INR: 2.3 (therapeutic)  Current warfarin dose: warfarin 5 mg PO daily. Current total weekly dose = 35 mg per week.     INR History:      2023     1:00 PM 3/16/2023     1:00 PM 2023    11:30 AM 2023     1:30 PM 2023     1:00 PM 2023     1:15 PM 2023     1:15 PM   Anticoagulation Monitoring   INR 3.10 2.80 3.20 2.10 2.10 1.80 2.30   INR Date 2023 3/16/2023 2023 2023 2023 2023 2023   INR Goal 2.0-3.0 2.0-3.0 2.0-3.0 2.0-3.0 2.0-3.0 2.0-3.0 2.0-3.0   Trend Same Same Down Same Same Same Same   Last Week Total 37.5 mg 37.5 mg 37.5 mg 35 mg 35 mg 35 mg 35 mg   Next Week Total 37.5 mg 37.5 mg 35 mg 35 mg 35 mg 35 mg 35 mg   Sun 5 mg 5 mg 5 mg 5 mg 5 mg 5 mg 5 mg   Mon 7.5 mg 7.5 mg 5 mg 5 mg 5 mg 5 mg 5 mg   Tue 5 mg 5 mg 5 mg 5 mg 5 mg 5 mg 5 mg   Wed 5 mg 5 mg 5 mg 5 mg 5 mg 5 mg 5 mg   Thu 5 mg 5 mg 5 mg 5 mg 5 mg 5 mg 5 mg   Fri 5 mg 5 mg 5 mg 5 mg 5 mg 5 mg 5 mg   Sat 5 mg 5 mg 5 mg 5 mg 5 mg 5 mg 5 mg   Visit Report    Report          Anticoagulation Summary  As of 2023      INR goal:  2.0-3.0   TTR:  76.1 % (4.1 y)   INR used for dosin.30 (2023)   Warfarin maintenance plan:  5 mg every day; Starting 2023   Weekly warfarin total:  35 mg   No change documented:  Kavita Jolley, PharmD   Plan last modified:  Kavita Jolley, Jessy (2023)   Next INR check:  2023   Priority:  Maintenance   Target end date:      Indications    Persistent atrial fibrillation [I48.19]                 Anticoagulation Episode Summary       INR check location:      Preferred lab:      Send INR reminders to:  [facility] CLINICAL POOL    Comments:  Patient contract signed 11/15/21.          Anticoagulation Care Providers       Provider Role Specialty Phone number    Silvano Gillsi MD  Cardiology [phone]            Clinic Interview:   Patient Findings     Negatives:   Signs/symptoms of thrombosis, Signs/symptoms of bleeding,   Laboratory test error suspected, Change in health, Change in alcohol use,   Change in activity, Upcoming invasive procedure, Emergency department   visit, Upcoming dental procedure, Missed doses, Extra doses, Change in   medications, Change in diet/appetite, Hospital admission, Bruising, Other   complaints      Clinical Outcomes     Negatives:  Major bleeding event, Thromboembolic event,   Anticoagulation-related hospital admission, Anticoagulation-related ED   visit, Anticoagulation-related fatality        Current Medications:   Prior to Admission medications    Medication Sig Start Date End Date Taking? Authorizing Provider   albuterol sulfate  (90 Base) MCG/ACT inhaler Inhale 2 puffs Every 6 (Six) Hours As Needed. 7/6/21   Shelby Haskins MD   alfuzosin (UROXATRAL) 10 MG 24 hr tablet Take 1 tablet by mouth 2 (Two) Times a Day. 9/23/19   Shelby Haskins MD   aspirin (aspirin) 81 MG EC tablet Take 1 tablet by mouth Daily. 4/11/22   Silvano Gillis MD   baclofen (LIORESAL) 10 MG tablet Take 1 tablet by mouth 2 (Two) Times a Day. 7/13/21   Shelby Haskins MD   BREO ELLIPTA 100-25 MCG/INH inhaler Inhale 1 puff Daily. 9/24/19   Shelby Haskins MD   digoxin (LANOXIN) 250 MCG tablet TAKE ONE TABLET BY MOUTH DAILY 6/9/23   Silvano Gillis MD   EPINEPHrine (EPIPEN) 0.3 MG/0.3ML solution auto-injector injection epinephrine 0.3 mg/0.3 mL injection, auto-injector    Shelby Haskins MD   fluorometholone (FML) 0.1 % ophthalmic suspension Administer 1 drop to the right eye Daily. 5/17/21   Shelby Haskins MD   gabapentin (NEURONTIN) 300 MG capsule Take 2 capsules by mouth 2 (Two) Times a Day. 9/13/19   Shelby Haskins MD   levothyroxine (SYNTHROID, LEVOTHROID) 75 MCG tablet Take 1 tablet by mouth Daily. 9/23/19   Shelby Haskins MD   montelukast (SINGULAIR) 10 MG tablet Take 1 tablet by mouth Every  Evening. 9/23/19   Shelby Haskins MD   pantoprazole (PROTONIX) 40 MG EC tablet Take 1 tablet by mouth Daily. 2/22/22   Silvano Gillis MD   pravastatin (PRAVACHOL) 80 MG tablet Take 1 tablet by mouth every night at bedtime. 4/25/23   Silvano Gillis MD   sertraline (ZOLOFT) 100 MG tablet Take 1.5 tablets by mouth Daily. 3/15/22   Silvano Gillis MD   theophylline (UNIPHYL) 400 MG 24 hr tablet Take 1 tablet by mouth Daily. 9/23/19   Shelby Haskins MD   verapamil ER (VERELAN) 240 MG 24 hr capsule TAKE ONE CAPSULE BY MOUTH EVERY NIGHT AT BEDTIME  Patient taking differently: 2 (Two) Times a Day. 2/13/23   Silvano Gillis MD   warfarin (COUMADIN) 1 MG tablet Take 2.5 tablets by mouth 2 (Two) Times a Week. Mon, Thur  with 5mg    Shelby Haskins MD   warfarin (COUMADIN) 5 MG tablet TAKE ONE TABLET BY MOUTH DAILY EXCEPT ON MONDAY AND THURSDAY TAKE ONE AND ONE-HALF TABLETS OR AS DIRECTED 2/13/23   Silvano Gillis MD       Medical history:   Past Medical History:   Diagnosis Date    Allergic     Anxiety     Arthritis     Asthma     Atrial fibrillation     Atypical chest pain 06/11/2020    Cataract     COPD (chronic obstructive pulmonary disease)     COVID     COVID-19 01/13/2021    Depression     Enlarged prostate     Headache     Hyperlipidemia     Hypertension     Kidney stone     Near syncope 12/17/2020    Prostatitis     Shortness of breath     Sleep apnea     Stroke     Urinary tract infection        Social history:   Social History     Tobacco Use    Smoking status: Former     Types: Cigarettes    Smokeless tobacco: Never   Substance Use Topics    Alcohol use: Yes     Comment: occasionally       Allergies:    Penicillins and Rivaroxaban    Vaccine History:   Immunization History   Administered Date(s) Administered    Covid-19 (Pfizer) Gray Cap Monovalent 05/18/2021, 11/17/2021, 11/26/2021    FLUAD TRI 65YR+ 10/31/2019    Fluzone High Dose =>65 Years  ([employer]axcare ONLY) 10/14/2016, 10/16/2017, 11/14/2018    H1N1 Inj 12/22/2009    Hepatitis A 09/17/2019, 11/19/2019    Influenza Quad Vaccine (Inpatient) 09/22/2014    Influenza, Unspecified 09/18/2013    Pneumococcal Conjugate 13-Valent (PCV13) 10/12/2015    Pneumococcal Polysaccharide (PPSV23) 09/24/2013    Shingrix 09/17/2019, 11/19/2019    Zostavax 02/12/2013       This patient is managed via a collaborative agreement, pursuant to IC 25-26-16. The signed protocol is kept in the [facility] at [address] [place] [address].    Education: Efe Malloy has been instructed to call if any changes in medications, doses, concerns, etc. Patient was provided dosing instructions and expressed verbal understanding and has no further questions at this time.    Plan:  1. Anticoagulation   - no change; warfarin 5 mg PO daily. Total weekly dose = 35 mg.   - RTC in 4 week(s)    Kavita Jolley, Jessy  7/25/2023  13:18 EDT

## 2023-07-31 RX ORDER — VERAPAMIL HYDROCHLORIDE 240 MG/1
CAPSULE, EXTENDED RELEASE ORAL
Qty: 90 CAPSULE | Refills: 1 | Status: SHIPPED | OUTPATIENT
Start: 2023-07-31

## 2023-08-23 ENCOUNTER — ANTICOAGULATION VISIT (OUTPATIENT)
Dept: PHARMACY | Facility: HOSPITAL | Age: 78
End: 2023-08-23
Payer: MEDICARE

## 2023-08-23 DIAGNOSIS — I48.19 PERSISTENT ATRIAL FIBRILLATION: Primary | ICD-10-CM

## 2023-08-23 LAB — INR PPP: 2.5 (ref 2–3)

## 2023-08-23 PROCEDURE — G0463 HOSPITAL OUTPT CLINIC VISIT: HCPCS

## 2023-08-23 PROCEDURE — 36416 COLLJ CAPILLARY BLOOD SPEC: CPT

## 2023-08-23 PROCEDURE — 85610 PROTHROMBIN TIME: CPT

## 2023-08-23 NOTE — PROGRESS NOTES
Anticoagulation Clinic Progress Note    INR Goal: 2 - 3  Today's INR: 2.5 (therapeutic)  Current warfarin dose: warfarin 5 mg PO daily Current total weekly dose = 35 mg per week.     INR History:      3/16/2023     1:00 PM 2023    11:30 AM 2023     1:30 PM 2023     1:00 PM 2023     1:15 PM 2023     1:15 PM 2023     1:15 PM   Anticoagulation Monitoring   INR 2.80 3.20 2.10 2.10 1.80 2.30 2.50   INR Date 3/16/2023 2023 2023 2023 2023 2023 2023   INR Goal 2.0-3.0 2.0-3.0 2.0-3.0 2.0-3.0 2.0-3.0 2.0-3.0 2.0-3.0   Trend Same Down Same Same Same Same Same   Last Week Total 37.5 mg 37.5 mg 35 mg 35 mg 35 mg 35 mg 35 mg   Next Week Total 37.5 mg 35 mg 35 mg 35 mg 35 mg 35 mg 35 mg   Sun 5 mg 5 mg 5 mg 5 mg 5 mg 5 mg 5 mg   Mon 7.5 mg 5 mg 5 mg 5 mg 5 mg 5 mg 5 mg   Tue 5 mg 5 mg 5 mg 5 mg 5 mg 5 mg 5 mg   Wed 5 mg 5 mg 5 mg 5 mg 5 mg 5 mg 5 mg   Thu 5 mg 5 mg 5 mg 5 mg 5 mg 5 mg 5 mg   Fri 5 mg 5 mg 5 mg 5 mg 5 mg 5 mg 5 mg   Sat 5 mg 5 mg 5 mg 5 mg 5 mg 5 mg 5 mg   Visit Report   Report           Anticoagulation Summary  As of 2023      INR goal:  2.0-3.0   TTR:  76.6 % (4.1 y)   INR used for dosin.50 (2023)   Warfarin maintenance plan:  5 mg every day   Weekly warfarin total:  35 mg   No change documented:  Meeta Chapman, PharmD   Plan last modified:  Kavita Jolley PharmD (2023)   Next INR check:  2023   Priority:  Maintenance   Target end date:      Indications    Persistent atrial fibrillation [I48.19]                 Anticoagulation Episode Summary       INR check location:      Preferred lab:      Send INR reminders to:  [facility] CLINICAL POOL    Comments:  Patient contract signed 11/15/21.          Anticoagulation Care Providers       Provider Role Specialty Phone number    Silvano Gillis MD  Cardiology [phone]            Clinic Interview:   Patient Findings     Negatives:  Signs/symptoms of  thrombosis, Signs/symptoms of bleeding,   Laboratory test error suspected, Change in health, Change in alcohol use,   Change in activity, Upcoming invasive procedure, Emergency department   visit, Upcoming dental procedure, Missed doses, Extra doses, Change in   medications, Change in diet/appetite, Hospital admission, Bruising, Other   complaints      Clinical Outcomes     Negatives:  Major bleeding event, Thromboembolic event,   Anticoagulation-related hospital admission, Anticoagulation-related ED   visit, Anticoagulation-related fatality        Current Medications:   Prior to Admission medications    Medication Sig Start Date End Date Taking? Authorizing Provider   albuterol sulfate  (90 Base) MCG/ACT inhaler Inhale 2 puffs Every 6 (Six) Hours As Needed. 7/6/21   Shelby Haskins MD   alfuzosin (UROXATRAL) 10 MG 24 hr tablet Take 1 tablet by mouth 2 (Two) Times a Day. 9/23/19   Shelby Haskins MD   aspirin (aspirin) 81 MG EC tablet Take 1 tablet by mouth Daily. 4/11/22   Silvano Gillis MD   baclofen (LIORESAL) 10 MG tablet Take 1 tablet by mouth 2 (Two) Times a Day. 7/13/21   Shelby Haskins MD   BREO ELLIPTA 100-25 MCG/INH inhaler Inhale 1 puff Daily. 9/24/19   Shelby Haskins MD   digoxin (LANOXIN) 250 MCG tablet TAKE ONE TABLET BY MOUTH DAILY 6/9/23   Silvano Gillis MD   EPINEPHrine (EPIPEN) 0.3 MG/0.3ML solution auto-injector injection epinephrine 0.3 mg/0.3 mL injection, auto-injector    Shelby Haskins MD   fluorometholone (FML) 0.1 % ophthalmic suspension Administer 1 drop to the right eye Daily. 5/17/21   Shelby Haskins MD   gabapentin (NEURONTIN) 300 MG capsule Take 2 capsules by mouth 2 (Two) Times a Day. 9/13/19   Shelby Haskins MD   levothyroxine (SYNTHROID, LEVOTHROID) 75 MCG tablet Take 1 tablet by mouth Daily. 9/23/19   Shelby Haskins MD   montelukast (SINGULAIR) 10 MG tablet Take 1 tablet by mouth Every Evening. 9/23/19    Shelby Haskins MD   pantoprazole (PROTONIX) 40 MG EC tablet Take 1 tablet by mouth Daily. 2/22/22   Silvano Gillis MD   pravastatin (PRAVACHOL) 80 MG tablet Take 1 tablet by mouth every night at bedtime. 4/25/23   Silvano Gillis MD   sertraline (ZOLOFT) 100 MG tablet Take 1.5 tablets by mouth Daily. 3/15/22   Silvano Gillis MD   theophylline (UNIPHYL) 400 MG 24 hr tablet Take 1 tablet by mouth Daily. 9/23/19   Shelby Haskins MD   verapamil ER (VERELAN) 240 MG 24 hr capsule TAKE ONE CAPSULE BY MOUTH EVERY NIGHT AT BEDTIME 7/31/23   Silvano Gillis MD   warfarin (COUMADIN) 1 MG tablet Take 2.5 tablets by mouth 2 (Two) Times a Week. Mon, Thur  with 5mg    Shelby Haskins MD   warfarin (COUMADIN) 5 MG tablet TAKE ONE TABLET BY MOUTH DAILY EXCEPT ON MONDAY AND THURSDAY TAKE ONE AND ONE-HALF TABLETS OR AS DIRECTED 2/13/23   Silvano Gillis MD       Medical history:   Past Medical History:   Diagnosis Date    Allergic     Anxiety     Arthritis     Asthma     Atrial fibrillation     Atypical chest pain 06/11/2020    Cataract     COPD (chronic obstructive pulmonary disease)     COVID     COVID-19 01/13/2021    Depression     Enlarged prostate     Headache     Hyperlipidemia     Hypertension     Kidney stone     Near syncope 12/17/2020    Prostatitis     Shortness of breath     Sleep apnea     Stroke     Urinary tract infection        Social history:   Social History     Tobacco Use    Smoking status: Former     Types: Cigarettes    Smokeless tobacco: Never   Substance Use Topics    Alcohol use: Yes     Comment: occasionally       Allergies:    Penicillins and Rivaroxaban    Vaccine History:   Immunization History   Administered Date(s) Administered    Covid-19 (Pfizer) Gray Cap Monovalent 05/18/2021, 11/17/2021, 11/26/2021    FLUAD TRI 65YR+ 10/31/2019    Fluzone High Dose =>65 Years (Vaxcare ONLY) 10/14/2016, 10/16/2017, 11/14/2018    H1N1 Inj 12/22/2009     Hepatitis A 09/17/2019, 11/19/2019    Influenza Quad Vaccine (Inpatient) 09/22/2014    Influenza, Unspecified 09/18/2013    Pneumococcal Conjugate 13-Valent (PCV13) 10/12/2015    Pneumococcal Polysaccharide (PPSV23) 09/24/2013    Shingrix 09/17/2019, 11/19/2019    Zostavax 02/12/2013       This patient is managed via a collaborative agreement, pursuant to IC 25-26-16. The signed protocol is kept in the [facility] at [address] [place] [address].    Education: Efe Malloy has been instructed to call if any changes in medications, doses, concerns, etc. Patient was provided dosing instructions and expressed verbal understanding and has no further questions at this time.    Plan:  1. Anticoagulation   - no change; warfarin 5 mg PO daily  Total weekly dose = 35 mg.   - RTC in 4 week(s)    Bony Chapman, PharmD  8/23/2023  13:20 EDT

## 2023-09-18 ENCOUNTER — ANTICOAGULATION VISIT (OUTPATIENT)
Dept: PHARMACY | Facility: HOSPITAL | Age: 78
End: 2023-09-18
Payer: MEDICARE

## 2023-09-18 DIAGNOSIS — I48.19 PERSISTENT ATRIAL FIBRILLATION: Primary | ICD-10-CM

## 2023-09-18 LAB — INR PPP: 2.6 (ref 2–3)

## 2023-09-18 PROCEDURE — G0463 HOSPITAL OUTPT CLINIC VISIT: HCPCS

## 2023-09-18 PROCEDURE — 85610 PROTHROMBIN TIME: CPT

## 2023-09-18 PROCEDURE — 36416 COLLJ CAPILLARY BLOOD SPEC: CPT

## 2023-09-18 NOTE — PROGRESS NOTES
Anticoagulation Clinic Progress Note    INR Goal: 2 - 3  Today's INR: 2.6 (therapeutic)  Current warfarin dose: warfarin 5 mg PO daily. Current total weekly dose = 35 mg per week.     INR History:      2023    11:30 AM 2023     1:30 PM 2023     1:00 PM 2023     1:15 PM 2023     1:15 PM 2023     1:15 PM 2023     1:00 PM   Anticoagulation Monitoring   INR 3.20 2.10 2.10 1.80 2.30 2.50 2.60   INR Date 2023   INR Goal 2.0-3.0 2.0-3.0 2.0-3.0 2.0-3.0 2.0-3.0 2.0-3.0 2.0-3.0   Trend Down Same Same Same Same Same Same   Last Week Total 37.5 mg 35 mg 35 mg 35 mg 35 mg 35 mg 35 mg   Next Week Total 35 mg 35 mg 35 mg 35 mg 35 mg 35 mg 35 mg   Sun 5 mg 5 mg 5 mg 5 mg 5 mg 5 mg 5 mg   Mon 5 mg 5 mg 5 mg 5 mg 5 mg 5 mg 5 mg   Tue 5 mg 5 mg 5 mg 5 mg 5 mg 5 mg 5 mg   Wed 5 mg 5 mg 5 mg 5 mg 5 mg 5 mg 5 mg   Thu 5 mg 5 mg 5 mg 5 mg 5 mg 5 mg 5 mg   Fri 5 mg 5 mg 5 mg 5 mg 5 mg 5 mg 5 mg   Sat 5 mg 5 mg 5 mg 5 mg 5 mg 5 mg 5 mg   Visit Report  Report            Anticoagulation Summary  As of 2023      INR goal:  2.0-3.0   TTR:  77.0 % (4.2 y)   INR used for dosin.60 (2023)   Warfarin maintenance plan:  5 mg every day   Weekly warfarin total:  35 mg   No change documented:  Kavita Jolley, PharmD   Plan last modified:  Kavita Jolley PharmD (2023)   Next INR check:  10/16/2023   Priority:  Maintenance   Target end date:      Indications    Persistent atrial fibrillation [I48.19]                 Anticoagulation Episode Summary       INR check location:      Preferred lab:      Send INR reminders to:  [facility] CLINICAL POOL    Comments:  Patient contract signed 11/15/21.          Anticoagulation Care Providers       Provider Role Specialty Phone number    Silvano Gillis MD  Cardiology [phone]            Clinic Interview:   Patient Findings     Negatives:  Signs/symptoms of thrombosis,  Signs/symptoms of bleeding,   Laboratory test error suspected, Change in health, Change in alcohol use,   Change in activity, Upcoming invasive procedure, Emergency department   visit, Upcoming dental procedure, Missed doses, Extra doses, Change in   medications, Change in diet/appetite, Hospital admission, Bruising, Other   complaints      Clinical Outcomes     Negatives:  Major bleeding event, Thromboembolic event,   Anticoagulation-related hospital admission, Anticoagulation-related ED   visit, Anticoagulation-related fatality        Current Medications:   Prior to Admission medications    Medication Sig Start Date End Date Taking? Authorizing Provider   albuterol sulfate  (90 Base) MCG/ACT inhaler Inhale 2 puffs Every 6 (Six) Hours As Needed. 7/6/21   Shelby Haskins MD   alfuzosin (UROXATRAL) 10 MG 24 hr tablet Take 1 tablet by mouth 2 (Two) Times a Day. 9/23/19   Shelby Haskins MD   aspirin (aspirin) 81 MG EC tablet Take 1 tablet by mouth Daily. 4/11/22   Silvano Gillis MD   baclofen (LIORESAL) 10 MG tablet Take 1 tablet by mouth 2 (Two) Times a Day. 7/13/21   Shelby Haskins MD   BREO ELLIPTA 100-25 MCG/INH inhaler Inhale 1 puff Daily. 9/24/19   Shelby Haskins MD   digoxin (LANOXIN) 250 MCG tablet TAKE ONE TABLET BY MOUTH DAILY 6/9/23   Silvano Gillis MD   EPINEPHrine (EPIPEN) 0.3 MG/0.3ML solution auto-injector injection epinephrine 0.3 mg/0.3 mL injection, auto-injector    Shelby Haskins MD   fluorometholone (FML) 0.1 % ophthalmic suspension Administer 1 drop to the right eye Daily. 5/17/21   Shelby Haskins MD   gabapentin (NEURONTIN) 300 MG capsule Take 2 capsules by mouth 2 (Two) Times a Day. 9/13/19   Shelby Haskins MD   levothyroxine (SYNTHROID, LEVOTHROID) 75 MCG tablet Take 1 tablet by mouth Daily. 9/23/19   Shelby Haskins MD   montelukast (SINGULAIR) 10 MG tablet Take 1 tablet by mouth Every Evening. 9/23/19   Jozef  MD [place]   pantoprazole (PROTONIX) 40 MG EC tablet Take 1 tablet by mouth Daily. 2/22/22   Silvano Gillis MD   pravastatin (PRAVACHOL) 80 MG tablet Take 1 tablet by mouth every night at bedtime. 4/25/23   Silvano Gillis MD   sertraline (ZOLOFT) 100 MG tablet Take 1.5 tablets by mouth Daily. 3/15/22   Silvano Gillis MD   theophylline (UNIPHYL) 400 MG 24 hr tablet Take 1 tablet by mouth Daily. 9/23/19   Shelby Haskins MD   verapamil ER (VERELAN) 240 MG 24 hr capsule TAKE ONE CAPSULE BY MOUTH EVERY NIGHT AT BEDTIME 7/31/23   Silvano Gillis MD   warfarin (COUMADIN) 1 MG tablet Take 2.5 tablets by mouth 2 (Two) Times a Week. Mon, Thur  with 5mg    Shelby Haskins MD   warfarin (COUMADIN) 5 MG tablet TAKE ONE TABLET BY MOUTH DAILY EXCEPT ON MONDAY AND THURSDAY TAKE ONE AND ONE-HALF TABLETS OR AS DIRECTED 2/13/23   Silvano Gillis MD       Medical history:   Past Medical History:   Diagnosis Date    Allergic     Anxiety     Arthritis     Asthma     Atrial fibrillation     Atypical chest pain 06/11/2020    Cataract     COPD (chronic obstructive pulmonary disease)     COVID     COVID-19 01/13/2021    Depression     Enlarged prostate     Headache     Hyperlipidemia     Hypertension     Kidney stone     Near syncope 12/17/2020    Prostatitis     Shortness of breath     Sleep apnea     Stroke     Urinary tract infection        Social history:   Social History     Tobacco Use    Smoking status: Former     Types: Cigarettes    Smokeless tobacco: Never   Substance Use Topics    Alcohol use: Yes     Comment: occasionally       Allergies:    Penicillins and Rivaroxaban    Vaccine History:   Immunization History   Administered Date(s) Administered    Covid-19 (Pfizer) Gray Cap Monovalent 05/18/2021, 11/17/2021, 11/26/2021    FLUAD TRI 65YR+ 10/31/2019    Fluzone High Dose =>65 Years (Vaxcare ONLY) 10/14/2016, 10/16/2017, 11/14/2018    H1N1 Inj 12/22/2009    Hepatitis  A 09/17/2019, 11/19/2019    Influenza Quad Vaccine (Inpatient) 09/22/2014    Influenza, Unspecified 09/18/2013    Pneumococcal Conjugate 13-Valent (PCV13) 10/12/2015    Pneumococcal Polysaccharide (PPSV23) 09/24/2013    Shingrix 09/17/2019, 11/19/2019    Zostavax 02/12/2013       This patient is managed via a collaborative agreement, pursuant to IC [id number]. The signed protocol is kept in the [facility] at [address] [place] [address].    Education: Efe Malloy has been instructed to call if any changes in medications, doses, concerns, etc. Patient was provided dosing instructions and expressed verbal understanding and has no further questions at this time.    Plan:  1. Anticoagulation   - no change; warfarin 5 mg PO daily. Total weekly dose = 35 mg.   - RTC in 4 week(s) - informed patient if INR remains therapeutic at next appointment, will extend to every 6 week appointments.    Kavita Jolley, PharmD  9/18/2023  14:36 EDT

## 2023-10-06 DIAGNOSIS — I48.19 PERSISTENT ATRIAL FIBRILLATION: Primary | ICD-10-CM

## 2023-10-06 RX ORDER — WARFARIN SODIUM 1 MG/1
2.5 TABLET ORAL 2 TIMES WEEKLY
Status: CANCELLED | OUTPATIENT
Start: 2023-10-09

## 2023-10-06 RX ORDER — PRAVASTATIN SODIUM 80 MG/1
80 TABLET ORAL
Qty: 90 TABLET | Refills: 1 | Status: SHIPPED | OUTPATIENT
Start: 2023-10-06

## 2023-10-06 RX ORDER — WARFARIN SODIUM 5 MG/1
TABLET ORAL
Qty: 90 TABLET | Refills: 1 | Status: SHIPPED | OUTPATIENT
Start: 2023-10-06

## 2023-10-06 NOTE — TELEPHONE ENCOUNTER
Caller: GamaEfe    Relationship: Self    Best call back number: [phone]    Requested Prescriptions:   Requested Prescriptions     Pending Prescriptions Disp Refills    pravastatin (PRAVACHOL) 80 MG tablet 90 tablet 1     Sig: Take 1 tablet by mouth every night at bedtime.    warfarin (COUMADIN) 1 MG tablet       Sig: Take 2.5 tablets by mouth 2 (Two) Times a Week. Mon, Thur  with 5mg        Pharmacy where request should be sent: [facility] [place] -[id number] [facility] [place] - [address] [facility] [phone] [facility] [phone] FX     Last office visit with prescribing clinician: 4/25/2023   Last telemedicine visit with prescribing clinician: Visit date not found   Next office visit with prescribing clinician: 1/30/2024     Additional details provided by patient: PATIENT WAS INFORMED BY PHARMACY PRESCRIPTION WAS MOLD. PLEASE SEND NEW PRESCRIPTION WITH CORRECT DOSAGE.    Does the patient have less than a 3 day supply:  [x] Yes  [] No OUT OF MEDICATION    Would you like a call back once the refill request has been completed: [] Yes [] No    If the office needs to give you a call back, can they leave a voicemail: [] Yes [] No    Anna Nino Rep   10/06/23 11:26 EDT

## 2023-10-17 ENCOUNTER — ANTICOAGULATION VISIT (OUTPATIENT)
Dept: PHARMACY | Facility: HOSPITAL | Age: 78
End: 2023-10-17
Payer: MEDICARE

## 2023-10-17 DIAGNOSIS — I48.19 PERSISTENT ATRIAL FIBRILLATION: Primary | ICD-10-CM

## 2023-10-17 LAB — INR PPP: 2.8 (ref 2–3)

## 2023-10-17 PROCEDURE — G0463 HOSPITAL OUTPT CLINIC VISIT: HCPCS

## 2023-10-17 PROCEDURE — 85610 PROTHROMBIN TIME: CPT

## 2023-10-17 PROCEDURE — 36416 COLLJ CAPILLARY BLOOD SPEC: CPT

## 2023-10-17 NOTE — PROGRESS NOTES
Anticoagulation Clinic Progress Note    INR Goal: 2 - 3  Today's INR: 2.8 (therapeutic)  Current warfarin dose: warfarin 5 mg PO daily. Current total weekly dose = 35 mg per week.     INR History:      2023     1:30 PM 2023     1:00 PM 2023     1:15 PM 2023     1:15 PM 2023     1:15 PM 2023     1:00 PM 10/17/2023    11:45 AM   Anticoagulation Monitoring   INR 2.10 2.10 1.80 2.30 2.50 2.60 2.80   INR Date 2023 2023 2023 2023 2023 2023 10/17/2023   INR Goal 2.0-3.0 2.0-3.0 2.0-3.0 2.0-3.0 2.0-3.0 2.0-3.0 2.0-3.0   Trend Same Same Same Same Same Same Same   Last Week Total 35 mg 35 mg 35 mg 35 mg 35 mg 35 mg 35 mg   Next Week Total 35 mg 35 mg 35 mg 35 mg 35 mg 35 mg 35 mg   Sun 5 mg 5 mg 5 mg 5 mg 5 mg 5 mg 5 mg   Mon 5 mg 5 mg 5 mg 5 mg 5 mg 5 mg 5 mg   Tue 5 mg 5 mg 5 mg 5 mg 5 mg 5 mg 5 mg   Wed 5 mg 5 mg 5 mg 5 mg 5 mg 5 mg 5 mg   Thu 5 mg 5 mg 5 mg 5 mg 5 mg 5 mg 5 mg   Fri 5 mg 5 mg 5 mg 5 mg 5 mg 5 mg 5 mg   Sat 5 mg 5 mg 5 mg 5 mg 5 mg 5 mg 5 mg   Visit Report Report             Anticoagulation Summary  As of 10/17/2023      INR goal:  2.0-3.0   TTR:  77.4% (4.3 y)   INR used for dosin.80 (10/17/2023)   Warfarin maintenance plan:  5 mg every day   Weekly warfarin total:  35 mg   No change documented:  Kavita Jolley, PharmD   Plan last modified:  Kavita Jolley PharmD (2023)   Next INR check:  2023   Priority:  Maintenance   Target end date:      Indications    Persistent atrial fibrillation [I48.19]                 Anticoagulation Episode Summary       INR check location:      Preferred lab:      Send INR reminders to:  [facility] CLINICAL POOL    Comments:  Patient contract signed 11/15/21.          Anticoagulation Care Providers       Provider Role Specialty Phone number    Silvano Gillis MD  Cardiology [phone]            Clinic Interview:   Patient Findings     Negatives:  Signs/symptoms of thrombosis,  Signs/symptoms of bleeding,   Laboratory test error suspected, Change in health, Change in alcohol use,   Change in activity, Upcoming invasive procedure, Emergency department   visit, Upcoming dental procedure, Missed doses, Extra doses, Change in   medications, Change in diet/appetite, Hospital admission, Bruising, Other   complaints      Clinical Outcomes     Negatives:  Major bleeding event, Thromboembolic event,   Anticoagulation-related hospital admission, Anticoagulation-related ED   visit, Anticoagulation-related fatality        Current Medications:   Prior to Admission medications    Medication Sig Start Date End Date Taking? Authorizing Provider   albuterol sulfate  (90 Base) MCG/ACT inhaler Inhale 2 puffs Every 6 (Six) Hours As Needed. 7/6/21   Shelby Haskins MD   alfuzosin (UROXATRAL) 10 MG 24 hr tablet Take 1 tablet by mouth 2 (Two) Times a Day. 9/23/19   Shelby Haskins MD   aspirin (aspirin) 81 MG EC tablet Take 1 tablet by mouth Daily. 4/11/22   Silvano Gillis MD   baclofen (LIORESAL) 10 MG tablet Take 1 tablet by mouth 2 (Two) Times a Day. 7/13/21   Shelby Haskins MD   BREO ELLIPTA 100-25 MCG/INH inhaler Inhale 1 puff Daily. 9/24/19   Shelby Haskins MD   digoxin (LANOXIN) 250 MCG tablet TAKE ONE TABLET BY MOUTH DAILY 6/9/23   Silvano Gillis MD   EPINEPHrine (EPIPEN) 0.3 MG/0.3ML solution auto-injector injection epinephrine 0.3 mg/0.3 mL injection, auto-injector    Shelby Haskins MD   fluorometholone (FML) 0.1 % ophthalmic suspension Administer 1 drop to the right eye Daily. 5/17/21   Shelby Haskins MD   gabapentin (NEURONTIN) 300 MG capsule Take 2 capsules by mouth 2 (Two) Times a Day. 9/13/19   Shelby Haskins MD   levothyroxine (SYNTHROID, LEVOTHROID) 75 MCG tablet Take 1 tablet by mouth Daily. 9/23/19   Shelby Haskins MD   montelukast (SINGULAIR) 10 MG tablet Take 1 tablet by mouth Every Evening. 9/23/19   Jozef  MD [place]   pantoprazole (PROTONIX) 40 MG EC tablet Take 1 tablet by mouth Daily. 2/22/22   Silvano Gillis MD   pravastatin (PRAVACHOL) 80 MG tablet Take 1 tablet by mouth every night at bedtime. 10/6/23   Silvano Gillis MD   sertraline (ZOLOFT) 100 MG tablet Take 1.5 tablets by mouth Daily. 3/15/22   Silvano Gillis MD   theophylline (UNIPHYL) 400 MG 24 hr tablet Take 1 tablet by mouth Daily. 9/23/19   Provider, MD [place]   verapamil ER (VERELAN) 240 MG 24 hr capsule TAKE ONE CAPSULE BY MOUTH EVERY NIGHT AT BEDTIME 7/31/23   Silvano Gillis MD   warfarin (COUMADIN) 5 MG tablet TAKE ONE TABLET BY MOUTH DAILY EXCEPT ON MONDAY AND THURSDAY TAKE ONE AND ONE-HALF TABLETS OR AS DIRECTED 2/13/23   Silvano Gillis MD   warfarin (Coumadin) 5 MG tablet Take 5 mg by mouth daily or as otherwise directed. 10/6/23   Silvano Gillis MD       Medical history:   Past Medical History:   Diagnosis Date    Allergic     Anxiety     Arthritis     Asthma     Atrial fibrillation     Atypical chest pain 06/11/2020    Cataract     COPD (chronic obstructive pulmonary disease)     COVID     COVID-19 01/13/2021    Depression     Enlarged prostate     Headache     Hyperlipidemia     Hypertension     Kidney stone     Near syncope 12/17/2020    Prostatitis     Shortness of breath     Sleep apnea     Stroke     Urinary tract infection        Social history:   Social History     Tobacco Use    Smoking status: Former     Types: Cigarettes    Smokeless tobacco: Never   Substance Use Topics    Alcohol use: Yes     Comment: occasionally       Allergies:    Penicillins and Rivaroxaban    Vaccine History:   Immunization History   Administered Date(s) Administered    Covid-19 (Pfizer) Gray Cap Monovalent 05/18/2021, 11/17/2021, 11/26/2021    FLUAD TRI 65YR+ 10/31/2019    Fluzone High Dose =>65 Years (Vaxcare ONLY) 10/14/2016, 10/16/2017, 11/14/2018    H1N1 Inj 12/22/2009    Hepatitis A  09/17/2019, 11/19/2019    Influenza Quad Vaccine (Inpatient) 09/22/2014    Influenza, Unspecified 09/18/2013    Pneumococcal Conjugate 13-Valent (PCV13) 10/12/2015    Pneumococcal Polysaccharide (PPSV23) 09/24/2013    Shingrix 09/17/2019, 11/19/2019    Zostavax 02/12/2013       This patient is managed via a collaborative agreement, pursuant to IC [id number]. The signed protocol is kept in the [facility] at [address] [place] [address].    Education: Efe Malloy has been instructed to call if any changes in medications, doses, concerns, etc. Patient was provided dosing instructions and expressed verbal understanding and has no further questions at this time.    Plan:  1. Anticoagulation   - no change; warfarin 5 mg PO daily. Total weekly dose = 35 mg.   - RTC in 6 week(s) - INR has been therapeutic on above regimen consistently since July 2023    Kavita Jolley, PharmD  10/17/2023  12:03 EDT

## 2023-11-20 ENCOUNTER — TELEPHONE (OUTPATIENT)
Dept: CARDIOLOGY | Facility: CLINIC | Age: 78
End: 2023-11-20

## 2023-11-20 NOTE — TELEPHONE ENCOUNTER
"    Caller: Efe Malloy     Relationship: SELF    Best call back number: [phone]    What is your medical concern? PATIENT BLOOD PRESSURE HAS BEEN ELEVATED AND SPOKE WITH THE DR ON CALL SATURDAY. PATIENT SAYS HE IS \"UNSTEADY\"    How long has this issue been going on? 11.17.23    Is your provider already aware of this issue? NO    Have you been treated for this issue? NO    "

## 2023-11-21 ENCOUNTER — OFFICE VISIT (OUTPATIENT)
Dept: CARDIOLOGY | Facility: CLINIC | Age: 78
End: 2023-11-21
Payer: MEDICARE

## 2023-11-21 VITALS
WEIGHT: 273 LBS | HEART RATE: 65 BPM | HEIGHT: 73 IN | SYSTOLIC BLOOD PRESSURE: 151 MMHG | DIASTOLIC BLOOD PRESSURE: 83 MMHG | BODY MASS INDEX: 36.18 KG/M2 | RESPIRATION RATE: 18 BRPM

## 2023-11-21 DIAGNOSIS — R07.89 CHEST PAIN, ATYPICAL: Primary | ICD-10-CM

## 2023-11-21 DIAGNOSIS — I48.19 PERSISTENT ATRIAL FIBRILLATION: ICD-10-CM

## 2023-11-21 PROCEDURE — 3079F DIAST BP 80-89 MM HG: CPT | Performed by: INTERNAL MEDICINE

## 2023-11-21 PROCEDURE — 3077F SYST BP >= 140 MM HG: CPT | Performed by: INTERNAL MEDICINE

## 2023-11-21 PROCEDURE — 99214 OFFICE O/P EST MOD 30 MIN: CPT | Performed by: INTERNAL MEDICINE

## 2023-11-21 RX ORDER — HYDROCHLOROTHIAZIDE 12.5 MG/1
12.5 CAPSULE, GELATIN COATED ORAL DAILY
Qty: 30 CAPSULE | Refills: 5 | Status: SHIPPED | OUTPATIENT
Start: 2023-11-21

## 2023-11-21 RX ORDER — LOSARTAN POTASSIUM 25 MG/1
25 TABLET ORAL DAILY
Qty: 30 TABLET | Refills: 5 | Status: SHIPPED | OUTPATIENT
Start: 2023-11-21

## 2023-11-21 NOTE — PROGRESS NOTES
Cardiology Clinic Note  Silvano Gillis MD, PhD    Subjective:     Encounter Date:11/21/2023      Patient ID: Efe Malloy is a 78 y.o. male.    Chief Complaint:  Chief Complaint   Patient presents with    Hypertension       HPI:         I the pleasure to see this 78-year-old gentleman today in cardiology follow-up,  He has a known history of chest pain with multiple stress test being ordered all unremarkable previously. He has chronic persistent atrial fibrillation which is rate controlled He is on anticoagulation with Coumadin goal INR 2-3.  He has no history of prosthetic valves in place.  He is a former smoker and continues abstinence after cessation remotely.  He has history of prior TIA and unilateral hemianopsia in the right visual fields of the right eye.  A prior ischemic work-up included left heart catheterization September 2021 revealed borderline obstructive disease and a large ramus branch and both superior and inferior limbs.  Both IFR/FFR of both limbs was insignificant with respect to hemodynamic criteria as described.  This does not meet criteria for intervention and was not hemodynamically significant.  There is likely eccentric plaque which is overestimated angiographically.,  0.98 in the superior limb and 0.95 in the inferior limb with respect to IFR, therefore he was recommended for medical therapy.  EF was 60%.   He presents back today with recurrent anginal chest pain dyspnea on exertion, uncontrolled blood pressures and not feeling right he says.     Follow-up echo April 2022  Mild concentric LVH, EF 65%, mild calcification the aortic valve, negative bubble study for any shunt, grade 1-2 diastolic dysfunction     Review of systems otherwise negative x14 point review of systems except as mentioned above     Historical data copied forward from previous encounters in EMR is unchanged     Left heart catheterization 9/17/2021  Findings next   #1 open aortic pressure, 157/96  2.  Closing pressure  was unchanged next   #3 LVEDP 12 to 16 mmHg  4.  Normal LV systolic function EF of 65 to 70%  5.  No transaortic gradient     Angiography next   1 left main large-caliber vessel no angiographic disease  2.  LAD medium caliber vessel coursing anteriorly with mild nonobstructive disease most severely 20 to 30% diffusely with diffuse luminal regularities  3.  Ramus intermedius is a very large caliber bifurcating vessel along anterolateral wall, proximally there is nonobstructive 20 to 30% luminal regularities, at the bifurcation of the superior and inferior limbs there is angiographic 70-80% in the superior limb, 50-60% in the inferior limb with only mild luminal regularities distally.  4.  Circumflex has 1 obtuse marginal branch, OM has 60% eccentric plaque, circumflex proper has mild luminal regularities only with DELFINO-3 flow throughout next   #5 RCA large caliber vessel with nonobstructive disease, mild luminal regularities only including PLV and PDA branches distally with DELFINO-3 flow     Conclusions and recommendations  1 borderline nonobstructive disease most severely in the ramus intermedius branch of the bifurcation involving superior and inferior sizable limbs.  IFR and FFR unremarkable in both limbs measured today as noted above.  Nonobstructive LAD disease, obtuse marginal disease and b luminal regularities only in the RCA.  2.  High normal LVEDP, no transaortic gradient, hyperdynamic LV function 65 to 70%  3.  Continue primary mention goals, aspirin statin beta-blocker, antianginals, may try Ranexa for small vessel disease not amenable to intervention or visible by angiography for microvascular dysfunction with negative work-up today  4.  Patient be discharged with discharge criteria met, follow-up cardiology 2 to 4 weeks for further optimization of his medical therapy     Silvano Gillis MD, PhD     =================================================  Exam today  Vitals reviewed below  Irregular, A. fib no  rubs gallops no heave no left, faint 1 out of 6 unchanged systolic ejection murmur lower sternal border  No carotid bruits or JVD  No clubbing cyanosis or edema  Obese soft nontender nondistended bowel sounds are positive  Clear to auscultation bilaterally  Normocephalic/atraumatic pupils equal round  Neurologic grossly intact bilaterally  Pulses 2+ radials equal bilaterally  Essentially unchanged     Assessment plan per my encounter  Chest pain,, recurrent left-sided with shortness of breath, order Lexiscan for risk stratification with known history of nonobstructive CAD with negative IFR in the past couple of years ago 2021 as below  IFR of the ramus at 0.98 and 0.95 with respect to superior and inferior limbs, no significant obstructive disease in other distributions with EF of 60 to 65%.  Obtuse marginal 60% eccentric plaque but nonflow limiting, RCA with luminal regularities, LAD 20 to 30% limb irregularities as well.  Secondary prevention goals     Continue pravastatin 80 daily, advance to high intensity statin if tolerated such as atorvastatin or add Zetia and repeat fasting lipid panel, goal LDL less than 70     CHF, EF of 60% by LV gram, euvolemic appearing today     Persistent atrial fibrillation, digoxin and verapamil will be continued, get digoxin level intermittently     Hypertension, trending up and uncontrolled today  Advance losartan HCTZ 25/12.5 daily  Blood pressure goal less than 135 systolic  Off hydralazine  EF 60% with no clinical heart failure no class I indication for ARB  Continue verapamil for atrial fibrillation indications     Dizziness and hypotension  symptoms are improved  Verapamil once daily, restart losartan HCTZ as above at low-dose     Hyperlipidemia continue pravastatin, guide by fasting lipid panel for ASCVD risk calculations, escalate doses per lipid studies     Diet and exercise as allowed by functional status  Weight reduction 10% over the next year he is at 269 pounds  "similar to prior encounter     continue smoking abstinence    Objective:         /83 (BP Location: Right arm, Patient Position: Sitting)   Pulse 65   Resp 18   Ht 185.4 cm (73\")   Wt 124 kg (273 lb)   BMI 36.02 kg/m²     Physical Exam    Assessment:         Diagnoses and all orders for this visit:    1. Chest pain, atypical (Primary)  -     Comprehensive Metabolic Panel; Future  -     Lipid Panel; Future  -     Magnesium; Future  -     CBC (No Diff); Future  -     TSH; Future  -     Stress Test With Myocardial Perfusion One Day; Future    2. Persistent atrial fibrillation  -     Comprehensive Metabolic Panel; Future  -     Lipid Panel; Future  -     Magnesium; Future  -     CBC (No Diff); Future  -     TSH; Future  -     Stress Test With Myocardial Perfusion One Day; Future    Other orders  -     losartan (Cozaar) 25 MG tablet; Take 1 tablet by mouth Daily.  Dispense: 30 tablet; Refill: 5  -     hydroCHLOROthiazide (MICROZIDE) 12.5 MG capsule; Take 1 capsule by mouth Daily.  Dispense: 30 capsule; Refill: 5           Plan:              The pleasure to be involved in this patient's cardiovascular care.  Please call with any questions or concerns  Silvano Gillis MD, PhD    Most recent EKG as reviewed and interpreted by me:  Procedures     Most recent echo as reviewed and interpreted by me:  Results for orders placed during the hospital encounter of 04/09/22    Adult Transthoracic Echo Complete W/ Cont if Necessary Per Protocol    Interpretation Summary  · Left ventricular wall thickness is consistent with mild concentric hypertrophy.  · Estimated left ventricular EF = 65% Left ventricular systolic function is normal.  · There is calcification of the aortic valve mainly affecting the right coronary cusp(s).  · Saline test results are negative for right to left atrial level shunt.  · Estimated right ventricular systolic pressure from tricuspid regurgitation is normal (<35 mmHg).  · Left ventricular diastolic " function is consistent with (grade II w/high LAP) pseudonormalization.      Most recent stress test as reviewed and interpreted by me:  Results for orders placed during the hospital encounter of 08/26/21    Stress Test With Myocardial Perfusion    Interpretation Summary  · Findings consistent with a normal ECG stress test.  · Diaphragmatic attenuation artifact is present.  · Left ventricular ejection fraction is mildly reduced. (Calculated EF = 51%).  · Impressions are consistent with a low risk study.  · Uptake abnormalities some score of 15 at rest and only some score of 8 post regadenoson suggest this is largely due to soft tissue attenuation artifact with no definite evidence of reversible ischemia      Most recent cardiac catheterization as reviewed interpreted by me:  Results for orders placed during the hospital encounter of 09/17/21    Cardiac Catheterization/Vascular Study    Narrative  Silvano Gillis MD, PhD  [facility] cardiology  Date of service 9-17-21    Procedure  1.  Left heart catheterization with coronary angiography left ventriculography via right radial approach  2.  Fractional flow reserve of high diagonal versus ramus intermedius superior limb  3.  Fractional flow reserve ramus intermedius, inferior limb separate branch    Indication  Continued chest pain, uninformative noninvasive studies    After informed consent the patient was brought to the catheterization lab sterilely prepped and draped in usual fashion for the right wrist for right radial access via micropuncture modified Seldinger technique under ultrasound guidance.  5/6 slender sheath was placed with standard cocktail of heparin nitroglycerin and Cardene.  035 guidewire was advanced aortic valve followed by diagnostic catheterization with JL 3 and JR4 6 [place] catheters.  JR4 was used across aortic valve followed by hand-injection for LV gram, EDP assessment and pullback assessment the transaortic valve gradient.  Secondary to  findings of possible obstructive coronary disease with large bifurcating ramus intermedius branch this is what made to perform FFR.  Patient was heparinized for ACT greater than 250 with 100 units/kg of heparin.  Standard FFR was zeroed outside the body, equalized in the left main followed by exam seen to first the inferior limb of the ramus intermedius, IFR was obtained 0.98, the wire was then pulled back followed again by equalization the left main, readvanced to the superior limb of the ramus intermedius separate branch followed by IFR which is 0.95, this was followed by FFR which was 0.87 with good correlated value.  Pullback to the left main revealed no drift at 0.99.  Final angiography revealed no wall trauma, no edge dissections, DELFINO-3 flow in all branches and all catheters and equipment were removed.  Sheath was removed with TR band for hemostasis.  Patient with Cath Lab chest pain-free hemodynamically electrically stable or talking staff neurologically grossly intact bilaterally.      Contrast 170 cc  Conscious sedation time 1 hour with IV Versed and fentanyl administration nurse with complete ECG pulse oximetry and hemodynamic monitoring during the case observed by me  Blood loss less than 5 cc  Complications none    Findings next  #1 open aortic pressure, 157/96  2.  Closing pressure was unchanged next  #3 LVEDP 12 to 16 mmHg  4.  Normal LV systolic function EF of 65 to 70%  5.  No transaortic gradient    Angiography next  1 left main large-caliber vessel no angiographic disease  2.  LAD medium caliber vessel coursing anteriorly with mild nonobstructive disease most severely 20 to 30% diffusely with diffuse luminal regularities  3.  Ramus intermedius is a very large caliber bifurcating vessel along anterolateral wall, proximally there is nonobstructive 20 to 30% luminal regularities, at the bifurcation of the superior and inferior limbs there is angiographic 70-80% in the superior limb, 50-60% in the  inferior limb with only mild luminal regularities distally.  4.  Circumflex has 1 obtuse marginal branch, OM has 60% eccentric plaque, circumflex proper has mild luminal regularities only with DELFINO-3 flow throughout next  #5 RCA large caliber vessel with nonobstructive disease, mild luminal regularities only including PLV and PDA branches distally with DELFINO-3 flow    Conclusions and recommendations  1 borderline nonobstructive disease most severely in the ramus intermedius branch of the bifurcation involving superior and inferior sizable limbs.  IFR and FFR unremarkable in both limbs measured today as noted above.  Nonobstructive LAD disease, obtuse marginal disease and b luminal regularities only in the RCA.  2.  High normal LVEDP, no transaortic gradient, hyperdynamic LV function 65 to 70%  3.  Continue primary mention goals, aspirin statin beta-blocker, antianginals, may try Ranexa for small vessel disease not amenable to intervention or visible by angiography for microvascular dysfunction with negative work-up today  4.  Patient be discharged with discharge criteria met, follow-up cardiology 2 to 4 weeks for further optimization of his medical therapy    Silvano Gillis MD, PhD    The following portions of the patient's history were reviewed and updated as appropriate: allergies, current medications, past family history, past medical history, past social history, past surgical history, and problem list.      ROS:  14 point review of systems negative except as mentioned above    Current Outpatient Medications:     albuterol sulfate  (90 Base) MCG/ACT inhaler, Inhale 2 puffs Every 6 (Six) Hours As Needed., Disp: , Rfl:     alfuzosin (UROXATRAL) 10 MG 24 hr tablet, Take 1 tablet by mouth 2 (Two) Times a Day., Disp: , Rfl: 11    aspirin (aspirin) 81 MG EC tablet, Take 1 tablet by mouth Daily., Disp: 90 tablet, Rfl: 3    baclofen (LIORESAL) 10 MG tablet, Take 1 tablet by mouth 2 (Two) Times a Day., Disp: , Rfl:      BREO ELLIPTA 100-25 MCG/INH inhaler, Inhale 1 puff Daily., Disp: , Rfl: 5    digoxin (LANOXIN) 250 MCG tablet, TAKE ONE TABLET BY MOUTH DAILY, Disp: 90 tablet, Rfl: 1    EPINEPHrine (EPIPEN) 0.3 MG/0.3ML solution auto-injector injection, epinephrine 0.3 mg/0.3 mL injection, auto-injector, Disp: , Rfl:     fluorometholone (FML) 0.1 % ophthalmic suspension, Administer 1 drop to the right eye Daily., Disp: , Rfl:     gabapentin (NEURONTIN) 300 MG capsule, Take 2 capsules by mouth 2 (Two) Times a Day., Disp: , Rfl: 3    levothyroxine (SYNTHROID, LEVOTHROID) 75 MCG tablet, Take 1 tablet by mouth Daily., Disp: , Rfl: 5    montelukast (SINGULAIR) 10 MG tablet, Take 1 tablet by mouth Every Evening., Disp: , Rfl: 5    pantoprazole (PROTONIX) 40 MG EC tablet, Take 1 tablet by mouth Daily., Disp: 90 tablet, Rfl: 2    pravastatin (PRAVACHOL) 80 MG tablet, Take 1 tablet by mouth every night at bedtime., Disp: 90 tablet, Rfl: 1    sertraline (ZOLOFT) 100 MG tablet, Take 1.5 tablets by mouth Daily., Disp: 30 tablet, Rfl: 0    theophylline (UNIPHYL) 400 MG 24 hr tablet, Take 1 tablet by mouth Daily., Disp: , Rfl: 5    verapamil ER (VERELAN) 240 MG 24 hr capsule, TAKE ONE CAPSULE BY MOUTH EVERY NIGHT AT BEDTIME, Disp: 90 capsule, Rfl: 1    warfarin (Coumadin) 5 MG tablet, Take 5 mg by mouth daily or as otherwise directed., Disp: 90 tablet, Rfl: 1    hydroCHLOROthiazide (MICROZIDE) 12.5 MG capsule, Take 1 capsule by mouth Daily., Disp: 30 capsule, Rfl: 5    losartan (Cozaar) 25 MG tablet, Take 1 tablet by mouth Daily., Disp: 30 tablet, Rfl: 5    warfarin (COUMADIN) 5 MG tablet, TAKE ONE TABLET BY MOUTH DAILY EXCEPT ON MONDAY AND THURSDAY TAKE ONE AND ONE-HALF TABLETS OR AS DIRECTED, Disp: 96 tablet, Rfl: 1    Problem List:  Patient Active Problem List   Diagnosis    Persistent atrial fibrillation    Essential hypertension    COPD (chronic obstructive pulmonary disease)    Hyperglycemia    Atypical chest pain    Abnormal Pap smear  of anus    Congestive heart failure    Depressive disorder    Gastroesophageal reflux disease    Mixed hyperlipidemia    Hypothyroidism    Neuropathy    Obstructive sleep apnea syndrome    Primary osteoarthritis of right knee    Cervical spondylosis without myelopathy    Cervico-occipital neuralgia    Lumbosacral spondylosis without myelopathy    Near syncope    Arthritis    Chronic idiopathic constipation    Disorder of prostate    Dysplasia of anus    Gout    Hearing loss    Seasonal allergic rhinitis    TIA (transient ischemic attack)     Past Medical History:  Past Medical History:   Diagnosis Date    Allergic     Anxiety     Arthritis     Asthma     Atrial fibrillation     Atypical chest pain 06/11/2020    Cataract     COPD (chronic obstructive pulmonary disease)     COVID     COVID-19 01/13/2021    Depression     Enlarged prostate     Headache     Hyperlipidemia     Hypertension     Kidney stone     Near syncope 12/17/2020    Prostatitis     Shortness of breath     Sleep apnea     Stroke     Urinary tract infection      Past Surgical History:  Past Surgical History:   Procedure Laterality Date    APPENDECTOMY      CARDIAC CATHETERIZATION  2007    nonobstructive dz    CARDIAC CATHETERIZATION N/A 9/17/2021    Procedure: Left Heart Cath;  Surgeon: Silvano Gillis MD;  Location: [facility] CATH INVASIVE LOCATION;  Service: Cardiology;  Laterality: N/A;    CERVICAL EPIDURAL      with  Block Pain mgt    CHOLECYSTECTOMY      EYE SURGERY Bilateral     corneal transplant left 09/02/2020 (Right eye 2014)    JOINT REPLACEMENT Right     knee    KIDNEY STONE SURGERY      PROSTATE SURGERY       Social History:  Social History     Socioeconomic History    Marital status:    Tobacco Use    Smoking status: Former     Types: Cigarettes    Smokeless tobacco: Never   Vaping Use    Vaping Use: Never used   Substance and Sexual Activity    Alcohol use: Yes     Comment: occasionally    Drug use: Not Currently     Sexual activity: Defer     Allergies:  Allergies   Allergen Reactions    Penicillins Other (See Comments)     AS A CHILD    Rivaroxaban Other (See Comments)     Immunizations:  Immunization History   Administered Date(s) Administered    Covid-19 (Pfizer) Gray Cap Monovalent 05/18/2021, 11/17/2021, 11/26/2021    FLUAD TRI 65YR+ 10/31/2019    Fluzone High Dose =>65 Years ([employer] ONLY) 10/14/2016, 10/16/2017, 11/14/2018    H1N1 Inj 12/22/2009    Hepatitis A 09/17/2019, 11/19/2019    Influenza Quad Vaccine (Inpatient) 09/22/2014    Influenza, Unspecified 09/18/2013    Pneumococcal Conjugate 13-Valent (PCV13) 10/12/2015    Pneumococcal Polysaccharide (PPSV23) 09/24/2013    Shingrix 09/17/2019, 11/19/2019    Zostavax 02/12/2013            In-Office Procedure(s):  No orders to display        ASCVD RIsk Score::  The ASCVD Risk score (Daisy DK, et al., 2019) failed to calculate for the following reasons:    The patient has a prior MI or stroke diagnosis    Imaging:    Results for orders placed during the hospital encounter of 04/09/22    XR Chest 1 View    Narrative  XR CHEST 1 VW-    Date of Exam: 4/9/2022 3:28 PM    Indication: Acute Stroke Protocol (onset < 12 hrs).    Comparison Exams: June 11, 2020    Technique: Single AP chest radiograph    FINDINGS:  The lungs are clear. The heart and mediastinal contours appear normal.  The pulmonary vasculature appears normal. The osseous structures appear  intact.    Impression  No acute cardiopulmonary process identified.    Electronically Signed By-Sha Rangel MD On:4/9/2022 3:34 PM  This report was finalized on [id number] by  Sha Rangel MD.       Results for orders placed during the hospital encounter of 04/09/22    CT CEREBRAL PERFUSION WITH & WITHOUT CONTRAST    Narrative  DATE OF EXAM:  4/9/2022 3:55 PM    PROCEDURE:  CT CEREBRAL PERFUSION W WO CONTRAST-    INDICATIONS:  Transient visual changes consistent with a stroke    COMPARISON:  CT head from earlier  today    TECHNIQUE:  Routine transaxial cuts were obtained through the head without  administration of  contrast. Routine transaxial cuts were then obtained  through the head following the administration of 50 mL of Isovue 370  intravenously. Core blood volume, core blood flow, mean transit time,  and Tmax images were obtained utilizing the Rapid software protocol. A  limited CT angiogram of the head was also performed to measure the blood  vessel density.    FINDINGS:  There is fairly symmetrical abnormal perfusion on the Tmax within the  posterior fossa and bilateral temporo-occipital lobes, favored to be  artifactual. The cerebral blood flow appears normal.    Impression  No definite acute perfusion abnormality identified. Suggestion of  artifact on the Tmax as described above.    Electronically Signed By-Sha Rangel MD On:4/9/2022 4:17 PM  This report was finalized on [id number] by  Sha Rangel MD.      Results for orders placed during the hospital encounter of 04/09/22    CT CEREBRAL PERFUSION WITH & WITHOUT CONTRAST    Narrative  DATE OF EXAM:  4/9/2022 3:55 PM    PROCEDURE:  CT CEREBRAL PERFUSION W WO CONTRAST-    INDICATIONS:  Transient visual changes consistent with a stroke    COMPARISON:  CT head from earlier today    TECHNIQUE:  Routine transaxial cuts were obtained through the head without  administration of  contrast. Routine transaxial cuts were then obtained  through the head following the administration of 50 mL of Isovue 370  intravenously. Core blood volume, core blood flow, mean transit time,  and Tmax images were obtained utilizing the Rapid software protocol. A  limited CT angiogram of the head was also performed to measure the blood  vessel density.    FINDINGS:  There is fairly symmetrical abnormal perfusion on the Tmax within the  posterior fossa and bilateral temporo-occipital lobes, favored to be  artifactual. The cerebral blood flow appears normal.    Impression  No definite  acute perfusion abnormality identified. Suggestion of  artifact on the Tmax as described above.    Electronically Signed By-Sha Rangel MD On:4/9/2022 4:17 PM  This report was finalized on [id number] by  Sha Rangel MD.      Lab Review:   Anticoagulation Visit on 10/17/2023   Component Date Value    INR 10/17/2023 2.80    Anticoagulation Visit on 09/18/2023   Component Date Value    INR 09/18/2023 2.60    Anticoagulation Visit on 08/23/2023   Component Date Value    INR 08/23/2023 2.50    Anticoagulation Visit on 07/25/2023   Component Date Value    INR 07/25/2023 2.30    Anticoagulation Visit on 06/27/2023   Component Date Value    INR 06/27/2023 1.80 (A)    Anticoagulation Visit on 05/23/2023   Component Date Value    INR 05/23/2023 2.10      Recent labs reviewed and interpreted for clinical significance and application            Level of Care:           Silvano Gillis MD  11/21/23  .

## 2023-11-22 RX ORDER — DIGOXIN 250 MCG
250 TABLET ORAL DAILY
Qty: 90 TABLET | Refills: 1 | Status: SHIPPED | OUTPATIENT
Start: 2023-11-22

## 2023-11-28 ENCOUNTER — ANTICOAGULATION VISIT (OUTPATIENT)
Dept: PHARMACY | Facility: HOSPITAL | Age: 78
End: 2023-11-28
Payer: MEDICARE

## 2023-11-28 DIAGNOSIS — I48.19 PERSISTENT ATRIAL FIBRILLATION: Primary | ICD-10-CM

## 2023-11-28 LAB — INR PPP: 2.2 (ref 2–3)

## 2023-11-28 PROCEDURE — 36416 COLLJ CAPILLARY BLOOD SPEC: CPT

## 2023-11-28 PROCEDURE — 85610 PROTHROMBIN TIME: CPT

## 2023-11-28 PROCEDURE — G0463 HOSPITAL OUTPT CLINIC VISIT: HCPCS

## 2023-11-28 NOTE — PROGRESS NOTES
"Anticoagulation Clinic Progress Note    INR Goal: 2 - 3  Today's INR: 2.2 (therapeutic)  Current warfarin dose: warfarin 5 mg PO daily Current total weekly dose = 35 mg per week.     Note: patient states he has an appointment tomorrow to have an injection in his back and expresses this an injection he's had before and never needed to hold his warfarin for/non-invasive. He also stated they may change his plan if the injection \"doesn't work.\"    INR History:      2023     1:00 PM 2023     1:15 PM 2023     1:15 PM 2023     1:15 PM 2023     1:00 PM 10/17/2023    11:45 AM 2023    11:45 AM   Anticoagulation Monitoring   INR 2.10 1.80 2.30 2.50 2.60 2.80 2.20   INR Date 2023 2023 2023 2023 2023 10/17/2023 2023   INR Goal 2.0-3.0 2.0-3.0 2.0-3.0 2.0-3.0 2.0-3.0 2.0-3.0 2.0-3.0   Trend Same Same Same Same Same Same Same   Last Week Total 35 mg 35 mg 35 mg 35 mg 35 mg 35 mg 35 mg   Next Week Total 35 mg 35 mg 35 mg 35 mg 35 mg 35 mg 35 mg   Sun 5 mg 5 mg 5 mg 5 mg 5 mg 5 mg 5 mg   Mon 5 mg 5 mg 5 mg 5 mg 5 mg 5 mg 5 mg   Tue 5 mg 5 mg 5 mg 5 mg 5 mg 5 mg 5 mg   Wed 5 mg 5 mg 5 mg 5 mg 5 mg 5 mg 5 mg   Thu 5 mg 5 mg 5 mg 5 mg 5 mg 5 mg 5 mg   Fri 5 mg 5 mg 5 mg 5 mg 5 mg 5 mg 5 mg   Sat 5 mg 5 mg 5 mg 5 mg 5 mg 5 mg 5 mg       Anticoagulation Summary  As of 2023      INR goal:  2.0-3.0   TTR:  78.0% (4.4 y)   INR used for dosin.20 (2023)   Warfarin maintenance plan:  5 mg every day   Weekly warfarin total:  35 mg   No change documented:  [place], Roma, PharmD   Plan last modified:  Kavita Jolley PharmD (2023)   Next INR check:  2024   Priority:  Maintenance   Target end date:      Indications    Persistent atrial fibrillation [I48.19]                 Anticoagulation Episode Summary       INR check location:      Preferred lab:      Send INR reminders to:  [facility] CLINICAL POOL    Comments:  Patient contract signed 11/15/21. "          Anticoagulation Care Providers       Provider Role Specialty Phone number    Silvano Gillis MD  Cardiology [phone]            Clinic Interview:   Patient Findings     Negatives:  Signs/symptoms of thrombosis, Signs/symptoms of bleeding,   Laboratory test error suspected, Change in health, Change in alcohol use,   Change in activity, Upcoming invasive procedure, Emergency department   visit, Upcoming dental procedure, Missed doses, Extra doses, Change in   medications, Change in diet/appetite, Hospital admission, Bruising, Other   complaints      Clinical Outcomes     Negatives:  Major bleeding event, Thromboembolic event,   Anticoagulation-related hospital admission, Anticoagulation-related ED   visit, Anticoagulation-related fatality        Current Medications:   Prior to Admission medications    Medication Sig Start Date End Date Taking? Authorizing Provider   albuterol sulfate  (90 Base) MCG/ACT inhaler Inhale 2 puffs Every 6 (Six) Hours As Needed. 7/6/21   Shelby Haskins MD   alfuzosin (UROXATRAL) 10 MG 24 hr tablet Take 1 tablet by mouth 2 (Two) Times a Day. 9/23/19   Shelby Haskins MD   aspirin (aspirin) 81 MG EC tablet Take 1 tablet by mouth Daily. 4/11/22   Silvano Gillis MD   baclofen (LIORESAL) 10 MG tablet Take 1 tablet by mouth 2 (Two) Times a Day. 7/13/21   Shelby Haskins MD   BREO ELLIPTA 100-25 MCG/INH inhaler Inhale 1 puff Daily. 9/24/19   Shelby Haskins MD   digoxin (LANOXIN) 250 MCG tablet Take 1 tablet by mouth Daily. 11/22/23   Silvano Gillis MD   EPINEPHrine (EPIPEN) 0.3 MG/0.3ML solution auto-injector injection epinephrine 0.3 mg/0.3 mL injection, auto-injector    Shelby Haskins MD   fluorometholone (FML) 0.1 % ophthalmic suspension Administer 1 drop to the right eye Daily. 5/17/21   Shelby Haskins MD   gabapentin (NEURONTIN) 300 MG capsule Take 2 capsules by mouth 2 (Two) Times a Day. 9/13/19    Shelby Haskins MD   hydroCHLOROthiazide (MICROZIDE) 12.5 MG capsule Take 1 capsule by mouth Daily. 11/21/23   Silvano Gillis MD   levothyroxine (SYNTHROID, LEVOTHROID) 75 MCG tablet Take 1 tablet by mouth Daily. 9/23/19   Shelby Haskins MD   losartan (Cozaar) 25 MG tablet Take 1 tablet by mouth Daily. 11/21/23   Silvano Gillis MD   montelukast (SINGULAIR) 10 MG tablet Take 1 tablet by mouth Every Evening. 9/23/19   Shelby Haskins MD   pantoprazole (PROTONIX) 40 MG EC tablet Take 1 tablet by mouth Daily. 2/22/22   Silvano Gillis MD   pravastatin (PRAVACHOL) 80 MG tablet Take 1 tablet by mouth every night at bedtime. 10/6/23   Silvano Gillis MD   sertraline (ZOLOFT) 100 MG tablet Take 1.5 tablets by mouth Daily. 3/15/22   Silvano Gillis MD   theophylline (UNIPHYL) 400 MG 24 hr tablet Take 1 tablet by mouth Daily. 9/23/19   Shelby Haskins MD   verapamil ER (VERELAN) 240 MG 24 hr capsule TAKE ONE CAPSULE BY MOUTH EVERY NIGHT AT BEDTIME 7/31/23   Silvano Gillis MD   warfarin (COUMADIN) 5 MG tablet TAKE ONE TABLET BY MOUTH DAILY EXCEPT ON MONDAY AND THURSDAY TAKE ONE AND ONE-HALF TABLETS OR AS DIRECTED 2/13/23   Silvano Gillis MD   warfarin (Coumadin) 5 MG tablet Take 5 mg by mouth daily or as otherwise directed. 10/6/23   Silvano Gillis MD       Medical history:   Past Medical History:   Diagnosis Date    Allergic     Anxiety     Arthritis     Asthma     Atrial fibrillation     Atypical chest pain 06/11/2020    Cataract     COPD (chronic obstructive pulmonary disease)     COVID     COVID-19 01/13/2021    Depression     Enlarged prostate     Headache     Hyperlipidemia     Hypertension     Kidney stone     Near syncope 12/17/2020    Prostatitis     Shortness of breath     Sleep apnea     Stroke     Urinary tract infection        Social history:   Social History     Tobacco Use    Smoking status: Former     Types:  "Cigarettes    Smokeless tobacco: Never   Substance Use Topics    Alcohol use: Yes     Comment: occasionally       Allergies:    Penicillins and Rivaroxaban    Vaccine History:   Immunization History   Administered Date(s) Administered    Covid-19 (Pfizer) Gray Cap Monovalent 05/18/2021, 11/17/2021, 11/26/2021    FLUAD TRI 65YR+ 10/31/2019    Fluzone High Dose =>65 Years (Vaxcare ONLY) 10/14/2016, 10/16/2017, 11/14/2018    H1N1 Inj 12/22/2009    Hepatitis A 09/17/2019, 11/19/2019    Influenza Quad Vaccine (Inpatient) 09/22/2014    Influenza, Unspecified 09/18/2013    Pneumococcal Conjugate 13-Valent (PCV13) 10/12/2015    Pneumococcal Polysaccharide (PPSV23) 09/24/2013    Shingrix 09/17/2019, 11/19/2019    Zostavax 02/12/2013       This patient is managed via a collaborative agreement, pursuant to IC 25-26-16. The signed protocol is kept in the [facility] at [address] [place] [address].    Education: Efe Malloy has been instructed to call if any changes in medications, doses, concerns, etc. Patient was provided dosing instructions and expressed verbal understanding and has no further questions at this time.    Plan:  1. Anticoagulation   - no change; warfarin 5 mg PO daily  Total weekly dose = 35 mg.   - RTC in 6 week(s)    Given patient's appointment for his back injection today and stating that they may change his plan if the injection \"doesn't work,\" advised patient to call our clinic if they do decide to update his treatment plan.  We will adjust if needed before his next appointment if they are going to plan any procedures or otherwise.     Roma Fowler, PharmD  11/28/2023  11:51 EST    "

## 2023-12-08 ENCOUNTER — HOSPITAL ENCOUNTER (OUTPATIENT)
Dept: NUCLEAR MEDICINE | Facility: HOSPITAL | Age: 78
Discharge: HOME | End: 2023-12-08
Payer: MEDICARE

## 2023-12-08 ENCOUNTER — TELEPHONE (OUTPATIENT)
Dept: CARDIOLOGY | Facility: CLINIC | Age: 78
End: 2023-12-08

## 2023-12-08 ENCOUNTER — LAB (OUTPATIENT)
Dept: LAB | Facility: HOSPITAL | Age: 78
End: 2023-12-08
Payer: MEDICARE

## 2023-12-08 DIAGNOSIS — I48.19 PERSISTENT ATRIAL FIBRILLATION: ICD-10-CM

## 2023-12-08 DIAGNOSIS — R07.89 CHEST PAIN, ATYPICAL: ICD-10-CM

## 2023-12-08 LAB
ALBUMIN SERPL-MCNC: 3.9 G/DL (ref 3.5–5.2)
ALBUMIN/GLOB SERPL: 1.3 G/DL
ALP SERPL-CCNC: 58 U/L (ref 39–117)
ALT SERPL W P-5'-P-CCNC: 24 U/L (ref 1–41)
ANION GAP SERPL CALCULATED.3IONS-SCNC: 9 MMOL/L (ref 5–15)
AST SERPL-CCNC: 23 U/L (ref 1–40)
BH CV NUCLEAR PRIOR STUDY: 3
BH CV REST NUCLEAR ISOTOPE DOSE: 11 MCI
BH CV STRESS BP STAGE 1: NORMAL
BH CV STRESS BP STAGE 2: NORMAL
BH CV STRESS BP STAGE 3: NORMAL
BH CV STRESS BP STAGE 4: NORMAL
BH CV STRESS COMMENTS STAGE 1: NORMAL
BH CV STRESS COMMENTS STAGE 2: NORMAL
BH CV STRESS DOSE REGADENOSON STAGE 1: 0.4
BH CV STRESS DURATION MIN STAGE 1: 0
BH CV STRESS DURATION MIN STAGE 2: 4
BH CV STRESS DURATION SEC STAGE 1: 10
BH CV STRESS DURATION SEC STAGE 2: 0
BH CV STRESS HR STAGE 1: 63
BH CV STRESS HR STAGE 2: 80
BH CV STRESS HR STAGE 3: 79
BH CV STRESS HR STAGE 4: 75
BH CV STRESS NUCLEAR ISOTOPE DOSE: 30.2 MCI
BH CV STRESS PROTOCOL 1: NORMAL
BH CV STRESS RECOVERY BP: NORMAL MMHG
BH CV STRESS RECOVERY HR: 80 BPM
BH CV STRESS STAGE 1: 1
BH CV STRESS STAGE 2: 2
BH CV STRESS STAGE 3: 3
BH CV STRESS STAGE 4: 4
BILIRUB SERPL-MCNC: 0.5 MG/DL (ref 0–1.2)
BUN SERPL-MCNC: 13 MG/DL (ref 8–23)
BUN/CREAT SERPL: 10.7 (ref 7–25)
CALCIUM SPEC-SCNC: 9.5 MG/DL (ref 8.6–10.5)
CHLORIDE SERPL-SCNC: 100 MMOL/L (ref 98–107)
CHOLEST SERPL-MCNC: 148 MG/DL (ref 0–200)
CO2 SERPL-SCNC: 30 MMOL/L (ref 22–29)
CREAT SERPL-MCNC: 1.21 MG/DL (ref 0.76–1.27)
DEPRECATED RDW RBC AUTO: 40.4 FL (ref 37–54)
EGFRCR SERPLBLD CKD-EPI 2021: 62.1 ML/MIN/1.73
ERYTHROCYTE [DISTWIDTH] IN BLOOD BY AUTOMATED COUNT: 14.5 % (ref 12.3–15.4)
GLOBULIN UR ELPH-MCNC: 3.1 GM/DL
GLUCOSE SERPL-MCNC: 170 MG/DL (ref 65–99)
HCT VFR BLD AUTO: 49.3 % (ref 37.5–51)
HDLC SERPL-MCNC: 30 MG/DL (ref 40–60)
HGB BLD-MCNC: 16.1 G/DL (ref 13–17.7)
LDLC SERPL CALC-MCNC: 88 MG/DL (ref 0–100)
LDLC/HDLC SERPL: 2.79 {RATIO}
LV EF NUC BP: 69 %
MAGNESIUM SERPL-MCNC: 1.9 MG/DL (ref 1.6–2.4)
MAXIMAL PREDICTED HEART RATE: 144 BPM
MCH RBC QN AUTO: 25.5 PG (ref 26.6–33)
MCHC RBC AUTO-ENTMCNC: 32.7 G/DL (ref 31.5–35.7)
MCV RBC AUTO: 78.1 FL (ref 79–97)
PERCENT MAX PREDICTED HR: 55.56 %
PLATELET # BLD AUTO: 169 10*3/MM3 (ref 140–450)
PMV BLD AUTO: 10.2 FL (ref 6–12)
POTASSIUM SERPL-SCNC: 4.2 MMOL/L (ref 3.5–5.2)
PROT SERPL-MCNC: 7 G/DL (ref 6–8.5)
RBC # BLD AUTO: 6.31 10*6/MM3 (ref 4.14–5.8)
SODIUM SERPL-SCNC: 139 MMOL/L (ref 136–145)
STRESS BASELINE BP: NORMAL MMHG
STRESS BASELINE HR: 72 BPM
STRESS PERCENT HR: 65 %
STRESS POST PEAK BP: NORMAL MMHG
STRESS POST PEAK HR: 80 BPM
STRESS TARGET HR: 122 BPM
TRIGL SERPL-MCNC: 172 MG/DL (ref 0–150)
TSH SERPL DL<=0.05 MIU/L-ACNC: 2.31 UIU/ML (ref 0.27–4.2)
VLDLC SERPL-MCNC: 30 MG/DL (ref 5–40)
WBC NRBC COR # BLD AUTO: 7.83 10*3/MM3 (ref 3.4–10.8)

## 2023-12-08 PROCEDURE — 80061 LIPID PANEL: CPT

## 2023-12-08 PROCEDURE — A9502 TC99M TETROFOSMIN: HCPCS | Performed by: INTERNAL MEDICINE

## 2023-12-08 PROCEDURE — 78452 HT MUSCLE IMAGE SPECT MULT: CPT

## 2023-12-08 PROCEDURE — 93017 CV STRESS TEST TRACING ONLY: CPT

## 2023-12-08 PROCEDURE — 36415 COLL VENOUS BLD VENIPUNCTURE: CPT

## 2023-12-08 PROCEDURE — 0 TECHNETIUM TETROFOSMIN KIT: Performed by: INTERNAL MEDICINE

## 2023-12-08 PROCEDURE — 80053 COMPREHEN METABOLIC PANEL: CPT

## 2023-12-08 PROCEDURE — 84443 ASSAY THYROID STIM HORMONE: CPT

## 2023-12-08 PROCEDURE — 85027 COMPLETE CBC AUTOMATED: CPT

## 2023-12-08 PROCEDURE — 83735 ASSAY OF MAGNESIUM: CPT

## 2023-12-08 RX ORDER — REGADENOSON 0.08 MG/ML
0.4 INJECTION, SOLUTION INTRAVENOUS
Status: DISCONTINUED | OUTPATIENT
Start: 2023-12-08 | End: 2023-12-09 | Stop reason: HOSPADM

## 2023-12-08 RX ADMIN — TETROFOSMIN 1 DOSE: 1.38 INJECTION, POWDER, LYOPHILIZED, FOR SOLUTION INTRAVENOUS at 10:10

## 2023-12-08 NOTE — TELEPHONE ENCOUNTER
Blood pressures this week. Last day of losartan was on Monday. He was also cutting losartan in half and it was too low and feeling very tired.   This morning 125/70 and now 131/77  116/66 yesterday   101/63  115/67 Wednesday   117/73  136/84  145/85   143/81  132/76  141/73

## 2023-12-08 NOTE — TELEPHONE ENCOUNTER
Caller: Efe Malloy    Relationship: Self    Best call back number:  [phone]        PATIENT CALLED IN TO ADVISE HIS BP HAS BEEN OKAY, BUT HE HAS NOT BEEN TAKING THE LOSARTEN BECAUSE IT WAS MAKING HIS BP DROP TOO MUCH.

## 2023-12-12 NOTE — TELEPHONE ENCOUNTER
Name: Efe Malloy    Relationship: Self    Best Callback Number: [phone]    HUB PROVIDED THE RELAY MESSAGE FROM THE OFFICE   PATIENT VOICED UNDERSTANDING AND HAS NO FURTHER QUESTIONS AT THIS TIME    ADDITIONAL INFORMATION:

## 2023-12-12 NOTE — TELEPHONE ENCOUNTER
Caller: Efe Malloy    Relationship to patient: Self    Best call back number: [phone]    Patient is needing: HUB READ MESSAGE. PATIENT REQUESTING A CALL BACK OVER HIS LATEST LABS AND STRESS TEST.

## 2023-12-12 NOTE — TELEPHONE ENCOUNTER
Left vm to call back. Blood pressures look ok per dr sanford. Ok to stay off losartan.     Ok for hub to read to patient.

## 2023-12-12 NOTE — TELEPHONE ENCOUNTER
Stress test was a low risk study patient needs to keep follow up to discuss in detail      Hub can read

## 2024-01-09 ENCOUNTER — TELEPHONE (OUTPATIENT)
Dept: CARDIOLOGY | Facility: CLINIC | Age: 79
End: 2024-01-09
Payer: MEDICARE

## 2024-01-09 NOTE — TELEPHONE ENCOUNTER
Rosita with [facility] calling:  Pt has afib.  Pt is having Lithotripsy by [facility].  Pt is on Warfarin 5mg.  Pt will hold for 5 days, but does pt need to be bridged?  Please advise.

## 2024-01-09 NOTE — TELEPHONE ENCOUNTER
Problem: Falls - Risk of  Goal: *Absence of Falls  Description: Document Rufus Click Fall Risk and appropriate interventions in the flowsheet.   Outcome: Progressing Towards Goal  Note: Fall Risk Interventions:  Mobility Interventions: Bed/chair exit alarm, Communicate number of staff needed for ambulation/transfer, OT consult for ADLs, Patient to call before getting OOB, PT Consult for mobility concerns, PT Consult for assist device competence         Medication Interventions: Bed/chair exit alarm, Evaluate medications/consider consulting pharmacy, Patient to call before getting OOB, Teach patient to arise slowly    Elimination Interventions: Bed/chair exit alarm, Call light in reach, Elevated toilet seat, Patient to call for help with toileting needs, Stay With Me (per policy), Toilet paper/wipes in reach, Toileting schedule/hourly rounds, Urinal in reach    History of Falls Interventions: Bed/chair exit alarm, Door open when patient unattended, Evaluate medications/consider consulting pharmacy, Investigate reason for fall, Room close to nurse's station, Utilize gait belt for transfer/ambulation, Assess for delayed presentation/identification of injury for 48 hrs (comment for end date)         Problem: Patient Education: Go to Patient Education Activity  Goal: Patient/Family Education  Outcome: Progressing Towards Goal     Problem: Breathing Pattern - Ineffective  Goal: *Absence of hypoxia  Outcome: Progressing Towards Goal  Goal: *Use of effective breathing techniques  Outcome: Progressing Towards Goal  Goal: *PALLIATIVE CARE:  Alleviation of Dyspnea  Outcome: Progressing Towards Goal     Problem: Patient Education: Go to Patient Education Activity  Goal: Patient/Family Education  Outcome: Progressing Towards Goal Will speak to Dr. Gillis.

## 2024-01-10 ENCOUNTER — LAB (OUTPATIENT)
Dept: LAB | Facility: HOSPITAL | Age: 79
End: 2024-01-10
Payer: MEDICARE

## 2024-01-10 ENCOUNTER — TELEPHONE (OUTPATIENT)
Dept: PHARMACY | Facility: HOSPITAL | Age: 79
End: 2024-01-10
Payer: MEDICARE

## 2024-01-10 ENCOUNTER — OFFICE VISIT (OUTPATIENT)
Dept: CARDIOLOGY | Facility: CLINIC | Age: 79
End: 2024-01-10
Payer: MEDICARE

## 2024-01-10 ENCOUNTER — TRANSCRIBE ORDERS (OUTPATIENT)
Dept: ADMINISTRATIVE | Facility: HOSPITAL | Age: 79
End: 2024-01-10
Payer: MEDICARE

## 2024-01-10 ENCOUNTER — ANTICOAGULATION VISIT (OUTPATIENT)
Dept: PHARMACY | Facility: HOSPITAL | Age: 79
End: 2024-01-10
Payer: MEDICARE

## 2024-01-10 VITALS
HEIGHT: 73 IN | SYSTOLIC BLOOD PRESSURE: 123 MMHG | HEART RATE: 72 BPM | WEIGHT: 268 LBS | DIASTOLIC BLOOD PRESSURE: 76 MMHG | RESPIRATION RATE: 18 BRPM | BODY MASS INDEX: 35.52 KG/M2

## 2024-01-10 DIAGNOSIS — I48.19 ATRIAL FIBRILLATION, PERSISTENT: Primary | ICD-10-CM

## 2024-01-10 DIAGNOSIS — N20.0 RENAL STONES: Primary | ICD-10-CM

## 2024-01-10 DIAGNOSIS — N20.0 RENAL STONES: ICD-10-CM

## 2024-01-10 DIAGNOSIS — I48.19 PERSISTENT ATRIAL FIBRILLATION: Primary | ICD-10-CM

## 2024-01-10 LAB
ANION GAP SERPL CALCULATED.3IONS-SCNC: 12.7 MMOL/L (ref 5–15)
BASOPHILS # BLD AUTO: 0.03 10*3/MM3 (ref 0–0.2)
BASOPHILS NFR BLD AUTO: 0.4 % (ref 0–1.5)
BUN SERPL-MCNC: 17 MG/DL (ref 8–23)
BUN/CREAT SERPL: 13.5 (ref 7–25)
CALCIUM SPEC-SCNC: 9.6 MG/DL (ref 8.6–10.5)
CHLORIDE SERPL-SCNC: 104 MMOL/L (ref 98–107)
CO2 SERPL-SCNC: 27.3 MMOL/L (ref 22–29)
CREAT SERPL-MCNC: 1.26 MG/DL (ref 0.76–1.27)
DEPRECATED RDW RBC AUTO: 42.5 FL (ref 37–54)
EGFRCR SERPLBLD CKD-EPI 2021: 59.1 ML/MIN/1.73
EOSINOPHIL # BLD AUTO: 0.12 10*3/MM3 (ref 0–0.4)
EOSINOPHIL NFR BLD AUTO: 1.5 % (ref 0.3–6.2)
ERYTHROCYTE [DISTWIDTH] IN BLOOD BY AUTOMATED COUNT: 16 % (ref 12.3–15.4)
GLUCOSE SERPL-MCNC: 92 MG/DL (ref 65–99)
HCT VFR BLD AUTO: 48.2 % (ref 37.5–51)
HGB BLD-MCNC: 15.7 G/DL (ref 13–17.7)
IMM GRANULOCYTES # BLD AUTO: 0.04 10*3/MM3 (ref 0–0.05)
IMM GRANULOCYTES NFR BLD AUTO: 0.5 % (ref 0–0.5)
INR PPP: 2 (ref 0.9–1.1)
LYMPHOCYTES # BLD AUTO: 2.21 10*3/MM3 (ref 0.7–3.1)
LYMPHOCYTES NFR BLD AUTO: 27.9 % (ref 19.6–45.3)
MCH RBC QN AUTO: 25.6 PG (ref 26.6–33)
MCHC RBC AUTO-ENTMCNC: 32.6 G/DL (ref 31.5–35.7)
MCV RBC AUTO: 78.6 FL (ref 79–97)
MONOCYTES # BLD AUTO: 0.75 10*3/MM3 (ref 0.1–0.9)
MONOCYTES NFR BLD AUTO: 9.5 % (ref 5–12)
NEUTROPHILS NFR BLD AUTO: 4.76 10*3/MM3 (ref 1.7–7)
NEUTROPHILS NFR BLD AUTO: 60.2 % (ref 42.7–76)
NRBC BLD AUTO-RTO: 0 /100 WBC (ref 0–0.2)
PLATELET # BLD AUTO: 182 10*3/MM3 (ref 140–450)
PMV BLD AUTO: 9.8 FL (ref 6–12)
POTASSIUM SERPL-SCNC: 3.8 MMOL/L (ref 3.5–5.2)
RBC # BLD AUTO: 6.13 10*6/MM3 (ref 4.14–5.8)
SODIUM SERPL-SCNC: 144 MMOL/L (ref 136–145)
WBC NRBC COR # BLD AUTO: 7.91 10*3/MM3 (ref 3.4–10.8)

## 2024-01-10 PROCEDURE — 93000 ELECTROCARDIOGRAM COMPLETE: CPT | Performed by: INTERNAL MEDICINE

## 2024-01-10 PROCEDURE — 85025 COMPLETE CBC W/AUTO DIFF WBC: CPT

## 2024-01-10 PROCEDURE — 80048 BASIC METABOLIC PNL TOTAL CA: CPT

## 2024-01-10 PROCEDURE — 3074F SYST BP LT 130 MM HG: CPT | Performed by: INTERNAL MEDICINE

## 2024-01-10 PROCEDURE — 3078F DIAST BP <80 MM HG: CPT | Performed by: INTERNAL MEDICINE

## 2024-01-10 PROCEDURE — 36416 COLLJ CAPILLARY BLOOD SPEC: CPT

## 2024-01-10 PROCEDURE — 85610 PROTHROMBIN TIME: CPT

## 2024-01-10 PROCEDURE — G0463 HOSPITAL OUTPT CLINIC VISIT: HCPCS

## 2024-01-10 PROCEDURE — 99214 OFFICE O/P EST MOD 30 MIN: CPT | Performed by: INTERNAL MEDICINE

## 2024-01-10 PROCEDURE — 36415 COLL VENOUS BLD VENIPUNCTURE: CPT

## 2024-01-10 RX ORDER — EZETIMIBE 10 MG/1
10 TABLET ORAL DAILY
Qty: 30 TABLET | Refills: 11 | Status: SHIPPED | OUTPATIENT
Start: 2024-01-10

## 2024-01-10 NOTE — TELEPHONE ENCOUNTER
Received call 1/9 from Sheba at [facility] regarding patient's upcoming procedure. Cardiac clearance was obtained by Dr. Gillis but unsure of whether patient would need enoxaparin bridge or not. Per TRINH Mon, Dr. Gillis states patient does not need a bridge for this. Informed Araina of this ([phone] ext [phone]). Of note, procedure has not been scheduled yet.

## 2024-01-10 NOTE — PROGRESS NOTES
Anticoagulation Clinic Progress Note    INR Goal: 2 - 3  Today's INR: 2.0 (therapeutic)  Current warfarin dose: warfarin 5 mg PO daily Current total weekly dose = 35 mg per week.     Of note: The patient has a kidney stone and will undergo a lithotripsy on 1/22. Warfarin will need to be held 5 days in advance of the procedure (01/17-21) and it was clarified with cardiology that the patient will not need bridging.  In urological lab work to ensure patient would be ready for the procedure, it was discovered the patient had a UTI. Cefinir was prescribed and taken from 01/06-09 before that antibiotic was switched to Macrobid for better bacterial coverage. Patient will start on Macrobid today. Cephalosporins may increase INR and increase risk of bleeding, but this medication was discontinued so this is no longer a concern. Macrobid does not have an expected drug reaction with warfarin.     INR History:      6/27/2023     1:15 PM 7/25/2023     1:15 PM 8/23/2023     1:15 PM 9/18/2023     1:00 PM 10/17/2023    11:45 AM 11/28/2023    11:45 AM 1/10/2024     3:15 PM   Anticoagulation Monitoring   INR 1.80 2.30 2.50 2.60 2.80 2.20 2.00   INR Date 6/27/2023 7/25/2023 8/23/2023 9/18/2023 10/17/2023 11/28/2023 1/10/2024   INR Goal 2.0-3.0 2.0-3.0 2.0-3.0 2.0-3.0 2.0-3.0 2.0-3.0 2.0-3.0   Trend Same Same Same Same Same Same Same   Last Week Total 35 mg 35 mg 35 mg 35 mg 35 mg 35 mg 35 mg   Next Week Total 35 mg 35 mg 35 mg 35 mg 35 mg 35 mg 35 mg   Sun 5 mg 5 mg 5 mg 5 mg 5 mg 5 mg Hold (1/21); Otherwise 5 mg   Mon 5 mg 5 mg 5 mg 5 mg 5 mg 5 mg 5 mg   Tue 5 mg 5 mg 5 mg 5 mg 5 mg 5 mg 5 mg   Wed 5 mg 5 mg 5 mg 5 mg 5 mg 5 mg Hold (1/17); Otherwise 5 mg   Thu 5 mg 5 mg 5 mg 5 mg 5 mg 5 mg Hold (1/18); Otherwise 5 mg   Fri 5 mg 5 mg 5 mg 5 mg 5 mg 5 mg Hold (1/19); Otherwise 5 mg   Sat 5 mg 5 mg 5 mg 5 mg 5 mg 5 mg Hold (1/20); Otherwise 5 mg   Visit Report       Report       Anticoagulation Summary  As of 1/10/2024      INR goal:   2.0-3.0   TTR:  78.6% (4.5 y)   INR used for dosin.00 (1/10/2024)   Warfarin maintenance plan:  5 mg every day   Weekly warfarin total:  35 mg   Plan last modified:  Kavita Jolley, PharmD (2023)   Next INR check:  2024   Priority:  Maintenance   Target end date:      Indications    Persistent atrial fibrillation [I48.19]                 Anticoagulation Episode Summary       INR check location:      Preferred lab:      Send INR reminders to:  [facility] CLINICAL POOL    Comments:  Patient contract signed 11/15/21.          Anticoagulation Care Providers       Provider Role Specialty Phone number    Silvano Gillis MD  Cardiology [phone]            Clinic Interview:   Patient Findings     Positives:  Change in health (Patient has a UTI. (acute illness may raise   INR)), Upcoming invasive procedure (Patient has a lithotripsy scheduled on   .), Change in medications (Patient was originally prescribed cefdinir   for UTI, now being switched to Macrobid.)    Negatives:  Signs/symptoms of thrombosis, Signs/symptoms of bleeding,   Laboratory test error suspected, Change in alcohol use, Change in   activity, Emergency department visit, Upcoming dental procedure, Missed   doses, Extra doses, Change in diet/appetite, Hospital admission, Bruising,   Other complaints      Clinical Outcomes     Negatives:  Major bleeding event, Thromboembolic event,   Anticoagulation-related hospital admission, Anticoagulation-related ED   visit, Anticoagulation-related fatality        Current Medications:   Prior to Admission medications    Medication Sig Start Date End Date Taking? Authorizing Provider   albuterol sulfate  (90 Base) MCG/ACT inhaler Inhale 2 puffs Every 6 (Six) Hours As Needed. 21   Shelby Haskins MD   alfuzosin (UROXATRAL) 10 MG 24 hr tablet Take 1 tablet by mouth 2 (Two) Times a Day. 19   Shelby Haskins MD   aspirin (aspirin) 81 MG EC tablet Take 1 tablet  by mouth Daily. 4/11/22   Silvano Gillis MD   baclofen (LIORESAL) 10 MG tablet Take 1 tablet by mouth 2 (Two) Times a Day. 7/13/21   Shelby Haskins MD   BREHENRY ELLIPTA 100-25 MCG/INH inhaler Inhale 1 puff Daily. 9/24/19   Shelby Haskins MD   digoxin (LANOXIN) 250 MCG tablet Take 1 tablet by mouth Daily. 11/22/23   Silvano Gillis MD   EPINEPHrine (EPIPEN) 0.3 MG/0.3ML solution auto-injector injection epinephrine 0.3 mg/0.3 mL injection, auto-injector    Shelby Haskins MD   fluorometholone (FML) 0.1 % ophthalmic suspension Administer 1 drop to the right eye Daily. 5/17/21   Shelby Haskins MD   gabapentin (NEURONTIN) 300 MG capsule Take 2 capsules by mouth 2 (Two) Times a Day. 9/13/19   Shelby Haskins MD   hydroCHLOROthiazide (MICROZIDE) 12.5 MG capsule Take 1 capsule by mouth Daily. 11/21/23   Silvano Gillis MD   levothyroxine (SYNTHROID, LEVOTHROID) 75 MCG tablet Take 1 tablet by mouth Daily. 9/23/19   Shelby Haskins MD   montelukast (SINGULAIR) 10 MG tablet Take 1 tablet by mouth Every Evening. 9/23/19   Shelby Haskins MD   pantoprazole (PROTONIX) 40 MG EC tablet Take 1 tablet by mouth Daily. 2/22/22   Silvano Gillis MD   pravastatin (PRAVACHOL) 80 MG tablet Take 1 tablet by mouth every night at bedtime. 10/6/23   Silvano Gillis MD   sertraline (ZOLOFT) 100 MG tablet Take 1.5 tablets by mouth Daily. 3/15/22   Silvano Gillis MD   theophylline (UNIPHYL) 400 MG 24 hr tablet Take 1 tablet by mouth Daily. 9/23/19   Shelby Haskins MD   verapamil ER (VERELAN) 240 MG 24 hr capsule TAKE ONE CAPSULE BY MOUTH EVERY NIGHT AT BEDTIME 7/31/23   Silvano Gillis MD   warfarin (COUMADIN) 5 MG tablet TAKE ONE TABLET BY MOUTH DAILY EXCEPT ON MONDAY AND THURSDAY TAKE ONE AND ONE-HALF TABLETS OR AS DIRECTED 2/13/23   Silvano Gillis MD   warfarin (Coumadin) 5 MG tablet Take 5 mg by mouth daily or as  otherwise directed. 10/6/23   Silvano Gillis MD   losartan (Cozaar) 25 MG tablet Take 1 tablet by mouth Daily. 11/21/23 1/10/24  Silvano Gillis MD       Medical history:   Past Medical History:   Diagnosis Date    Allergic     Anxiety     Arthritis     Asthma     Atrial fibrillation     Atypical chest pain 06/11/2020    Cataract     COPD (chronic obstructive pulmonary disease)     COVID     COVID-19 01/13/2021    Depression     Enlarged prostate     Headache     Hyperlipidemia     Hypertension     Kidney stone     Near syncope 12/17/2020    Prostatitis     Shortness of breath     Sleep apnea     Stroke     Urinary tract infection        Social history:   Social History     Tobacco Use    Smoking status: Former     Types: Cigarettes    Smokeless tobacco: Never   Substance Use Topics    Alcohol use: Yes     Comment: occasionally       Allergies:    Penicillins and Rivaroxaban    Vaccine History:   Immunization History   Administered Date(s) Administered    Covid-19 (Pfizer) Gray Cap Monovalent 05/18/2021, 11/17/2021, 11/26/2021    FLUAD TRI 65YR+ 10/31/2019    Fluzone High Dose =>65 Years (Vaxcare ONLY) 10/14/2016, 10/16/2017, 11/14/2018    H1N1 Inj 12/22/2009    Hepatitis A 09/17/2019, 11/19/2019    Influenza Quad Vaccine (Inpatient) 09/22/2014    Influenza, Unspecified 09/18/2013    Pneumococcal Conjugate 13-Valent (PCV13) 10/12/2015    Pneumococcal Polysaccharide (PPSV23) 09/24/2013    Shingrix 09/17/2019, 11/19/2019    Zostavax 02/12/2013       This patient is managed via a collaborative agreement, pursuant to IC [id number]. The signed protocol is kept in the [facility] at [address] [place] [address].    Education: Efe Malloy has been instructed to call if any changes in medications, doses, concerns, etc. Patient was provided dosing instructions and expressed verbal understanding and has no further questions at this time.    Plan:  1. Anticoagulation   - no  change; warfarin 5 mg PO daily  Total weekly dose = 35 mg.   - RTC in 2.5 week(s) (7 days post procedure)    Daphnie Dyer, Pharmacy Intern  1/10/2024  15:45 EST

## 2024-01-15 NOTE — PROGRESS NOTES
Cardiology Clinic Note  Silvano Gillis MD, PhD    Subjective:     Encounter Date:01/10/2024      Patient ID: Efe Malloy is a 78 y.o. male.    Chief Complaint:  Chief Complaint   Patient presents with    Follow-up       HPI:         I the pleasure to see this 78-year-old gentleman today in cardiology follow-up,  He has a known history of chest pain with multiple stress test being ordered all unremarkable previously. He has chronic persistent atrial fibrillation which is rate controlled He is on anticoagulation with Coumadin goal INR 2-3.  He has no history of prosthetic valves in place.  He is a former smoker and continues abstinence after cessation remotely.  He has history of prior TIA and unilateral hemianopsia in the right visual fields of the right eye.  A prior ischemic work-up included left heart catheterization September 2021 revealed borderline obstructive disease and a large ramus branch and both superior and inferior limbs.  Both IFR/FFR of both limbs was insignificant with respect to hemodynamic criteria as described.  This does not meet criteria for intervention and was not hemodynamically significant at that time.  There is likely eccentric plaque which is overestimated angiographically.,  0.98 in the superior limb and 0.95 in the inferior limb with respect to IFR, therefore he was recommended for medical therapy.  EF was 60%.   He presents back today with some chest pain previously, stress test was really uninformative with poor quality study diminished counts and a variety of anatomic regions but overall preserved EF with normal regional and global wall motion, summed difference score was less than 3.  We discussed the results of the stress test, preserved EF, tolerable symptoms at this time on max medical therapy which she chose to continue without scheduling repeat invasive evaluation which is completely reasonable, last heart cath 2021 as below.  If he continues to have symptoms increasing in  frequency severity or duration he will let us know for repeat invasive ischemic evaluation noting stress testing results below.  Blood pressures are controlled today no heart failure center symptoms.     Follow-up echo April 2022  Mild concentric LVH, EF 65%, mild calcification the aortic valve, negative bubble study for any shunt, grade 1-2 diastolic dysfunction    Stress testing December 2023, EF 69%, globally diminished counts poor visualization with diaphragmatic and GI attenuation, normal size ventricle with normal regional and global wall motion, [id number] variability with underlying atrial fibrillation poor overall imaging with diffuse uptake abnormalities in the inferolateral wall and apex however minimal reversibility between stress and rest imaging, intermediate risk study with poor overall imaging, correlation with clinical risk factors was recommended     Review of systems otherwise negative x14 point review of systems except as mentioned above     Historical data copied forward from previous encounters in EMR is unchanged     Left heart catheterization 9/17/2021  Findings next   #1 open aortic pressure, 157/96  2.  Closing pressure was unchanged next   #3 LVEDP 12 to 16 mmHg  4.  Normal LV systolic function EF of 65 to 70%  5.  No transaortic gradient     Angiography next   1 left main large-caliber vessel no angiographic disease  2.  LAD medium caliber vessel coursing anteriorly with mild nonobstructive disease most severely 20 to 30% diffusely with diffuse luminal regularities  3.  Ramus intermedius is a very large caliber bifurcating vessel along anterolateral wall, proximally there is nonobstructive 20 to 30% luminal regularities, at the bifurcation of the superior and inferior limbs there is angiographic 70-80% in the superior limb, 50-60% in the inferior limb with only mild luminal regularities distally.  4.  Circumflex has 1 obtuse marginal branch, OM has 60% eccentric plaque, circumflex  proper has mild luminal regularities only with DELFINO-3 flow throughout next   #5 RCA large caliber vessel with nonobstructive disease, mild luminal regularities only including PLV and PDA branches distally with DELFINO-3 flow     Conclusions and recommendations  1 borderline nonobstructive disease most severely in the ramus intermedius branch of the bifurcation involving superior and inferior sizable limbs.  IFR and FFR unremarkable in both limbs measured today as noted above.  Nonobstructive LAD disease, obtuse marginal disease and b luminal regularities only in the RCA.  2.  High normal LVEDP, no transaortic gradient, hyperdynamic LV function 65 to 70%  3.  Continue primary mention goals, aspirin statin beta-blocker, antianginals, may try Ranexa for small vessel disease not amenable to intervention or visible by angiography for microvascular dysfunction with negative work-up today  4.  Patient be discharged with discharge criteria met, follow-up cardiology 2 to 4 weeks for further optimization of his medical therapy     Silvano Gillis MD, PhD     =================================================  Exam today  Vitals reviewed below, 123/76 heart rate 72, weight 268  Irregular, A. 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  Obtuse marginal 60% eccentric  "plaque but nonflow limiting, RCA with luminal regularities, LAD 20 to 30% limb irregularities as well.  Secondary prevention goals  Repeat stress testing, suboptimal findings globally diminished counts but no reversibility, preserved EF 69%    Would defer adding Plavix as he is on Coumadin and aspirin at this time, goal INR 2-3 with persistent atrial fibrillation     Continue pravastatin 80 daily, add Zetia and repeat fasting lipid panel, goal LDL less than 70 optimally less than 55     CHF, EF of 60% by LV gram, euvolemic appearing today     Persistent atrial fibrillation, digoxin and verapamil will be continued, get digoxin level intermittently     Hypertension, trending up and uncontrolled today  Advance losartan HCTZ 25/12.5 daily  Blood pressure goal less than 135 systolic  Off hydralazine  EF 60% with no clinical heart failure no class I indication for ARB  Continue verapamil for atrial fibrillation indications     Dizziness and hypotension  symptoms are improved  Verapamil continues, off losartan, continues on low-dose HCTZ     Hyperlipidemia continue pravastatin 80 daily, add Zetia, guide by fasting lipid panel for ASCVD risk calculations, escalate doses per lipid studies     Diet and exercise as allowed by functional status  Weight reduction 10% over the next year he is at 269 pounds similar to prior encounter     continue smoking abstinence    Back to clinic 6 months to 9 months    Objective:         /76 (BP Location: Right arm, Patient Position: Sitting)   Pulse 72   Resp 18   Ht 185.4 cm (73\")   Wt 122 kg (268 lb)   BMI 35.36 kg/m²       The pleasure to be involved in this patient's cardiovascular care.  Please call with any questions or concerns  Silvano Gillis MD, PhD    Most recent EKG as reviewed and interpreted by me:    ECG 12 Lead    Date/Time: 1/10/2024 4:56 PM  Performed by: Silvano Gillis MD    Authorized by: Silvano Gillis MD  Comparison: not compared with " previous ECG   Previous ECG: no previous ECG available  Rhythm: atrial fibrillation  Rate: normal  Conduction: left anterior fascicular block  Other findings: non-specific ST-T wave changes and left ventricular hypertrophy    Clinical impression: abnormal EKG           Most recent echo as reviewed and interpreted by me:  Results for orders placed during the hospital encounter of 04/09/22    Adult Transthoracic Echo Complete W/ Cont if Necessary Per Protocol    Interpretation Summary  · Left ventricular wall thickness is consistent with mild concentric hypertrophy.  · Estimated left ventricular EF = 65% Left ventricular systolic function is normal.  · There is calcification of the aortic valve mainly affecting the right coronary cusp(s).  · Saline test results are negative for right to left atrial level shunt.  · Estimated right ventricular systolic pressure from tricuspid regurgitation is normal (<35 mmHg).  · Left ventricular diastolic function is consistent with (grade II w/high LAP) pseudonormalization.      Most recent stress test as reviewed and interpreted by me:  Results for orders placed during the hospital encounter of 12/08/23    Stress Test With Myocardial Perfusion One Day    Interpretation Summary    Left ventricular ejection fraction is normal (Calculated EF = 69%).    Stress and rest imaging were compared, globally diminished counts and the septum inferior and inferolateral wall.  Poor visualization of the inferior wall diaphragmatic GI attenuation Summed rest score of 25 with summed stress score of 20 with no significant reversibility identified Normal EF 69% Normal regional and global wall motion with normal size ventricle.    Findings consistent with an equivocal ECG stress test.    Equivocal stress ECG as did not reach target heart rate  Underlying atrial fibrillation, nonspecific ST-T wave abnormalities at baseline with left anterior fascicular block, LVH, no changes at stress /Rest/ recovery  No  arrhythmias seen other than atrial fibrillation  Normal EF 69% with normal regional and global wall motion by gated imaging  Overall poor imaging with poor visualization of the inferior and inferolateral wall and apex, twmj-[id number] variability likely with atrial fibrillation, significant GI diaphragmatic attenuation  Again normal wall motion with no reversibility identified, summed difference score of 2 and noncontiguous segments    Correlate with clinical symptoms  Low risk study by criteria noted confounders      Most recent cardiac catheterization as reviewed interpreted by me:  Results for orders placed during the hospital encounter of 09/17/21    Cardiac Catheterization/Vascular Study    Narrative  Silvano Gillis MD, PhD  [facility] cardiology  Date of service 9-17-21    Procedure  1.  Left heart catheterization with coronary angiography left ventriculography via right radial approach  2.  Fractional flow reserve of high diagonal versus ramus intermedius superior limb  3.  Fractional flow reserve ramus intermedius, inferior limb separate branch    Indication  Continued chest pain, uninformative noninvasive studies    After informed consent the patient was brought to the catheterization lab sterilely prepped and draped in usual fashion for the right wrist for right radial access via micropuncture modified Seldinger technique under ultrasound guidance.  5/6 slender sheath was placed with standard cocktail of heparin nitroglycerin and Cardene.  035 guidewire was advanced aortic valve followed by diagnostic catheterization with JL 3 and JR4 6 Japanese catheters.  JR4 was used across aortic valve followed by hand-injection for LV gram, EDP assessment and pullback assessment the transaortic valve gradient.  Secondary to findings of possible obstructive coronary disease with large bifurcating ramus intermedius branch this is what made to perform FFR.  Patient was heparinized for ACT greater than 250 with 100  units/kg of heparin.  Standard FFR was zeroed outside the body, equalized in the left main followed by exam seen to first the inferior limb of the ramus intermedius, IFR was obtained 0.98, the wire was then pulled back followed again by equalization the left main, readvanced to the superior limb of the ramus intermedius separate branch followed by IFR which is 0.95, this was followed by FFR which was 0.87 with good correlated value.  Pullback to the left main revealed no drift at 0.99.  Final angiography revealed no wall trauma, no edge dissections, DELFINO-3 flow in all branches and all catheters and equipment were removed.  Sheath was removed with TR band for hemostasis.  Patient with Cath Lab chest pain-free hemodynamically electrically stable or talking staff neurologically grossly intact bilaterally.      Contrast 170 cc  Conscious sedation time 1 hour with IV Versed and fentanyl administration nurse with complete ECG pulse oximetry and hemodynamic monitoring during the case observed by me  Blood loss less than 5 cc  Complications none    Findings next  #1 open aortic pressure, 157/96  2.  Closing pressure was unchanged next  #3 LVEDP 12 to 16 mmHg  4.  Normal LV systolic function EF of 65 to 70%  5.  No transaortic gradient    Angiography next  1 left main large-caliber vessel no angiographic disease  2.  LAD medium caliber vessel coursing anteriorly with mild nonobstructive disease most severely 20 to 30% diffusely with diffuse luminal regularities  3.  Ramus intermedius is a very large caliber bifurcating vessel along anterolateral wall, proximally there is nonobstructive 20 to 30% luminal regularities, at the bifurcation of the superior and inferior limbs there is angiographic 70-80% in the superior limb, 50-60% in the inferior limb with only mild luminal regularities distally.  4.  Circumflex has 1 obtuse marginal branch, OM has 60% eccentric plaque, circumflex proper has mild luminal regularities only with  DELFINO-3 flow throughout next  #5 RCA large caliber vessel with nonobstructive disease, mild luminal regularities only including PLV and PDA branches distally with DELFINO-3 flow    Conclusions and recommendations  1 borderline nonobstructive disease most severely in the ramus intermedius branch of the bifurcation involving superior and inferior sizable limbs.  IFR and FFR unremarkable in both limbs measured today as noted above.  Nonobstructive LAD disease, obtuse marginal disease and b luminal regularities only in the RCA.  2.  High normal LVEDP, no transaortic gradient, hyperdynamic LV function 65 to 70%  3.  Continue primary mention goals, aspirin statin beta-blocker, antianginals, may try Ranexa for small vessel disease not amenable to intervention or visible by angiography for microvascular dysfunction with negative work-up today  4.  Patient be discharged with discharge criteria met, follow-up cardiology 2 to 4 weeks for further optimization of his medical therapy    Silvano Gillis MD, PhD    The following portions of the patient's history were reviewed and updated as appropriate: allergies, current medications, past family history, past medical history, past social history, past surgical history, and problem list.      ROS:  14 point review of systems negative except as mentioned above    Current Outpatient Medications:     albuterol sulfate  (90 Base) MCG/ACT inhaler, Inhale 2 puffs Every 6 (Six) Hours As Needed., Disp: , Rfl:     alfuzosin (UROXATRAL) 10 MG 24 hr tablet, Take 1 tablet by mouth 2 (Two) Times a Day., Disp: , Rfl: 11    aspirin (aspirin) 81 MG EC tablet, Take 1 tablet by mouth Daily., Disp: 90 tablet, Rfl: 3    baclofen (LIORESAL) 10 MG tablet, Take 1 tablet by mouth 2 (Two) Times a Day., Disp: , Rfl:     BREO ELLIPTA 100-25 MCG/INH inhaler, Inhale 1 puff Daily., Disp: , Rfl: 5    digoxin (LANOXIN) 250 MCG tablet, Take 1 tablet by mouth Daily., Disp: 90 tablet, Rfl: 1    fluorometholone  (FML) 0.1 % ophthalmic suspension, Administer 1 drop to the right eye Daily., Disp: , Rfl:     gabapentin (NEURONTIN) 300 MG capsule, Take 2 capsules by mouth 2 (Two) Times a Day., Disp: , Rfl: 3    hydroCHLOROthiazide (MICROZIDE) 12.5 MG capsule, Take 1 capsule by mouth Daily., Disp: 30 capsule, Rfl: 5    levothyroxine (SYNTHROID, LEVOTHROID) 75 MCG tablet, Take 1 tablet by mouth Daily., Disp: , Rfl: 5    montelukast (SINGULAIR) 10 MG tablet, Take 1 tablet by mouth Every Evening., Disp: , Rfl: 5    pantoprazole (PROTONIX) 40 MG EC tablet, Take 1 tablet by mouth Daily., Disp: 90 tablet, Rfl: 2    pravastatin (PRAVACHOL) 80 MG tablet, Take 1 tablet by mouth every night at bedtime., Disp: 90 tablet, Rfl: 1    sertraline (ZOLOFT) 100 MG tablet, Take 1.5 tablets by mouth Daily., Disp: 30 tablet, Rfl: 0    theophylline (UNIPHYL) 400 MG 24 hr tablet, Take 1 tablet by mouth Daily., Disp: , Rfl: 5    verapamil ER (VERELAN) 240 MG 24 hr capsule, TAKE ONE CAPSULE BY MOUTH EVERY NIGHT AT BEDTIME, Disp: 90 capsule, Rfl: 1    warfarin (COUMADIN) 5 MG tablet, TAKE ONE TABLET BY MOUTH DAILY EXCEPT ON MONDAY AND THURSDAY TAKE ONE AND ONE-HALF TABLETS OR AS DIRECTED, Disp: 96 tablet, Rfl: 1    warfarin (Coumadin) 5 MG tablet, Take 5 mg by mouth daily or as otherwise directed., Disp: 90 tablet, Rfl: 1    EPINEPHrine (EPIPEN) 0.3 MG/0.3ML solution auto-injector injection, epinephrine 0.3 mg/0.3 mL injection, auto-injector, Disp: , Rfl:     ezetimibe (ZETIA) 10 MG tablet, Take 1 tablet by mouth Daily., Disp: 30 tablet, Rfl: 11    Problem List:  Patient Active Problem List   Diagnosis    Persistent atrial fibrillation    Essential hypertension    COPD (chronic obstructive pulmonary disease)    Hyperglycemia    Atypical chest pain    Abnormal Pap smear of anus    Congestive heart failure    Depressive disorder    Gastroesophageal reflux disease    Mixed hyperlipidemia    Hypothyroidism    Neuropathy    Obstructive sleep apnea syndrome     Primary osteoarthritis of right knee    Cervical spondylosis without myelopathy    Cervico-occipital neuralgia    Lumbosacral spondylosis without myelopathy    Near syncope    Arthritis    Chronic idiopathic constipation    Disorder of prostate    Dysplasia of anus    Gout    Hearing loss    Seasonal allergic rhinitis    TIA (transient ischemic attack)     Past Medical History:  Past Medical History:   Diagnosis Date    Allergic     Anxiety     Arthritis     Asthma     Atrial fibrillation     Atypical chest pain 06/11/2020    Cataract     COPD (chronic obstructive pulmonary disease)     COVID     COVID-19 01/13/2021    Depression     Enlarged prostate     Headache     Hyperlipidemia     Hypertension     Kidney stone     Near syncope 12/17/2020    Prostatitis     Shortness of breath     Sleep apnea     Stroke     Urinary tract infection      Past Surgical History:  Past Surgical History:   Procedure Laterality Date    APPENDECTOMY      CARDIAC CATHETERIZATION  2007    nonobstructive dz    CARDIAC CATHETERIZATION N/A 9/17/2021    Procedure: Left Heart Cath;  Surgeon: Silvano Gillis MD;  Location: [facility] CATH INVASIVE LOCATION;  Service: Cardiology;  Laterality: N/A;    CERVICAL EPIDURAL      with Dr. Harshil Rodgers mgt    CHOLECYSTECTOMY      EYE SURGERY Bilateral     corneal transplant left 09/02/2020 (Right eye 2014)    JOINT REPLACEMENT Right     knee    KIDNEY STONE SURGERY      PROSTATE SURGERY       Social History:  Social History     Socioeconomic History    Marital status:    Tobacco Use    Smoking status: Former     Types: Cigarettes    Smokeless tobacco: Never   Vaping Use    Vaping Use: Never used   Substance and Sexual Activity    Alcohol use: Yes     Comment: occasionally    Drug use: Not Currently    Sexual activity: Defer     Allergies:  Allergies   Allergen Reactions    Penicillins Other (See Comments)     AS A CHILD    Rivaroxaban Other (See Comments)     Immunizations:  Immunization  History   Administered Date(s) Administered    Covid-19 (Pfizer) Gray Cap Monovalent 05/18/2021, 11/17/2021, 11/26/2021    FLUAD TRI 65YR+ 10/31/2019    Fluzone High Dose =>65 Years ([employer] ONLY) 10/14/2016, 10/16/2017, 11/14/2018    H1N1 Inj 12/22/2009    Hepatitis A 09/17/2019, 11/19/2019    Influenza Quad Vaccine (Inpatient) 09/22/2014    Influenza, Unspecified 09/18/2013    Pneumococcal Conjugate 13-Valent (PCV13) 10/12/2015    Pneumococcal Polysaccharide (PPSV23) 09/24/2013    Shingrix 09/17/2019, 11/19/2019    Zostavax 02/12/2013            In-Office Procedure(s):  No orders to display        ASCVD RIsk Score::  The ASCVD Risk score (Daisy JAIN, et al., 2019) failed to calculate for the following reasons:    The patient has a prior MI or stroke diagnosis    Imaging:    Results for orders placed during the hospital encounter of 04/09/22    XR Chest 1 View    Narrative  XR CHEST 1 VW-    Date of Exam: 4/9/2022 3:28 PM    Indication: Acute Stroke Protocol (onset < 12 hrs).    Comparison Exams: June 11, 2020    Technique: Single AP chest radiograph    FINDINGS:  The lungs are clear. The heart and mediastinal contours appear normal.  The pulmonary vasculature appears normal. The osseous structures appear  intact.    Impression  No acute cardiopulmonary process identified.    Electronically Signed By-Sha Rangel MD On:4/9/2022 3:34 PM  This report was finalized on [id number] by  Sha Rangel MD.       Results for orders placed during the hospital encounter of 04/09/22    CT CEREBRAL PERFUSION WITH & WITHOUT CONTRAST    Narrative  DATE OF EXAM:  4/9/2022 3:55 PM    PROCEDURE:  CT CEREBRAL PERFUSION W WO CONTRAST-    INDICATIONS:  Transient visual changes consistent with a stroke    COMPARISON:  CT head from earlier today    TECHNIQUE:  Routine transaxial cuts were obtained through the head without  administration of  contrast. Routine transaxial cuts were then obtained  through the head following the  administration of 50 mL of Isovue 370  intravenously. Core blood volume, core blood flow, mean transit time,  and Tmax images were obtained utilizing the Rapid software protocol. A  limited CT angiogram of the head was also performed to measure the blood  vessel density.    FINDINGS:  There is fairly symmetrical abnormal perfusion on the Tmax within the  posterior fossa and bilateral temporo-occipital lobes, favored to be  artifactual. The cerebral blood flow appears normal.    Impression  No definite acute perfusion abnormality identified. Suggestion of  artifact on the Tmax as described above.    Electronically Signed By-Sha Rangel MD On:4/9/2022 4:17 PM  This report was finalized on [id number] by  Sha Rangel MD.      Results for orders placed during the hospital encounter of 04/09/22    CT CEREBRAL PERFUSION WITH & WITHOUT CONTRAST    Narrative  DATE OF EXAM:  4/9/2022 3:55 PM    PROCEDURE:  CT CEREBRAL PERFUSION W WO CONTRAST-    INDICATIONS:  Transient visual changes consistent with a stroke    COMPARISON:  CT head from earlier today    TECHNIQUE:  Routine transaxial cuts were obtained through the head without  administration of  contrast. Routine transaxial cuts were then obtained  through the head following the administration of 50 mL of Isovue 370  intravenously. Core blood volume, core blood flow, mean transit time,  and Tmax images were obtained utilizing the Rapid software protocol. A  limited CT angiogram of the head was also performed to measure the blood  vessel density.    FINDINGS:  There is fairly symmetrical abnormal perfusion on the Tmax within the  posterior fossa and bilateral temporo-occipital lobes, favored to be  artifactual. The cerebral blood flow appears normal.    Impression  No definite acute perfusion abnormality identified. Suggestion of  artifact on the Tmax as described above.    Electronically Signed By-Sha Rangel MD On:4/9/2022 4:17 PM  This report was  finalized on [id number] by  Sha Rangel MD.      Lab Review:   Lab on 01/10/2024   Component Date Value    Glucose 01/10/2024 92     BUN 01/10/2024 17     Creatinine 01/10/2024 1.26     Sodium 01/10/2024 144     Potassium 01/10/2024 3.8     Chloride 01/10/2024 104     CO2 01/10/2024 27.3     Calcium 01/10/2024 9.6     BUN/Creatinine Ratio 01/10/2024 13.5     Anion Gap 01/10/2024 12.7     eGFR 01/10/2024 59.1 (L)     WBC 01/10/2024 7.91     RBC 01/10/2024 6.13 (H)     Hemoglobin 01/10/2024 15.7     Hematocrit 01/10/2024 48.2     MCV 01/10/2024 78.6 (L)     MCH 01/10/2024 25.6 (L)     MCHC 01/10/2024 32.6     RDW 01/10/2024 16.0 (H)     RDW-SD 01/10/2024 42.5     MPV 01/10/2024 9.8     Platelets 01/10/2024 182     Neutrophil % 01/10/2024 60.2     Lymphocyte % 01/10/2024 27.9     Monocyte % 01/10/2024 9.5     Eosinophil % 01/10/2024 1.5     Basophil % 01/10/2024 0.4     Immature Grans % 01/10/2024 0.5     Neutrophils, Absolute 01/10/2024 4.76     Lymphocytes, Absolute 01/10/2024 2.21     Monocytes, Absolute 01/10/2024 0.75     Eosinophils, Absolute 01/10/2024 0.12     Basophils, Absolute 01/10/2024 0.03     Immature Grans, Absolute 01/10/2024 0.04     nRBC 01/10/2024 0.0    Anticoagulation Visit on 01/10/2024   Component Date Value    INR 01/10/2024 2.00    Hospital Outpatient Visit on 12/08/2023   Component Date Value    BH CV STRESS PROTOCOL 1 12/08/2023 Pharmacologic     Stage 1 12/08/2023 1.0     Duration Min Stage 1 12/08/2023 0     Duration Sec Stage 1 12/08/2023 10     Stress Dose Regadenoson * 12/08/2023 0.40     Stress Comments Stage 1 12/08/2023 10 sec bolus injection     Target HR (85%) 12/08/2023 122     Max. Pred. HR (100%) 12/08/2023 144     HR Stage 1 12/08/2023 63     BP Stage 1 12/08/2023 157/76     Stage 2 12/08/2023 2.0     HR Stage 2 12/08/2023 80     BP Stage 2 12/08/2023 157/76     Duration Min Stage 2 12/08/2023 4     Duration Sec Stage 2 12/08/2023 0     Stress Comments Stage 2  12/08/2023 recovery     Stage 3 12/08/2023 3.0     HR Stage 3 12/08/2023 79     BP Stage 3 12/08/2023 157/75     Stage 4 12/08/2023 4.0     HR Stage 4 12/08/2023 75     BP Stage 4 12/08/2023 146/81     Baseline HR 12/08/2023 72     Baseline BP 12/08/2023 157/76     Peak HR 12/08/2023 80     Peak BP 12/08/2023 157/76     Recovery HR 12/08/2023 80     Recovery BP 12/08/2023 148/91     Percent Max Pred HR 12/08/2023 55.56     Percent Target HR 12/08/2023 65     Nuclear Prior Study 12/08/2023 3.0     BH CV REST NUCLEAR ISOTO* 12/08/2023 11.0     BH CV STRESS NUCLEAR ISO* 12/08/2023 30.2     Nuc Stress EF 12/08/2023 69    Lab on 12/08/2023   Component Date Value    Glucose 12/08/2023 170 (H)     BUN 12/08/2023 13     Creatinine 12/08/2023 1.21     Sodium 12/08/2023 139     Potassium 12/08/2023 4.2     Chloride 12/08/2023 100     CO2 12/08/2023 30.0 (H)     Calcium 12/08/2023 9.5     Total Protein 12/08/2023 7.0     Albumin 12/08/2023 3.9     ALT (SGPT) 12/08/2023 24     AST (SGOT) 12/08/2023 23     Alkaline Phosphatase 12/08/2023 58     Total Bilirubin 12/08/2023 0.5     Globulin 12/08/2023 3.1     A/G Ratio 12/08/2023 1.3     BUN/Creatinine Ratio 12/08/2023 10.7     Anion Gap 12/08/2023 9.0     eGFR 12/08/2023 62.1     Total Cholesterol 12/08/2023 148     Triglycerides 12/08/2023 172 (H)     HDL Cholesterol 12/08/2023 30 (L)     LDL Cholesterol  12/08/2023 88     VLDL Cholesterol 12/08/2023 30     LDL/HDL Ratio 12/08/2023 2.79     Magnesium 12/08/2023 1.9     WBC 12/08/2023 7.83     RBC 12/08/2023 6.31 (H)     Hemoglobin 12/08/2023 16.1     Hematocrit 12/08/2023 49.3     MCV 12/08/2023 78.1 (L)     MCH 12/08/2023 25.5 (L)     MCHC 12/08/2023 32.7     RDW 12/08/2023 14.5     RDW-SD 12/08/2023 40.4     MPV 12/08/2023 10.2     Platelets 12/08/2023 169     TSH 12/08/2023 2.310    Anticoagulation Visit on 11/28/2023   Component Date Value    INR 11/28/2023 2.20    Anticoagulation Visit on 10/17/2023   Component Date Value     INR 10/17/2023 2.80    Anticoagulation Visit on 09/18/2023   Component Date Value    INR 09/18/2023 2.60    Anticoagulation Visit on 08/23/2023   Component Date Value    INR 08/23/2023 2.50    Anticoagulation Visit on 07/25/2023   Component Date Value    INR 07/25/2023 2.30      Recent labs reviewed and interpreted for clinical significance and application            Level of Care:           Silvano Gillis MD  01/15/24  .

## 2024-01-26 ENCOUNTER — TELEPHONE (OUTPATIENT)
Dept: PHARMACY | Facility: HOSPITAL | Age: 79
End: 2024-01-26
Payer: MEDICARE

## 2024-01-26 NOTE — TELEPHONE ENCOUNTER
Patient called to report procedure was rescheduled for 1/29. Appointment on 1/29 was rescheduled for 2/5 for patient to hopefully have resumed warfarin x 7 days at that point. Instructed patient to inform clinic if warfarin is not resumed day of procedure.

## 2024-02-05 ENCOUNTER — ANTICOAGULATION VISIT (OUTPATIENT)
Dept: PHARMACY | Facility: HOSPITAL | Age: 79
End: 2024-02-05
Payer: MEDICARE

## 2024-02-05 DIAGNOSIS — I48.19 PERSISTENT ATRIAL FIBRILLATION: Primary | ICD-10-CM

## 2024-02-05 LAB — INR PPP: 1.3 (ref 2–3)

## 2024-02-05 PROCEDURE — G0463 HOSPITAL OUTPT CLINIC VISIT: HCPCS

## 2024-02-05 PROCEDURE — 36416 COLLJ CAPILLARY BLOOD SPEC: CPT

## 2024-02-05 PROCEDURE — 85610 PROTHROMBIN TIME: CPT

## 2024-02-05 NOTE — PROGRESS NOTES
Anticoagulation Clinic Progress Note    INR Goal: 2 - 3  Today's INR: 1.3 (subtherapeutic)  Current warfarin dose: warfarin 5 mg PO daily however patient held doses -. Originally was only supposed to hold - but procedure got rescheduled. Current total weekly dose = 35 mg per week (30 mg in past 7 days; 14% decrease).     INR History:      2023     1:15 PM 2023     1:15 PM 2023     1:00 PM 10/17/2023    11:45 AM 2023    11:45 AM 1/10/2024     3:15 PM 2024     1:15 PM   Anticoagulation Monitoring   INR 2.30 2.50 2.60 2.80 2.20 2.00 1.30   INR Date 2023 2023 2023 10/17/2023 2023 1/10/2024 2024   INR Goal 2.0-3.0 2.0-3.0 2.0-3.0 2.0-3.0 2.0-3.0 2.0-3.0 2.0-3.0   Trend Same Same Same Same Same Same Same   Last Week Total 35 mg 35 mg 35 mg 35 mg 35 mg 35 mg 30 mg   Next Week Total 35 mg 35 mg 35 mg 35 mg 35 mg 35 mg 45 mg   Sun 5 mg 5 mg 5 mg 5 mg 5 mg Hold (); Otherwise 5 mg -   Mon 5 mg 5 mg 5 mg 5 mg 5 mg 5 mg 10 mg ()   Tue 5 mg 5 mg 5 mg 5 mg 5 mg 5 mg 10 mg ()   Wed 5 mg 5 mg 5 mg 5 mg 5 mg Hold (); Otherwise 5 mg -   Thu 5 mg 5 mg 5 mg 5 mg 5 mg Hold (); Otherwise 5 mg -   Fri 5 mg 5 mg 5 mg 5 mg 5 mg Hold (); Otherwise 5 mg -   Sat 5 mg 5 mg 5 mg 5 mg 5 mg Hold (); Otherwise 5 mg -   Visit Report      Report        Anticoagulation Summary  As of 2024      INR goal:  2.0-3.0   TTR:  77.3% (4.6 y)   INR used for dosin.30 (2024)   Warfarin maintenance plan:  5 mg every day   Weekly warfarin total:  35 mg   Plan last modified:  Kavita Jolley, PharmD (2023)   Next INR check:  2024   Priority:  Maintenance   Target end date:      Indications    Persistent atrial fibrillation [I48.19]                 Anticoagulation Episode Summary       INR check location:      Preferred lab:      Send INR reminders to:  [facility] CLINICAL POOL    Comments:  Patient contract signed 11/15/21.           Anticoagulation Care Providers       Provider Role Specialty Phone number    Silvano Gillis MD  Cardiology [phone]            Clinic Interview:   Patient Findings     Positives:  Missed doses (Held doses 1/17-1/29), Change in medications   (Completed regimen of ciprofloxacin on 2/2 (may increase INR))    Negatives:  Signs/symptoms of thrombosis, Signs/symptoms of bleeding,   Laboratory test error suspected, Change in health, Change in alcohol use,   Change in activity, Upcoming invasive procedure, Emergency department   visit, Upcoming dental procedure, Extra doses, Change in diet/appetite,   Hospital admission, Bruising, Other complaints      Clinical Outcomes     Negatives:  Major bleeding event, Thromboembolic event,   Anticoagulation-related hospital admission, Anticoagulation-related ED   visit, Anticoagulation-related fatality        Current Medications:   Prior to Admission medications    Medication Sig Start Date End Date Taking? Authorizing Provider   albuterol sulfate  (90 Base) MCG/ACT inhaler Inhale 2 puffs Every 6 (Six) Hours As Needed. 7/6/21   Shelby Haskins MD   alfuzosin (UROXATRAL) 10 MG 24 hr tablet Take 1 tablet by mouth 2 (Two) Times a Day. 9/23/19   Shelby Haskins MD   aspirin (aspirin) 81 MG EC tablet Take 1 tablet by mouth Daily. 4/11/22   Silvano Gillis MD   baclofen (LIORESAL) 10 MG tablet Take 1 tablet by mouth 2 (Two) Times a Day. 7/13/21   Shelby Haskins MD   BREHENRY ELLIPTA 100-25 MCG/INH inhaler Inhale 1 puff Daily. 9/24/19   Shelby Haskins MD   digoxin (LANOXIN) 250 MCG tablet Take 1 tablet by mouth Daily. 11/22/23   Silvano Gillis MD   EPINEPHrine (EPIPEN) 0.3 MG/0.3ML solution auto-injector injection epinephrine 0.3 mg/0.3 mL injection, auto-injector    Shelby Haskins MD   ezetimibe (ZETIA) 10 MG tablet Take 1 tablet by mouth Daily. 1/10/24   Silvano Gillis MD   fluorometholone (FML) 0.1 %  ophthalmic suspension Administer 1 drop to the right eye Daily. 5/17/21   Shelby Haskins MD   gabapentin (NEURONTIN) 300 MG capsule Take 2 capsules by mouth 2 (Two) Times a Day. 9/13/19   Shelby Haskins MD   hydroCHLOROthiazide (MICROZIDE) 12.5 MG capsule Take 1 capsule by mouth Daily. 11/21/23   Silvano Gillis MD   levothyroxine (SYNTHROID, LEVOTHROID) 75 MCG tablet Take 1 tablet by mouth Daily. 9/23/19   Shelby Haskins MD   montelukast (SINGULAIR) 10 MG tablet Take 1 tablet by mouth Every Evening. 9/23/19   Shelby Haskins MD   pantoprazole (PROTONIX) 40 MG EC tablet Take 1 tablet by mouth Daily. 2/22/22   Silvano Gillis MD   pravastatin (PRAVACHOL) 80 MG tablet Take 1 tablet by mouth every night at bedtime. 10/6/23   Silvano Gillis MD   sertraline (ZOLOFT) 100 MG tablet Take 1.5 tablets by mouth Daily. 3/15/22   Silvnao Gillis MD   theophylline (UNIPHYL) 400 MG 24 hr tablet Take 1 tablet by mouth Daily. 9/23/19   Shelby Haskins MD   verapamil ER (VERELAN) 240 MG 24 hr capsule TAKE ONE CAPSULE BY MOUTH EVERY NIGHT AT BEDTIME 7/31/23   Silvano Gillis MD   warfarin (COUMADIN) 5 MG tablet TAKE ONE TABLET BY MOUTH DAILY EXCEPT ON MONDAY AND THURSDAY TAKE ONE AND ONE-HALF TABLETS OR AS DIRECTED 2/13/23   Silvano Gillis MD   warfarin (Coumadin) 5 MG tablet Take 5 mg by mouth daily or as otherwise directed. 10/6/23   Silvano Gillis MD       Medical history:   Past Medical History:   Diagnosis Date    Allergic     Anxiety     Arthritis     Asthma     Atrial fibrillation     Atypical chest pain 06/11/2020    Cataract     COPD (chronic obstructive pulmonary disease)     COVID     COVID-19 01/13/2021    Depression     Enlarged prostate     Headache     Hyperlipidemia     Hypertension     Kidney stone     Near syncope 12/17/2020    Prostatitis     Shortness of breath     Sleep apnea     Stroke     Urinary tract infection         Social history:   Social History     Tobacco Use    Smoking status: Former     Types: Cigarettes    Smokeless tobacco: Never   Substance Use Topics    Alcohol use: Yes     Comment: occasionally       Allergies:    Penicillins and Rivaroxaban    Vaccine History:   Immunization History   Administered Date(s) Administered    Covid-19 (Pfizer) Gray Cap Monovalent 05/18/2021, 11/17/2021, 11/26/2021    FLUAD TRI 65YR+ 10/31/2019    Fluzone High Dose =>65 Years (Vaxcare ONLY) 10/14/2016, 10/16/2017, 11/14/2018    H1N1 Inj 12/22/2009    Hepatitis A 09/17/2019, 11/19/2019    Influenza Quad Vaccine (Inpatient) 09/22/2014    Influenza, Unspecified 09/18/2013    Pneumococcal Conjugate 13-Valent (PCV13) 10/12/2015    Pneumococcal Polysaccharide (PPSV23) 09/24/2013    Shingrix 09/17/2019, 11/19/2019    Zostavax 02/12/2013       CHADS2-VASc score for atrial fibrillation:  Age >75 (2)  Sex Male (0)  CHF history  Yes (1)  HTN history  Yes (1)  Vascular disease history  No (0)  DM history  No (0)  Stroke/TIA/Thromboembolism history Yes (2)  Total Score 6  Adjusted Stroke Risk (% per year): 6: 9.8%    This patient is managed via a collaborative agreement, pursuant to IC 25-26-16. The signed protocol is kept in the [facility] at [address] [place] [address].    Education: Efe Malloy has been instructed to call if any changes in medications, doses, concerns, etc. Patient was provided dosing instructions and expressed verbal understanding and has no further questions at this time.    Plan:  1. Anticoagulation   - Bolus doses of warfarin 10 mg today and tomorrow.  - RTC in 2 days in coordination with another appointment in the same building.     - Discussed with patient signs/symptoms to monitor for and when to seek medical attention.  - FYI patient switching pharmacies from [facility] to [facility]. Patient has fax number for [facility] and will bring to appointment on 2/7.    Kavita Jolley  PharmD  2/5/2024  13:27 EST

## 2024-02-06 ENCOUNTER — TELEPHONE (OUTPATIENT)
Dept: CARDIOLOGY | Facility: CLINIC | Age: 79
End: 2024-02-06
Payer: MEDICARE

## 2024-02-06 DIAGNOSIS — I48.19 OTHER PERSISTENT ATRIAL FIBRILLATION: ICD-10-CM

## 2024-02-06 RX ORDER — PRAVASTATIN SODIUM 80 MG/1
80 TABLET ORAL
Qty: 90 TABLET | Refills: 1 | Status: SHIPPED | OUTPATIENT
Start: 2024-02-06

## 2024-02-06 RX ORDER — WARFARIN SODIUM 5 MG/1
TABLET ORAL
Qty: 96 TABLET | Refills: 1 | Status: CANCELLED | OUTPATIENT
Start: 2024-02-06

## 2024-02-06 RX ORDER — DIGOXIN 250 MCG
250 TABLET ORAL DAILY
Qty: 90 TABLET | Refills: 1 | Status: SHIPPED | OUTPATIENT
Start: 2024-02-06

## 2024-02-06 RX ORDER — EZETIMIBE 10 MG/1
10 TABLET ORAL DAILY
Qty: 30 TABLET | Refills: 11 | Status: SHIPPED | OUTPATIENT
Start: 2024-02-06

## 2024-02-06 RX ORDER — HYDROCHLOROTHIAZIDE 12.5 MG/1
12.5 CAPSULE, GELATIN COATED ORAL DAILY
Qty: 90 CAPSULE | Refills: 1 | Status: SHIPPED | OUTPATIENT
Start: 2024-02-06

## 2024-02-06 RX ORDER — VERAPAMIL HYDROCHLORIDE 240 MG/1
240 CAPSULE, EXTENDED RELEASE ORAL
Qty: 90 CAPSULE | Refills: 1 | Status: SHIPPED | OUTPATIENT
Start: 2024-02-06

## 2024-02-06 NOTE — TELEPHONE ENCOUNTER
Caller: Efe Malloy    Relationship: Self    Best call back number: [phone]    Requested Prescriptions:   Requested Prescriptions     Pending Prescriptions Disp Refills    hydroCHLOROthiazide (MICROZIDE) 12.5 MG capsule 30 capsule 5     Sig: Take 1 capsule by mouth Daily.    digoxin (LANOXIN) 250 MCG tablet 90 tablet 1     Sig: Take 1 tablet by mouth Daily.    ezetimibe (ZETIA) 10 MG tablet 30 tablet 11     Sig: Take 1 tablet by mouth Daily.    pravastatin (PRAVACHOL) 80 MG tablet 90 tablet 1     Sig: Take 1 tablet by mouth every night at bedtime.    verapamil ER (VERELAN) 240 MG 24 hr capsule 90 capsule 1     Sig: Take 1 capsule by mouth every night at bedtime.    warfarin (COUMADIN) 5 MG tablet 96 tablet 1        Pharmacy where request should be sent: [facility], [address] [phone] [place] [phone] FX       Does the patient have less than a 3 day supply:  [x] Yes  [] No    Would you like a call back once the refill request has been completed: [] Yes [x] No    If the office needs to give you a call back, can they leave a voicemail: [] Yes [x] No    Anna Parr Rep   02/06/24 15:40 EST

## 2024-02-07 ENCOUNTER — ANTICOAGULATION VISIT (OUTPATIENT)
Dept: PHARMACY | Facility: HOSPITAL | Age: 79
End: 2024-02-07
Payer: MEDICARE

## 2024-02-07 DIAGNOSIS — I48.19 PERSISTENT ATRIAL FIBRILLATION: Primary | ICD-10-CM

## 2024-02-07 LAB — INR PPP: 1.5 (ref 2–3)

## 2024-02-07 PROCEDURE — 85610 PROTHROMBIN TIME: CPT

## 2024-02-07 PROCEDURE — G0463 HOSPITAL OUTPT CLINIC VISIT: HCPCS

## 2024-02-07 PROCEDURE — 36416 COLLJ CAPILLARY BLOOD SPEC: CPT

## 2024-02-07 NOTE — PROGRESS NOTES
Anticoagulation Clinic Progress Note    INR Goal: 2 - 3  Today's INR: 1.5 (subtherapeutic)  Current warfarin dose: warfarin 5 mg PO daily. Current total weekly dose = 35 mg per week.     INR History:      2023     1:15 PM 2023     1:00 PM 10/17/2023    11:45 AM 2023    11:45 AM 1/10/2024     3:15 PM 2024     1:15 PM 2024     2:30 PM   Anticoagulation Monitoring   INR 2.50 2.60 2.80 2.20 2.00 1.30 1.50   INR Date 2023 2023 10/17/2023 2023 1/10/2024 2024 2024   INR Goal 2.0-3.0 2.0-3.0 2.0-3.0 2.0-3.0 2.0-3.0 2.0-3.0 2.0-3.0   Trend Same Same Same Same Same Same Same   Last Week Total 35 mg 35 mg 35 mg 35 mg 35 mg 30 mg 35 mg   Next Week Total 35 mg 35 mg 35 mg 35 mg 35 mg 45 mg 40 mg   Sun 5 mg 5 mg 5 mg 5 mg Hold (); Otherwise 5 mg - 5 mg   Mon 5 mg 5 mg 5 mg 5 mg 5 mg 10 mg () 5 mg   Tue 5 mg 5 mg 5 mg 5 mg 5 mg 10 mg () 5 mg   Wed 5 mg 5 mg 5 mg 5 mg Hold (); Otherwise 5 mg - 10 mg ()   Thu 5 mg 5 mg 5 mg 5 mg Hold (); Otherwise 5 mg - 5 mg   Fri 5 mg 5 mg 5 mg 5 mg Hold (); Otherwise 5 mg - 5 mg   Sat 5 mg 5 mg 5 mg 5 mg Hold (); Otherwise 5 mg - 5 mg   Visit Report     Report         Anticoagulation Summary  As of 2024      INR goal:  2.0-3.0   TTR:  77.2% (4.6 y)   INR used for dosin.50 (2024)   Warfarin maintenance plan:  5 mg every day   Weekly warfarin total:  35 mg   Plan last modified:  Kavita Jolley PharmD (2023)   Next INR check:  2024   Priority:  Maintenance   Target end date:      Indications    Persistent atrial fibrillation [I48.19]                 Anticoagulation Episode Summary       INR check location:      Preferred lab:      Send INR reminders to:  [facility] CLINICAL POOL    Comments:  Patient contract signed 11/15/21.          Anticoagulation Care Providers       Provider Role Specialty Phone number    Silvano Gillis MD  Cardiology [phone]            Clinic Interview:    Patient Findings     Positives:  Missed doses (Patient forgot to take bolus doses as   instructed on 2/5 and 2/6.)    Negatives:  Signs/symptoms of thrombosis, Signs/symptoms of bleeding,   Laboratory test error suspected, Change in health, Change in alcohol use,   Change in activity, Upcoming invasive procedure, Emergency department   visit, Upcoming dental procedure, Extra doses, Change in medications,   Change in diet/appetite, Hospital admission, Bruising, Other complaints      Clinical Outcomes     Negatives:  Major bleeding event, Thromboembolic event,   Anticoagulation-related hospital admission, Anticoagulation-related ED   visit, Anticoagulation-related fatality        Current Medications:   Prior to Admission medications    Medication Sig Start Date End Date Taking? Authorizing Provider   albuterol sulfate  (90 Base) MCG/ACT inhaler Inhale 2 puffs Every 6 (Six) Hours As Needed. 7/6/21   Shelby Haskins MD   alfuzosin (UROXATRAL) 10 MG 24 hr tablet Take 1 tablet by mouth 2 (Two) Times a Day. 9/23/19   Shelby Haskins MD   aspirin (aspirin) 81 MG EC tablet Take 1 tablet by mouth Daily. 4/11/22   Silvano Gillis MD   baclofen (LIORESAL) 10 MG tablet Take 1 tablet by mouth 2 (Two) Times a Day. 7/13/21   Shelby Haskins MD   BREO ELLIPTA 100-25 MCG/INH inhaler Inhale 1 puff Daily. 9/24/19   Shelby Haskins MD   digoxin (LANOXIN) 250 MCG tablet Take 1 tablet by mouth Daily. 2/6/24   Silvano Gillis MD   EPINEPHrine (EPIPEN) 0.3 MG/0.3ML solution auto-injector injection epinephrine 0.3 mg/0.3 mL injection, auto-injector    Shelby Haskins MD   ezetimibe (ZETIA) 10 MG tablet Take 1 tablet by mouth Daily. 2/6/24   Silvano Gillis MD   fluorometholone (FML) 0.1 % ophthalmic suspension Administer 1 drop to the right eye Daily. 5/17/21   Shelby Haskins MD   gabapentin (NEURONTIN) 300 MG capsule Take 2 capsules by mouth 2 (Two) Times a Day.  9/13/19   Shelby Haskins MD   hydroCHLOROthiazide (MICROZIDE) 12.5 MG capsule Take 1 capsule by mouth Daily. 2/6/24   Silvano Gillis MD   levothyroxine (SYNTHROID, LEVOTHROID) 75 MCG tablet Take 1 tablet by mouth Daily. 9/23/19   Shelby Haskins MD   montelukast (SINGULAIR) 10 MG tablet Take 1 tablet by mouth Every Evening. 9/23/19   Shelby Haskins MD   pantoprazole (PROTONIX) 40 MG EC tablet Take 1 tablet by mouth Daily. 2/22/22   Silvano Gillis MD   pravastatin (PRAVACHOL) 80 MG tablet Take 1 tablet by mouth every night at bedtime. 2/6/24   Silvano Gillis MD   sertraline (ZOLOFT) 100 MG tablet Take 1.5 tablets by mouth Daily. 3/15/22   Silvano Gillis MD   theophylline (UNIPHYL) 400 MG 24 hr tablet Take 1 tablet by mouth Daily. 9/23/19   Shelby Haskins MD   verapamil ER (VERELAN) 240 MG 24 hr capsule Take 1 capsule by mouth every night at bedtime. 2/6/24   Silvano Gillis MD   warfarin (COUMADIN) 5 MG tablet TAKE ONE TABLET BY MOUTH DAILY EXCEPT ON MONDAY AND THURSDAY TAKE ONE AND ONE-HALF TABLETS OR AS DIRECTED 2/13/23   Silvano Gillis MD   warfarin (Coumadin) 5 MG tablet Take 5 mg by mouth daily or as otherwise directed. 10/6/23   Silvano Gillis MD       Medical history:   Past Medical History:   Diagnosis Date    Allergic     Anxiety     Arthritis     Asthma     Atrial fibrillation     Atypical chest pain 06/11/2020    Cataract     COPD (chronic obstructive pulmonary disease)     COVID     COVID-19 01/13/2021    Depression     Enlarged prostate     Headache     Hyperlipidemia     Hypertension     Kidney stone     Near syncope 12/17/2020    Prostatitis     Shortness of breath     Sleep apnea     Stroke     Urinary tract infection        Social history:   Social History     Tobacco Use    Smoking status: Former     Types: Cigarettes    Smokeless tobacco: Never   Substance Use Topics    Alcohol use: Yes     Comment:  occasionally       Allergies:    Penicillins and Rivaroxaban    Vaccine History:   Immunization History   Administered Date(s) Administered    Covid-19 (Pfizer) Gray Cap Monovalent 05/18/2021, 11/17/2021, 11/26/2021    FLUAD TRI 65YR+ 10/31/2019    Fluzone High Dose =>65 Years (Vaxcare ONLY) 10/14/2016, 10/16/2017, 11/14/2018    H1N1 Inj 12/22/2009    Hepatitis A 09/17/2019, 11/19/2019    Influenza Quad Vaccine (Inpatient) 09/22/2014    Influenza, Unspecified 09/18/2013    Pneumococcal Conjugate 13-Valent (PCV13) 10/12/2015    Pneumococcal Polysaccharide (PPSV23) 09/24/2013    Shingrix 09/17/2019, 11/19/2019    Zostavax 02/12/2013       CHADS2-VASc score for atrial fibrillation:  Age >75 (2)  Sex Male (0)  CHF history  Yes (1)  HTN history  Yes (1)  Vascular disease history  No (0)  DM history  No (0)  Stroke/TIA/Thromboembolism history Yes (2)  Total Score 6  Adjusted Stroke Risk (% per year): 6: 9.8%    This patient is managed via a collaborative agreement, pursuant to IC 25-26-16. The signed protocol is kept in the [facility] at [address] [place] [address].    Education: Efe Malloy has been instructed to call if any changes in medications, doses, concerns, etc. Patient was provided dosing instructions and expressed verbal understanding and has no further questions at this time.    Plan:  1. Anticoagulation   - Bolus dose of warfarin 10 mg today then resume warfarin 5 mg PO daily. Total weekly dose of next 7 days = 40 mg (14% increase).   - Discussed with patient signs/symptoms to monitor for and when to seek medical attention.  - RTC in 1 week(s)    Kavita Jolley, PharmD  2/7/2024  14:46 EST

## 2024-02-14 ENCOUNTER — ANTICOAGULATION VISIT (OUTPATIENT)
Dept: PHARMACY | Facility: HOSPITAL | Age: 79
End: 2024-02-14
Payer: MEDICARE

## 2024-02-14 DIAGNOSIS — I48.19 PERSISTENT ATRIAL FIBRILLATION: Primary | ICD-10-CM

## 2024-02-14 LAB — INR PPP: 1.9 (ref 2–3)

## 2024-02-14 PROCEDURE — 85610 PROTHROMBIN TIME: CPT

## 2024-02-14 PROCEDURE — G0463 HOSPITAL OUTPT CLINIC VISIT: HCPCS

## 2024-02-14 PROCEDURE — 36416 COLLJ CAPILLARY BLOOD SPEC: CPT

## 2024-03-06 ENCOUNTER — ANTICOAGULATION VISIT (OUTPATIENT)
Dept: PHARMACY | Facility: HOSPITAL | Age: 79
End: 2024-03-06
Payer: MEDICARE

## 2024-03-06 DIAGNOSIS — I48.19 PERSISTENT ATRIAL FIBRILLATION: Primary | ICD-10-CM

## 2024-03-06 LAB — INR PPP: 2 (ref 2–3)

## 2024-03-06 PROCEDURE — G0463 HOSPITAL OUTPT CLINIC VISIT: HCPCS

## 2024-03-06 PROCEDURE — 85610 PROTHROMBIN TIME: CPT

## 2024-03-06 PROCEDURE — 36416 COLLJ CAPILLARY BLOOD SPEC: CPT

## 2024-03-06 NOTE — PROGRESS NOTES
Anticoagulation Clinic Progress Note    INR Goal: 2 - 3  Today's INR: 2.0 (therapeutic)  Current warfarin dose: warfarin 5 mg PO daily Current total weekly dose = 35 mg per week.     INR History:      10/17/2023    11:45 AM 2023    11:45 AM 1/10/2024     3:15 PM 2024     1:15 PM 2024     2:30 PM 2024    10:15 AM 3/6/2024    10:30 AM   Anticoagulation Monitoring   INR 2.80 2.20 2.00 1.30 1.50 1.90 2.00   INR Date 10/17/2023 2023 1/10/2024 2024 2024 2024 3/6/2024   INR Goal 2.0-3.0 2.0-3.0 2.0-3.0 2.0-3.0 2.0-3.0 2.0-3.0 2.0-3.0   Trend Same Same Same Same Same Same Same   Last Week Total 35 mg 35 mg 35 mg 30 mg 35 mg 40 mg 35 mg   Next Week Total 35 mg 35 mg 35 mg 45 mg 40 mg 35 mg 35 mg   Sun 5 mg 5 mg Hold (); Otherwise 5 mg - 5 mg 5 mg 5 mg   Mon 5 mg 5 mg 5 mg 10 mg () 5 mg 5 mg 5 mg   Tue 5 mg 5 mg 5 mg 10 mg () 5 mg 5 mg 5 mg   Wed 5 mg 5 mg Hold (); Otherwise 5 mg - 10 mg () 5 mg 5 mg   Thu 5 mg 5 mg Hold (); Otherwise 5 mg - 5 mg 5 mg 5 mg   Fri 5 mg 5 mg Hold (); Otherwise 5 mg - 5 mg 5 mg 5 mg   Sat 5 mg 5 mg Hold (); Otherwise 5 mg - 5 mg 5 mg 5 mg   Visit Report   Report           Anticoagulation Summary  As of 3/6/2024      INR goal:  2.0-3.0   TTR:  76.0% (4.7 y)   INR used for dosin.00 (3/6/2024)   Warfarin maintenance plan:  5 mg every day   Weekly warfarin total:  35 mg   No change documented:  Ynes Trotter, Pharmacy Intern   Plan last modified:  Kavita Jolley, PharmD (2023)   Next INR check:  2024   Priority:  Maintenance   Target end date:      Indications    Persistent atrial fibrillation [I48.19]                 Anticoagulation Episode Summary       INR check location:      Preferred lab:      Send INR reminders to:  [facility] CLINICAL POOL    Comments:  Patient contract signed 11/15/21.          Anticoagulation Care Providers       Provider Role Specialty Phone number    Silvano Gillis MD   Cardiology [phone]            Clinic Interview:   Patient Findings     Negatives:  Signs/symptoms of thrombosis, Signs/symptoms of bleeding,   Laboratory test error suspected, Change in health, Change in alcohol use,   Change in activity, Upcoming invasive procedure, Emergency department   visit, Upcoming dental procedure, Missed doses, Extra doses, Change in   medications, Change in diet/appetite, Hospital admission, Bruising, Other   complaints      Clinical Outcomes     Negatives:  Major bleeding event, Thromboembolic event,   Anticoagulation-related hospital admission, Anticoagulation-related ED   visit, Anticoagulation-related fatality        Current Medications:   Prior to Admission medications    Medication Sig Start Date End Date Taking? Authorizing Provider   albuterol sulfate  (90 Base) MCG/ACT inhaler Inhale 2 puffs Every 6 (Six) Hours As Needed. 7/6/21   Shelby Haskins MD   alfuzosin (UROXATRAL) 10 MG 24 hr tablet Take 1 tablet by mouth 2 (Two) Times a Day. 9/23/19   Shelby Haskins MD   aspirin (aspirin) 81 MG EC tablet Take 1 tablet by mouth Daily. 4/11/22   Silvano Gillis MD   baclofen (LIORESAL) 10 MG tablet Take 1 tablet by mouth 2 (Two) Times a Day. 7/13/21   Shelby Haskins MD   BREO ELLIPTA 100-25 MCG/INH inhaler Inhale 1 puff Daily. 9/24/19   Shelby Haskins MD   digoxin (LANOXIN) 250 MCG tablet Take 1 tablet by mouth Daily. 2/6/24   Silvano Gillis MD   EPINEPHrine (EPIPEN) 0.3 MG/0.3ML solution auto-injector injection epinephrine 0.3 mg/0.3 mL injection, auto-injector    Shelby Haskins MD   ezetimibe (ZETIA) 10 MG tablet Take 1 tablet by mouth Daily. 2/6/24   Silvano Gillis MD   fluorometholone (FML) 0.1 % ophthalmic suspension Administer 1 drop to the right eye Daily. 5/17/21   Shelby Haskins MD   gabapentin (NEURONTIN) 300 MG capsule Take 2 capsules by mouth 2 (Two) Times a Day. 9/13/19   Shelby Haskins MD    hydroCHLOROthiazide (MICROZIDE) 12.5 MG capsule Take 1 capsule by mouth Daily. 2/6/24   Silvano Gillis MD   levothyroxine (SYNTHROID, LEVOTHROID) 75 MCG tablet Take 1 tablet by mouth Daily. 9/23/19   Shelby Haskins MD   montelukast (SINGULAIR) 10 MG tablet Take 1 tablet by mouth Every Evening. 9/23/19   Shelby Haskins MD   pantoprazole (PROTONIX) 40 MG EC tablet Take 1 tablet by mouth Daily. 2/22/22   Silvano Gillis MD   pravastatin (PRAVACHOL) 80 MG tablet Take 1 tablet by mouth every night at bedtime. 2/6/24   Silvano Gillis MD   sertraline (ZOLOFT) 100 MG tablet Take 1.5 tablets by mouth Daily. 3/15/22   Silvano Gillis MD   theophylline (UNIPHYL) 400 MG 24 hr tablet Take 1 tablet by mouth Daily. 9/23/19   Shelby Haskins MD   verapamil ER (VERELAN) 240 MG 24 hr capsule Take 1 capsule by mouth every night at bedtime. 2/6/24   Silvano Gillis MD   warfarin (COUMADIN) 5 MG tablet TAKE ONE TABLET BY MOUTH DAILY EXCEPT ON MONDAY AND THURSDAY TAKE ONE AND ONE-HALF TABLETS OR AS DIRECTED 2/13/23   Silvano Gillis MD   warfarin (Coumadin) 5 MG tablet Take 5 mg by mouth daily or as otherwise directed. 10/6/23   Silvano Gillis MD       Medical history:   Past Medical History:   Diagnosis Date    Allergic     Anxiety     Arthritis     Asthma     Atrial fibrillation     Atypical chest pain 06/11/2020    Cataract     COPD (chronic obstructive pulmonary disease)     COVID     COVID-19 01/13/2021    Depression     Enlarged prostate     Headache     Hyperlipidemia     Hypertension     Kidney stone     Near syncope 12/17/2020    Prostatitis     Shortness of breath     Sleep apnea     Stroke     Urinary tract infection        Social history:   Social History     Tobacco Use    Smoking status: Former     Types: Cigarettes    Smokeless tobacco: Never   Substance Use Topics    Alcohol use: Yes     Comment: occasionally       Allergies:     Penicillins and Rivaroxaban    Vaccine History:   Immunization History   Administered Date(s) Administered    Covid-19 (Pfizer) Gray Cap Monovalent 05/18/2021, 11/17/2021, 11/26/2021    FLUAD TRI 65YR+ 10/31/2019    Fluzone High Dose =>65 Years (Vaxcare ONLY) 10/14/2016, 10/16/2017, 11/14/2018    H1N1 Inj 12/22/2009    Hepatitis A 09/17/2019, 11/19/2019    Influenza Quad Vaccine (Inpatient) 09/22/2014    Influenza, Unspecified 09/18/2013    Pneumococcal Conjugate 13-Valent (PCV13) 10/12/2015    Pneumococcal Polysaccharide (PPSV23) 09/24/2013    Shingrix 09/17/2019, 11/19/2019    Zostavax 02/12/2013       CHADS2-VASc score for atrial fibrillation:  Age >75 (2)  Sex Male (0)  CHF history  Yes (1)  HTN history  Yes (1)  Vascular disease history  No (0)  DM history  No (0)  Stroke/TIA/Thromboembolism history Yes (2)  Total Score 6  Adjusted Stroke Risk (% per year): 6: 9.8%    This patient is managed via a collaborative agreement, pursuant to IC 25-26-16. The signed protocol is kept in the [facility] at [address] [place] [address].    Education: Efe Malloy has been instructed to call if any changes in medications, doses, concerns, etc. Patient was provided dosing instructions and expressed verbal understanding and has no further questions at this time.    Plan:  1. Anticoagulation   - no change; warfarin 5 mg PO daily  Total weekly dose = 35 mg.   - Discussed with patient signs/symptoms to monitor for and when to seek medical attention.   - RTC in 6 week(s)    Ynes Trotter, Pharmacy Intern  3/6/2024  10:38 EST

## 2024-03-19 DIAGNOSIS — I48.19 PERSISTENT ATRIAL FIBRILLATION: ICD-10-CM

## 2024-03-19 RX ORDER — WARFARIN SODIUM 5 MG/1
TABLET ORAL
Qty: 90 TABLET | Refills: 3 | Status: SHIPPED | OUTPATIENT
Start: 2024-03-19

## 2024-04-17 ENCOUNTER — ANTICOAGULATION VISIT (OUTPATIENT)
Dept: PHARMACY | Facility: HOSPITAL | Age: 79
End: 2024-04-17
Payer: MEDICARE

## 2024-04-17 DIAGNOSIS — I48.19 PERSISTENT ATRIAL FIBRILLATION: Primary | ICD-10-CM

## 2024-04-17 LAB — INR PPP: 2.5 (ref 0.9–1.1)

## 2024-04-17 PROCEDURE — 36416 COLLJ CAPILLARY BLOOD SPEC: CPT

## 2024-04-17 PROCEDURE — 85610 PROTHROMBIN TIME: CPT

## 2024-04-17 PROCEDURE — G0463 HOSPITAL OUTPT CLINIC VISIT: HCPCS

## 2024-04-17 NOTE — PROGRESS NOTES
Anticoagulation Clinic Progress Note    INR Goal: 2 - 3  Today's INR: 2.5 (therapeutic)  Current warfarin dose: warfarin 5 mg PO daily. Current total weekly dose = 35 mg per week.     INR History:      2023    11:45 AM 1/10/2024     3:15 PM 2024     1:15 PM 2024     2:30 PM 2024    10:15 AM 3/6/2024    10:30 AM 2024     1:30 PM   Anticoagulation Monitoring   INR 2.20 2.00 1.30 1.50 1.90 2.00 2.50   INR Date 2023 1/10/2024 2024 2024 2024 3/6/2024 2024   INR Goal 2.0-3.0 2.0-3.0 2.0-3.0 2.0-3.0 2.0-3.0 2.0-3.0 2.0-3.0   Trend Same Same Same Same Same Same Same   Last Week Total 35 mg 35 mg 30 mg 35 mg 40 mg 35 mg 35 mg   Next Week Total 35 mg 35 mg 45 mg 40 mg 35 mg 35 mg 35 mg   Sun 5 mg Hold (); Otherwise 5 mg - 5 mg 5 mg 5 mg 5 mg   Mon 5 mg 5 mg 10 mg () 5 mg 5 mg 5 mg 5 mg   Tue 5 mg 5 mg 10 mg () 5 mg 5 mg 5 mg 5 mg   Wed 5 mg Hold (); Otherwise 5 mg - 10 mg () 5 mg 5 mg 5 mg   Thu 5 mg Hold (); Otherwise 5 mg - 5 mg 5 mg 5 mg 5 mg   Fri 5 mg Hold (); Otherwise 5 mg - 5 mg 5 mg 5 mg 5 mg   Sat 5 mg Hold (); Otherwise 5 mg - 5 mg 5 mg 5 mg 5 mg   Visit Report  Report            Anticoagulation Summary  As of 2024      INR goal:  2.0-3.0   TTR:  76.6% (4.8 y)   INR used for dosin.50 (2024)   Warfarin maintenance plan:  5 mg every day   Weekly warfarin total:  35 mg   No change documented:  Jason Nevarez, Pharmacy Intern   Plan last modified:  Kavita Jolley, PharmD (2023)   Next INR check:  2024   Priority:  Maintenance   Target end date:      Indications    Persistent atrial fibrillation [I48.19]                 Anticoagulation Episode Summary       INR check location:      Preferred lab:      Send INR reminders to:  [facility] CLINICAL POOL    Comments:  Patient contract signed 11/15/21.          Anticoagulation Care Providers       Provider Role Specialty Phone number    Silvano Gillis MD   Cardiology [phone]            Clinic Interview:   Patient Findings     Negatives:  Signs/symptoms of thrombosis, Signs/symptoms of bleeding,   Laboratory test error suspected, Change in health, Change in alcohol use,   Change in activity, Upcoming invasive procedure, Emergency department   visit, Upcoming dental procedure, Missed doses, Extra doses, Change in   medications, Change in diet/appetite, Hospital admission, Bruising, Other   complaints      Clinical Outcomes     Negatives:  Major bleeding event, Thromboembolic event,   Anticoagulation-related hospital admission, Anticoagulation-related ED   visit, Anticoagulation-related fatality        Current Medications:   Prior to Admission medications    Medication Sig Start Date End Date Taking? Authorizing Provider   albuterol sulfate  (90 Base) MCG/ACT inhaler Inhale 2 puffs Every 6 (Six) Hours As Needed. 7/6/21   Shelby Haskins MD   alfuzosin (UROXATRAL) 10 MG 24 hr tablet Take 1 tablet by mouth 2 (Two) Times a Day. 9/23/19   Shelby Haskins MD   aspirin (aspirin) 81 MG EC tablet Take 1 tablet by mouth Daily. 4/11/22   Silvano Gillis MD   baclofen (LIORESAL) 10 MG tablet Take 1 tablet by mouth 2 (Two) Times a Day. 7/13/21   Shelby Haskins MD   BREO ELLIPTA 100-25 MCG/INH inhaler Inhale 1 puff Daily. 9/24/19   Shelby Haskins MD   digoxin (LANOXIN) 250 MCG tablet Take 1 tablet by mouth Daily. 2/6/24   Silvano Gillis MD   EPINEPHrine (EPIPEN) 0.3 MG/0.3ML solution auto-injector injection epinephrine 0.3 mg/0.3 mL injection, auto-injector    Shelby Haskins MD   ezetimibe (ZETIA) 10 MG tablet Take 1 tablet by mouth Daily. 2/6/24   Silvano Gillis MD   fluorometholone (FML) 0.1 % ophthalmic suspension Administer 1 drop to the right eye Daily. 5/17/21   Shelby Haskins MD   gabapentin (NEURONTIN) 300 MG capsule Take 2 capsules by mouth 2 (Two) Times a Day. 9/13/19   Shelby Haskins MD    hydroCHLOROthiazide (MICROZIDE) 12.5 MG capsule Take 1 capsule by mouth Daily. 2/6/24   Silvano Gillis MD   levothyroxine (SYNTHROID, LEVOTHROID) 75 MCG tablet Take 1 tablet by mouth Daily. 9/23/19   Shelby Haskins MD   montelukast (SINGULAIR) 10 MG tablet Take 1 tablet by mouth Every Evening. 9/23/19   Shelby Haskins MD   pantoprazole (PROTONIX) 40 MG EC tablet Take 1 tablet by mouth Daily. 2/22/22   Silvano Gillis MD   pravastatin (PRAVACHOL) 80 MG tablet Take 1 tablet by mouth every night at bedtime. 2/6/24   Silvano Gillis MD   sertraline (ZOLOFT) 100 MG tablet Take 1.5 tablets by mouth Daily. 3/15/22   Silvano Gillis MD   theophylline (UNIPHYL) 400 MG 24 hr tablet Take 1 tablet by mouth Daily. 9/23/19   Shelby Haskins MD   verapamil ER (VERELAN) 240 MG 24 hr capsule Take 1 capsule by mouth every night at bedtime. 2/6/24   Silvano Gillis MD   warfarin (Coumadin) 5 MG tablet Take 5 mg by mouth daily or as otherwise directed. 3/19/24   Silvano Gillis MD       Medical history:   Past Medical History:   Diagnosis Date    Allergic     Anxiety     Arthritis     Asthma     Atrial fibrillation     Atypical chest pain 06/11/2020    Cataract     COPD (chronic obstructive pulmonary disease)     COVID     COVID-19 01/13/2021    Depression     Enlarged prostate     Headache     Hyperlipidemia     Hypertension     Kidney stone     Near syncope 12/17/2020    Prostatitis     Shortness of breath     Sleep apnea     Stroke     Urinary tract infection        Social history:   Social History     Tobacco Use    Smoking status: Former     Types: Cigarettes    Smokeless tobacco: Never   Substance Use Topics    Alcohol use: Yes     Comment: occasionally       Allergies:    Penicillins and Rivaroxaban    Vaccine History:   Immunization History   Administered Date(s) Administered    Covid-19 (Pfizer) Gray Cap Monovalent 05/18/2021, 11/17/2021, 11/26/2021     FLUAD TRI 65YR+ 10/31/2019    Fluzone High Dose =>65 Years (Vaxcare ONLY) 10/14/2016, 10/16/2017, 11/14/2018    H1N1 Inj 12/22/2009    Hepatitis A 09/17/2019, 11/19/2019    Influenza Quad Vaccine (Inpatient) 09/22/2014    Influenza, Unspecified 09/18/2013    Pneumococcal Conjugate 13-Valent (PCV13) 10/12/2015    Pneumococcal Polysaccharide (PPSV23) 09/24/2013    Shingrix 09/17/2019, 11/19/2019    Zostavax 02/12/2013       [id number] SCORE   [id number] Score for Atrial Fibrillation Stroke Risk  Age in Years: 75+  Sex: Male  CHF History: Yes  Hypertension History: Yes  Stroke/TIA/Thromboembolism History: Yes  Vascular Disease History: No  Diabetes History: No  [id number] Score: 6    Stroke risks -: 6 Points. Risk of ischemic stroke: 9.7%. Risk of stroke/TIA/embolism: 13.6%    This patient is managed via a collaborative agreement, pursuant to IC 25-26-16. The signed protocol is kept in the [facility] at [address] [place] [address].    Education: Efe Malloy has been instructed to call if any changes in medications, doses, concerns, etc. Patient was provided dosing instructions and expressed verbal understanding and has no further questions at this time.    Plan:  1. Anticoagulation   - No change; warfarin 5 mg PO daily. Total weekly dose = 35 mg.   - RTC in 6 weeks    Jason Nevarez, Pharmacy Intern  4/17/2024  13:59 EDT

## 2024-04-17 NOTE — PROGRESS NOTES
I have reviewed the notes, assessments, and/or procedures performed by Jason Nevarez, pharmacy intern. I concur with his documentation of Efe Malloy.

## 2024-05-13 RX ORDER — DIGOXIN 250 MCG
250 TABLET ORAL DAILY
Qty: 90 TABLET | Refills: 1 | Status: SHIPPED | OUTPATIENT
Start: 2024-05-13

## 2024-05-13 NOTE — TELEPHONE ENCOUNTER
Caller: GamaEfe    Relationship: Self    Best call back number: [phone]    Requested Prescriptions:   Requested Prescriptions     Pending Prescriptions Disp Refills    digoxin (LANOXIN) 250 MCG tablet 90 tablet 1     Sig: Take 1 tablet by mouth Daily.        Pharmacy where request should be sent: [id number] - [place], [place] - [address] - [phone]  - [phone] FX     Last office visit with prescribing clinician: 1/10/2024   Last telemedicine visit with prescribing clinician: Visit date not found   Next office visit with prescribing clinician: 7/11/2024     Additional details provided by patient: PT IS NEEDING A 2 WK SUPPLY TO [facility] AND HIS NORMAL PRESCRIPTION FOR A 90 DAY REFILL SENT TO [facility]. HE IS COMPLETELY OUT OF THIS PRESCRIPTION.    Does the patient have less than a 3 day supply:  [x] Yes  [] No    Would you like a call back once the refill request has been completed: [x] Yes [] No    If the office needs to give you a call back, can they leave a voicemail: [x] Yes [] No    Anna Sandhu Rep   05/13/24 11:41 EDT

## 2024-05-29 ENCOUNTER — ANTICOAGULATION VISIT (OUTPATIENT)
Dept: PHARMACY | Facility: HOSPITAL | Age: 79
End: 2024-05-29
Payer: MEDICARE

## 2024-05-29 DIAGNOSIS — I48.19 PERSISTENT ATRIAL FIBRILLATION: Primary | ICD-10-CM

## 2024-05-29 LAB — INR PPP: 2.1 (ref 2–3)

## 2024-05-29 PROCEDURE — 36416 COLLJ CAPILLARY BLOOD SPEC: CPT

## 2024-05-29 PROCEDURE — G0463 HOSPITAL OUTPT CLINIC VISIT: HCPCS

## 2024-05-29 PROCEDURE — 85610 PROTHROMBIN TIME: CPT

## 2024-05-29 NOTE — PROGRESS NOTES
Anticoagulation Clinic Progress Note    INR Goal: 2 - 3  Today's INR: 2.1 (therapeutic)  Current warfarin dose: warfarin 5 mg PO daily Current total weekly dose = 35 mg per week.     INR History:      1/10/2024     3:15 PM 2024     1:15 PM 2024     2:30 PM 2024    10:15 AM 3/6/2024    10:30 AM 2024     1:30 PM 2024     8:45 AM   Anticoagulation Monitoring   INR 2.00 1.30 1.50 1.90 2.00 2.50 2.10   INR Date 1/10/2024 2024 2024 2024 3/6/2024 2024 2024   INR Goal 2.0-3.0 2.0-3.0 2.0-3.0 2.0-3.0 2.0-3.0 2.0-3.0 2.0-3.0   Trend Same Same Same Same Same Same Same   Last Week Total 35 mg 30 mg 35 mg 40 mg 35 mg 35 mg 35 mg   Next Week Total 35 mg 45 mg 40 mg 35 mg 35 mg 35 mg 35 mg   Sun Hold (); Otherwise 5 mg - 5 mg 5 mg 5 mg 5 mg 5 mg   Mon 5 mg 10 mg () 5 mg 5 mg 5 mg 5 mg 5 mg   Tue 5 mg 10 mg () 5 mg 5 mg 5 mg 5 mg 5 mg   Wed Hold (); Otherwise 5 mg - 10 mg () 5 mg 5 mg 5 mg 5 mg   Thu Hold (); Otherwise 5 mg - 5 mg 5 mg 5 mg 5 mg 5 mg   Fri Hold (); Otherwise 5 mg - 5 mg 5 mg 5 mg 5 mg 5 mg   Sat Hold (); Otherwise 5 mg - 5 mg 5 mg 5 mg 5 mg 5 mg   Visit Report Report             Anticoagulation Summary  As of 2024      INR goal:  2.0-3.0   TTR:  77.1% (4.9 y)   INR used for dosin.10 (2024)   Warfarin maintenance plan:  5 mg every day   Weekly warfarin total:  35 mg   No change documented:  Meeta Chapman, PharmD   Plan last modified:  Kavita Jolley PharmD (2023)   Next INR check:  7/10/2024   Priority:  Maintenance   Target end date:      Indications    Persistent atrial fibrillation [I48.19]                 Anticoagulation Episode Summary       INR check location:      Preferred lab:      Send INR reminders to:  [facility] CLINICAL POOL    Comments:  Patient contract signed 11/15/21.          Anticoagulation Care Providers       Provider Role Specialty Phone number    Silvano Gillis MD  Cardiology  [phone]            Clinic Interview:   Patient Findings     Negatives:  Signs/symptoms of thrombosis, Signs/symptoms of bleeding,   Laboratory test error suspected, Change in health, Change in alcohol use,   Change in activity, Upcoming invasive procedure, Emergency department   visit, Upcoming dental procedure, Missed doses, Extra doses, Change in   medications, Change in diet/appetite, Hospital admission, Bruising, Other   complaints      Clinical Outcomes     Negatives:  Major bleeding event, Thromboembolic event,   Anticoagulation-related hospital admission, Anticoagulation-related ED   visit, Anticoagulation-related fatality        Current Medications:   Prior to Admission medications    Medication Sig Start Date End Date Taking? Authorizing Provider   albuterol sulfate  (90 Base) MCG/ACT inhaler Inhale 2 puffs Every 6 (Six) Hours As Needed. 7/6/21   Shelby Haskins MD   alfuzosin (UROXATRAL) 10 MG 24 hr tablet Take 1 tablet by mouth 2 (Two) Times a Day. 9/23/19   Shelby Haskins MD   aspirin (aspirin) 81 MG EC tablet Take 1 tablet by mouth Daily. 4/11/22   Silvano Gillis MD   baclofen (LIORESAL) 10 MG tablet Take 1 tablet by mouth 2 (Two) Times a Day. 7/13/21   Shelby Haskins MD   BREO ELLIPTA 100-25 MCG/INH inhaler Inhale 1 puff Daily. 9/24/19   Shelby Haskins MD   digoxin (LANOXIN) 250 MCG tablet Take 1 tablet by mouth Daily. 5/13/24   Silvano Gillis MD   EPINEPHrine (EPIPEN) 0.3 MG/0.3ML solution auto-injector injection epinephrine 0.3 mg/0.3 mL injection, auto-injector    Shelby Haskins MD   ezetimibe (ZETIA) 10 MG tablet Take 1 tablet by mouth Daily. 2/6/24   Silvano Gillis MD   fluorometholone (FML) 0.1 % ophthalmic suspension Administer 1 drop to the right eye Daily. 5/17/21   Shelby Haskins MD   gabapentin (NEURONTIN) 300 MG capsule Take 2 capsules by mouth 2 (Two) Times a Day. 9/13/19   Shelby Haskins MD    hydroCHLOROthiazide (MICROZIDE) 12.5 MG capsule Take 1 capsule by mouth Daily. 2/6/24   Silvano Gillis MD   levothyroxine (SYNTHROID, LEVOTHROID) 75 MCG tablet Take 1 tablet by mouth Daily. 9/23/19   Shelby Haskins MD   montelukast (SINGULAIR) 10 MG tablet Take 1 tablet by mouth Every Evening. 9/23/19   Shelby Haskins MD   pantoprazole (PROTONIX) 40 MG EC tablet Take 1 tablet by mouth Daily. 2/22/22   Silvano Gillis MD   pravastatin (PRAVACHOL) 80 MG tablet Take 1 tablet by mouth every night at bedtime. 2/6/24   Silvano Gillis MD   sertraline (ZOLOFT) 100 MG tablet Take 1.5 tablets by mouth Daily. 3/15/22   Silvano Gillis MD   theophylline (UNIPHYL) 400 MG 24 hr tablet Take 1 tablet by mouth Daily. 9/23/19   Shelby Haskins MD   verapamil ER (VERELAN) 240 MG 24 hr capsule Take 1 capsule by mouth every night at bedtime. 2/6/24   Silvano Gillis MD   warfarin (Coumadin) 5 MG tablet Take 5 mg by mouth daily or as otherwise directed. 3/19/24   Silvano Gillis MD       Medical history:   Past Medical History:   Diagnosis Date    Allergic     Anxiety     Arthritis     Asthma     Atrial fibrillation     Atypical chest pain 06/11/2020    Cataract     COPD (chronic obstructive pulmonary disease)     COVID     COVID-19 01/13/2021    Depression     Enlarged prostate     Headache     Hyperlipidemia     Hypertension     Kidney stone     Near syncope 12/17/2020    Prostatitis     Shortness of breath     Sleep apnea     Stroke     Urinary tract infection        Social history:   Social History     Tobacco Use    Smoking status: Former     Types: Cigarettes    Smokeless tobacco: Never   Substance Use Topics    Alcohol use: Yes     Comment: occasionally       Allergies:    Penicillins and Rivaroxaban    Vaccine History:   Immunization History   Administered Date(s) Administered    Covid-19 (Pfizer) Gray Cap Monovalent 05/18/2021, 11/17/2021, 11/26/2021     FLUAD TRI 65YR+ 10/31/2019    Fluzone High Dose =>65 Years (Vaxcare ONLY) 10/14/2016, 10/16/2017, 11/14/2018    H1N1 Inj 12/22/2009    Hepatitis A 09/17/2019, 11/19/2019    Influenza Quad Vaccine (Inpatient) 09/22/2014    Influenza, Unspecified 09/18/2013    Pneumococcal Conjugate 13-Valent (PCV13) 10/12/2015    Pneumococcal Polysaccharide (PPSV23) 09/24/2013    Shingrix 09/17/2019, 11/19/2019    Zostavax 02/12/2013       [id number] SCORE   [id number] Score for Atrial Fibrillation Stroke Risk  Age in Years: 75+  Sex: Male  CHF History: Yes  Hypertension History: Yes  Stroke/TIA/Thromboembolism History: Yes  Vascular Disease History: No  Diabetes History: No  [id number] Score: 6  Stroke risks -: 6 Points. Risk of ischemic stroke: 9.7%. Risk of stroke/TIA/embolism: 13.6%      This patient is managed via a collaborative agreement, pursuant to IC 25-26-16. The signed protocol is kept in the [facility] at [address] [place] [address].    Education: Efe Malloy has been instructed to call if any changes in medications, doses, concerns, etc. Patient was provided dosing instructions and expressed verbal understanding and has no further questions at this time.    Plan:  1. Anticoagulation   - no change; warfarin 5 mg PO daily  Total weekly dose = 35 mg.   - RTC in 6 week(s)    Bony Chapman, PharmD  5/29/2024  09:25 EDT

## 2024-07-01 RX ORDER — VERAPAMIL HYDROCHLORIDE 240 MG/1
240 CAPSULE, EXTENDED RELEASE ORAL
Qty: 90 CAPSULE | Refills: 1 | Status: SHIPPED | OUTPATIENT
Start: 2024-07-01

## 2024-07-08 RX ORDER — DIGOXIN 250 MCG
250 TABLET ORAL DAILY
Qty: 90 TABLET | Refills: 1 | Status: SHIPPED | OUTPATIENT
Start: 2024-07-08

## 2024-07-11 ENCOUNTER — OFFICE VISIT (OUTPATIENT)
Dept: CARDIOLOGY | Facility: CLINIC | Age: 79
End: 2024-07-11
Payer: MEDICARE

## 2024-07-11 ENCOUNTER — ANTICOAGULATION VISIT (OUTPATIENT)
Dept: PHARMACY | Facility: HOSPITAL | Age: 79
End: 2024-07-11
Payer: MEDICARE

## 2024-07-11 VITALS
RESPIRATION RATE: 18 BRPM | DIASTOLIC BLOOD PRESSURE: 75 MMHG | HEART RATE: 70 BPM | WEIGHT: 260 LBS | BODY MASS INDEX: 34.46 KG/M2 | HEIGHT: 73 IN | SYSTOLIC BLOOD PRESSURE: 121 MMHG

## 2024-07-11 DIAGNOSIS — I48.19 OTHER PERSISTENT ATRIAL FIBRILLATION: Primary | ICD-10-CM

## 2024-07-11 DIAGNOSIS — I50.30 DIASTOLIC CONGESTIVE HEART FAILURE, UNSPECIFIED HF CHRONICITY: ICD-10-CM

## 2024-07-11 DIAGNOSIS — E78.2 MIXED HYPERLIPIDEMIA: ICD-10-CM

## 2024-07-11 DIAGNOSIS — R07.89 CHEST PAIN, ATYPICAL: ICD-10-CM

## 2024-07-11 DIAGNOSIS — I10 ESSENTIAL HYPERTENSION: ICD-10-CM

## 2024-07-11 DIAGNOSIS — I48.19 PERSISTENT ATRIAL FIBRILLATION: Primary | ICD-10-CM

## 2024-07-11 LAB — INR PPP: 2.4 (ref 2–3)

## 2024-07-11 PROCEDURE — G0463 HOSPITAL OUTPT CLINIC VISIT: HCPCS

## 2024-07-11 PROCEDURE — 85610 PROTHROMBIN TIME: CPT

## 2024-07-11 PROCEDURE — 36416 COLLJ CAPILLARY BLOOD SPEC: CPT

## 2024-07-11 RX ORDER — AMITRIPTYLINE HYDROCHLORIDE 25 MG/1
50 TABLET, FILM COATED ORAL NIGHTLY
COMMUNITY

## 2024-07-11 RX ORDER — LEVOTHYROXINE SODIUM 175 MCG
0.5 TABLET ORAL DAILY
COMMUNITY
Start: 2024-06-04

## 2024-07-11 RX ORDER — MELOXICAM 7.5 MG/1
7.5 TABLET ORAL DAILY
COMMUNITY

## 2024-07-11 RX ORDER — LIFITEGRAST 50 MG/ML
1 SOLUTION/ DROPS OPHTHALMIC EVERY 12 HOURS
COMMUNITY
Start: 2024-03-28

## 2024-07-11 NOTE — PROGRESS NOTES
Anticoagulation Clinic Progress Note    INR Goal: 2 - 3  Today's INR: 2.4 (therapeutic)  Current warfarin dose: warfarin 5 mg PO daily. Current total weekly dose = 35 mg per week.     Patient states he thinks he may have messed up and taken his levothyroxine dose incorrectly. Changes in thyroid function can affect INR in either direction. Patient is awaiting call back from PCP to confirm correct dose.    INR History:      2024     1:15 PM 2024     2:30 PM 2024    10:15 AM 3/6/2024    10:30 AM 2024     1:30 PM 2024     8:45 AM 2024     2:30 PM   Anticoagulation Monitoring   INR 1.30 1.50 1.90 2.00 2.50 2.10 2.40   INR Date 2024 2024 2024 3/6/2024 2024 2024 2024   INR Goal 2.0-3.0 2.0-3.0 2.0-3.0 2.0-3.0 2.0-3.0 2.0-3.0 2.0-3.0   Trend Same Same Same Same Same Same Same   Last Week Total 30 mg 35 mg 40 mg 35 mg 35 mg 35 mg 35 mg   Next Week Total 45 mg 40 mg 35 mg 35 mg 35 mg 35 mg 35 mg   Sun - 5 mg 5 mg 5 mg 5 mg 5 mg 5 mg   Mon 10 mg () 5 mg 5 mg 5 mg 5 mg 5 mg 5 mg   Tue 10 mg () 5 mg 5 mg 5 mg 5 mg 5 mg 5 mg   Wed - 10 mg () 5 mg 5 mg 5 mg 5 mg 5 mg   Thu - 5 mg 5 mg 5 mg 5 mg 5 mg 5 mg   Fri - 5 mg 5 mg 5 mg 5 mg 5 mg 5 mg   Sat - 5 mg 5 mg 5 mg 5 mg 5 mg 5 mg   Visit Report       Report       Anticoagulation Summary  As of 2024      INR goal:  2.0-3.0   TTR:  77.6% (5 y)   INR used for dosin.40 (2024)   Warfarin maintenance plan:  5 mg every day   Weekly warfarin total:  35 mg   No change documented:  Beam, Kavita, PharmD   Plan last modified:  Kavita Jolley PharmD (2023)   Next INR check:  2024   Priority:  Maintenance   Target end date:      Indications    Persistent atrial fibrillation [I48.19]                 Anticoagulation Episode Summary       INR check location:      Preferred lab:      Send INR reminders to:  [facility] CLINICAL POOL    Comments:  Patient contract signed 11/15/21.           Anticoagulation Care Providers       Provider Role Specialty Phone number    Silvano Gillis MD  Cardiology [phone]            Clinic Interview:     Clinical Outcomes    Negatives: Major bleeding event, Thromboembolic event, Anticoagulation-related hospital admission, Anticoagulation-related ED visit, Anticoagulation-related fatality     Patient Findings    Positives: Change in medications (Patient thinks he may have messed up and taken levothyroxine dose incorrectly.)   Negatives: Signs/symptoms of thrombosis, Signs/symptoms of bleeding, Laboratory test error suspected, Change in health, Change in alcohol use, Change in activity, Upcoming invasive procedure, Emergency department visit, Upcoming dental procedure, Missed doses, Extra doses, Change in diet/appetite, Hospital admission, Bruising, Other complaints          Current Medications:   Prior to Admission medications    Medication Sig Start Date End Date Taking? Authorizing Provider   albuterol sulfate  (90 Base) MCG/ACT inhaler Inhale 2 puffs Every 6 (Six) Hours As Needed. 7/6/21   Shelby Haskins MD   alfuzosin (UROXATRAL) 10 MG 24 hr tablet Take 1 tablet by mouth 2 (Two) Times a Day. 9/23/19   Shelby Haskins MD   aspirin (aspirin) 81 MG EC tablet Take 1 tablet by mouth Daily. 4/11/22   Silvano Gillis MD   baclofen (LIORESAL) 10 MG tablet Take 1 tablet by mouth 2 (Two) Times a Day. 7/13/21   Shelby Haskins MD   BREO ELLIPTA 100-25 MCG/INH inhaler Inhale 1 puff Daily. 9/24/19   Shelby Haskins MD   digoxin (LANOXIN) 250 MCG tablet TAKE ONE TABLET BY MOUTH EVERY DAY 7/8/24   Silvano Gillis MD   EPINEPHrine (EPIPEN) 0.3 MG/0.3ML solution auto-injector injection epinephrine 0.3 mg/0.3 mL injection, auto-injector    Shelby Haskins MD   ezetimibe (ZETIA) 10 MG tablet Take 1 tablet by mouth Daily. 2/6/24   Silvano Gillis MD   fluorometholone (FML) 0.1 % ophthalmic suspension  Administer 1 drop to the right eye Daily. 5/17/21   Shelby Haskins MD   gabapentin (NEURONTIN) 300 MG capsule Take 2 capsules by mouth 2 (Two) Times a Day. 9/13/19   Shelby Haskins MD   hydroCHLOROthiazide (MICROZIDE) 12.5 MG capsule Take 1 capsule by mouth Daily. 2/6/24   Silvano Gillis MD   levothyroxine (SYNTHROID, LEVOTHROID) 75 MCG tablet Take 1 tablet by mouth Daily. 9/23/19   Shelby Haskins MD   montelukast (SINGULAIR) 10 MG tablet Take 1 tablet by mouth Every Evening. 9/23/19   Shelby Haskins MD   pantoprazole (PROTONIX) 40 MG EC tablet Take 1 tablet by mouth Daily. 2/22/22   Silvano Gillis MD   pravastatin (PRAVACHOL) 80 MG tablet Take 1 tablet by mouth every night at bedtime. 2/6/24   Silvano Gillis MD   sertraline (ZOLOFT) 100 MG tablet Take 1.5 tablets by mouth Daily. 3/15/22   Silvano Gillis MD   theophylline (UNIPHYL) 400 MG 24 hr tablet Take 1 tablet by mouth Daily. 9/23/19   Shelby Haskins MD   verapamil ER (VERELAN) 240 MG 24 hr capsule TAKE 1 CAPSULE BY MOUTH EVERY NIGHT AT BEDTIME 7/1/24   Silvano Gillis MD   warfarin (Coumadin) 5 MG tablet Take 5 mg by mouth daily or as otherwise directed. 3/19/24   Silvano Gillis MD       Medical history:   Past Medical History:   Diagnosis Date    Allergic     Anxiety     Arthritis     Asthma     Atrial fibrillation     Atypical chest pain 06/11/2020    Cataract     COPD (chronic obstructive pulmonary disease)     COVID     COVID-19 01/13/2021    Depression     Enlarged prostate     Headache     Hyperlipidemia     Hypertension     Kidney stone     Near syncope 12/17/2020    Prostatitis     Shortness of breath     Sleep apnea     Stroke     Urinary tract infection        Social history:   Social History     Tobacco Use    Smoking status: Former     Types: Cigarettes    Smokeless tobacco: Never   Substance Use Topics    Alcohol use: Yes     Comment: occasionally        Allergies:    Penicillins and Rivaroxaban    Vaccine History:   Immunization History   Administered Date(s) Administered    Covid-19 (Pfizer) Gray Cap Monovalent 05/18/2021, 11/17/2021, 11/26/2021    FLUAD TRI 65YR+ 10/31/2019    Fluzone High Dose =>65 Years ([employer] ONLY) 10/14/2016, 10/16/2017, 11/14/2018    H1N1 Inj 12/22/2009    Hepatitis A 09/17/2019, 11/19/2019    Influenza Quad Vaccine (Inpatient) 09/22/2014    Influenza, Unspecified 09/18/2013    Pneumococcal Conjugate 13-Valent (PCV13) 10/12/2015    Pneumococcal Polysaccharide (PPSV23) 09/24/2013    Shingrix 09/17/2019, 11/19/2019    Zostavax 02/12/2013       [id number] SCORE   [id number] Score for Atrial Fibrillation Stroke Risk  Age in Years: 75+  Sex: Male  CHF History: Yes  Hypertension History: Yes  Stroke/TIA/Thromboembolism History: Yes  Vascular Disease History: No  Diabetes History: No  [id number] Score: 6  Stroke risks -: 6 Points. Risk of ischemic stroke: 9.7%. Risk of stroke/TIA/embolism: 13.6%      This patient is managed via a collaborative agreement, pursuant to IC [id number]. The signed protocol is kept in the [facility] at [address] [place] [address].    Education: Efe Malloy has been instructed to call if any changes in medications, doses, concerns, etc. Patient was provided dosing instructions and expressed verbal understanding and has no further questions at this time.    Plan:  1. Anticoagulation   - no change; warfarin 5 mg PO daily. Total weekly dose = 35 mg.   - RTC in 6 week(s)      Kavita Jolley, RosettaD  7/11/2024  14:56 EDT

## 2024-07-11 NOTE — PROGRESS NOTES
Cardiology Clinic Note  Silvano Gillis MD, PhD    Subjective:     Encounter Date:07/11/2024      Patient ID: Efe Malloy is a 78 y.o. male.    Chief Complaint:  Chief Complaint   Patient presents with    Follow-up       HPI:           I the pleasure to see this 78-year-old gentleman today in cardiology follow-up,  He has a known history of chest pain with multiple stress test being ordered all unremarkable previously the most recent was December 2023. He has chronic persistent atrial fibrillation which is rate controlled He is on anticoagulation with Coumadin goal INR 2-3.  He is a former smoker and continues abstinence after cessation remotely.  He has history of prior TIA and unilateral hemianopsia in the right visual fields of the right eye.  CHADSVASC of 5, A prior ischemic work-up included left heart catheterization September 2021 revealed borderline obstructive disease and a large ramus branch and both superior and inferior limbs.  Both IFR/FFR of both limbs was insignificant with respect to hemodynamic criteria as described .  This does not meet criteria for intervention and was not hemodynamically significant at that time.  Again subsequent stress test demonstrated no reversibility of the anterior lateral wall in this distribution most recently December 2023.  IFR 0.98 in the superior limb and 0.95 in the inferior limb , EF was 60%.  He continues on maximal goal-directed medical therapy  On encounter today he denies any refractory chest pain, blood pressure well-controlled, weight is down 8 to 10 pounds year every year.  No heart failure center symptoms unexplained syncope palpitations or other complaints       Follow-up echo April 2022  Mild concentric LVH, EF 65%, mild calcification the aortic valve, negative bubble study for any shunt, grade 1-2 diastolic dysfunction     Stress testing December 2023, EF 69%, globally diminished counts poor visualization with diaphragmatic and GI attenuation, normal size  ventricle with normal regional and global wall motion, [id number] variability with underlying atrial fibrillation poor overall imaging with diffuse uptake abnormalities in the inferolateral wall and apex however minimal reversibility between stress and rest imaging, intermediate risk study with poor overall imaging, correlation with clinical risk factors was recommended     Review of systems otherwise negative x14 point review of systems except as mentioned above     Historical data copied forward from previous encounters in EMR is unchanged     Left heart catheterization 9/17/2021  Findings next   #1 open aortic pressure, 157/96  2.  Closing pressure was unchanged next   #3 LVEDP 12 to 16 mmHg  4.  Normal LV systolic function EF of 65 to 70%  5.  No transaortic gradient     Angiography next   1 left main large-caliber vessel no angiographic disease  2.  LAD medium caliber vessel coursing anteriorly with mild nonobstructive disease most severely 20 to 30% diffusely with diffuse luminal regularities  3.  Ramus intermedius is a very large caliber bifurcating vessel along anterolateral wall, proximally there is nonobstructive 20 to 30% luminal regularities, at the bifurcation of the superior and inferior limbs there is angiographic 70-80% in the superior limb, 50-60% in the inferior limb with only mild luminal regularities distally.  4.  Circumflex has 1 obtuse marginal branch, OM has 60% eccentric plaque, circumflex proper has mild luminal regularities only with DELFINO-3 flow throughout next   #5 RCA large caliber vessel with nonobstructive disease, mild luminal regularities only including PLV and PDA branches distally with DELFINO-3 flow     Conclusions and recommendations  1 borderline nonobstructive disease most severely in the ramus intermedius branch of the bifurcation involving superior and inferior sizable limbs.  IFR and FFR unremarkable in both limbs measured today as noted above.  Nonobstructive LAD disease,  obtuse marginal disease and b luminal regularities only in the RCA.  2.  High normal LVEDP, no transaortic gradient, hyperdynamic LV function 65 to 70%  3.  Continue primary mention goals, aspirin statin beta-blocker, antianginals, may try Ranexa for small vessel disease not amenable to intervention or visible by angiography for microvascular dysfunction with negative work-up today  4.  Patient be discharged with discharge criteria met, follow-up cardiology 2 to 4 weeks for further optimization of his medical therapy     Silvano Gillis MD, PhD     =================================================      Exam today  Vitals reviewed below,     Irregular, A. 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  Obtuse marginal 60% eccentric plaque but nonflow limiting, RCA with luminal regularities, LAD 20 to 30% limb irregularities as well.  Secondary prevention goals  Repeat stress testing, suboptimal findings globally diminished counts but no reversibility, preserved EF 69%  Stable symptoms     Coumadin and aspirin at this time, goal INR 2-3 with persistent atrial fibrillation     Continue pravastatin 80 daily, add Zetia and repeat fasting lipid panel, goal LDL less than 70 optimally less than 55  He has had trouble tolerating other statins  Discussed  "PCSK9 if he is not at goal LDL less than 70, last was 80, continue low-cholesterol diet recheck 3 to 6 months with primary care     CHF, EF of 60% by LV gram, euvolemic appearing today     Persistent atrial fibrillation, digoxin and verapamil will be continued, get digoxin level intermittently     Hypertension, better controlled today with previous changes and efforts at weight reduction  HCTZ 12.5 daily, verapamil  Blood pressure goal less than 135 systolic  Off hydralazine and losartan  EF 60% with no clinical heart failure no class I indication for ARB  Continue verapamil for atrial fibrillation and hypertension indications     Dizziness and hypotension  symptoms are improved  Verapamil continues, off losartan, continues on low-dose HCTZ     Hyperlipidemia continue pravastatin 80 daily, add Zetia, changes as above with respect to repeat lipid levels, follow-up primary care for this in the next 3 to 6 months     Diet and exercise as allowed by functional status  Weight reduction 10% over the next year he is at 260 pounds      continue smoking abstinence     Back to clinic 1 year    Objective:         /75 (BP Location: Left arm, Patient Position: Sitting)   Pulse 70   Resp 18   Ht 185.4 cm (73\")   Wt 118 kg (260 lb)   BMI 34.30 kg/m²     Physical Exam    Assessment:         There are no diagnoses linked to this encounter.       Plan:              The pleasure to be involved in this patient's cardiovascular care.  Please call with any questions or concerns  Silvano Gillis MD, PhD    Most recent EKG as reviewed and interpreted by me:  Procedures     Most recent echo as reviewed and interpreted by me:  Results for orders placed during the hospital encounter of 04/09/22    Adult Transthoracic Echo Complete W/ Cont if Necessary Per Protocol    Interpretation Summary  · Left ventricular wall thickness is consistent with mild concentric hypertrophy.  · Estimated left ventricular EF = 65% Left ventricular " systolic function is normal.  · There is calcification of the aortic valve mainly affecting the right coronary cusp(s).  · Saline test results are negative for right to left atrial level shunt.  · Estimated right ventricular systolic pressure from tricuspid regurgitation is normal (<35 mmHg).  · Left ventricular diastolic function is consistent with (grade II w/high LAP) pseudonormalization.      Most recent stress test as reviewed and interpreted by me:  Results for orders placed during the hospital encounter of 12/08/23    Stress Test With Myocardial Perfusion One Day    Interpretation Summary    Left ventricular ejection fraction is normal (Calculated EF = 69%).    Stress and rest imaging were compared, globally diminished counts and the septum inferior and inferolateral wall.  Poor visualization of the inferior wall diaphragmatic GI attenuation Summed rest score of 25 with summed stress score of 20 with no significant reversibility identified Normal EF 69% Normal regional and global wall motion with normal size ventricle.    Findings consistent with an equivocal ECG stress test.    Equivocal stress ECG as did not reach target heart rate  Underlying atrial fibrillation, nonspecific ST-T wave abnormalities at baseline with left anterior fascicular block, LVH, no changes at stress /Rest/ recovery  No arrhythmias seen other than atrial fibrillation  Normal EF 69% with normal regional and global wall motion by gated imaging  Overall poor imaging with poor visualization of the inferior and inferolateral wall and apex, [id number] variability likely with atrial fibrillation, significant GI diaphragmatic attenuation  Again normal wall motion with no reversibility identified, summed difference score of 2 and noncontiguous segments    Correlate with clinical symptoms  Low risk study by criteria noted confounders      Most recent cardiac catheterization as reviewed interpreted by me:  Results for orders placed during the  hospital encounter of 09/17/21    Cardiac Catheterization/Vascular Study    Narrative  Silvano Gillis MD, PhD  [facility] cardiology  Date of service 9-17-21    Procedure  1.  Left heart catheterization with coronary angiography left ventriculography via right radial approach  2.  Fractional flow reserve of high diagonal versus ramus intermedius superior limb  3.  Fractional flow reserve ramus intermedius, inferior limb separate branch    Indication  Continued chest pain, uninformative noninvasive studies    After informed consent the patient was brought to the catheterization lab sterilely prepped and draped in usual fashion for the right wrist for right radial access via micropuncture modified Seldinger technique under ultrasound guidance.  5/6 slender sheath was placed with standard cocktail of heparin nitroglycerin and Cardene.  035 guidewire was advanced aortic valve followed by diagnostic catheterization with JL 3 and JR4 6 Vietnamese catheters.  JR4 was used across aortic valve followed by hand-injection for LV gram, EDP assessment and pullback assessment the transaortic valve gradient.  Secondary to findings of possible obstructive coronary disease with large bifurcating ramus intermedius branch this is what made to perform FFR.  Patient was heparinized for ACT greater than 250 with 100 units/kg of heparin.  Standard FFR was zeroed outside the body, equalized in the left main followed by exam seen to first the inferior limb of the ramus intermedius, IFR was obtained 0.98, the wire was then pulled back followed again by equalization the left main, readvanced to the superior limb of the ramus intermedius separate branch followed by IFR which is 0.95, this was followed by FFR which was 0.87 with good correlated value.  Pullback to the left main revealed no drift at 0.99.  Final angiography revealed no wall trauma, no edge dissections, DELFINO-3 flow in all branches and all catheters and equipment were removed.   Sheath was removed with TR band for hemostasis.  Patient with Cath Lab chest pain-free hemodynamically electrically stable or talking staff neurologically grossly intact bilaterally.      Contrast 170 cc  Conscious sedation time 1 hour with IV Versed and fentanyl administration nurse with complete ECG pulse oximetry and hemodynamic monitoring during the case observed by me  Blood loss less than 5 cc  Complications none    Findings next  #1 open aortic pressure, 157/96  2.  Closing pressure was unchanged next  #3 LVEDP 12 to 16 mmHg  4.  Normal LV systolic function EF of 65 to 70%  5.  No transaortic gradient    Angiography next  1 left main large-caliber vessel no angiographic disease  2.  LAD medium caliber vessel coursing anteriorly with mild nonobstructive disease most severely 20 to 30% diffusely with diffuse luminal regularities  3.  Ramus intermedius is a very large caliber bifurcating vessel along anterolateral wall, proximally there is nonobstructive 20 to 30% luminal regularities, at the bifurcation of the superior and inferior limbs there is angiographic 70-80% in the superior limb, 50-60% in the inferior limb with only mild luminal regularities distally.  4.  Circumflex has 1 obtuse marginal branch, OM has 60% eccentric plaque, circumflex proper has mild luminal regularities only with DELFINO-3 flow throughout next  #5 RCA large caliber vessel with nonobstructive disease, mild luminal regularities only including PLV and PDA branches distally with DELFINO-3 flow    Conclusions and recommendations  1 borderline nonobstructive disease most severely in the ramus intermedius branch of the bifurcation involving superior and inferior sizable limbs.  IFR and FFR unremarkable in both limbs measured today as noted above.  Nonobstructive LAD disease, obtuse marginal disease and b luminal regularities only in the RCA.  2.  High normal LVEDP, no transaortic gradient, hyperdynamic LV function 65 to 70%  3.  Continue primary  mention goals, aspirin statin beta-blocker, antianginals, may try Ranexa for small vessel disease not amenable to intervention or visible by angiography for microvascular dysfunction with negative work-up today  4.  Patient be discharged with discharge criteria met, follow-up cardiology 2 to 4 weeks for further optimization of his medical therapy    Silvano Gillis MD, PhD    The following portions of the patient's history were reviewed and updated as appropriate: allergies, current medications, past family history, past medical history, past social history, past surgical history, and problem list.      ROS:  14 point review of systems negative except as mentioned above    Current Outpatient Medications:     albuterol sulfate  (90 Base) MCG/ACT inhaler, Inhale 2 puffs Every 6 (Six) Hours As Needed., Disp: , Rfl:     alfuzosin (UROXATRAL) 10 MG 24 hr tablet, Take 1 tablet by mouth 2 (Two) Times a Day., Disp: , Rfl: 11    amitriptyline (ELAVIL) 25 MG tablet, Take 2 tablets by mouth Every Night., Disp: , Rfl:     aspirin (aspirin) 81 MG EC tablet, Take 1 tablet by mouth Daily., Disp: 90 tablet, Rfl: 3    baclofen (LIORESAL) 10 MG tablet, Take 1 tablet by mouth 2 (Two) Times a Day., Disp: , Rfl:     BREO ELLIPTA 100-25 MCG/INH inhaler, Inhale 1 puff Daily., Disp: , Rfl: 5    digoxin (LANOXIN) 250 MCG tablet, TAKE ONE TABLET BY MOUTH EVERY DAY, Disp: 90 tablet, Rfl: 1    EPINEPHrine (EPIPEN) 0.3 MG/0.3ML solution auto-injector injection, epinephrine 0.3 mg/0.3 mL injection, auto-injector, Disp: , Rfl:     ezetimibe (ZETIA) 10 MG tablet, Take 1 tablet by mouth Daily., Disp: 30 tablet, Rfl: 11    fluorometholone (FML) 0.1 % ophthalmic suspension, Administer 1 drop to the right eye Daily., Disp: , Rfl:     gabapentin (NEURONTIN) 300 MG capsule, Take 2 capsules by mouth 2 (Two) Times a Day., Disp: , Rfl: 3    hydroCHLOROthiazide (MICROZIDE) 12.5 MG capsule, Take 1 capsule by mouth Daily., Disp: 90 capsule, Rfl: 1     levothyroxine (SYNTHROID, LEVOTHROID) 75 MCG tablet, Take 1 tablet by mouth Daily., Disp: , Rfl: 5    meloxicam (MOBIC) 7.5 MG tablet, Take 1 tablet by mouth Daily., Disp: , Rfl:     montelukast (SINGULAIR) 10 MG tablet, Take 1 tablet by mouth Every Evening., Disp: , Rfl: 5    pantoprazole (PROTONIX) 40 MG EC tablet, Take 1 tablet by mouth Daily., Disp: 90 tablet, Rfl: 2    pravastatin (PRAVACHOL) 80 MG tablet, Take 1 tablet by mouth every night at bedtime., Disp: 90 tablet, Rfl: 1    sertraline (ZOLOFT) 100 MG tablet, Take 1.5 tablets by mouth Daily., Disp: 30 tablet, Rfl: 0    Synthroid 175 MCG tablet, Take 0.5 tablets by mouth Daily., Disp: , Rfl:     theophylline (UNIPHYL) 400 MG 24 hr tablet, Take 1 tablet by mouth Daily., Disp: , Rfl: 5    verapamil ER (VERELAN) 240 MG 24 hr capsule, TAKE 1 CAPSULE BY MOUTH EVERY NIGHT AT BEDTIME, Disp: 90 capsule, Rfl: 1    warfarin (Coumadin) 5 MG tablet, Take 5 mg by mouth daily or as otherwise directed., Disp: 90 tablet, Rfl: 3    Xiidra 5 % ophthalmic solution, Apply 1 drop to eye(s) as directed by provider Every 12 (Twelve) Hours., Disp: , Rfl:     Problem List:  Patient Active Problem List   Diagnosis    Persistent atrial fibrillation    Essential hypertension    COPD (chronic obstructive pulmonary disease)    Hyperglycemia    Atypical chest pain    Abnormal Pap smear of anus    Congestive heart failure    Depressive disorder    Gastroesophageal reflux disease    Mixed hyperlipidemia    Hypothyroidism    Neuropathy    Obstructive sleep apnea syndrome    Primary osteoarthritis of right knee    Cervical spondylosis without myelopathy    Cervico-occipital neuralgia    Lumbosacral spondylosis without myelopathy    Near syncope    Arthritis    Chronic idiopathic constipation    Disorder of prostate    Dysplasia of anus    Gout    Hearing loss    Seasonal allergic rhinitis    TIA (transient ischemic attack)     Past Medical History:  Past Medical History:   Diagnosis Date     Allergic     Anxiety     Arthritis     Asthma     Atrial fibrillation     Atypical chest pain 06/11/2020    Cataract     COPD (chronic obstructive pulmonary disease)     COVID     COVID-19 01/13/2021    Depression     Enlarged prostate     Headache     Hyperlipidemia     Hypertension     Kidney stone     Near syncope 12/17/2020    Prostatitis     Shortness of breath     Sleep apnea     Stroke     Urinary tract infection      Past Surgical History:  Past Surgical History:   Procedure Laterality Date    APPENDECTOMY      CARDIAC CATHETERIZATION  2007    nonobstructive dz    CARDIAC CATHETERIZATION N/A 9/17/2021    Procedure: Left Heart Cath;  Surgeon: Silvano Gillis MD;  Location: [facility] CATH INVASIVE LOCATION;  Service: Cardiology;  Laterality: N/A;    CERVICAL EPIDURAL      with Dr. Harshil Rodgers mgt    CHOLECYSTECTOMY      EYE SURGERY Bilateral     corneal transplant left 09/02/2020 (Right eye 2014)    JOINT REPLACEMENT Right     knee    KIDNEY STONE SURGERY      PROSTATE SURGERY       Social History:  Social History     Socioeconomic History    Marital status:    Tobacco Use    Smoking status: Former     Types: Cigarettes    Smokeless tobacco: Never   Vaping Use    Vaping status: Never Used   Substance and Sexual Activity    Alcohol use: Yes     Comment: occasionally    Drug use: Not Currently    Sexual activity: Defer     Allergies:  Allergies   Allergen Reactions    Penicillins Other (See Comments)     AS A CHILD    Rivaroxaban Other (See Comments)     Immunizations:  Immunization History   Administered Date(s) Administered    Covid-19 (Pfizer) Gray Cap Monovalent 05/18/2021, 11/17/2021, 11/26/2021    FLUAD TRI 65YR+ 10/31/2019    Fluzone High Dose =>65 Years ([employer] ONLY) 10/14/2016, 10/16/2017, 11/14/2018    H1N1 Inj 12/22/2009    Hepatitis A 09/17/2019, 11/19/2019    Influenza Quad Vaccine (Inpatient) 09/22/2014    Influenza, Unspecified 09/18/2013    Pneumococcal Conjugate 13-Valent (PCV13)  10/12/2015    Pneumococcal Polysaccharide (PPSV23) 09/24/2013    Shingrix 09/17/2019, 11/19/2019    Zostavax 02/12/2013            In-Office Procedure(s):  No orders to display        ASCVD RIsk Score::  The ASCVD Risk score (Daisy JAIN, et al., 2019) failed to calculate for the following reasons:    The patient has a prior MI or stroke diagnosis    Imaging:    Results for orders placed during the hospital encounter of 04/09/22    XR Chest 1 View    Narrative  XR CHEST 1 VW-    Date of Exam: 4/9/2022 3:28 PM    Indication: Acute Stroke Protocol (onset < 12 hrs).    Comparison Exams: June 11, 2020    Technique: Single AP chest radiograph    FINDINGS:  The lungs are clear. The heart and mediastinal contours appear normal.  The pulmonary vasculature appears normal. The osseous structures appear  intact.    Impression  No acute cardiopulmonary process identified.    Electronically Signed By-Sha Rangel MD On:4/9/2022 3:34 PM  This report was finalized on [id number] by  Sha Rangel MD.       Results for orders placed during the hospital encounter of 04/09/22    CT CEREBRAL PERFUSION WITH & WITHOUT CONTRAST    Narrative  DATE OF EXAM:  4/9/2022 3:55 PM    PROCEDURE:  CT CEREBRAL PERFUSION W WO CONTRAST-    INDICATIONS:  Transient visual changes consistent with a stroke    COMPARISON:  CT head from earlier today    TECHNIQUE:  Routine transaxial cuts were obtained through the head without  administration of  contrast. Routine transaxial cuts were then obtained  through the head following the administration of 50 mL of Isovue 370  intravenously. Core blood volume, core blood flow, mean transit time,  and Tmax images were obtained utilizing the Rapid software protocol. A  limited CT angiogram of the head was also performed to measure the blood  vessel density.    FINDINGS:  There is fairly symmetrical abnormal perfusion on the Tmax within the  posterior fossa and bilateral temporo-occipital lobes, favored to  be  artifactual. The cerebral blood flow appears normal.    Impression  No definite acute perfusion abnormality identified. Suggestion of  artifact on the Tmax as described above.    Electronically Signed By-Sha Rangel MD On:4/9/2022 4:17 PM  This report was finalized on [id number] by  Sha Rangel MD.      Results for orders placed during the hospital encounter of 04/09/22    CT CEREBRAL PERFUSION WITH & WITHOUT CONTRAST    Narrative  DATE OF EXAM:  4/9/2022 3:55 PM    PROCEDURE:  CT CEREBRAL PERFUSION W WO CONTRAST-    INDICATIONS:  Transient visual changes consistent with a stroke    COMPARISON:  CT head from earlier today    TECHNIQUE:  Routine transaxial cuts were obtained through the head without  administration of  contrast. Routine transaxial cuts were then obtained  through the head following the administration of 50 mL of Isovue 370  intravenously. Core blood volume, core blood flow, mean transit time,  and Tmax images were obtained utilizing the Rapid software protocol. A  limited CT angiogram of the head was also performed to measure the blood  vessel density.    FINDINGS:  There is fairly symmetrical abnormal perfusion on the Tmax within the  posterior fossa and bilateral temporo-occipital lobes, favored to be  artifactual. The cerebral blood flow appears normal.    Impression  No definite acute perfusion abnormality identified. Suggestion of  artifact on the Tmax as described above.    Electronically Signed By-Sha Rangel MD On:4/9/2022 4:17 PM  This report was finalized on [id number] by  Sha Rangel MD.      Lab Review:   Anticoagulation Visit on 07/11/2024   Component Date Value    INR 07/11/2024 2.40    Anticoagulation Visit on 05/29/2024   Component Date Value    INR 05/29/2024 2.10    Anticoagulation Visit on 04/17/2024   Component Date Value    INR 04/17/2024 2.50 (A)    Anticoagulation Visit on 03/06/2024   Component Date Value    INR 03/06/2024 2.00 (A)     Anticoagulation Visit on 02/14/2024   Component Date Value    INR 02/14/2024 1.90 (A)    Anticoagulation Visit on 02/07/2024   Component Date Value    INR 02/07/2024 1.50 (A)    Anticoagulation Visit on 02/05/2024   Component Date Value    INR 02/05/2024 1.30 (A)      Recent labs reviewed and interpreted for clinical significance and application            Level of Care:           Silvano Gillis MD  07/11/24  .

## 2024-07-12 ENCOUNTER — LAB (OUTPATIENT)
Dept: LAB | Facility: HOSPITAL | Age: 79
End: 2024-07-12
Payer: MEDICARE

## 2024-07-12 ENCOUNTER — TRANSCRIBE ORDERS (OUTPATIENT)
Dept: ADMINISTRATIVE | Facility: HOSPITAL | Age: 79
End: 2024-07-12
Payer: MEDICARE

## 2024-07-12 DIAGNOSIS — E03.9 ACQUIRED HYPOTHYROIDISM: Primary | ICD-10-CM

## 2024-07-12 DIAGNOSIS — E03.9 ACQUIRED HYPOTHYROIDISM: ICD-10-CM

## 2024-07-12 LAB
T3 SERPL-MCNC: 80.4 NG/DL (ref 80–200)
T4 FREE SERPL-MCNC: 1.08 NG/DL (ref 0.93–1.7)
TSH SERPL DL<=0.05 MIU/L-ACNC: 0.66 UIU/ML (ref 0.27–4.2)

## 2024-07-12 PROCEDURE — 84480 ASSAY TRIIODOTHYRONINE (T3): CPT

## 2024-07-12 PROCEDURE — 84443 ASSAY THYROID STIM HORMONE: CPT

## 2024-07-12 PROCEDURE — 36415 COLL VENOUS BLD VENIPUNCTURE: CPT

## 2024-07-12 PROCEDURE — 84439 ASSAY OF FREE THYROXINE: CPT

## 2024-08-09 ENCOUNTER — HOSPITAL ENCOUNTER (OUTPATIENT)
Dept: CARDIOLOGY | Facility: HOSPITAL | Age: 79
Discharge: HOME | End: 2024-08-09
Payer: MEDICARE

## 2024-08-09 ENCOUNTER — TRANSCRIBE ORDERS (OUTPATIENT)
Dept: ADMINISTRATIVE | Facility: HOSPITAL | Age: 79
End: 2024-08-09
Payer: MEDICARE

## 2024-08-09 ENCOUNTER — LAB (OUTPATIENT)
Dept: LAB | Facility: HOSPITAL | Age: 79
End: 2024-08-09
Payer: MEDICARE

## 2024-08-09 DIAGNOSIS — Z01.818 PRE-OP TESTING: ICD-10-CM

## 2024-08-09 DIAGNOSIS — Z01.818 PRE-OP TESTING: Primary | ICD-10-CM

## 2024-08-09 LAB
ANION GAP SERPL CALCULATED.3IONS-SCNC: 10 MMOL/L (ref 5–15)
BASOPHILS # BLD AUTO: 0.03 10*3/MM3 (ref 0–0.2)
BASOPHILS NFR BLD AUTO: 0.5 % (ref 0–1.5)
BUN SERPL-MCNC: 17 MG/DL (ref 8–23)
BUN/CREAT SERPL: 14 (ref 7–25)
CALCIUM SPEC-SCNC: 9.3 MG/DL (ref 8.6–10.5)
CHLORIDE SERPL-SCNC: 102 MMOL/L (ref 98–107)
CO2 SERPL-SCNC: 28 MMOL/L (ref 22–29)
CREAT SERPL-MCNC: 1.21 MG/DL (ref 0.76–1.27)
DEPRECATED RDW RBC AUTO: 41.6 FL (ref 37–54)
EGFRCR SERPLBLD CKD-EPI 2021: 61.7 ML/MIN/1.73
EOSINOPHIL # BLD AUTO: 0.14 10*3/MM3 (ref 0–0.4)
EOSINOPHIL NFR BLD AUTO: 2.2 % (ref 0.3–6.2)
ERYTHROCYTE [DISTWIDTH] IN BLOOD BY AUTOMATED COUNT: 14.5 % (ref 12.3–15.4)
GLUCOSE SERPL-MCNC: 128 MG/DL (ref 65–99)
HCT VFR BLD AUTO: 49.7 % (ref 37.5–51)
HGB BLD-MCNC: 16.3 G/DL (ref 13–17.7)
IMM GRANULOCYTES # BLD AUTO: 0.05 10*3/MM3 (ref 0–0.05)
IMM GRANULOCYTES NFR BLD AUTO: 0.8 % (ref 0–0.5)
LYMPHOCYTES # BLD AUTO: 2.1 10*3/MM3 (ref 0.7–3.1)
LYMPHOCYTES NFR BLD AUTO: 33.4 % (ref 19.6–45.3)
MCH RBC QN AUTO: 26.3 PG (ref 26.6–33)
MCHC RBC AUTO-ENTMCNC: 32.8 G/DL (ref 31.5–35.7)
MCV RBC AUTO: 80.2 FL (ref 79–97)
MONOCYTES # BLD AUTO: 0.54 10*3/MM3 (ref 0.1–0.9)
MONOCYTES NFR BLD AUTO: 8.6 % (ref 5–12)
NEUTROPHILS NFR BLD AUTO: 3.42 10*3/MM3 (ref 1.7–7)
NEUTROPHILS NFR BLD AUTO: 54.5 % (ref 42.7–76)
NRBC BLD AUTO-RTO: 0 /100 WBC (ref 0–0.2)
PLATELET # BLD AUTO: 164 10*3/MM3 (ref 140–450)
PMV BLD AUTO: 9.9 FL (ref 6–12)
POTASSIUM SERPL-SCNC: 4.1 MMOL/L (ref 3.5–5.2)
RBC # BLD AUTO: 6.2 10*6/MM3 (ref 4.14–5.8)
SODIUM SERPL-SCNC: 140 MMOL/L (ref 136–145)
WBC NRBC COR # BLD AUTO: 6.28 10*3/MM3 (ref 3.4–10.8)

## 2024-08-09 PROCEDURE — 36415 COLL VENOUS BLD VENIPUNCTURE: CPT

## 2024-08-09 PROCEDURE — 80048 BASIC METABOLIC PNL TOTAL CA: CPT

## 2024-08-09 PROCEDURE — 93005 ELECTROCARDIOGRAM TRACING: CPT | Performed by: ORTHOPAEDIC SURGERY

## 2024-08-09 PROCEDURE — 85025 COMPLETE CBC W/AUTO DIFF WBC: CPT

## 2024-08-10 LAB
QT INTERVAL: 399 MS
QTC INTERVAL: 398 MS

## 2024-08-21 ENCOUNTER — TELEPHONE (OUTPATIENT)
Dept: PHARMACY | Facility: HOSPITAL | Age: 79
End: 2024-08-21
Payer: MEDICARE

## 2024-08-21 NOTE — TELEPHONE ENCOUNTER
[facility]: [facility]  Clinical Outreach     Efe Malloy is a 79 y.o. male and is a patient of the [facility] at [facility] for anticoagulation monitoring.     Contacted patient with reminder for appointment scheduled tomorrow, 8/22. At that time, patient reported he had back surgery yesterday and has held warfarin since 8/14. Patient states he will be resuming warfarin tonight. Of note, cardiac clearance signed by Dr. Gillis is in media tab. Instructed patient to resume warfarin at previous regimen of 5 mg PO daily and rescheduled appointment for 8/29 (>7 days post resume).    Kavita Jolley, PharmD  [facility] Clinical Pharmacist  8/21/2024  14:22 EDT

## 2024-08-29 ENCOUNTER — ANTICOAGULATION VISIT (OUTPATIENT)
Dept: PHARMACY | Facility: HOSPITAL | Age: 79
End: 2024-08-29
Payer: MEDICARE

## 2024-08-29 DIAGNOSIS — I48.19 PERSISTENT ATRIAL FIBRILLATION: Primary | ICD-10-CM

## 2024-08-29 LAB
INR PPP: 1.5 (ref 0.9–1.1)
PROTHROMBIN TIME: 17.3 SECONDS

## 2024-08-29 PROCEDURE — 85610 PROTHROMBIN TIME: CPT

## 2024-08-29 PROCEDURE — 36416 COLLJ CAPILLARY BLOOD SPEC: CPT

## 2024-08-29 PROCEDURE — G0463 HOSPITAL OUTPT CLINIC VISIT: HCPCS

## 2024-08-29 RX ORDER — HYDROCHLOROTHIAZIDE 12.5 MG/1
12.5 CAPSULE ORAL DAILY
Qty: 90 CAPSULE | Refills: 1 | Status: SHIPPED | OUTPATIENT
Start: 2024-08-29 | End: 2024-08-29 | Stop reason: SDUPTHER

## 2024-08-29 RX ORDER — HYDROCHLOROTHIAZIDE 12.5 MG/1
12.5 CAPSULE ORAL DAILY
Qty: 90 CAPSULE | Refills: 1 | Status: SHIPPED | OUTPATIENT
Start: 2024-08-29

## 2024-08-29 NOTE — PROGRESS NOTES
Anticoagulation Clinic Progress Note    INR Goal: 2 - 3  Today's INR: 1.5 (subtherapeutic)  Current warfarin dose: warfarin 5 mg PO daily Current total weekly dose = 35 mg per week.     -Of note: Patient had back surgery on  and starting holding warfarin on . Patient re-started warfarin back on .      INR History:      2024     2:30 PM 2024    10:15 AM 3/6/2024    10:30 AM 2024     1:30 PM 2024     8:45 AM 2024     2:30 PM 2024     1:00 PM   Anticoagulation Monitoring   INR 1.50 1.90 2.00 2.50 2.10 2.40 1.50   INR Date 2024 2024 3/6/2024 2024 2024 2024 2024   INR Goal 2.0-3.0 2.0-3.0 2.0-3.0 2.0-3.0 2.0-3.0 2.0-3.0 2.0-3.0   Trend Same Same Same Same Same Same Same   Last Week Total 35 mg 40 mg 35 mg 35 mg 35 mg 35 mg 35 mg   Next Week Total 40 mg 35 mg 35 mg 35 mg 35 mg 35 mg 40 mg   Sun 5 mg 5 mg 5 mg 5 mg 5 mg 5 mg 5 mg   Mon 5 mg 5 mg 5 mg 5 mg 5 mg 5 mg 5 mg   Tue 5 mg 5 mg 5 mg 5 mg 5 mg 5 mg 5 mg   Wed 10 mg () 5 mg 5 mg 5 mg 5 mg 5 mg 5 mg   Thu 5 mg 5 mg 5 mg 5 mg 5 mg 5 mg 10 mg ()   Fri 5 mg 5 mg 5 mg 5 mg 5 mg 5 mg 5 mg   Sat 5 mg 5 mg 5 mg 5 mg 5 mg 5 mg 5 mg   Visit Report      Report        Anticoagulation Summary  As of 2024      INR goal:  2.0-3.0   TTR:  76.8% (5.2 y)   INR used for dosin.50 (2024)   Warfarin maintenance plan:  5 mg every day   Weekly warfarin total:  35 mg   Plan last modified:  Kavita Jolley, PharmD (2023)   Next INR check:  2024   Priority:  Maintenance   Target end date:      Indications    Persistent atrial fibrillation [I48.19]                 Anticoagulation Episode Summary       INR check location:      Preferred lab:      Send INR reminders to:  [facility] CLINICAL POOL    Comments:  Patient contract signed 11/15/21.          Anticoagulation Care Providers       Provider Role Specialty Phone number    Silvano Gillis MD  Cardiology [phone]             Clinic Interview:   Patient Findings     Negatives:  Signs/symptoms of thrombosis, Signs/symptoms of bleeding,   Laboratory test error suspected, Change in health, Change in alcohol use,   Change in activity, Upcoming invasive procedure, Emergency department   visit, Upcoming dental procedure, Missed doses, Extra doses, Change in   medications, Change in diet/appetite, Hospital admission, Bruising, Other   complaints      Clinical Outcomes     Negatives:  Major bleeding event, Thromboembolic event,   Anticoagulation-related hospital admission, Anticoagulation-related ED   visit, Anticoagulation-related fatality        Current Medications:   Prior to Admission medications    Medication Sig Start Date End Date Taking? Authorizing Provider   albuterol sulfate  (90 Base) MCG/ACT inhaler Inhale 2 puffs Every 6 (Six) Hours As Needed. 7/6/21   Shelby Haskins MD   alfuzosin (UROXATRAL) 10 MG 24 hr tablet Take 1 tablet by mouth 2 (Two) Times a Day. 9/23/19   Shelby Haskins MD   amitriptyline (ELAVIL) 25 MG tablet Take 2 tablets by mouth Every Night.    Shelby Haskins MD   aspirin (aspirin) 81 MG EC tablet Take 1 tablet by mouth Daily. 4/11/22   Silvano Gillis MD   baclofen (LIORESAL) 10 MG tablet Take 1 tablet by mouth 2 (Two) Times a Day. 7/13/21   Shelby Haskins MD   BREO ELLIPTA 100-25 MCG/INH inhaler Inhale 1 puff Daily. 9/24/19   Shelby Haskins MD   digoxin (LANOXIN) 250 MCG tablet TAKE ONE TABLET BY MOUTH EVERY DAY 7/8/24   Silvano Gillis MD   EPINEPHrine (EPIPEN) 0.3 MG/0.3ML solution auto-injector injection epinephrine 0.3 mg/0.3 mL injection, auto-injector    Shelby Haskins MD   ezetimibe (ZETIA) 10 MG tablet Take 1 tablet by mouth Daily. 2/6/24   Silvano Gillis MD   fluorometholone (FML) 0.1 % ophthalmic suspension Administer 1 drop to the right eye Daily. 5/17/21   Shelby Haskins MD   gabapentin (NEURONTIN) 300 MG capsule  Take 2 capsules by mouth 2 (Two) Times a Day. 9/13/19   Shelby Haskins MD   hydroCHLOROthiazide (MICROZIDE) 12.5 MG capsule Take 1 capsule by mouth Daily. 8/29/24   Silvano Gillis MD   levothyroxine (SYNTHROID, LEVOTHROID) 75 MCG tablet Take 1 tablet by mouth Daily. 9/23/19   Shelby Haskins MD   meloxicam (MOBIC) 7.5 MG tablet Take 1 tablet by mouth Daily.    Shelby Haskins MD   montelukast (SINGULAIR) 10 MG tablet Take 1 tablet by mouth Every Evening. 9/23/19   Shelby Haskins MD   pantoprazole (PROTONIX) 40 MG EC tablet Take 1 tablet by mouth Daily. 2/22/22   Silvano Gillis MD   pravastatin (PRAVACHOL) 80 MG tablet Take 1 tablet by mouth every night at bedtime. 2/6/24   Silvano Gillis MD   sertraline (ZOLOFT) 100 MG tablet Take 1.5 tablets by mouth Daily. 3/15/22   Silvano Gillis MD   Synthroid 175 MCG tablet Take 0.5 tablets by mouth Daily. 6/4/24   Shelby Haskins MD   theophylline (UNIPHYL) 400 MG 24 hr tablet Take 1 tablet by mouth Daily. 9/23/19   Shelby Haskins MD   verapamil ER (VERELAN) 240 MG 24 hr capsule TAKE 1 CAPSULE BY MOUTH EVERY NIGHT AT BEDTIME 7/1/24   Silvano Gillis MD   warfarin (Coumadin) 5 MG tablet Take 5 mg by mouth daily or as otherwise directed. 3/19/24   Silvano Gillis MD   Xiidra 5 % ophthalmic solution Apply 1 drop to eye(s) as directed by provider Every 12 (Twelve) Hours. 3/28/24   Shelby Haskins MD   hydroCHLOROthiazide (MICROZIDE) 12.5 MG capsule Take 1 capsule by mouth Daily. 2/6/24 8/29/24  Silvano Gillis MD   hydroCHLOROthiazide (MICROZIDE) 12.5 MG capsule Take 1 capsule by mouth Daily. 8/29/24 8/29/24  Silvano Gillis MD       Medical history:   Past Medical History:   Diagnosis Date    Allergic     Anxiety     Arthritis     Asthma     Atrial fibrillation     Atypical chest pain 06/11/2020    Cataract     COPD (chronic obstructive pulmonary disease)      COVID     COVID-19 01/13/2021    Depression     Enlarged prostate     Headache     Hyperlipidemia     Hypertension     Kidney stone     Near syncope 12/17/2020    Prostatitis     Shortness of breath     Sleep apnea     Stroke     Urinary tract infection        Social history:   Social History     Tobacco Use    Smoking status: Former     Types: Cigarettes    Smokeless tobacco: Never   Substance Use Topics    Alcohol use: Yes     Comment: occasionally       Allergies:    Penicillins and Rivaroxaban    Vaccine History:   Immunization History   Administered Date(s) Administered    Covid-19 (Pfizer) Gray Cap Monovalent 05/18/2021, 11/17/2021, 11/26/2021    FLUAD TRI 65YR+ 10/31/2019    Fluzone  >6mos 09/22/2014    Fluzone High-Dose 65+YRS 10/14/2016, 10/16/2017, 11/14/2018    H1N1 Inj 12/22/2009    Hepatitis A 09/17/2019, 11/19/2019    Influenza, Unspecified 09/18/2013    Pneumococcal Conjugate 13-Valent (PCV13) 10/12/2015    Pneumococcal Polysaccharide (PPSV23) 09/24/2013    Shingrix 09/17/2019, 11/19/2019    Zostavax 02/12/2013       [id number] SCORE   [id number] Score for Atrial Fibrillation Stroke Risk  Age in Years: 75+  Sex: Male  CHF History: Yes  Hypertension History: Yes  Stroke/TIA/Thromboembolism History: Yes  Vascular Disease History: No  Diabetes History: No  [id number] Score: 6  Stroke risks -: 6 Points. Risk of ischemic stroke: 9.7%. Risk of stroke/TIA/embolism: 13.6%      This patient is managed via a collaborative agreement, pursuant to IC 25-26-16. The signed protocol is kept in the [facility] at [address] [place] [address].    Education: Efe Malloy has been instructed to call if any changes in medications, doses, concerns, etc. Patient was provided dosing instructions and expressed verbal understanding and has no further questions at this time.    Plan:  1. Anticoagulation   - Bolus of 10 mg today then resume home dose of ; warfarin 5 mg PO daily Next 7 day  total = 40 mg  - RTC in 1 week(s)    Counseled patient on increased clotting risk associated with subtherapeutic INR, signs/symptoms to monitor for, and when to seek medical attention.       Bony Chapman, PharmD  8/29/2024  13:35 EDT

## 2024-09-05 ENCOUNTER — ANTICOAGULATION VISIT (OUTPATIENT)
Dept: PHARMACY | Facility: HOSPITAL | Age: 79
End: 2024-09-05
Payer: MEDICARE

## 2024-09-05 DIAGNOSIS — I48.19 PERSISTENT ATRIAL FIBRILLATION: Primary | ICD-10-CM

## 2024-09-05 LAB
INR PPP: 2.8 (ref 0.9–1.1)
PROTHROMBIN TIME: 29.8 SECONDS

## 2024-09-05 PROCEDURE — 85610 PROTHROMBIN TIME: CPT

## 2024-09-05 PROCEDURE — 36416 COLLJ CAPILLARY BLOOD SPEC: CPT

## 2024-09-05 PROCEDURE — G0463 HOSPITAL OUTPT CLINIC VISIT: HCPCS

## 2024-09-05 NOTE — PROGRESS NOTES
Anticoagulation Clinic Progress Note    INR Goal: 2 - 3  Today's INR: 2.8 (therapeutic)  Current warfarin dose: warfarin 5 mg PO daily with bolus dose of 10 mg on  as instructed. Current total weekly dose = 35 mg per week (40 mg in past 7 days; 14% increase for past 7 days).     INR History:      2024    10:15 AM 3/6/2024    10:30 AM 2024     1:30 PM 2024     8:45 AM 2024     2:30 PM 2024     1:00 PM 2024     1:00 PM   Anticoagulation Monitoring   INR 1.90 2.00 2.50 2.10 2.40 1.50 2.80   INR Date 2024 3/6/2024 2024 2024 2024 2024 2024   INR Goal 2.0-3.0 2.0-3.0 2.0-3.0 2.0-3.0 2.0-3.0 2.0-3.0 2.0-3.0   Trend Same Same Same Same Same Same Same   Last Week Total 40 mg 35 mg 35 mg 35 mg 35 mg 35 mg 40 mg   Next Week Total 35 mg 35 mg 35 mg 35 mg 35 mg 40 mg 35 mg   Sun 5 mg 5 mg 5 mg 5 mg 5 mg 5 mg 5 mg   Mon 5 mg 5 mg 5 mg 5 mg 5 mg 5 mg 5 mg   Tue 5 mg 5 mg 5 mg 5 mg 5 mg 5 mg 5 mg   Wed 5 mg 5 mg 5 mg 5 mg 5 mg 5 mg 5 mg   Thu 5 mg 5 mg 5 mg 5 mg 5 mg 10 mg () 5 mg   Fri 5 mg 5 mg 5 mg 5 mg 5 mg 5 mg 5 mg   Sat 5 mg 5 mg 5 mg 5 mg 5 mg 5 mg 5 mg   Visit Report     Report         Anticoagulation Summary  As of 2024      INR goal:  2.0-3.0   TTR:  76.7% (5.2 y)   INR used for dosin.80 (2024)   Warfarin maintenance plan:  5 mg every day   Weekly warfarin total:  35 mg   No change documented:  Kavita Jolley, PharmD   Plan last modified:  Kavita Jolley, PharmD (2023)   Next INR check:  10/3/2024   Priority:  Maintenance   Target end date:      Indications    Persistent atrial fibrillation [I48.19]                 Anticoagulation Episode Summary       INR check location:      Preferred lab:      Send INR reminders to:  [facility] CLINICAL POOL    Comments:  Patient contract signed 11/15/21.          Anticoagulation Care Providers       Provider Role Specialty Phone number    Silvano Gillis MD  Cardiology [phone]             Clinic Interview:   Patient Findings     Positives:  Extra doses (Took bolus dose of 10 mg on 8/29 as instructed)    Negatives:  Signs/symptoms of thrombosis, Signs/symptoms of bleeding,   Laboratory test error suspected, Change in health, Change in alcohol use,   Change in activity, Upcoming invasive procedure, Emergency department   visit, Upcoming dental procedure, Missed doses, Change in medications,   Change in diet/appetite, Hospital admission, Bruising, Other complaints      Clinical Outcomes     Negatives:  Major bleeding event, Thromboembolic event,   Anticoagulation-related hospital admission, Anticoagulation-related ED   visit, Anticoagulation-related fatality        Current Medications:   Prior to Admission medications    Medication Sig Start Date End Date Taking? Authorizing Provider   albuterol sulfate  (90 Base) MCG/ACT inhaler Inhale 2 puffs Every 6 (Six) Hours As Needed. 7/6/21   Shelby Haskins MD   alfuzosin (UROXATRAL) 10 MG 24 hr tablet Take 1 tablet by mouth 2 (Two) Times a Day. 9/23/19   Shelby Haskins MD   amitriptyline (ELAVIL) 25 MG tablet Take 2 tablets by mouth Every Night.    Shelby Haskins MD   aspirin (aspirin) 81 MG EC tablet Take 1 tablet by mouth Daily. 4/11/22   Silvano Gillis MD   baclofen (LIORESAL) 10 MG tablet Take 1 tablet by mouth 2 (Two) Times a Day. 7/13/21   Shelby Haskins MD   BREO ELLIPTA 100-25 MCG/INH inhaler Inhale 1 puff Daily. 9/24/19   Shelby Haskins MD   digoxin (LANOXIN) 250 MCG tablet TAKE ONE TABLET BY MOUTH EVERY DAY 7/8/24   Silvano Gillis MD   EPINEPHrine (EPIPEN) 0.3 MG/0.3ML solution auto-injector injection epinephrine 0.3 mg/0.3 mL injection, auto-injector    Shelby Haskins MD   ezetimibe (ZETIA) 10 MG tablet Take 1 tablet by mouth Daily. 2/6/24   Silvano Gillis MD   fluorometholone (FML) 0.1 % ophthalmic suspension Administer 1 drop to the right eye Daily. 5/17/21    Shelby Haskins MD   gabapentin (NEURONTIN) 300 MG capsule Take 2 capsules by mouth 2 (Two) Times a Day. 9/13/19   Shelby Haskins MD   hydroCHLOROthiazide (MICROZIDE) 12.5 MG capsule Take 1 capsule by mouth Daily. 8/29/24   Silvano Gillis MD   levothyroxine (SYNTHROID, LEVOTHROID) 75 MCG tablet Take 1 tablet by mouth Daily. 9/23/19   Shelby Haskins MD   meloxicam (MOBIC) 7.5 MG tablet Take 1 tablet by mouth Daily.    Shelby Haskins MD   montelukast (SINGULAIR) 10 MG tablet Take 1 tablet by mouth Every Evening. 9/23/19   Shelby Haskins MD   pantoprazole (PROTONIX) 40 MG EC tablet Take 1 tablet by mouth Daily. 2/22/22   Silvano Gillis MD   pravastatin (PRAVACHOL) 80 MG tablet Take 1 tablet by mouth every night at bedtime. 2/6/24   Silvano Gillis MD   sertraline (ZOLOFT) 100 MG tablet Take 1.5 tablets by mouth Daily. 3/15/22   Silvano Gillis MD   Synthroid 175 MCG tablet Take 0.5 tablets by mouth Daily. 6/4/24   Shelby Haskins MD   theophylline (UNIPHYL) 400 MG 24 hr tablet Take 1 tablet by mouth Daily. 9/23/19   Shelby Haskins MD   verapamil ER (VERELAN) 240 MG 24 hr capsule TAKE 1 CAPSULE BY MOUTH EVERY NIGHT AT BEDTIME 7/1/24   Silvano Gillis MD   warfarin (Coumadin) 5 MG tablet Take 5 mg by mouth daily or as otherwise directed. 3/19/24   Silvano Gillis MD   Xiidra 5 % ophthalmic solution Apply 1 drop to eye(s) as directed by provider Every 12 (Twelve) Hours. 3/28/24   Shelby Haskins MD       Medical history:   Past Medical History:   Diagnosis Date    Allergic     Anxiety     Arthritis     Asthma     Atrial fibrillation     Atypical chest pain 06/11/2020    Cataract     COPD (chronic obstructive pulmonary disease)     COVID     COVID-19 01/13/2021    Depression     Enlarged prostate     Headache     Hyperlipidemia     Hypertension     Kidney stone     Near syncope 12/17/2020    Prostatitis      Shortness of breath     Sleep apnea     Stroke     Urinary tract infection        Social history:   Social History     Tobacco Use    Smoking status: Former     Types: Cigarettes    Smokeless tobacco: Never   Substance Use Topics    Alcohol use: Yes     Comment: occasionally       Allergies:    Penicillins and Rivaroxaban    Vaccine History:   Immunization History   Administered Date(s) Administered    Covid-19 (Pfizer) Gray Cap Monovalent 05/18/2021, 11/17/2021, 11/26/2021    FLUAD TRI 65YR+ 10/31/2019    Fluzone  >6mos 09/22/2014    Fluzone High-Dose 65+YRS 10/14/2016, 10/16/2017, 11/14/2018    H1N1 Inj 12/22/2009    Hepatitis A 09/17/2019, 11/19/2019    Influenza, Unspecified 09/18/2013    Pneumococcal Conjugate 13-Valent (PCV13) 10/12/2015    Pneumococcal Polysaccharide (PPSV23) 09/24/2013    Shingrix 09/17/2019, 11/19/2019    Zostavax 02/12/2013       [id number] SCORE   [id number] Score for Atrial Fibrillation Stroke Risk  Age in Years: 75+  Sex: Male  CHF History: Yes  Hypertension History: Yes  Stroke/TIA/Thromboembolism History: Yes  Vascular Disease History: No  Diabetes History: No  [id number] Score: 6  Stroke risks -: 6 Points. Risk of ischemic stroke: 9.7%. Risk of stroke/TIA/embolism: 13.6%      This patient is managed via a collaborative agreement, pursuant to IC 25-26-16. The signed protocol is kept in the [facility] at [address] [place] [address].    Education: Efe Malloy has been instructed to call if any changes in medications, doses, concerns, etc. Patient was provided dosing instructions and expressed verbal understanding and has no further questions at this time.    Plan:  1. Anticoagulation   - Resume previous maintenance regimen at this time now that INR has recovered from held doses prior to procedure; warfarin 5 mg PO daily. Total weekly dose = 35 mg.   - RTC in 4 week(s) - discussed that if INR remains therapeutic at next appointment, monitoring  can be extended back to every 6 weeks      Kavita Jolley PharmD  9/5/2024  13:07 EDT

## 2024-10-03 ENCOUNTER — ANTICOAGULATION VISIT (OUTPATIENT)
Dept: PHARMACY | Facility: HOSPITAL | Age: 79
End: 2024-10-03
Payer: MEDICARE

## 2024-10-03 DIAGNOSIS — I48.19 PERSISTENT ATRIAL FIBRILLATION: Primary | ICD-10-CM

## 2024-10-03 LAB
INR PPP: 2.9 (ref 2–3)
INR PPP: 2.9 (ref 2–3)

## 2024-10-03 PROCEDURE — 36416 COLLJ CAPILLARY BLOOD SPEC: CPT

## 2024-10-03 PROCEDURE — 85610 PROTHROMBIN TIME: CPT

## 2024-10-03 PROCEDURE — G0463 HOSPITAL OUTPT CLINIC VISIT: HCPCS

## 2024-10-03 NOTE — PROGRESS NOTES
Anticoagulation Clinic Progress Note    INR Goal: 2 - 3  Today's INR: 2.9 (therapeutic)  Current warfarin dose: warfarin 5 mg PO daily Current total weekly dose = 35 mg per week.     INR History:      Latest Ref Rng & Units 2024     1:30 PM 2024     8:45 AM 2024     2:30 PM 2024     1:00 PM 2024     1:00 PM 10/3/2024    12:00 AM 10/3/2024     1:00 PM   Anticoagulation Monitoring   INR  2.50 2.10 2.40 1.50 2.80  2.90   INR Date  2024 2024 2024 2024 2024  10/3/2024   INR Goal  2.0-3.0 2.0-3.0 2.0-3.0 2.0-3.0 2.0-3.0  2.0-3.0   Trend  Same Same Same Same Same  Same   Last Week Total  35 mg 35 mg 35 mg 35 mg 40 mg  35 mg   Next Week Total  35 mg 35 mg 35 mg 40 mg 35 mg  35 mg   Sun  5 mg 5 mg 5 mg 5 mg 5 mg  5 mg   Mon  5 mg 5 mg 5 mg 5 mg 5 mg  5 mg   Tue  5 mg 5 mg 5 mg 5 mg 5 mg  5 mg   Wed  5 mg 5 mg 5 mg 5 mg 5 mg  5 mg   Thu  5 mg 5 mg 5 mg 10 mg () 5 mg  5 mg   Fri  5 mg 5 mg 5 mg 5 mg 5 mg  5 mg   Sat  5 mg 5 mg 5 mg 5 mg 5 mg  5 mg   Historical INR 2.00 - 3.00      2.90        Visit Report    Report           This result is from an external source.       Anticoagulation Summary  As of 10/3/2024      INR goal:  2.0-3.0   TTR:  77.0% (5.3 y)   INR used for dosin.90 (10/3/2024)   Warfarin maintenance plan:  5 mg every day   Weekly warfarin total:  35 mg   No change documented:  Meeta Chapman, PharmD   Plan last modified:  Kavita Jolley PharmD (2023)   Next INR check:  2024   Priority:  Maintenance   Target end date:      Indications    Persistent atrial fibrillation [I48.19]                 Anticoagulation Episode Summary       INR check location:      Preferred lab:      Send INR reminders to:  [facility] CLINICAL POOL    Comments:  Patient contract signed 11/15/21.          Anticoagulation Care Providers       Provider Role Specialty Phone number    Silvano Gillis MD  Cardiology [phone]            Clinic Interview:    Patient Findings     Negatives:  Signs/symptoms of thrombosis, Signs/symptoms of bleeding,   Laboratory test error suspected, Change in health, Change in alcohol use,   Change in activity, Upcoming invasive procedure, Emergency department   visit, Upcoming dental procedure, Missed doses, Extra doses, Change in   medications, Change in diet/appetite, Hospital admission, Bruising, Other   complaints      Clinical Outcomes     Negatives:  Major bleeding event, Thromboembolic event,   Anticoagulation-related hospital admission, Anticoagulation-related ED   visit, Anticoagulation-related fatality        Current Medications:   Prior to Admission medications    Medication Sig Start Date End Date Taking? Authorizing Provider   albuterol sulfate  (90 Base) MCG/ACT inhaler Inhale 2 puffs Every 6 (Six) Hours As Needed. 7/6/21   Shelby Haskins MD   alfuzosin (UROXATRAL) 10 MG 24 hr tablet Take 1 tablet by mouth 2 (Two) Times a Day. 9/23/19   Shelby Haskins MD   amitriptyline (ELAVIL) 25 MG tablet Take 2 tablets by mouth Every Night.    Shelby Haskins MD   aspirin (aspirin) 81 MG EC tablet Take 1 tablet by mouth Daily. 4/11/22   Silvano Gillis MD   baclofen (LIORESAL) 10 MG tablet Take 1 tablet by mouth 2 (Two) Times a Day. 7/13/21   Shelby Haskins MD   BREO ELLIPTA 100-25 MCG/INH inhaler Inhale 1 puff Daily. 9/24/19   Shelby Haskins MD   digoxin (LANOXIN) 250 MCG tablet TAKE ONE TABLET BY MOUTH EVERY DAY 7/8/24   Silvano Gillis MD   EPINEPHrine (EPIPEN) 0.3 MG/0.3ML solution auto-injector injection epinephrine 0.3 mg/0.3 mL injection, auto-injector    Shelby Haskins MD   ezetimibe (ZETIA) 10 MG tablet Take 1 tablet by mouth Daily. 2/6/24   Silvano Gillis MD   fluorometholone (FML) 0.1 % ophthalmic suspension Administer 1 drop to the right eye Daily. 5/17/21   Shelby Haskins MD   gabapentin (NEURONTIN) 300 MG capsule Take 2 capsules by mouth  2 (Two) Times a Day. 9/13/19   Shelby Haskins MD   hydroCHLOROthiazide (MICROZIDE) 12.5 MG capsule Take 1 capsule by mouth Daily. 8/29/24   Silvano Gillis MD   levothyroxine (SYNTHROID, LEVOTHROID) 75 MCG tablet Take 1 tablet by mouth Daily. 9/23/19   Shelby Haskins MD   meloxicam (MOBIC) 7.5 MG tablet Take 1 tablet by mouth Daily.    Shelby Haskins MD   montelukast (SINGULAIR) 10 MG tablet Take 1 tablet by mouth Every Evening. 9/23/19   Shelby Haskins MD   pantoprazole (PROTONIX) 40 MG EC tablet Take 1 tablet by mouth Daily. 2/22/22   Silvano Gillis MD   pravastatin (PRAVACHOL) 80 MG tablet Take 1 tablet by mouth every night at bedtime. 2/6/24   Silvano Gillis MD   sertraline (ZOLOFT) 100 MG tablet Take 1.5 tablets by mouth Daily. 3/15/22   Silvano Gillis MD   Synthroid 175 MCG tablet Take 0.5 tablets by mouth Daily. 6/4/24   Shelby Haskins MD   theophylline (UNIPHYL) 400 MG 24 hr tablet Take 1 tablet by mouth Daily. 9/23/19   Shelby Haskins MD   verapamil ER (VERELAN) 240 MG 24 hr capsule TAKE 1 CAPSULE BY MOUTH EVERY NIGHT AT BEDTIME 7/1/24   Silvano Gillis MD   warfarin (Coumadin) 5 MG tablet Take 5 mg by mouth daily or as otherwise directed. 3/19/24   Silvano Gillis MD   Xiidra 5 % ophthalmic solution Apply 1 drop to eye(s) as directed by provider Every 12 (Twelve) Hours. 3/28/24   Sehlby Haskins MD       Medical history:   Past Medical History:   Diagnosis Date    Allergic     Anxiety     Arthritis     Asthma     Atrial fibrillation     Atypical chest pain 06/11/2020    Cataract     COPD (chronic obstructive pulmonary disease)     COVID     COVID-19 01/13/2021    Depression     Enlarged prostate     Headache     Hyperlipidemia     Hypertension     Kidney stone     Near syncope 12/17/2020    Prostatitis     Shortness of breath     Sleep apnea     Stroke     Urinary tract infection        Social history:    Social History     Tobacco Use    Smoking status: Former     Types: Cigarettes    Smokeless tobacco: Never   Substance Use Topics    Alcohol use: Yes     Comment: occasionally       Allergies:    Penicillins and Rivaroxaban    Vaccine History:   Immunization History   Administered Date(s) Administered    Covid-19 (Pfizer) Gray Cap Monovalent 05/18/2021, 11/17/2021, 11/26/2021    FLUAD TRI 65YR+ 10/31/2019    Fluzone  >6mos 09/22/2014    Fluzone High-Dose 65+YRS 10/14/2016, 10/16/2017, 11/14/2018    H1N1 Inj 12/22/2009    Hepatitis A 09/17/2019, 11/19/2019    Influenza, Unspecified 09/18/2013    Pneumococcal Conjugate 13-Valent (PCV13) 10/12/2015    Pneumococcal Polysaccharide (PPSV23) 09/24/2013    Shingrix 09/17/2019, 11/19/2019    Zostavax 02/12/2013     [id number] SCORE   [id number] Score for Atrial Fibrillation Stroke Risk  Age in Years: 75+  Sex: Male  CHF History: Yes  Hypertension History: Yes  Stroke/TIA/Thromboembolism History: Yes  Vascular Disease History: No  Diabetes History: No  [id number] Score: 6  Stroke risks -: 6 Points. Risk of ischemic stroke: 9.7%. Risk of stroke/TIA/embolism: 13.6%    This patient is managed via a collaborative agreement, pursuant to IC 25-26-16. The signed protocol is kept in the [facility] at [address] [place] [address].    Education: Efe Malloy has been instructed to call if any changes in medications, doses, concerns, etc. Patient was provided dosing instructions and expressed verbal understanding and has no further questions at this time.    Plan:  1. Anticoagulation   - no change; warfarin 5 mg PO daily  Total weekly dose = 35 mg.   - RTC in 6 week(s)      Bony Chapman, RosettaD  10/3/2024  14:50 EDT

## 2024-10-07 RX ORDER — PRAVASTATIN SODIUM 80 MG/1
80 TABLET ORAL
Qty: 90 TABLET | Refills: 3 | Status: SHIPPED | OUTPATIENT
Start: 2024-10-07

## 2024-10-07 NOTE — TELEPHONE ENCOUNTER
Caller: Efe Malloy    Relationship: Self    Best call back number: [phone]    Requested Prescriptions:   Requested Prescriptions     Pending Prescriptions Disp Refills    pravastatin (PRAVACHOL) 80 MG tablet 90 tablet 1     Sig: Take 1 tablet by mouth every night at bedtime.        Pharmacy where request should be sent: [id number] - [employer] [address] - [phone]  - [phone] FX     Last office visit with prescribing clinician: 7/11/2024   Last telemedicine visit with prescribing clinician: Visit date not found   Next office visit with prescribing clinician: 4/14/2025     Additional details provided by patient: PATIENT IS COMPLETELY OUT OF MEDICATION.     Does the patient have less than a 3 day supply:  [x] Yes  [] No    Would you like a call back once the refill request has been completed: [] Yes [] No    If the office needs to give you a call back, can they leave a voicemail: [] Yes [] No    Anna Gonzalez Rep   10/07/24 11:22 EDT

## 2024-10-21 ENCOUNTER — TRANSCRIBE ORDERS (OUTPATIENT)
Dept: LAB | Facility: HOSPITAL | Age: 79
End: 2024-10-21
Payer: MEDICARE

## 2024-10-21 ENCOUNTER — LAB (OUTPATIENT)
Dept: LAB | Facility: HOSPITAL | Age: 79
End: 2024-10-21
Payer: MEDICARE

## 2024-10-21 DIAGNOSIS — G62.9 POLYNEUROPATHY: Primary | ICD-10-CM

## 2024-10-21 DIAGNOSIS — G62.9 POLYNEUROPATHY: ICD-10-CM

## 2024-10-21 LAB
FOLATE SERPL-MCNC: 5.95 NG/ML (ref 4.78–24.2)
HBA1C MFR BLD: 7 % (ref 4.8–5.6)
TSH SERPL DL<=0.05 MIU/L-ACNC: 0.43 UIU/ML (ref 0.27–4.2)
VIT B12 BLD-MCNC: 485 PG/ML (ref 211–946)

## 2024-10-21 PROCEDURE — 84446 ASSAY OF VITAMIN E: CPT

## 2024-10-21 PROCEDURE — 83036 HEMOGLOBIN GLYCOSYLATED A1C: CPT

## 2024-10-21 PROCEDURE — 84155 ASSAY OF PROTEIN SERUM: CPT

## 2024-10-21 PROCEDURE — 82607 VITAMIN B-12: CPT

## 2024-10-21 PROCEDURE — 82746 ASSAY OF FOLIC ACID SERUM: CPT

## 2024-10-21 PROCEDURE — 36415 COLL VENOUS BLD VENIPUNCTURE: CPT

## 2024-10-21 PROCEDURE — 84443 ASSAY THYROID STIM HORMONE: CPT

## 2024-10-21 PROCEDURE — 84165 PROTEIN E-PHORESIS SERUM: CPT

## 2024-10-22 LAB
ALBUMIN SERPL ELPH-MCNC: 3.8 G/DL (ref 2.9–4.4)
ALBUMIN/GLOB SERPL: 1.4 {RATIO} (ref 0.7–1.7)
ALPHA1 GLOB SERPL ELPH-MCNC: 0.2 G/DL (ref 0–0.4)
ALPHA2 GLOB SERPL ELPH-MCNC: 0.6 G/DL (ref 0.4–1)
B-GLOBULIN SERPL ELPH-MCNC: 1 G/DL (ref 0.7–1.3)
GAMMA GLOB SERPL ELPH-MCNC: 1 G/DL (ref 0.4–1.8)
GLOBULIN SER CALC-MCNC: 2.7 G/DL (ref 2.2–3.9)
LABORATORY COMMENT REPORT: NORMAL
M PROTEIN SERPL ELPH-MCNC: NORMAL G/DL
PROT PATTERN SERPL ELPH-IMP: NORMAL
PROT SERPL-MCNC: 6.5 G/DL (ref 6–8.5)

## 2024-10-23 LAB
A-TOCOPHEROL VIT E SERPL-MCNC: 10.4 MG/L (ref 9–29)
GAMMA TOCOPHEROL SERPL-MCNC: 1.8 MG/L (ref 0.5–4.9)

## 2024-11-14 ENCOUNTER — PHARMACY IMMUNIZATION REVIEW (OUTPATIENT)
Dept: PHARMACY | Facility: HOSPITAL | Age: 79
End: 2024-11-14
Payer: MEDICARE

## 2024-11-14 ENCOUNTER — ANTICOAGULATION VISIT (OUTPATIENT)
Dept: PHARMACY | Facility: HOSPITAL | Age: 79
End: 2024-11-14
Payer: MEDICARE

## 2024-11-14 DIAGNOSIS — I48.19 PERSISTENT ATRIAL FIBRILLATION: Primary | ICD-10-CM

## 2024-11-14 LAB
INR PPP: 2.3 (ref 0.9–1.1)
PROTHROMBIN TIME: 24.9 SECONDS

## 2024-11-14 PROCEDURE — G0463 HOSPITAL OUTPT CLINIC VISIT: HCPCS

## 2024-11-14 PROCEDURE — 85610 PROTHROMBIN TIME: CPT

## 2024-11-14 PROCEDURE — 36416 COLLJ CAPILLARY BLOOD SPEC: CPT

## 2024-11-14 NOTE — PROGRESS NOTES
I have reviewed the notes, assessments, and/or procedures performed by Ana Brannon, pharmacy technician, I concur with her documentation of Efe Malloy.

## 2024-11-14 NOTE — PROGRESS NOTES
[facility] Vaccination Administration    Patient reported to [facility] via referral on 11/14/2024    Allergies:    Penicillins and Rivaroxaban    Vaccine History:   Immunization History   Administered Date(s) Administered    ABRYSVO (RSV, 60+ or pregnant women 32-36 wks) 11/14/2024    Covid-19 (Pfizer) Gray Cap Monovalent 05/18/2021, 11/17/2021, 11/26/2021    FLUAD TRI 65YR+ 10/31/2019    Fluzone  >6mos 09/22/2014    Fluzone High-Dose 65+YRS 10/14/2016, 10/16/2017, 11/14/2018    H1N1 Inj 12/22/2009    Hepatitis A 09/17/2019, 11/19/2019    Influenza, Unspecified 09/18/2013    Pneumococcal Conjugate 13-Valent (PCV13) 10/12/2015    Pneumococcal Polysaccharide (PPSV23) 09/24/2013    Shingrix 09/17/2019, 11/19/2019    Zostavax 02/12/2013       Plan:    The following vaccines were administered today:  RSV    Mariaa Brannon  11/14/2024  13:24 EST

## 2024-11-14 NOTE — PROGRESS NOTES
Anticoagulation Clinic Progress Note    INR Goal: 2 - 3  Today's INR: 2.3 (therapeutic)  Current warfarin dose: warfarin 5 mg PO daily. Current total weekly dose = 35 mg per week.     Of note, patient having vision trouble but says this has been going on for a while. Had MRI done today for a specialist that he was referred to by his ophthalmologist.     INR History:      Latest Ref Rng & Units 2024     8:45 AM 2024     2:30 PM 2024     1:00 PM 2024     1:00 PM 10/3/2024    12:00 AM 10/3/2024     1:00 PM 2024     1:00 PM   Anticoagulation Monitoring   INR  2.10 2.40 1.50 2.80  2.90 2.30   INR Date  2024 2024 2024 2024  10/3/2024 2024   INR Goal  2.0-3.0 2.0-3.0 2.0-3.0 2.0-3.0  2.0-3.0 2.0-3.0   Trend  Same Same Same Same  Same Same   Last Week Total  35 mg 35 mg 35 mg 40 mg  35 mg 35 mg   Next Week Total  35 mg 35 mg 40 mg 35 mg  35 mg 35 mg   Sun  5 mg 5 mg 5 mg 5 mg  5 mg 5 mg   Mon  5 mg 5 mg 5 mg 5 mg  5 mg 5 mg   Tue  5 mg 5 mg 5 mg 5 mg  5 mg 5 mg   Wed  5 mg 5 mg 5 mg 5 mg  5 mg 5 mg   Thu  5 mg 5 mg 10 mg () 5 mg  5 mg 5 mg   Fri  5 mg 5 mg 5 mg 5 mg  5 mg 5 mg   Sat  5 mg 5 mg 5 mg 5 mg  5 mg 5 mg   Historical INR 2.00 - 3.00     2.90         Visit Report   Report            This result is from an external source.       Anticoagulation Summary  As of 2024      INR goal:  2.0-3.0   TTR:  77.5% (5.4 y)   INR used for dosin.30 (2024)   Warfarin maintenance plan:  5 mg every day   Weekly warfarin total:  35 mg   No change documented:  Kavita Jolley, PharmD   Plan last modified:  Kavita Jolley, PharmD (2023)   Next INR check:  2024   Priority:  Maintenance   Target end date:  --    Indications    Persistent atrial fibrillation [I48.19]                 Anticoagulation Episode Summary       INR check location:  --    Preferred lab:  --    Send INR reminders to:  [facility] CLINICAL POOL    Comments:  Patient contract signed  11/15/21.          Anticoagulation Care Providers       Provider Role Specialty Phone number    Silvano Gillis MD  Cardiology [phone]            Clinic Interview:   Patient Findings     Negatives:  Signs/symptoms of thrombosis, Signs/symptoms of bleeding,   Laboratory test error suspected, Change in health, Change in alcohol use,   Change in activity, Upcoming invasive procedure, Emergency department   visit, Upcoming dental procedure, Missed doses, Extra doses, Change in   medications, Change in diet/appetite, Hospital admission, Bruising, Other   complaints      Clinical Outcomes     Negatives:  Major bleeding event, Thromboembolic event,   Anticoagulation-related hospital admission, Anticoagulation-related ED   visit, Anticoagulation-related fatality        Current Medications:   Prior to Admission medications    Medication Sig Start Date End Date Taking? Authorizing Provider   albuterol sulfate  (90 Base) MCG/ACT inhaler Inhale 2 puffs Every 6 (Six) Hours As Needed. 7/6/21   Shelby Haskins MD   alfuzosin (UROXATRAL) 10 MG 24 hr tablet Take 1 tablet by mouth 2 (Two) Times a Day. 9/23/19   Shelby Haskins MD   amitriptyline (ELAVIL) 25 MG tablet Take 2 tablets by mouth Every Night.    Shelby Haskins MD   aspirin (aspirin) 81 MG EC tablet Take 1 tablet by mouth Daily. 4/11/22   Silvano Gillis MD   baclofen (LIORESAL) 10 MG tablet Take 1 tablet by mouth 2 (Two) Times a Day. 7/13/21   Shelby Haskins MD   BREO ELLIPTA 100-25 MCG/INH inhaler Inhale 1 puff Daily. 9/24/19   Shelby Haskins MD   digoxin (LANOXIN) 250 MCG tablet TAKE ONE TABLET BY MOUTH EVERY DAY 7/8/24   Silvano Gillis MD   EPINEPHrine (EPIPEN) 0.3 MG/0.3ML solution auto-injector injection epinephrine 0.3 mg/0.3 mL injection, auto-injector    Shelby Haskins MD   ezetimibe (ZETIA) 10 MG tablet Take 1 tablet by mouth Daily. 2/6/24   Silvano Gillis MD    fluorometholone (FML) 0.1 % ophthalmic suspension Administer 1 drop to the right eye Daily. 5/17/21   Shelby Haskins MD   gabapentin (NEURONTIN) 300 MG capsule Take 2 capsules by mouth 2 (Two) Times a Day. 9/13/19   Shelby Haskins MD   hydroCHLOROthiazide (MICROZIDE) 12.5 MG capsule Take 1 capsule by mouth Daily. 8/29/24   Silvano Gillis MD   levothyroxine (SYNTHROID, LEVOTHROID) 75 MCG tablet Take 1 tablet by mouth Daily. 9/23/19   Shelby Haskins MD   meloxicam (MOBIC) 7.5 MG tablet Take 1 tablet by mouth Daily.    Shelby Haskins MD   montelukast (SINGULAIR) 10 MG tablet Take 1 tablet by mouth Every Evening. 9/23/19   Shelby Haskins MD   pantoprazole (PROTONIX) 40 MG EC tablet Take 1 tablet by mouth Daily. 2/22/22   Silvano Gillis MD   pravastatin (PRAVACHOL) 80 MG tablet Take 1 tablet by mouth every night at bedtime. 10/7/24   Silvano Gillis MD   RSV Pre-Fusion F A&B Vac Rcmb (Abrysvo) 120 MCG/0.5ML reconstituted solution injection Inject 0.5 mL into the appropriate muscle as directed by prescriber 1 time for 1 dose. 11/14/24 11/15/24  Elayne Mcknight MD   sertraline (ZOLOFT) 100 MG tablet Take 1.5 tablets by mouth Daily. 3/15/22   Silvano Gillis MD   Synthroid 175 MCG tablet Take 0.5 tablets by mouth Daily. 6/4/24   Shelby Haskins MD   theophylline (UNIPHYL) 400 MG 24 hr tablet Take 1 tablet by mouth Daily. 9/23/19   Shelby Haskins MD   verapamil ER (VERELAN) 240 MG 24 hr capsule TAKE 1 CAPSULE BY MOUTH EVERY NIGHT AT BEDTIME 7/1/24   Silvano Gillis MD   warfarin (Coumadin) 5 MG tablet Take 5 mg by mouth daily or as otherwise directed. 3/19/24   Silvano Gillis MD   Xiidra 5 % ophthalmic solution Apply 1 drop to eye(s) as directed by provider Every 12 (Twelve) Hours. 3/28/24   Shelby Haskins MD       Medical history:   Past Medical History:   Diagnosis Date    Allergic     Anxiety     Arthritis      Asthma     Atrial fibrillation     Atypical chest pain 06/11/2020    Cataract     COPD (chronic obstructive pulmonary disease)     COVID     COVID-19 01/13/2021    Depression     Enlarged prostate     Headache     Hyperlipidemia     Hypertension     Kidney stone     Near syncope 12/17/2020    Prostatitis     Shortness of breath     Sleep apnea     Stroke     Urinary tract infection        Social history:   Social History     Tobacco Use    Smoking status: Former     Types: Cigarettes    Smokeless tobacco: Never   Substance Use Topics    Alcohol use: Yes     Comment: occasionally       Allergies:    Penicillins and Rivaroxaban    Vaccine History:   Immunization History   Administered Date(s) Administered    ABRYSVO (RSV, 60+ or pregnant women 32-36 wks) 11/14/2024    Covid-19 (Pfizer) Gray Cap Monovalent 05/18/2021, 11/17/2021, 11/26/2021    FLUAD TRI 65YR+ 10/31/2019    Fluzone  >6mos 09/22/2014    Fluzone High-Dose 65+YRS 10/14/2016, 10/16/2017, 11/14/2018    H1N1 Inj 12/22/2009    Hepatitis A 09/17/2019, 11/19/2019    Influenza, Unspecified 09/18/2013    Pneumococcal Conjugate 13-Valent (PCV13) 10/12/2015    Pneumococcal Polysaccharide (PPSV23) 09/24/2013    Shingrix 09/17/2019, 11/19/2019    Zostavax 02/12/2013       [id number] SCORE   [id number] Score for Atrial Fibrillation Stroke Risk  Age in Years: 75+  Sex: Male  CHF History: Yes  Hypertension History: Yes  Stroke/TIA/Thromboembolism History: Yes  Vascular Disease History: No  Diabetes History: No  [id number] Score: 6  Stroke risks -: 6 Points. Risk of ischemic stroke: 9.7%. Risk of stroke/TIA/embolism: 13.6%      This patient is managed via a collaborative agreement, pursuant to IC 25-26-16. The signed protocol is kept in the [facility] at [address] [place] [address].    Education: Efe Malloy has been instructed to call if any changes in medications, doses, concerns, etc. Patient was provided dosing instructions  and expressed verbal understanding and has no further questions at this time.    Plan:  1. Anticoagulation   - no change; warfarin 5 mg PO daily. Total weekly dose = 35 mg.   - RTC in 6 week(s)      Kavita Jolley PharmD  11/14/2024  13:34 EST

## 2024-12-13 RX ORDER — EZETIMIBE 10 MG/1
10 TABLET ORAL DAILY
Qty: 30 TABLET | Refills: 11 | Status: SHIPPED | OUTPATIENT
Start: 2024-12-13

## 2024-12-17 RX ORDER — VERAPAMIL HYDROCHLORIDE 240 MG/1
240 CAPSULE, EXTENDED RELEASE ORAL
Qty: 90 CAPSULE | Refills: 1 | Status: SHIPPED | OUTPATIENT
Start: 2024-12-17

## 2024-12-23 RX ORDER — DIGOXIN 250 MCG
250 TABLET ORAL DAILY
Qty: 90 TABLET | Refills: 1 | Status: SHIPPED | OUTPATIENT
Start: 2024-12-23

## 2024-12-26 ENCOUNTER — ANTICOAGULATION VISIT (OUTPATIENT)
Dept: PHARMACY | Facility: HOSPITAL | Age: 79
End: 2024-12-26
Payer: MEDICARE

## 2024-12-26 DIAGNOSIS — I48.19 PERSISTENT ATRIAL FIBRILLATION: Primary | ICD-10-CM

## 2024-12-26 LAB
INR PPP: 2.3 (ref 0.9–1.1)
PROTHROMBIN TIME: 25.1 SECONDS

## 2024-12-26 PROCEDURE — 36416 COLLJ CAPILLARY BLOOD SPEC: CPT

## 2024-12-26 PROCEDURE — 85610 PROTHROMBIN TIME: CPT

## 2024-12-26 PROCEDURE — G0463 HOSPITAL OUTPT CLINIC VISIT: HCPCS

## 2024-12-26 NOTE — PROGRESS NOTES
Anticoagulation Clinic Progress Note    INR Goal: 2 - 3  Today's INR: 2.3 (therapeutic)  Current warfarin dose: warfarin 5 mg PO daily. Current total weekly dose = 35 mg per week.     INR History:      Latest Ref Rng & Units 2024     2:30 PM 2024     1:00 PM 2024     1:00 PM 10/3/2024    12:00 AM 10/3/2024     1:00 PM 2024     1:00 PM 2024     1:00 PM   Anticoagulation Monitoring   INR  2.40 1.50 2.80  2.90 2.30 2.30   INR Date  2024 2024 2024  10/3/2024 2024 2024   INR Goal  2.0-3.0 2.0-3.0 2.0-3.0  2.0-3.0 2.0-3.0 2.0-3.0   Trend  Same Same Same  Same Same Same   Last Week Total  35 mg 35 mg 40 mg  35 mg 35 mg 35 mg   Next Week Total  35 mg 40 mg 35 mg  35 mg 35 mg 35 mg   Sun  5 mg 5 mg 5 mg  5 mg 5 mg 5 mg   Mon  5 mg 5 mg 5 mg  5 mg 5 mg 5 mg   Tue  5 mg 5 mg 5 mg  5 mg 5 mg 5 mg   Wed  5 mg 5 mg 5 mg  5 mg 5 mg 5 mg   Thu  5 mg 10 mg () 5 mg  5 mg 5 mg 5 mg   Fri  5 mg 5 mg 5 mg  5 mg 5 mg 5 mg   Sat  5 mg 5 mg 5 mg  5 mg 5 mg 5 mg   Historical INR 2.00 - 3.00    2.90          Visit Report  Report             This result is from an external source.       Anticoagulation Summary  As of 2024      INR goal:  2.0-3.0   TTR:  78.0% (5.5 y)   INR used for dosin.30 (2024)   Warfarin maintenance plan:  5 mg every day   Weekly warfarin total:  35 mg   No change documented:  Kavita Jolley, Jessy   Plan last modified:  Kavita Jolley PharmD (2023)   Next INR check:  2025   Priority:  Maintenance   Target end date:  --    Indications    Persistent atrial fibrillation [I48.19]                 Anticoagulation Episode Summary       INR check location:  --    Preferred lab:  --    Send INR reminders to:  [facility] CLINICAL POOL    Comments:  Patient contract signed 11/15/21.          Anticoagulation Care Providers       Provider Role Specialty Phone number    Silvano Gillis MD  Cardiology [phone]            [facility]  Interview:   Patient Findings     Negatives:  Signs/symptoms of thrombosis, Signs/symptoms of bleeding,   Laboratory test error suspected, Change in health, Change in alcohol use,   Change in activity, Upcoming invasive procedure, Emergency department   visit, Upcoming dental procedure, Missed doses, Extra doses, Change in   medications, Change in diet/appetite, Hospital admission, Bruising, Other   complaints      Clinical Outcomes     Negatives:  Major bleeding event, Thromboembolic event,   Anticoagulation-related hospital admission, Anticoagulation-related ED   visit, Anticoagulation-related fatality        Current Medications:   Prior to Admission medications    Medication Sig Start Date End Date Taking? Authorizing Provider   albuterol sulfate  (90 Base) MCG/ACT inhaler Inhale 2 puffs Every 6 (Six) Hours As Needed. 7/6/21   Shelby Haskins MD   alfuzosin (UROXATRAL) 10 MG 24 hr tablet Take 1 tablet by mouth 2 (Two) Times a Day. 9/23/19   Shelby Haskins MD   amitriptyline (ELAVIL) 25 MG tablet Take 2 tablets by mouth Every Night.    Shelby Haskins MD   aspirin (aspirin) 81 MG EC tablet Take 1 tablet by mouth Daily. 4/11/22   Silvano Gillis MD   baclofen (LIORESAL) 10 MG tablet Take 1 tablet by mouth 2 (Two) Times a Day. 7/13/21   Shelby Haskins MD   BREO ELLIPTA 100-25 MCG/INH inhaler Inhale 1 puff Daily. 9/24/19   Shelby Haskins MD   digoxin (LANOXIN) 250 MCG tablet TAKE ONE TABLET BY MOUTH EVERY DAY 12/23/24   Silvano Gillis MD   EPINEPHrine (EPIPEN) 0.3 MG/0.3ML solution auto-injector injection epinephrine 0.3 mg/0.3 mL injection, auto-injector    Shelby Haskins MD   ezetimibe (ZETIA) 10 MG tablet TAKE ONE TABLET BY MOUTH EVERY DAY 12/13/24   Silvano Gillis MD   fluorometholone (FML) 0.1 % ophthalmic suspension Administer 1 drop to the right eye Daily. 5/17/21   Shelby Haskins MD   gabapentin (NEURONTIN) 300 MG capsule Take  2 capsules by mouth 2 (Two) Times a Day. 9/13/19   Shelby Haskins MD   hydroCHLOROthiazide (MICROZIDE) 12.5 MG capsule Take 1 capsule by mouth Daily. 8/29/24   Silvano Gillis MD   levothyroxine (SYNTHROID, LEVOTHROID) 75 MCG tablet Take 1 tablet by mouth Daily. 9/23/19   Shelby Haskins MD   meloxicam (MOBIC) 7.5 MG tablet Take 1 tablet by mouth Daily.    Shelby Haskins MD   montelukast (SINGULAIR) 10 MG tablet Take 1 tablet by mouth Every Evening. 9/23/19   Shelby Haskins MD   pantoprazole (PROTONIX) 40 MG EC tablet Take 1 tablet by mouth Daily. 2/22/22   Silvano Gillis MD   pravastatin (PRAVACHOL) 80 MG tablet Take 1 tablet by mouth every night at bedtime. 10/7/24   Silvano Gillis MD   sertraline (ZOLOFT) 100 MG tablet Take 1.5 tablets by mouth Daily. 3/15/22   Silvano Gillis MD   Synthroid 175 MCG tablet Take 0.5 tablets by mouth Daily. 6/4/24   Shelby Haskins MD   theophylline (UNIPHYL) 400 MG 24 hr tablet Take 1 tablet by mouth Daily. 9/23/19   Shelby Haskins MD   verapamil ER (VERELAN) 240 MG 24 hr capsule TAKE 1 CAPSULE BY MOUTH AT BEDTIME 12/17/24   Silvano Gillis MD   warfarin (Coumadin) 5 MG tablet Take 5 mg by mouth daily or as otherwise directed. 3/19/24   Silvano Gillis MD   Xiidra 5 % ophthalmic solution Apply 1 drop to eye(s) as directed by provider Every 12 (Twelve) Hours. 3/28/24   Shelby Haskins MD       Medical history:   Past Medical History:   Diagnosis Date    Allergic     Anxiety     Arthritis     Asthma     Atrial fibrillation     Atypical chest pain 06/11/2020    Cataract     COPD (chronic obstructive pulmonary disease)     COVID     COVID-19 01/13/2021    Depression     Enlarged prostate     Headache     Hyperlipidemia     Hypertension     Kidney stone     Near syncope 12/17/2020    Prostatitis     Shortness of breath     Sleep apnea     Stroke     Urinary tract infection         Social history:   Social History     Tobacco Use    Smoking status: Former     Types: Cigarettes    Smokeless tobacco: Never   Substance Use Topics    Alcohol use: Yes     Comment: occasionally       Allergies:    Penicillins and Rivaroxaban    Vaccine History:   Immunization History   Administered Date(s) Administered    ABRYSVO (RSV, 60+ or pregnant women 32-36 wks) 11/14/2024    Covid-19 (Pfizer) Gray Cap Monovalent 05/18/2021, 11/17/2021, 11/26/2021    FLUAD TRI 65YR+ 10/31/2019    Fluzone  >6mos 09/22/2014    Fluzone High-Dose 65+YRS 10/14/2016, 10/16/2017, 11/14/2018    H1N1 Inj 12/22/2009    Hepatitis A 09/17/2019, 11/19/2019    Influenza, Unspecified 09/18/2013    Pneumococcal Conjugate 13-Valent (PCV13) 10/12/2015    Pneumococcal Polysaccharide (PPSV23) 09/24/2013    Shingrix 09/17/2019, 11/19/2019    Zostavax 02/12/2013       [id number] SCORE   [id number] Score for Atrial Fibrillation Stroke Risk  Age in Years: 75+  Sex: Male  CHF History: Yes  Hypertension History: Yes  Stroke/TIA/Thromboembolism History: Yes  Vascular Disease History: No  Diabetes History: No  [id number] Score: 6  Stroke risks -: 6 Points. Risk of ischemic stroke: 9.7%. Risk of stroke/TIA/embolism: 13.6%      This patient is managed via a collaborative agreement, pursuant to IC 25-26-16. The signed protocol is kept in the [facility] at [address] [place] [address].    Education: Efe Malloy has been instructed to call if any changes in medications, doses, concerns, etc. Patient was provided dosing instructions and expressed verbal understanding and has no further questions at this time.    Plan:  1. Anticoagulation   - no change; warfarin 5 mg PO daily. Total weekly dose = 35 mg.   - RTC in 6 week(s)    - Patient's vision trouble being monitored by ophthalmology and neurology specialists. Instructed patient to inform [facility] if any procedures are planned so that  anticoagulation can be addressed.      Kavita Jolley PharmD  12/26/2024  14:16 EST

## 2024-12-30 NOTE — TELEPHONE ENCOUNTER
Caller: Efe Malloy    Relationship: Self    Best call back number: [phone]     Requested Prescriptions:   Requested Prescriptions     Pending Prescriptions Disp Refills    pravastatin (PRAVACHOL) 80 MG tablet 90 tablet 3     Sig: Take 1 tablet by mouth every night at bedtime.        Pharmacy where request should be sent: [id number] [address] - [phone]  - [phone] FX     Last office visit with prescribing clinician: 7/11/2024   Last telemedicine visit with prescribing clinician: Visit date not found   Next office visit with prescribing clinician: 4/14/2025     Additional details provided by patient: PT ASKING 90 DAY SUPPLY -     Does the patient have less than a 3 day supply:  [] Yes  [x] No    Anna Logan Rep   12/30/24 13:23 EST

## 2024-12-31 RX ORDER — PRAVASTATIN SODIUM 80 MG/1
80 TABLET ORAL
Qty: 90 TABLET | Refills: 3 | Status: SHIPPED | OUTPATIENT
Start: 2024-12-31

## 2025-01-13 RX ORDER — HYDROCHLOROTHIAZIDE 12.5 MG/1
12.5 CAPSULE ORAL DAILY
Qty: 90 CAPSULE | Refills: 1 | Status: SHIPPED | OUTPATIENT
Start: 2025-01-13

## 2025-02-05 ENCOUNTER — ANTICOAGULATION VISIT (OUTPATIENT)
Dept: PHARMACY | Facility: HOSPITAL | Age: 80
End: 2025-02-05
Payer: MEDICARE

## 2025-02-05 DIAGNOSIS — I48.19 PERSISTENT ATRIAL FIBRILLATION: Primary | ICD-10-CM

## 2025-02-05 LAB
INR PPP: 2 (ref 0.9–1.1)
PROTHROMBIN TIME: 21 SECONDS

## 2025-02-05 PROCEDURE — 36416 COLLJ CAPILLARY BLOOD SPEC: CPT

## 2025-02-05 PROCEDURE — 85610 PROTHROMBIN TIME: CPT

## 2025-02-05 PROCEDURE — G0463 HOSPITAL OUTPT CLINIC VISIT: HCPCS

## 2025-02-05 NOTE — PROGRESS NOTES
Anticoagulation Clinic Progress Note    INR Goal: 2 - 3  Today's INR: 2.0 (therapeutic)  Current warfarin dose: warfarin 5 mg PO daily. Current total weekly dose = 35 mg per week.     INR History:      Latest Ref Rng & Units 2024     1:00 PM 2024     1:00 PM 10/3/2024    12:00 AM 10/3/2024     1:00 PM 2024     1:00 PM 2024     1:00 PM 2025     1:30 PM   Anticoagulation Monitoring   INR  1.50 2.80  2.90 2.30 2.30 2.00   INR Date  2024 2024  10/3/2024 2024 2024 2025   INR Goal  2.0-3.0 2.0-3.0  2.0-3.0 2.0-3.0 2.0-3.0 2.0-3.0   Trend  Same Same  Same Same Same Same   Last Week Total  35 mg 40 mg  35 mg 35 mg 35 mg 35 mg   Next Week Total  40 mg 35 mg  35 mg 35 mg 35 mg 35 mg   Sun  5 mg 5 mg  5 mg 5 mg 5 mg 5 mg   Mon  5 mg 5 mg  5 mg 5 mg 5 mg 5 mg   Tue  5 mg 5 mg  5 mg 5 mg 5 mg 5 mg   Wed  5 mg 5 mg  5 mg 5 mg 5 mg 5 mg   Thu  10 mg () 5 mg  5 mg 5 mg 5 mg 5 mg   Fri  5 mg 5 mg  5 mg 5 mg 5 mg 5 mg   Sat  5 mg 5 mg  5 mg 5 mg 5 mg 5 mg   Historical INR 2.00 - 3.00   2.90               This result is from an external source.       Anticoagulation Summary  As of 2025      INR goal:  2.0-3.0   TTR:  78.5% (5.6 y)   INR used for dosin.00 (2025)   Warfarin maintenance plan:  5 mg every day   Weekly warfarin total:  35 mg   No change documented:  Kavita Jolley, PharmD   Plan last modified:  Kavita Jolley, Jessy (2023)   Next INR check:  3/19/2025   Priority:  Maintenance   Target end date:  --    Indications    Persistent atrial fibrillation [I48.19]                 Anticoagulation Episode Summary       INR check location:  --    Preferred lab:  --    Send INR reminders to:  [facility] CLINICAL POOL    Comments:  Patient contract signed 11/15/21.          Anticoagulation Care Providers       Provider Role Specialty Phone number    Silvano Gillis MD  Cardiology [phone]            Clinic Interview:   Patient Findings      Negatives:  Signs/symptoms of thrombosis, Signs/symptoms of bleeding,   Laboratory test error suspected, Change in health, Change in alcohol use,   Change in activity, Upcoming invasive procedure, Emergency department   visit, Upcoming dental procedure, Missed doses, Extra doses, Change in   medications, Change in diet/appetite, Hospital admission, Bruising, Other   complaints      Clinical Outcomes     Negatives:  Major bleeding event, Thromboembolic event,   Anticoagulation-related hospital admission, Anticoagulation-related ED   visit, Anticoagulation-related fatality        Current Medications:   Prior to Admission medications    Medication Sig Start Date End Date Taking? Authorizing Provider   albuterol sulfate  (90 Base) MCG/ACT inhaler Inhale 2 puffs Every 6 (Six) Hours As Needed. 7/6/21   Shelby Haskins MD   alfuzosin (UROXATRAL) 10 MG 24 hr tablet Take 1 tablet by mouth 2 (Two) Times a Day. 9/23/19   Shelby Haskins MD   amitriptyline (ELAVIL) 25 MG tablet Take 2 tablets by mouth Every Night.    Shelby Haskins MD   aspirin (aspirin) 81 MG EC tablet Take 1 tablet by mouth Daily. 4/11/22   Silvano Gillis MD   baclofen (LIORESAL) 10 MG tablet Take 1 tablet by mouth 2 (Two) Times a Day. 7/13/21   Shelby Haskins MD   BREO ELLIPTA 100-25 MCG/INH inhaler Inhale 1 puff Daily. 9/24/19   Shelby Haskins MD   digoxin (LANOXIN) 250 MCG tablet TAKE ONE TABLET BY MOUTH EVERY DAY 12/23/24   Silvano Gillis MD   EPINEPHrine (EPIPEN) 0.3 MG/0.3ML solution auto-injector injection epinephrine 0.3 mg/0.3 mL injection, auto-injector    Shelby Haskins MD   ezetimibe (ZETIA) 10 MG tablet TAKE ONE TABLET BY MOUTH EVERY DAY 12/13/24   Silvano Gillis MD   fluorometholone (FML) 0.1 % ophthalmic suspension Administer 1 drop to the right eye Daily. 5/17/21   Shelby Haskins MD   gabapentin (NEURONTIN) 300 MG capsule Take 2 capsules by mouth 2 (Two) Times  a Day. 9/13/19   Shelby Haskins MD   hydroCHLOROthiazide (MICROZIDE) 12.5 MG capsule TAKE ONE CAPSULE BY MOUTH ONCE DAILY 1/13/25   Silvano Gillis MD   levothyroxine (SYNTHROID, LEVOTHROID) 75 MCG tablet Take 1 tablet by mouth Daily. 9/23/19   Shelby Haskins MD   meloxicam (MOBIC) 7.5 MG tablet Take 1 tablet by mouth Daily.    Shelby Haskins MD   montelukast (SINGULAIR) 10 MG tablet Take 1 tablet by mouth Every Evening. 9/23/19   Shelby Haskins MD   pantoprazole (PROTONIX) 40 MG EC tablet Take 1 tablet by mouth Daily. 2/22/22   Silvano Gillis MD   pravastatin (PRAVACHOL) 80 MG tablet Take 1 tablet by mouth every night at bedtime. 12/31/24   Silvano Gillis MD   sertraline (ZOLOFT) 100 MG tablet Take 1.5 tablets by mouth Daily. 3/15/22   Silvano Gillis MD   Synthroid 175 MCG tablet Take 0.5 tablets by mouth Daily. 6/4/24   Shelby Haskins MD   theophylline (UNIPHYL) 400 MG 24 hr tablet Take 1 tablet by mouth Daily. 9/23/19   Shelby Haskins MD   verapamil ER (VERELAN) 240 MG 24 hr capsule TAKE 1 CAPSULE BY MOUTH AT BEDTIME 12/17/24   Silvano Gillis MD   warfarin (Coumadin) 5 MG tablet Take 5 mg by mouth daily or as otherwise directed. 3/19/24   Silvano Gillis MD   Xiidra 5 % ophthalmic solution Apply 1 drop to eye(s) as directed by provider Every 12 (Twelve) Hours. 3/28/24   Shelby Haskins MD       Medical history:   Past Medical History:   Diagnosis Date    Allergic     Anxiety     Arthritis     Asthma     Atrial fibrillation     Atypical chest pain 06/11/2020    Cataract     COPD (chronic obstructive pulmonary disease)     COVID     COVID-19 01/13/2021    Depression     Enlarged prostate     Headache     Hyperlipidemia     Hypertension     Kidney stone     Near syncope 12/17/2020    Prostatitis     Shortness of breath     Sleep apnea     Stroke     Urinary tract infection        Social history:   Social History      Tobacco Use    Smoking status: Former     Types: Cigarettes    Smokeless tobacco: Never   Substance Use Topics    Alcohol use: Yes     Comment: occasionally       Allergies:    Penicillins and Rivaroxaban    Vaccine History:   Immunization History   Administered Date(s) Administered    ABRYSVO (RSV, 60+ or pregnant women 32-36 wks) 11/14/2024    Covid-19 (Pfizer) Gray Cap Monovalent 05/18/2021, 11/17/2021, 11/26/2021    FLUAD TRI 65YR+ 10/31/2019    Fluzone  >6mos 09/22/2014    Fluzone High-Dose 65+YRS 10/14/2016, 10/16/2017, 11/14/2018    H1N1 Inj 12/22/2009    Hepatitis A 09/17/2019, 11/19/2019    Influenza, Unspecified 09/18/2013    Pneumococcal Conjugate 13-Valent (PCV13) 10/12/2015    Pneumococcal Polysaccharide (PPSV23) 09/24/2013    Shingrix 09/17/2019, 11/19/2019    Zostavax 02/12/2013       [id number] SCORE   [id number] Score for Atrial Fibrillation Stroke Risk  Age in Years: 75+  Sex: Male  CHF History: Yes  Hypertension History: Yes  Stroke/TIA/Thromboembolism History: Yes  Vascular Disease History: No  Diabetes History: No  [id number] Score: 6  Stroke risks -: 6 Points. Risk of ischemic stroke: 9.7%. Risk of stroke/TIA/embolism: 13.6%      This patient is managed via a collaborative agreement, pursuant to IC 25-26-16. The signed protocol is kept in the [facility] at [address] [place] [address].    Education: Efe Malloy has been instructed to call if any changes in medications, doses, concerns, etc. Patient was provided dosing instructions and expressed verbal understanding and has no further questions at this time.    Plan:  1. Anticoagulation   - no change; warfarin 5 mg PO daily. Total weekly dose = 35 mg.   - Of note, patient's neurologist suggested transition to DOAC. Patient has not talked to Dr. Gillis about this yet but states he has had issues with one of them in the past (he thinks it was once daily so likely rivaroxaban). Requested patient keep  [facility] updated on if this transition is going to occur or not.  - RTC in 6 week(s)      Kavita Jolley PharmD  2/5/2025  13:58 EST

## 2025-03-10 ENCOUNTER — TRANSCRIBE ORDERS (OUTPATIENT)
Dept: ADMINISTRATIVE | Facility: HOSPITAL | Age: 80
End: 2025-03-10
Payer: MEDICARE

## 2025-03-10 ENCOUNTER — LAB (OUTPATIENT)
Dept: LAB | Facility: HOSPITAL | Age: 80
End: 2025-03-10
Payer: MEDICARE

## 2025-03-10 DIAGNOSIS — E11.69 DIABETES MELLITUS ASSOCIATED WITH HORMONAL ETIOLOGY: ICD-10-CM

## 2025-03-10 DIAGNOSIS — E03.9 ACQUIRED HYPOTHYROIDISM: ICD-10-CM

## 2025-03-10 DIAGNOSIS — E78.2 MIXED HYPERLIPIDEMIA: ICD-10-CM

## 2025-03-10 DIAGNOSIS — I10 ESSENTIAL HYPERTENSION, MALIGNANT: ICD-10-CM

## 2025-03-10 DIAGNOSIS — I10 ESSENTIAL HYPERTENSION, MALIGNANT: Primary | ICD-10-CM

## 2025-03-10 LAB
ALBUMIN SERPL-MCNC: 4.4 G/DL (ref 3.5–5.2)
ALBUMIN/GLOB SERPL: 1.6 G/DL
ALP SERPL-CCNC: 59 U/L (ref 39–117)
ALT SERPL W P-5'-P-CCNC: 28 U/L (ref 1–41)
ANION GAP SERPL CALCULATED.3IONS-SCNC: 9.2 MMOL/L (ref 5–15)
AST SERPL-CCNC: 39 U/L (ref 1–40)
BASOPHILS # BLD AUTO: 0.04 10*3/MM3 (ref 0–0.2)
BASOPHILS NFR BLD AUTO: 0.5 % (ref 0–1.5)
BILIRUB SERPL-MCNC: 0.6 MG/DL (ref 0–1.2)
BUN SERPL-MCNC: 16 MG/DL (ref 8–23)
BUN/CREAT SERPL: 13.7 (ref 7–25)
CALCIUM SPEC-SCNC: 9.9 MG/DL (ref 8.6–10.5)
CHLORIDE SERPL-SCNC: 101 MMOL/L (ref 98–107)
CHOLEST SERPL-MCNC: 117 MG/DL (ref 0–200)
CO2 SERPL-SCNC: 28.8 MMOL/L (ref 22–29)
CREAT SERPL-MCNC: 1.17 MG/DL (ref 0.76–1.27)
DEPRECATED RDW RBC AUTO: 42.5 FL (ref 37–54)
EGFRCR SERPLBLD CKD-EPI 2021: 64.2 ML/MIN/1.73
EOSINOPHIL # BLD AUTO: 0.12 10*3/MM3 (ref 0–0.4)
EOSINOPHIL NFR BLD AUTO: 1.6 % (ref 0.3–6.2)
ERYTHROCYTE [DISTWIDTH] IN BLOOD BY AUTOMATED COUNT: 14.6 % (ref 12.3–15.4)
GLOBULIN UR ELPH-MCNC: 2.7 GM/DL
GLUCOSE SERPL-MCNC: 104 MG/DL (ref 65–99)
HBA1C MFR BLD: 7.53 % (ref 4.8–5.6)
HCT VFR BLD AUTO: 49.9 % (ref 37.5–51)
HDLC SERPL-MCNC: 35 MG/DL (ref 40–60)
HGB BLD-MCNC: 15.8 G/DL (ref 13–17.7)
IMM GRANULOCYTES # BLD AUTO: 0.03 10*3/MM3 (ref 0–0.05)
IMM GRANULOCYTES NFR BLD AUTO: 0.4 % (ref 0–0.5)
LDLC SERPL CALC-MCNC: 60 MG/DL (ref 0–100)
LDLC/HDLC SERPL: 1.66 {RATIO}
LYMPHOCYTES # BLD AUTO: 2.53 10*3/MM3 (ref 0.7–3.1)
LYMPHOCYTES NFR BLD AUTO: 32.7 % (ref 19.6–45.3)
MCH RBC QN AUTO: 25.7 PG (ref 26.6–33)
MCHC RBC AUTO-ENTMCNC: 31.7 G/DL (ref 31.5–35.7)
MCV RBC AUTO: 81.1 FL (ref 79–97)
MONOCYTES # BLD AUTO: 0.69 10*3/MM3 (ref 0.1–0.9)
MONOCYTES NFR BLD AUTO: 8.9 % (ref 5–12)
NEUTROPHILS NFR BLD AUTO: 4.32 10*3/MM3 (ref 1.7–7)
NEUTROPHILS NFR BLD AUTO: 55.9 % (ref 42.7–76)
NRBC BLD AUTO-RTO: 0 /100 WBC (ref 0–0.2)
PLATELET # BLD AUTO: 162 10*3/MM3 (ref 140–450)
PMV BLD AUTO: 9.8 FL (ref 6–12)
POTASSIUM SERPL-SCNC: 4.5 MMOL/L (ref 3.5–5.2)
PROT SERPL-MCNC: 7.1 G/DL (ref 6–8.5)
RBC # BLD AUTO: 6.15 10*6/MM3 (ref 4.14–5.8)
SODIUM SERPL-SCNC: 139 MMOL/L (ref 136–145)
T4 FREE SERPL-MCNC: 1.16 NG/DL (ref 0.93–1.7)
TRIGL SERPL-MCNC: 119 MG/DL (ref 0–150)
TSH SERPL DL<=0.05 MIU/L-ACNC: 0.22 UIU/ML (ref 0.27–4.2)
VLDLC SERPL-MCNC: 22 MG/DL (ref 5–40)
WBC NRBC COR # BLD AUTO: 7.73 10*3/MM3 (ref 3.4–10.8)

## 2025-03-10 PROCEDURE — 80061 LIPID PANEL: CPT

## 2025-03-10 PROCEDURE — 84439 ASSAY OF FREE THYROXINE: CPT

## 2025-03-10 PROCEDURE — 36415 COLL VENOUS BLD VENIPUNCTURE: CPT

## 2025-03-10 PROCEDURE — 83036 HEMOGLOBIN GLYCOSYLATED A1C: CPT

## 2025-03-10 PROCEDURE — 80053 COMPREHEN METABOLIC PANEL: CPT

## 2025-03-10 PROCEDURE — 85025 COMPLETE CBC W/AUTO DIFF WBC: CPT

## 2025-03-10 PROCEDURE — 84443 ASSAY THYROID STIM HORMONE: CPT

## 2025-03-19 ENCOUNTER — ANTICOAGULATION VISIT (OUTPATIENT)
Dept: PHARMACY | Facility: HOSPITAL | Age: 80
End: 2025-03-19
Payer: MEDICARE

## 2025-03-19 DIAGNOSIS — I48.19 PERSISTENT ATRIAL FIBRILLATION: Primary | ICD-10-CM

## 2025-03-19 LAB
INR PPP: 2.4 (ref 0.9–1.1)
PROTHROMBIN TIME: 25.8 SECONDS

## 2025-03-19 PROCEDURE — 85610 PROTHROMBIN TIME: CPT

## 2025-03-19 PROCEDURE — G0463 HOSPITAL OUTPT CLINIC VISIT: HCPCS

## 2025-03-19 PROCEDURE — 36416 COLLJ CAPILLARY BLOOD SPEC: CPT

## 2025-03-19 RX ORDER — PRAVASTATIN SODIUM 80 MG/1
80 TABLET ORAL
Qty: 90 TABLET | Refills: 3 | Status: SHIPPED | OUTPATIENT
Start: 2025-03-19

## 2025-03-19 NOTE — PROGRESS NOTES
Anticoagulation Clinic Progress Note    INR Goal: 2 - 3  Today's INR: 2.4 (therapeutic)  Current warfarin dose: warfarin 5 mg PO daily Current total weekly dose = 35 mg per week.     INR History:      Latest Ref Rng & Units 2024     1:00 PM 10/3/2024    12:00 AM 10/3/2024     1:00 PM 2024     1:00 PM 2024     1:00 PM 2025     1:30 PM 3/19/2025     1:30 PM   Anticoagulation Monitoring   INR  2.80  2.90 2.30 2.30 2.00 2.40   INR Date  2024  10/3/2024 2024 2024 2025 3/19/2025   INR Goal  2.0-3.0  2.0-3.0 2.0-3.0 2.0-3.0 2.0-3.0 2.0-3.0   Trend  Same  Same Same Same Same Same   Last Week Total  40 mg  35 mg 35 mg 35 mg 35 mg 35 mg   Next Week Total  35 mg  35 mg 35 mg 35 mg 35 mg 35 mg   Sun  5 mg  5 mg 5 mg 5 mg 5 mg 5 mg   Mon  5 mg  5 mg 5 mg 5 mg 5 mg 5 mg   Tue  5 mg  5 mg 5 mg 5 mg 5 mg 5 mg   Wed  5 mg  5 mg 5 mg 5 mg 5 mg 5 mg   Thu  5 mg  5 mg 5 mg 5 mg 5 mg 5 mg   Fri  5 mg  5 mg 5 mg 5 mg 5 mg 5 mg   Sat  5 mg  5 mg 5 mg 5 mg 5 mg 5 mg   Historical INR 2.00 - 3.00  2.90                This result is from an external source.       Anticoagulation Summary  As of 3/19/2025      INR goal:  2.0-3.0   TTR:  78.9% (5.7 y)   INR used for dosin.40 (3/19/2025)   Warfarin maintenance plan:  5 mg every day   Weekly warfarin total:  35 mg   No change documented:  Meeta Chapman, PharmD   Plan last modified:  Kavita Jolley, PharmD (2023)   Next INR check:  2025   Priority:  Maintenance   Target end date:  --    Indications    Persistent atrial fibrillation [I48.19]                 Anticoagulation Episode Summary       INR check location:  --    Preferred lab:  --    Send INR reminders to:  [facility] CLINICAL POOL    Comments:  Patient contract signed 11/15/21.          Anticoagulation Care Providers       Provider Role Specialty Phone number    Silvano Gillis MD  Cardiology [phone]            Clinic Interview:   Patient Findings      Negatives:  Signs/symptoms of thrombosis, Signs/symptoms of bleeding,   Laboratory test error suspected, Change in health, Change in alcohol use,   Change in activity, Upcoming invasive procedure, Emergency department   visit, Upcoming dental procedure, Missed doses, Extra doses, Change in   medications, Change in diet/appetite, Hospital admission, Bruising, Other   complaints      Clinical Outcomes     Negatives:  Major bleeding event, Thromboembolic event,   Anticoagulation-related hospital admission, Anticoagulation-related ED   visit, Anticoagulation-related fatality        Current Medications:   Prior to Admission medications    Medication Sig Start Date End Date Taking? Authorizing Provider   albuterol sulfate  (90 Base) MCG/ACT inhaler Inhale 2 puffs Every 6 (Six) Hours As Needed. 7/6/21   Shelby Haskins MD   alfuzosin (UROXATRAL) 10 MG 24 hr tablet Take 1 tablet by mouth 2 (Two) Times a Day. 9/23/19   Shelby Haskins MD   amitriptyline (ELAVIL) 25 MG tablet Take 2 tablets by mouth Every Night.    Shelby Haskins MD   aspirin (aspirin) 81 MG EC tablet Take 1 tablet by mouth Daily. 4/11/22   Silvano Gillis MD   baclofen (LIORESAL) 10 MG tablet Take 1 tablet by mouth 2 (Two) Times a Day. 7/13/21   Shelby Haskins MD   BREO ELLIPTA 100-25 MCG/INH inhaler Inhale 1 puff Daily. 9/24/19   Shelby Haskins MD   digoxin (LANOXIN) 250 MCG tablet TAKE ONE TABLET BY MOUTH EVERY DAY 12/23/24   Silvano Gillis MD   EPINEPHrine (EPIPEN) 0.3 MG/0.3ML solution auto-injector injection epinephrine 0.3 mg/0.3 mL injection, auto-injector    Shelby Haskins MD   ezetimibe (ZETIA) 10 MG tablet TAKE ONE TABLET BY MOUTH EVERY DAY 12/13/24   Silvano Gillis MD   fluorometholone (FML) 0.1 % ophthalmic suspension Administer 1 drop to the right eye Daily. 5/17/21   Shelby Haskins MD   gabapentin (NEURONTIN) 300 MG capsule Take 2 capsules by mouth 2 (Two) Times  a Day. 9/13/19   Shelby Haskins MD   hydroCHLOROthiazide (MICROZIDE) 12.5 MG capsule TAKE ONE CAPSULE BY MOUTH ONCE DAILY 1/13/25   Silvano Gillis MD   levothyroxine (SYNTHROID, LEVOTHROID) 75 MCG tablet Take 1 tablet by mouth Daily. 9/23/19   Shelby Haskins MD   meloxicam (MOBIC) 7.5 MG tablet Take 1 tablet by mouth Daily.    Shelby Haskins MD   montelukast (SINGULAIR) 10 MG tablet Take 1 tablet by mouth Every Evening. 9/23/19   Shelby Haskins MD   pantoprazole (PROTONIX) 40 MG EC tablet Take 1 tablet by mouth Daily. 2/22/22   Silvano Gillis MD   pravastatin (PRAVACHOL) 80 MG tablet Take 1 tablet by mouth every night at bedtime. 12/31/24   Silvano Gillis MD   sertraline (ZOLOFT) 100 MG tablet Take 1.5 tablets by mouth Daily. 3/15/22   Silvano Gillis MD   Synthroid 175 MCG tablet Take 0.5 tablets by mouth Daily. 6/4/24   Shelby Haskins MD   theophylline (UNIPHYL) 400 MG 24 hr tablet Take 1 tablet by mouth Daily. 9/23/19   Shelby Haskins MD   verapamil ER (VERELAN) 240 MG 24 hr capsule TAKE 1 CAPSULE BY MOUTH AT BEDTIME 12/17/24   Silvano Gillis MD   warfarin (Coumadin) 5 MG tablet Take 5 mg by mouth daily or as otherwise directed. 3/19/24   Silvano Gillis MD   Xiidra 5 % ophthalmic solution Apply 1 drop to eye(s) as directed by provider Every 12 (Twelve) Hours. 3/28/24   Shelby Haskins MD       Medical history:   Past Medical History:   Diagnosis Date    Allergic     Anxiety     Arthritis     Asthma     Atrial fibrillation     Atypical chest pain 06/11/2020    Cataract     COPD (chronic obstructive pulmonary disease)     COVID     COVID-19 01/13/2021    Depression     Enlarged prostate     Headache     Hyperlipidemia     Hypertension     Kidney stone     Near syncope 12/17/2020    Prostatitis     Shortness of breath     Sleep apnea     Stroke     Urinary tract infection        Social history:   Social History      Tobacco Use    Smoking status: Former     Types: Cigarettes    Smokeless tobacco: Never   Substance Use Topics    Alcohol use: Yes     Comment: occasionally       Allergies:    Penicillins and Rivaroxaban    Vaccine History:   Immunization History   Administered Date(s) Administered    ABRYSVO (RSV, 60+ or pregnant women 32-36 wks) 11/14/2024    Covid-19 (Pfizer) Gray Cap Monovalent 05/18/2021, 11/17/2021, 11/26/2021    FLUAD TRI 65YR+ 10/31/2019    Fluzone  >6mos 09/22/2014    Fluzone High-Dose 65+YRS 10/14/2016, 10/16/2017, 11/14/2018    H1N1 Inj 12/22/2009    Hepatitis A 09/17/2019, 11/19/2019    Influenza, Unspecified 09/18/2013    Pneumococcal Conjugate 13-Valent (PCV13) 10/12/2015    Pneumococcal Polysaccharide (PPSV23) 09/24/2013    Shingrix 09/17/2019, 11/19/2019    Zostavax 02/12/2013       [id number] SCORE   [id number] Score for Atrial Fibrillation Stroke Risk  Age in Years: 75+  Sex: Male  CHF History: Yes  Hypertension History: Yes  Stroke/TIA/Thromboembolism History: Yes  Vascular Disease History: No  Diabetes History: No  [id number] Score: 6  Stroke risks -: 6 Points. Risk of ischemic stroke: 9.7%. Risk of stroke/TIA/embolism: 13.6%      This patient is managed via a collaborative agreement, pursuant to IC 25-26-16. The signed protocol is kept in the [facility] at [address] [place] [facility].    Education: Efe Malloy has been instructed to call if any changes in medications, doses, concerns, etc. Patient was provided dosing instructions and expressed verbal understanding and has no further questions at this time.    Plan:  1. Anticoagulation   - no change; warfarin 5 mg PO daily  Total weekly dose = 35 mg.   - RTC in 6 week(s)    -Neurology wants patient to discuss DOAC options with Dr. Gillis at his 4/14 appointment.  Patient will let us know after his Dr. Gillis appointment.      Bony Chapman, PharmD  3/19/2025  13:40 EDT

## 2025-04-14 ENCOUNTER — OFFICE VISIT (OUTPATIENT)
Dept: CARDIOLOGY | Facility: CLINIC | Age: 80
End: 2025-04-14
Payer: MEDICARE

## 2025-04-14 VITALS
RESPIRATION RATE: 16 BRPM | BODY MASS INDEX: 34.85 KG/M2 | SYSTOLIC BLOOD PRESSURE: 112 MMHG | DIASTOLIC BLOOD PRESSURE: 73 MMHG | OXYGEN SATURATION: 95 % | HEART RATE: 49 BPM | HEIGHT: 73 IN | WEIGHT: 263 LBS

## 2025-04-14 DIAGNOSIS — R07.89 ATYPICAL CHEST PAIN: ICD-10-CM

## 2025-04-14 DIAGNOSIS — I63.432 CEREBRAL INFARCTION DUE TO EMBOLISM OF LEFT POSTERIOR CEREBRAL ARTERY: ICD-10-CM

## 2025-04-14 DIAGNOSIS — I25.701 ATHEROSCLEROSIS OF CORONARY ARTERY BYPASS GRAFT OF NATIVE HEART WITH ANGINA PECTORIS WITH DOCUMENTED SPASM: ICD-10-CM

## 2025-04-14 DIAGNOSIS — E03.9 HYPOTHYROIDISM, UNSPECIFIED TYPE: ICD-10-CM

## 2025-04-14 DIAGNOSIS — I10 ESSENTIAL HYPERTENSION: ICD-10-CM

## 2025-04-14 DIAGNOSIS — G45.9 TIA (TRANSIENT ISCHEMIC ATTACK): ICD-10-CM

## 2025-04-14 DIAGNOSIS — I50.9 CONGESTIVE HEART FAILURE, UNSPECIFIED HF CHRONICITY, UNSPECIFIED HEART FAILURE TYPE: ICD-10-CM

## 2025-04-14 DIAGNOSIS — I48.19 PERSISTENT ATRIAL FIBRILLATION: Primary | ICD-10-CM

## 2025-04-14 DIAGNOSIS — E78.2 MIXED HYPERLIPIDEMIA: ICD-10-CM

## 2025-04-14 NOTE — PROGRESS NOTES
"Chief Complaint  Follow-up (9 mo )    Subjective        Efe Malloy presents to [facility] CARDIOLOGY  History of Present Illness    79-year-old male presents for cardiac follow-up.  Last seen in the office with Dr. Gillis in July 2024.  He has a known history of chest pain with multiple stress tests ordered.  All of which been unremarkable.  He has a past medical history of persistent atrial fibrillation which is rate controlled, TIA.      Today he reports intermittent chest pain. It has increased in frequency and duration for the last month or so. It is substernal and non-radiating, described as burning in sensation.    Of note he had a recent left heart catheterization in September of 2021 which revealed borderline obstructive disease and a large ramus branch.  IFR/FFR negative and did not meet hemodynamic criteria to be stented.  He continues on maximal goal-directed medical therapy.    Review of Systems   Constitutional: Negative.    HENT: Negative.     Respiratory: Negative.     Cardiovascular: Negative.  Positive for chest pain.   Musculoskeletal: Negative.    Skin: Negative.    Neurological: Negative.         Objective   Vital Signs:  /73 (BP Location: Right arm, Patient Position: Sitting)   Pulse (!) 49 Comment: ekg  Resp 16   Ht 185.4 cm (73\")   Wt 119 kg (263 lb)   SpO2 95%   BMI 34.70 kg/m²   Estimated body mass index is 34.7 kg/m² as calculated from the following:    Height as of this encounter: 185.4 cm (73\").    Weight as of this encounter: 119 kg (263 lb).          Physical Exam  Vitals and nursing note reviewed.   Constitutional:       General: He is not in acute distress.     Appearance: Normal appearance. He is not toxic-appearing.   HENT:      Head: Normocephalic and atraumatic.      Nose: Nose normal.      Mouth/Throat:      Mouth: Mucous membranes are moist.      Pharynx: Oropharynx is clear.   Eyes:      Pupils: Pupils are equal, round, and reactive to light. "   Cardiovascular:      Rate and Rhythm: Normal rate and regular rhythm.      Pulses: Normal pulses.      Heart sounds: Normal heart sounds.   Pulmonary:      Effort: Pulmonary effort is normal.      Breath sounds: Normal breath sounds.   Abdominal:      General: Bowel sounds are normal.      Palpations: Abdomen is soft.   Musculoskeletal:         General: Normal range of motion.      Cervical back: Normal range of motion and neck supple.   Skin:     General: Skin is warm and dry.   Neurological:      General: No focal deficit present.      Mental Status: He is alert and oriented to person, place, and time.   Psychiatric:         Mood and Affect: Mood normal.         Behavior: Behavior normal.         Thought Content: Thought content normal.         Judgment: Judgment normal.        Result Review :  The following data was reviewed by: Antione Gamboa DO on 04/14/2025:  Common labs          8/9/2024    11:54 10/21/2024    09:15 3/10/2025    16:17   Common Labs   Glucose 128   104    BUN 17   16    Creatinine 1.21   1.17    Sodium 140   139    Potassium 4.1   4.5    Chloride 102   101    Calcium 9.3   9.9    Albumin  3.8  4.4    Total Bilirubin   0.6    Alkaline Phosphatase   59    AST (SGOT)   39    ALT (SGPT)   28    WBC 6.28   7.73    Hemoglobin 16.3   15.8    Hematocrit 49.7   49.9    Platelets 164   162    Total Cholesterol   117    Triglycerides   119    HDL Cholesterol   35    LDL Cholesterol    60    Hemoglobin A1C  7.00  7.53      CBC          8/9/2024    11:54 3/10/2025    16:17   CBC   WBC 6.28  7.73    RBC 6.20  6.15    Hemoglobin 16.3  15.8    Hematocrit 49.7  49.9    MCV 80.2  81.1    MCH 26.3  25.7    MCHC 32.8  31.7    RDW 14.5  14.6    Platelets 164  162      CBC w/diff          8/9/2024    11:54 3/10/2025    16:17   CBC w/Diff   WBC 6.28  7.73    RBC 6.20  6.15    Hemoglobin 16.3  15.8    Hematocrit 49.7  49.9    MCV 80.2  81.1    MCH 26.3  25.7    MCHC 32.8  31.7    RDW 14.5  14.6    Platelets 164   162    Neutrophil Rel % 54.5  55.9    Immature Granulocyte Rel % 0.8  0.4    Lymphocyte Rel % 33.4  32.7    Monocyte Rel % 8.6  8.9    Eosinophil Rel % 2.2  1.6    Basophil Rel % 0.5  0.5      Lipid Panel          3/10/2025    16:17   Lipid Panel   Total Cholesterol 117    Triglycerides 119    HDL Cholesterol 35    VLDL Cholesterol 22    LDL Cholesterol  60    LDL/HDL Ratio 1.66      Data reviewed : Cardiology studies        ECG 12 Lead    Date/Time: 4/14/2025 2:04 PM  Performed by: Antione Gamboa DO    Authorized by: Antione Gamboa DO  Rhythm: atrial fibrillation  Ectopy: unifocal PVCs  Conduction: left anterior fascicular block  Other findings: non-specific ST-T wave changes    Clinical impression: abnormal EKG          Left heart catheterization 9/17/2021  Findings next   #1 open aortic pressure, 157/96  2.  Closing pressure was unchanged next   #3 LVEDP 12 to 16 mmHg  4.  Normal LV systolic function EF of 65 to 70%  5.  No transaortic gradient      Most recent echo as reviewed and interpreted by me:  Results for orders placed during the hospital encounter of 04/09/22     Adult Transthoracic Echo Complete W/ Cont if Necessary Per Protocol     Interpretation Summary  · Left ventricular wall thickness is consistent with mild concentric hypertrophy.  · Estimated left ventricular EF = 65% Left ventricular systolic function is normal.  · There is calcification of the aortic valve mainly affecting the right coronary cusp(s).  · Saline test results are negative for right to left atrial level shunt.  · Estimated right ventricular systolic pressure from tricuspid regurgitation is normal (<35 mmHg).  · Left ventricular diastolic function is consistent with (grade II w/high LAP) pseudonormalization.     Assessment and Plan   Diagnoses and all orders for this visit:    1. Persistent atrial fibrillation (Primary)  Comments:  - patient was on warfarin. He is tired of INR checks and asks if he can switch to  Eliquis.  -  Orders:  -     ECG 12 Lead  -     Stress Test With Myocardial Perfusion (1 Day); Future  -     Adult Transthoracic Echo Complete W/ Color, Spectral and Contrast if Necessary Per Protocol; Future    2. Essential hypertension    3. Atypical chest pain  Assessment & Plan:  Plan for repeat stress test and echocardiogram      4. Congestive heart failure, unspecified HF chronicity, unspecified heart failure type    5. Mixed hyperlipidemia    6. Hypothyroidism, unspecified type    7. TIA (transient ischemic attack)  Assessment & Plan:  Plan for carotid duplex.    Orders:  -     US Carotid Bilateral; Future    8. 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  Dispense: 180 tablet; Refill: 3             Follow Up   No follow-ups on file.  Patient was given instructions and counseling regarding his condition or for health maintenance advice. Please see specific information pulled into the AVS if appropriate.

## 2025-04-14 NOTE — LETTER
"April 15, 2025     Uday Beltran MD  [address]  [place] [address]    Patient: Efe Malloy   YOB: 1945   Date of Visit: 4/14/2025     Dear Uday Beltran MD:       Thank you for referring Efe Malloy to me for evaluation. Below are the relevant portions of my assessment and plan of care.    If you have questions, please do not hesitate to call me. I look forward to following Efe along with you.         Sincerely,        Antione Gamboa DO        CC: No Recipients    Antione Gamboa DO  04/15/25 0924  Sign when Signing Visit  Chief Complaint  Follow-up (9 mo )    Subjective       Efe Malloy presents to [facility] CARDIOLOGY  History of Present Illness    79-year-old male presents for cardiac follow-up.  Last seen in the office with Dr. Gillis in July 2024.  He has a known history of chest pain with multiple stress tests ordered.  All of which been unremarkable.  He has a past medical history of persistent atrial fibrillation which is rate controlled, TIA.      Today he reports intermittent chest pain. It has increased in frequency and duration for the last month or so. It is substernal and non-radiating, described as burning in sensation.    Of note he had a recent left heart catheterization in September of 2021 which revealed borderline obstructive disease and a large ramus branch.  IFR/FFR negative and did not meet hemodynamic criteria to be stented.  He continues on maximal goal-directed medical therapy.    Review of Systems   Constitutional: Negative.    HENT: Negative.     Respiratory: Negative.     Cardiovascular: Negative.  Positive for chest pain.   Musculoskeletal: Negative.    Skin: Negative.    Neurological: Negative.         Objective  Vital Signs:  /73 (BP Location: Right arm, Patient Position: Sitting)   Pulse (!) 49 Comment: ekg  Resp 16   Ht 185.4 cm (73\")   Wt 119 kg (263 lb)   SpO2 95%   BMI 34.70 kg/m²   Estimated body mass index " "is 34.7 kg/m² as calculated from the following:    Height as of this encounter: 185.4 cm (73\").    Weight as of this encounter: 119 kg (263 lb).          Physical Exam  Vitals and nursing note reviewed.   Constitutional:       General: He is not in acute distress.     Appearance: Normal appearance. He is not toxic-appearing.   HENT:      Head: Normocephalic and atraumatic.      Nose: Nose normal.      Mouth/Throat:      Mouth: Mucous membranes are moist.      Pharynx: Oropharynx is clear.   Eyes:      Pupils: Pupils are equal, round, and reactive to light.   Cardiovascular:      Rate and Rhythm: Normal rate and regular rhythm.      Pulses: Normal pulses.      Heart sounds: Normal heart sounds.   Pulmonary:      Effort: Pulmonary effort is normal.      Breath sounds: Normal breath sounds.   Abdominal:      General: Bowel sounds are normal.      Palpations: Abdomen is soft.   Musculoskeletal:         General: Normal range of motion.      Cervical back: Normal range of motion and neck supple.   Skin:     General: Skin is warm and dry.   Neurological:      General: No focal deficit present.      Mental Status: He is alert and oriented to person, place, and time.   Psychiatric:         Mood and Affect: Mood normal.         Behavior: Behavior normal.         Thought Content: Thought content normal.         Judgment: Judgment normal.        Result Review:  The following data was reviewed by: Antione Gamboa DO on 04/14/2025:  Common labs          8/9/2024    11:54 10/21/2024    09:15 3/10/2025    16:17   Common Labs   Glucose 128   104    BUN 17   16    Creatinine 1.21   1.17    Sodium 140   139    Potassium 4.1   4.5    Chloride 102   101    Calcium 9.3   9.9    Albumin  3.8  4.4    Total Bilirubin   0.6    Alkaline Phosphatase   59    AST (SGOT)   39    ALT (SGPT)   28    WBC 6.28   7.73    Hemoglobin 16.3   15.8    Hematocrit 49.7   49.9    Platelets 164   162    Total Cholesterol   117    Triglycerides   119    HDL " Cholesterol   35    LDL Cholesterol    60    Hemoglobin A1C  7.00  7.53      CBC          8/9/2024    11:54 3/10/2025    16:17   CBC   WBC 6.28  7.73    RBC 6.20  6.15    Hemoglobin 16.3  15.8    Hematocrit 49.7  49.9    MCV 80.2  81.1    MCH 26.3  25.7    MCHC 32.8  31.7    RDW 14.5  14.6    Platelets 164  162      CBC w/diff          8/9/2024    11:54 3/10/2025    16:17   CBC w/Diff   WBC 6.28  7.73    RBC 6.20  6.15    Hemoglobin 16.3  15.8    Hematocrit 49.7  49.9    MCV 80.2  81.1    MCH 26.3  25.7    MCHC 32.8  31.7    RDW 14.5  14.6    Platelets 164  162    Neutrophil Rel % 54.5  55.9    Immature Granulocyte Rel % 0.8  0.4    Lymphocyte Rel % 33.4  32.7    Monocyte Rel % 8.6  8.9    Eosinophil Rel % 2.2  1.6    Basophil Rel % 0.5  0.5      Lipid Panel          3/10/2025    16:17   Lipid Panel   Total Cholesterol 117    Triglycerides 119    HDL Cholesterol 35    VLDL Cholesterol 22    LDL Cholesterol  60    LDL/HDL Ratio 1.66      Data reviewed : Cardiology studies        ECG 12 Lead    Date/Time: 4/14/2025 2:04 PM  Performed by: Antione Gamboa DO    Authorized by: Antione Gamboa DO  Rhythm: atrial fibrillation  Ectopy: unifocal PVCs  Conduction: left anterior fascicular block  Other findings: non-specific ST-T wave changes    Clinical impression: abnormal EKG          Left heart catheterization 9/17/2021  Findings next   #1 open aortic pressure, 157/96  2.  Closing pressure was unchanged next   #3 LVEDP 12 to 16 mmHg  4.  Normal LV systolic function EF of 65 to 70%  5.  No transaortic gradient      Most recent echo as reviewed and interpreted by me:  Results for orders placed during the hospital encounter of 04/09/22     Adult Transthoracic Echo Complete W/ Cont if Necessary Per Protocol     Interpretation Summary  · Left ventricular wall thickness is consistent with mild concentric hypertrophy.  · Estimated left ventricular EF = 65% Left ventricular systolic function is normal.  · There is calcification  of the aortic valve mainly affecting the right coronary cusp(s).  · Saline test results are negative for right to left atrial level shunt.  · Estimated right ventricular systolic pressure from tricuspid regurgitation is normal (<35 mmHg).  · Left ventricular diastolic function is consistent with (grade II w/high LAP) pseudonormalization.     Assessment and Plan   Diagnoses and all orders for this visit:    1. Persistent atrial fibrillation (Primary)  Comments:  - patient was on warfarin. He is tired of INR checks and asks if he can switch to Eliquis.  -  Orders:  -     ECG 12 Lead  -     Stress Test With Myocardial Perfusion (1 Day); Future  -     Adult Transthoracic Echo Complete W/ Color, Spectral and Contrast if Necessary Per Protocol; Future    2. Essential hypertension    3. Atypical chest pain  Assessment & Plan:  Plan for repeat stress test and echocardiogram      4. Congestive heart failure, unspecified HF chronicity, unspecified heart failure type    5. Mixed hyperlipidemia    6. Hypothyroidism, unspecified type    7. TIA (transient ischemic attack)  Assessment & Plan:  Plan for carotid duplex.    Orders:  -     US Carotid Bilateral; Future    8. 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  Dispense: 180 tablet; Refill: 3             Follow Up   No follow-ups on file.  Patient was given instructions and counseling regarding his condition or for health maintenance advice. Please see specific information pulled into the AVS if appropriate.

## 2025-04-15 DIAGNOSIS — I48.19 PERSISTENT ATRIAL FIBRILLATION: ICD-10-CM

## 2025-04-15 RX ORDER — WARFARIN SODIUM 5 MG/1
TABLET ORAL
Qty: 90 TABLET | Refills: 1 | Status: SHIPPED | OUTPATIENT
Start: 2025-04-15

## 2025-04-15 NOTE — TELEPHONE ENCOUNTER
Medication Management Clinic: [facility]  Clinical Outreach     Efe Malloy is a 79 y.o. male and is a patient of the [facility] at [facility] for anticoagulation monitoring.     Patient called to report he had cardiology appointment with Dr. Gamboa yesterday and decision was made to switch patient to Eliquis instead of warfarin. However, patient went to pharmacy to  today and confirmed that Eliquis is unaffordable for him. He would like to remain on warfarin. New warfarin script sent to pharmacy. Patient will keep his previously scheduled 4/30 appointment with clinic for next INR visit.     Almita Leon, PharmD  Ambulatory Care Clinical Pharmacist  4/15/2025  14:41 EDT

## 2025-04-16 ENCOUNTER — TELEPHONE (OUTPATIENT)
Dept: CARDIOLOGY | Facility: CLINIC | Age: 80
End: 2025-04-16
Payer: MEDICARE

## 2025-04-16 NOTE — TELEPHONE ENCOUNTER
Caller: Efe Malloy    Relationship to patient: Self    Best call back number: [phone]     Patient is needing: PT IS RETURNING [facility]'S CALL, PT STATED HE WAS TRANSFERRED AND DID NOT RECIEVE AN ANSWER SO PUTTING CALLBACK MSS[facility] IN. PLS CALL & ADVISE PT

## 2025-04-16 NOTE — TELEPHONE ENCOUNTER
Caller: Efe Malloy    Relationship to patient: Self    Best call back number: [phone]    Patient is needing: PATIENT WAS RETURNING A CALL TO [place] AND WAS WARM TRANSFERRED TO PRACTICE.

## 2025-04-16 NOTE — TELEPHONE ENCOUNTER
Left message for the patient to call office to schedule stress and echo. Please transfer patient to office to speak to Yolanda.

## 2025-04-21 DIAGNOSIS — I63.429: ICD-10-CM

## 2025-04-21 DIAGNOSIS — G45.9 TIA DUE TO EMBOLISM: Primary | ICD-10-CM

## 2025-04-21 DIAGNOSIS — I74.9 TIA DUE TO EMBOLISM: Primary | ICD-10-CM

## 2025-04-21 DIAGNOSIS — I63.40 CEREBRAL INFARCTION DUE TO EMBOLISM OF CEREBRAL ARTERY: ICD-10-CM

## 2025-04-21 DIAGNOSIS — R42 DIZZINESS: Primary | ICD-10-CM

## 2025-04-21 DIAGNOSIS — G45.9 TIA (TRANSIENT ISCHEMIC ATTACK): ICD-10-CM

## 2025-04-21 DIAGNOSIS — R42 DIZZINESS: ICD-10-CM

## 2025-04-21 DIAGNOSIS — I48.19 ATRIAL FIBRILLATION, PERSISTENT: ICD-10-CM

## 2025-04-21 DIAGNOSIS — I48.19 PERSISTENT ATRIAL FIBRILLATION: ICD-10-CM

## 2025-04-26 ENCOUNTER — HOSPITAL ENCOUNTER (EMERGENCY)
Facility: HOSPITAL | Age: 80
Discharge: HOME | End: 2025-04-26
Attending: EMERGENCY MEDICINE
Payer: MEDICARE

## 2025-04-26 ENCOUNTER — APPOINTMENT (OUTPATIENT)
Dept: GENERAL RADIOLOGY | Facility: HOSPITAL | Age: 80
End: 2025-04-26
Payer: MEDICARE

## 2025-04-26 VITALS
DIASTOLIC BLOOD PRESSURE: 81 MMHG | HEIGHT: 73 IN | RESPIRATION RATE: 17 BRPM | OXYGEN SATURATION: 96 % | WEIGHT: 262.35 LBS | BODY MASS INDEX: 34.77 KG/M2 | TEMPERATURE: 97.6 F | SYSTOLIC BLOOD PRESSURE: 122 MMHG | HEART RATE: 81 BPM

## 2025-04-26 DIAGNOSIS — U07.1 COVID-19: ICD-10-CM

## 2025-04-26 DIAGNOSIS — J06.9 ACUTE UPPER RESPIRATORY INFECTION: Primary | ICD-10-CM

## 2025-04-26 LAB
ALBUMIN SERPL-MCNC: 4.2 G/DL (ref 3.5–5.2)
ALBUMIN/GLOB SERPL: 1.5 G/DL
ALP SERPL-CCNC: 53 U/L (ref 39–117)
ALT SERPL W P-5'-P-CCNC: 21 U/L (ref 1–41)
ANION GAP SERPL CALCULATED.3IONS-SCNC: 9.6 MMOL/L (ref 5–15)
AST SERPL-CCNC: 28 U/L (ref 1–40)
B PARAPERT DNA SPEC QL NAA+PROBE: NOT DETECTED
B PERT DNA SPEC QL NAA+PROBE: NOT DETECTED
BASOPHILS # BLD AUTO: 0.03 10*3/MM3 (ref 0–0.2)
BASOPHILS NFR BLD AUTO: 0.3 % (ref 0–1.5)
BILIRUB SERPL-MCNC: 0.6 MG/DL (ref 0–1.2)
BUN SERPL-MCNC: 18 MG/DL (ref 8–23)
BUN/CREAT SERPL: 14.1 (ref 7–25)
C PNEUM DNA NPH QL NAA+NON-PROBE: NOT DETECTED
CALCIUM SPEC-SCNC: 9.3 MG/DL (ref 8.6–10.5)
CHLORIDE SERPL-SCNC: 98 MMOL/L (ref 98–107)
CO2 SERPL-SCNC: 23.4 MMOL/L (ref 22–29)
CREAT SERPL-MCNC: 1.28 MG/DL (ref 0.76–1.27)
DEPRECATED RDW RBC AUTO: 45.1 FL (ref 37–54)
EGFRCR SERPLBLD CKD-EPI 2021: 57.6 ML/MIN/1.73
EOSINOPHIL # BLD AUTO: 0.01 10*3/MM3 (ref 0–0.4)
EOSINOPHIL NFR BLD AUTO: 0.1 % (ref 0.3–6.2)
ERYTHROCYTE [DISTWIDTH] IN BLOOD BY AUTOMATED COUNT: 15.4 % (ref 12.3–15.4)
FLUAV SUBTYP SPEC NAA+PROBE: NOT DETECTED
FLUBV RNA ISLT QL NAA+PROBE: NOT DETECTED
GLOBULIN UR ELPH-MCNC: 2.8 GM/DL
GLUCOSE SERPL-MCNC: 127 MG/DL (ref 65–99)
HADV DNA SPEC NAA+PROBE: NOT DETECTED
HCOV 229E RNA SPEC QL NAA+PROBE: NOT DETECTED
HCOV HKU1 RNA SPEC QL NAA+PROBE: NOT DETECTED
HCOV NL63 RNA SPEC QL NAA+PROBE: NOT DETECTED
HCOV OC43 RNA SPEC QL NAA+PROBE: NOT DETECTED
HCT VFR BLD AUTO: 49.4 % (ref 37.5–51)
HGB BLD-MCNC: 15.6 G/DL (ref 13–17.7)
HMPV RNA NPH QL NAA+NON-PROBE: NOT DETECTED
HPIV1 RNA ISLT QL NAA+PROBE: NOT DETECTED
HPIV2 RNA SPEC QL NAA+PROBE: NOT DETECTED
HPIV3 RNA NPH QL NAA+PROBE: NOT DETECTED
HPIV4 P GENE NPH QL NAA+PROBE: NOT DETECTED
IMM GRANULOCYTES # BLD AUTO: 0.05 10*3/MM3 (ref 0–0.05)
IMM GRANULOCYTES NFR BLD AUTO: 0.5 % (ref 0–0.5)
INR PPP: 1.76 (ref 2–3)
LYMPHOCYTES # BLD AUTO: 0.87 10*3/MM3 (ref 0.7–3.1)
LYMPHOCYTES NFR BLD AUTO: 9.3 % (ref 19.6–45.3)
M PNEUMO IGG SER IA-ACNC: NOT DETECTED
MCH RBC QN AUTO: 25.9 PG (ref 26.6–33)
MCHC RBC AUTO-ENTMCNC: 31.6 G/DL (ref 31.5–35.7)
MCV RBC AUTO: 81.9 FL (ref 79–97)
MONOCYTES # BLD AUTO: 0.99 10*3/MM3 (ref 0.1–0.9)
MONOCYTES NFR BLD AUTO: 10.6 % (ref 5–12)
NEUTROPHILS NFR BLD AUTO: 7.41 10*3/MM3 (ref 1.7–7)
NEUTROPHILS NFR BLD AUTO: 79.2 % (ref 42.7–76)
NRBC BLD AUTO-RTO: 0 /100 WBC (ref 0–0.2)
PLATELET # BLD AUTO: 134 10*3/MM3 (ref 140–450)
PMV BLD AUTO: 9 FL (ref 6–12)
POTASSIUM SERPL-SCNC: 3.8 MMOL/L (ref 3.5–5.2)
PROT SERPL-MCNC: 7 G/DL (ref 6–8.5)
PROTHROMBIN TIME: 20.6 SECONDS (ref 19.4–28.5)
RBC # BLD AUTO: 6.03 10*6/MM3 (ref 4.14–5.8)
RHINOVIRUS RNA SPEC NAA+PROBE: NOT DETECTED
RSV RNA NPH QL NAA+NON-PROBE: NOT DETECTED
SARS-COV-2 RNA RESP QL NAA+PROBE: DETECTED
SODIUM SERPL-SCNC: 131 MMOL/L (ref 136–145)
WBC NRBC COR # BLD AUTO: 9.36 10*3/MM3 (ref 3.4–10.8)

## 2025-04-26 PROCEDURE — 71045 X-RAY EXAM CHEST 1 VIEW: CPT

## 2025-04-26 PROCEDURE — 85610 PROTHROMBIN TIME: CPT | Performed by: EMERGENCY MEDICINE

## 2025-04-26 PROCEDURE — 0202U NFCT DS 22 TRGT SARS-COV-2: CPT | Performed by: EMERGENCY MEDICINE

## 2025-04-26 PROCEDURE — 25010000002 ONDANSETRON PER 1 MG: Performed by: EMERGENCY MEDICINE

## 2025-04-26 PROCEDURE — 99283 EMERGENCY DEPT VISIT LOW MDM: CPT

## 2025-04-26 PROCEDURE — 96374 THER/PROPH/DIAG INJ IV PUSH: CPT

## 2025-04-26 PROCEDURE — 85025 COMPLETE CBC W/AUTO DIFF WBC: CPT | Performed by: EMERGENCY MEDICINE

## 2025-04-26 PROCEDURE — 80053 COMPREHEN METABOLIC PANEL: CPT | Performed by: EMERGENCY MEDICINE

## 2025-04-26 RX ORDER — SODIUM CHLORIDE 0.9 % (FLUSH) 0.9 %
10 SYRINGE (ML) INJECTION AS NEEDED
Status: DISCONTINUED | OUTPATIENT
Start: 2025-04-26 | End: 2025-04-26 | Stop reason: HOSPADM

## 2025-04-26 RX ORDER — ONDANSETRON 2 MG/ML
4 INJECTION INTRAMUSCULAR; INTRAVENOUS ONCE
Status: COMPLETED | OUTPATIENT
Start: 2025-04-26 | End: 2025-04-26

## 2025-04-26 RX ADMIN — ONDANSETRON 4 MG: 2 INJECTION, SOLUTION INTRAMUSCULAR; INTRAVENOUS at 19:05

## 2025-04-27 NOTE — ED PROVIDER NOTES
Subjective   History of Present Illness  78y/o M Patient presents with a sore throat, weakness, cough, runny nose that has been worsening since Thursday.  He reports mild diarrhea today. He was evaluated by his family doctor on Thursday and prescribed doxycycline, which he has been taking twice daily, with one dose this morning. The patient notes that his son has similar symptoms.  Had a couple episodes of vomiting while riding in back of ambulance enroute to hospital.  Review of Systems  See HPI.  Past Medical History:   Diagnosis Date    Allergic     Anxiety     Arthritis     Asthma     Atrial fibrillation     Atypical chest pain 06/11/2020    Cataract     COPD (chronic obstructive pulmonary disease)     COVID     COVID-19 01/13/2021    Depression     Enlarged prostate     Headache     Hyperlipidemia     Hypertension     Kidney stone     Near syncope 12/17/2020    Prostatitis     Shortness of breath     Sleep apnea     Stroke     Urinary tract infection        Allergies   Allergen Reactions    Penicillins Other (See Comments)     AS A CHILD    Rivaroxaban Other (See Comments)       Past Surgical History:   Procedure Laterality Date    APPENDECTOMY      CARDIAC CATHETERIZATION  2007    nonobstructive dz    CARDIAC CATHETERIZATION N/A 9/17/2021    Procedure: Left Heart Cath;  Surgeon: Silvano Gillis MD;  Location: [facility] CATH INVASIVE LOC[facility];  Service: Cardiology;  Laterality: N/A;    CERVICAL EPIDURAL      with Dr. Harshil Rodgers mgt    CHOLECYSTECTOMY      EYE SURGERY Bilateral     corneal transplant left 09/02/2020 (Right eye 2014)    JOINT REPLACEMENT Right     knee    KIDNEY STONE SURGERY      PROSTATE SURGERY         Family History   Problem Relation Age of Onset    Heart disease Mother     Hypertension Mother     Diabetes Father     Stroke Father     Hypertension Father     Cancer Father         esophageal. prostate, skin    Diabetes Sister     Thyroid disease Sister     Heart disease Maternal  "Grandmother     Diabetes Paternal Grandmother        Social History     Socioeconomic History    Marital status:    Tobacco Use    Smoking status: Former     Types: Cigarettes    Smokeless tobacco: Never   Vaping Use    Vaping status: Never Used   Substance and Sexual Activity    Alcohol use: Yes     Comment: occasionally    Drug use: Not Currently    Sexual activity: Defer           Objective   Physical Exam  No acute distress. Normocephalic. No scleral icterus. Moist oral mucosa. Erythematous oropharynx with midline uvula, no exudate or masses. Bilateral tympanic membranes with good light reflex. No observable neck masses on external visualization. Lungs clear to auscultation bilaterally, no respiratory distress, tachypnea, or increased work of breathing. Normal heart rate with intact distal pulses. Abdomen soft and nontender without peritoneal signs. Alert with normal speech.  Procedures           ED Course      /81   Pulse 81   Temp 97.6 °F (36.4 °C) (Oral)   Resp 17   Ht 185.4 cm (73\")   Wt 119 kg (262 lb 5.6 oz)   SpO2 96%   BMI 34.61 kg/m²   Labs Reviewed   RESPIRATORY PANEL PCR W/ COVID-19 (SARS-COV-2), NP SWAB IN UTM/VTP, 2 HR TAT - Abnormal; Notable for the following components:       Result Value    COVID19 Detected (*)     All other components within normal limits    Narrative:     In the setting of a positive respiratory panel with a viral infection PLUS a negative procalcitonin without other underlying concern for bacterial infection, consider observing off antibiotics or discontinuation of antibiotics and continue supportive care. If the respiratory panel is positive for atypical bacterial infection (Bordetella pertussis, Chlamydophila pneumoniae, or Mycoplasma pneumoniae), consider antibiotic de-escalation to target atypical bacterial infection.   COMPREHENSIVE METABOLIC PANEL - Abnormal; Notable for the following components:    Glucose 127 (*)     Creatinine 1.28 (*)     Sodium " 131 (*)     eGFR 57.6 (*)     All other components within normal limits    Narrative:     GFR Categories in Chronic Kidney Disease (CKD)      GFR Category          GFR (mL/min/1.73)    Interpretation  G1                     90 or greater         Normal or high (1)  G2                      60-89                Mild decrease (1)  G3a                   45-59                Mild to moderate decrease  G3b                   30-44                Moderate to severe decrease  G4                    15-29                Severe decrease  G5                    14 or less           Kidney failure          (1)In the absence of evidence of kidney disease, neither GFR category G1 or G2 fulfill the criteria for CKD.    eGFR calculation 2021 CKD-EPI creatinine equation, which does not include race as a factor   PROTIME-INR - Abnormal; Notable for the following components:    INR 1.76 (*)     All other components within normal limits   CBC WITH AUTO DIFFERENTIAL - Abnormal; Notable for the following components:    RBC 6.03 (*)     MCH 25.9 (*)     Platelets 134 (*)     Neutrophil % 79.2 (*)     Lymphocyte % 9.3 (*)     Eosinophil % 0.1 (*)     Neutrophils, Absolute 7.41 (*)     Monocytes, Absolute 0.99 (*)     All other components within normal limits   CBC AND DIFFERENTIAL    Narrative:     The following orders were created for panel order CBC & Differential.  Procedure                               Abnormality         Status                     ---------                               -----------         ------                     CBC Auto Differential[[id number]]        Abnormal            Final result                 Please view results for these tests on the individual orders.     Medications   ondansetron (ZOFRAN) injection 4 mg (4 mg Intravenous Given 4/26/25 1905)     XR Chest 1 View   Final Result   Impression:   Mildly enlarged cardiac silhouette. No acute pulmonary process.            Electronically Signed: Amanda Quinteros MD      4/26/2025 7:16 PM EDT     Workstation ID: [id number]                                                         Medical Decision Making  Problems Addressed:  Acute upper respiratory infection: complicated acute illness or injury  COVID-19: complicated acute illness or injury    Amount and/or Complexity of Data Reviewed  Labs: ordered.  Radiology: ordered.    Risk  Prescription drug management.    My interpretation of chest x-ray is no pneumothorax or focal infiltrate.  See system for radiology interpretation.    Patient nontoxic-appearing.  Reassuring vital signs.  Satting 95% on room air.  Blood pressure 130s over 80s on reassessment without intervention.  Family's main concern is weakness.  Does not appear significantly dry on exam.  COVID-positive and patient with URI symptoms that would be consistent with this diagnosis.  Discussed admission for PT consult but states prefers discharge home.  Return ER precautions discussed.    Final diagnoses:   Acute upper respiratory infection   COVID-19       ED Disposition  ED Disposition       ED Disposition   Discharge    Condition   Stable    Comment   --               Uday Beltran MD  [address] [facility] [place] [id number] [phone]    In 2 days           Medication List      No changes were made to your prescriptions during this visit.            Urban Srinivasan MD  04/27/25 0237

## 2025-04-27 NOTE — DISCHARGE INSTRUCTIONS
If your symptoms worsen, or if other new concerning symptoms arise, please return the emergency department for repeat evaluation.

## 2025-04-28 ENCOUNTER — TELEPHONE (OUTPATIENT)
Dept: PHARMACY | Facility: HOSPITAL | Age: 80
End: 2025-04-28
Payer: MEDICARE

## 2025-04-28 NOTE — TELEPHONE ENCOUNTER
Medication Management Clinic: [facility]  Clinical Outreach     Efe Malloy is a 79 y.o. male and is a patient of the Medication Management Clinic at [facility] for anticoagulation monitoring.     Patient seen at [facility] ED on 4/26 and tested positive for COVID-19. Patient was prescribed doxycycline x 10 days on 4/24 by PCP (may increase INR). INR at [facility] ED was subtherapeutic at 1.76.     Contacted patient to discuss above today. Patient reports he has been taking warfarin 5 mg PO daily and he has not missed any doses.Patient has also been taking PRN robitussin DM (no effect on INR). Informed patient that acute illness typically can increase INR but could potentially be responsible for decrease in INR. Patient also states he has not been eating much at all (decrease food intake can increase INR). Given multiple factors that could increase INR are present, instructed patient to continue warfarin 5 mg PO daily and rescheduled appointment from 4/30 to 5/6.     Kavita Jolley, PharmD  Ambulatory Care Clinical Pharmacist  4/28/2025  12:58 EDT

## 2025-05-01 ENCOUNTER — HOSPITAL ENCOUNTER (OUTPATIENT)
Dept: CARDIOLOGY | Facility: HOSPITAL | Age: 80
Discharge: HOME | End: 2025-05-01
Admitting: INTERNAL MEDICINE
Payer: MEDICARE

## 2025-05-01 DIAGNOSIS — I63.429: ICD-10-CM

## 2025-05-01 DIAGNOSIS — G45.9 TIA (TRANSIENT ISCHEMIC ATTACK): ICD-10-CM

## 2025-05-01 DIAGNOSIS — R42 DIZZINESS: ICD-10-CM

## 2025-05-01 DIAGNOSIS — I48.19 PERSISTENT ATRIAL FIBRILLATION: ICD-10-CM

## 2025-05-01 LAB
BH CV XLRA MEAS LEFT DIST CCA EDV: 18.8 CM/SEC
BH CV XLRA MEAS LEFT DIST CCA PSV: 80.7 CM/SEC
BH CV XLRA MEAS LEFT DIST ICA EDV: -24.9 CM/SEC
BH CV XLRA MEAS LEFT DIST ICA PSV: -67.7 CM/SEC
BH CV XLRA MEAS LEFT ICA/CCA RATIO: -0.74
BH CV XLRA MEAS LEFT PROX CCA EDV: 15.7 CM/SEC
BH CV XLRA MEAS LEFT PROX CCA PSV: 114 CM/SEC
BH CV XLRA MEAS LEFT PROX ECA PSV: -114 CM/SEC
BH CV XLRA MEAS LEFT PROX ICA EDV: -14.9 CM/SEC
BH CV XLRA MEAS LEFT PROX ICA PSV: -84.5 CM/SEC
BH CV XLRA MEAS LEFT PROX SCLA PSV: 123 CM/SEC
BH CV XLRA MEAS LEFT VERTEBRAL A EDV: 9.2 CM/SEC
BH CV XLRA MEAS LEFT VERTEBRAL A PSV: 38.6 CM/SEC
BH CV XLRA MEAS RIGHT DIST CCA EDV: 17.4 CM/SEC
BH CV XLRA MEAS RIGHT DIST CCA PSV: 73.9 CM/SEC
BH CV XLRA MEAS RIGHT DIST ICA EDV: -23.7 CM/SEC
BH CV XLRA MEAS RIGHT DIST ICA PSV: -71.6 CM/SEC
BH CV XLRA MEAS RIGHT ICA/CCA RATIO: -0.97
BH CV XLRA MEAS RIGHT PROX CCA EDV: 18.6 CM/SEC
BH CV XLRA MEAS RIGHT PROX CCA PSV: 91.9 CM/SEC
BH CV XLRA MEAS RIGHT PROX ECA PSV: -99.4 CM/SEC
BH CV XLRA MEAS RIGHT PROX ICA EDV: -11.4 CM/SEC
BH CV XLRA MEAS RIGHT PROX ICA PSV: -51.7 CM/SEC
BH CV XLRA MEAS RIGHT PROX SCLA PSV: 107 CM/SEC
BH CV XLRA MEAS RIGHT VERTEBRAL A EDV: 8.8 CM/SEC
BH CV XLRA MEAS RIGHT VERTEBRAL A PSV: 31.4 CM/SEC
LEFT ARM BP: NORMAL MMHG
RIGHT ARM BP: NORMAL MMHG

## 2025-05-01 PROCEDURE — 93880 EXTRACRANIAL BILAT STUDY: CPT

## 2025-05-06 ENCOUNTER — ANTICOAGULATION VISIT (OUTPATIENT)
Dept: PHARMACY | Facility: HOSPITAL | Age: 80
End: 2025-05-06
Payer: MEDICARE

## 2025-05-06 ENCOUNTER — HOSPITAL ENCOUNTER (OUTPATIENT)
Dept: CARDIOLOGY | Facility: HOSPITAL | Age: 80
Discharge: HOME | End: 2025-05-06
Payer: MEDICARE

## 2025-05-06 ENCOUNTER — HOSPITAL ENCOUNTER (OUTPATIENT)
Dept: NUCLEAR MEDICINE | Facility: HOSPITAL | Age: 80
Discharge: HOME | End: 2025-05-06
Payer: MEDICARE

## 2025-05-06 ENCOUNTER — HOSPITAL ENCOUNTER (OUTPATIENT)
Dept: RESPIRATORY THERAPY | Facility: HOSPITAL | Age: 80
Discharge: HOME | End: 2025-05-06
Payer: MEDICARE

## 2025-05-06 ENCOUNTER — LAB (OUTPATIENT)
Dept: LAB | Facility: HOSPITAL | Age: 80
End: 2025-05-06
Payer: MEDICARE

## 2025-05-06 VITALS — BODY MASS INDEX: 34.72 KG/M2 | HEIGHT: 73 IN | WEIGHT: 262 LBS

## 2025-05-06 DIAGNOSIS — I25.701 ATHEROSCLEROSIS OF CORONARY ARTERY BYPASS GRAFT OF NATIVE HEART WITH ANGINA PECTORIS WITH DOCUMENTED SPASM: ICD-10-CM

## 2025-05-06 DIAGNOSIS — I48.19 PERSISTENT ATRIAL FIBRILLATION: Primary | ICD-10-CM

## 2025-05-06 DIAGNOSIS — I48.19 PERSISTENT ATRIAL FIBRILLATION: ICD-10-CM

## 2025-05-06 LAB
AORTIC DIMENSIONLESS INDEX: 0.73 (DI)
AV MEAN PRESS GRAD SYS DOP V1V2: 9 MMHG
AV VMAX SYS DOP: 222 CM/SEC
BH CV ECHO LEFT VENTRICLE GLOBAL LONGITUDINAL STRAIN: -24.8 %
BH CV ECHO MEAS - AO MAX PG: 19.7 MMHG
BH CV ECHO MEAS - AO V2 VTI: 38.6 CM
BH CV ECHO MEAS - AVA(I,D): 2.5 CM2
BH CV ECHO MEAS - EDV(CUBED): 227 ML
BH CV ECHO MEAS - EDV(MOD-SP4): 82.4 ML
BH CV ECHO MEAS - EF(MOD-SP4): 63.6 %
BH CV ECHO MEAS - ESV(CUBED): 59.3 ML
BH CV ECHO MEAS - ESV(MOD-SP4): 30 ML
BH CV ECHO MEAS - FS: 36.1 %
BH CV ECHO MEAS - IVS/LVPW: 1 CM
BH CV ECHO MEAS - IVSD: 1.2 CM
BH CV ECHO MEAS - LA DIMENSION: 5.4 CM
BH CV ECHO MEAS - LAT PEAK E' VEL: 11.3 CM/SEC
BH CV ECHO MEAS - LV DIASTOLIC VOL/BSA (35-75): 34.1 CM2
BH CV ECHO MEAS - LV MASS(C)D: 322.7 GRAMS
BH CV ECHO MEAS - LV MAX PG: 9.9 MMHG
BH CV ECHO MEAS - LV MEAN PG: 4 MMHG
BH CV ECHO MEAS - LV SYSTOLIC VOL/BSA (12-30): 12.4 CM2
BH CV ECHO MEAS - LV V1 MAX: 157 CM/SEC
BH CV ECHO MEAS - LV V1 VTI: 28 CM
BH CV ECHO MEAS - LVIDD: 6.1 CM
BH CV ECHO MEAS - LVIDS: 3.9 CM
BH CV ECHO MEAS - LVOT AREA: 3.5 CM2
BH CV ECHO MEAS - LVOT DIAM: 2.1 CM
BH CV ECHO MEAS - LVPWD: 1.2 CM
BH CV ECHO MEAS - MED PEAK E' VEL: 11.7 CM/SEC
BH CV ECHO MEAS - MV A MAX VEL: 29.9 CM/SEC
BH CV ECHO MEAS - MV DEC SLOPE: 216 CM/SEC2
BH CV ECHO MEAS - MV DEC TIME: 0.18 SEC
BH CV ECHO MEAS - MV E MAX VEL: 86.5 CM/SEC
BH CV ECHO MEAS - MV E/A: 2.9
BH CV ECHO MEAS - MV MAX PG: 3.6 MMHG
BH CV ECHO MEAS - MV MEAN PG: 2 MMHG
BH CV ECHO MEAS - MV P1/2T: 132.5 MSEC
BH CV ECHO MEAS - MV V2 VTI: 31.4 CM
BH CV ECHO MEAS - MVA(P1/2T): 1.66 CM2
BH CV ECHO MEAS - MVA(VTI): 3.1 CM2
BH CV ECHO MEAS - PA ACC TIME: 0.12 SEC
BH CV ECHO MEAS - PA V2 MAX: 128 CM/SEC
BH CV ECHO MEAS - RAP SYSTOLE: 8 MMHG
BH CV ECHO MEAS - RV MAX PG: 1.98 MMHG
BH CV ECHO MEAS - RV V1 MAX: 70.3 CM/SEC
BH CV ECHO MEAS - RV V1 VTI: 12.2 CM
BH CV ECHO MEAS - RVDD: 4.6 CM
BH CV ECHO MEAS - RVSP: 38.5 MMHG
BH CV ECHO MEAS - SV(LVOT): 97 ML
BH CV ECHO MEAS - SV(MOD-SP4): 52.4 ML
BH CV ECHO MEAS - SVI(LVOT): 40.2 ML/M2
BH CV ECHO MEAS - SVI(MOD-SP4): 21.7 ML/M2
BH CV ECHO MEAS - TAPSE (>1.6): 1.68 CM
BH CV ECHO MEAS - TR MAX PG: 30.5 MMHG
BH CV ECHO MEAS - TR MAX VEL: 276 CM/SEC
BH CV ECHO MEASUREMENTS AVERAGE E/E' RATIO: 7.52
BH CV NUCLEAR PRIOR STUDY: 3
BH CV REST NUCLEAR ISOTOPE DOSE: 10.8 MCI
BH CV STRESS BP STAGE 1: NORMAL
BH CV STRESS BP STAGE 2: NORMAL
BH CV STRESS BP STAGE 3: NORMAL
BH CV STRESS BP STAGE 4: NORMAL
BH CV STRESS COMMENTS STAGE 1: NORMAL
BH CV STRESS COMMENTS STAGE 2: NORMAL
BH CV STRESS DOSE REGADENOSON STAGE 1: 0.4
BH CV STRESS DURATION MIN STAGE 1: 0
BH CV STRESS DURATION MIN STAGE 2: 4
BH CV STRESS DURATION SEC STAGE 1: 10
BH CV STRESS DURATION SEC STAGE 2: 0
BH CV STRESS HR STAGE 1: 53
BH CV STRESS HR STAGE 2: 56
BH CV STRESS HR STAGE 3: 59
BH CV STRESS HR STAGE 4: 57
BH CV STRESS NUCLEAR ISOTOPE DOSE: 31.1 MCI
BH CV STRESS PROTOCOL 1: NORMAL
BH CV STRESS RECOVERY BP: NORMAL MMHG
BH CV STRESS RECOVERY HR: 75 BPM
BH CV STRESS STAGE 1: 1
BH CV STRESS STAGE 2: 2
BH CV STRESS STAGE 3: 3
BH CV STRESS STAGE 4: 4
BH CV XLRA - TDI S': 12.3 CM/SEC
DIGOXIN SERPL-MCNC: 2.22 NG/ML (ref 0.6–1.2)
INR PPP: 3.1 (ref 0.9–1.1)
LEFT ATRIUM VOLUME INDEX: 65.1 ML/M2
LV EF BIPLANE MOD: 64 %
MAXIMAL PREDICTED HEART RATE: 143 BPM
PERCENT MAX PREDICTED HR: 39.86 %
PROTHROMBIN TIME: 32.2 SECONDS
SINUS: 3.1 CM
SPECT HRT GATED+EF W RNC IV: 70 %
STJ: 2.8 CM
STRESS BASELINE BP: NORMAL MMHG
STRESS BASELINE HR: 61 BPM
STRESS PERCENT HR: 47 %
STRESS POST PEAK BP: NORMAL MMHG
STRESS POST PEAK HR: 57 BPM
STRESS TARGET HR: 122 BPM

## 2025-05-06 PROCEDURE — 93306 TTE W/DOPPLER COMPLETE: CPT | Performed by: STUDENT IN AN ORGANIZED HEALTH CARE EDUCATION/TRAINING PROGRAM

## 2025-05-06 PROCEDURE — 36416 COLLJ CAPILLARY BLOOD SPEC: CPT

## 2025-05-06 PROCEDURE — 80162 ASSAY OF DIGOXIN TOTAL: CPT

## 2025-05-06 PROCEDURE — 34310000005 TECHNETIUM TETROFOSMIN KIT: Performed by: INTERNAL MEDICINE

## 2025-05-06 PROCEDURE — 93356 MYOCRD STRAIN IMG SPCKL TRCK: CPT

## 2025-05-06 PROCEDURE — A9502 TC99M TETROFOSMIN: HCPCS | Performed by: INTERNAL MEDICINE

## 2025-05-06 PROCEDURE — 93017 CV STRESS TEST TRACING ONLY: CPT

## 2025-05-06 PROCEDURE — 25010000002 REGADENOSON 0.4 MG/5ML SOLUTION: Performed by: INTERNAL MEDICINE

## 2025-05-06 PROCEDURE — 85610 PROTHROMBIN TIME: CPT

## 2025-05-06 PROCEDURE — 36415 COLL VENOUS BLD VENIPUNCTURE: CPT

## 2025-05-06 PROCEDURE — 93306 TTE W/DOPPLER COMPLETE: CPT

## 2025-05-06 PROCEDURE — 93356 MYOCRD STRAIN IMG SPCKL TRCK: CPT | Performed by: STUDENT IN AN ORGANIZED HEALTH CARE EDUCATION/TRAINING PROGRAM

## 2025-05-06 PROCEDURE — G0463 HOSPITAL OUTPT CLINIC VISIT: HCPCS

## 2025-05-06 PROCEDURE — 78452 HT MUSCLE IMAGE SPECT MULT: CPT

## 2025-05-06 RX ORDER — REGADENOSON 0.08 MG/ML
0.4 INJECTION, SOLUTION INTRAVENOUS
Status: COMPLETED | OUTPATIENT
Start: 2025-05-06 | End: 2025-05-06

## 2025-05-06 RX ADMIN — TETROFOSMIN 1 DOSE: 1.38 INJECTION, POWDER, LYOPHILIZED, FOR SOLUTION INTRAVENOUS at 10:00

## 2025-05-06 RX ADMIN — TETROFOSMIN 1 DOSE: 1.38 INJECTION, POWDER, LYOPHILIZED, FOR SOLUTION INTRAVENOUS at 08:50

## 2025-05-06 RX ADMIN — REGADENOSON 0.4 MG: 0.08 INJECTION, SOLUTION INTRAVENOUS at 10:00

## 2025-05-06 NOTE — PROGRESS NOTES
Anticoagulation Clinic Progress Note    INR Goal: 2 - 3  Today's INR: 3.1 (supratherapeutic)  Current warfarin dose: warfarin 5 mg PO daily. Current total weekly dose = 35 mg per week.     Patient recently recovering from COVID-19 infection (illness may increase INR). Patient will also be stopping his digoxin for at least two weeks (INR may decrease when digoxin is stopped).     INR History:      Latest Ref Rng & Units 10/3/2024     1:00 PM 11/14/2024     1:00 PM 12/26/2024     1:00 PM 2/5/2025     1:30 PM 3/19/2025     1:30 PM 4/26/2025     7:03 PM 5/6/2025    11:30 AM   Anticoagulation Monitoring   INR  2.90 2.30 2.30 2.00 2.40  3.10   INR Date  10/3/2024 11/14/2024 12/26/2024 2/5/2025 3/19/2025  5/6/2025   INR Goal  2.0-3.0 2.0-3.0 2.0-3.0 2.0-3.0 2.0-3.0  2.0-3.0   Trend  Same Same Same Same Same  Same   Last Week Total  35 mg 35 mg 35 mg 35 mg 35 mg  35 mg   Next Week Total  35 mg 35 mg 35 mg 35 mg 35 mg  35 mg   Sun  5 mg 5 mg 5 mg 5 mg 5 mg  5 mg   Mon  5 mg 5 mg 5 mg 5 mg 5 mg  5 mg   Tue  5 mg 5 mg 5 mg 5 mg 5 mg  5 mg   Wed  5 mg 5 mg 5 mg 5 mg 5 mg  5 mg   Thu  5 mg 5 mg 5 mg 5 mg 5 mg  5 mg   Fri  5 mg 5 mg 5 mg 5 mg 5 mg  5 mg   Sat  5 mg 5 mg 5 mg 5 mg 5 mg  5 mg   Historical INR 2.00 - 3.00      1.76         Anticoagulation Summary  As of 5/6/2025      INR goal:  2.0-3.0   TTR:  78.6% (5.9 y)   INR used for dosing:  3.10 (5/6/2025)   Warfarin maintenance plan:  5 mg every day   Weekly warfarin total:  35 mg   No change documented:  Almita Leon, PharmD   Plan last modified:  Kavita Jolley, PharmD (4/14/2023)   Next INR check:  6/17/2025   Priority:  Maintenance   Target end date:  --    Indications    Persistent atrial fibrillation [I48.19]                 Anticoagulation Episode Summary       INR check location:  --    Preferred lab:  --    Send INR reminders to:  [facility] CLINICAL POOL    Comments:  Patient contract signed 11/15/21.          Anticoagulation Care Providers        Provider Role Specialty Phone number    Silvano Gillis MD  Cardiology [phone]            Clinic Interview:   Patient Findings     Positives:  Change in medications    Negatives:  Signs/symptoms of thrombosis, Signs/symptoms of bleeding,   Laboratory test error suspected, Change in health, Change in alcohol use,   Change in activity, Upcoming invasive procedure, Emergency department   visit, Upcoming dental procedure, Missed doses, Extra doses, Change in   diet/appetite, Hospital admission, Bruising, Other complaints    Comments:  Digoxin being stopped for at least two weeks (may decrease   INR)      Clinical Outcomes     Negatives:  Major bleeding event, Thromboembolic event,   Anticoagulation-related hospital admission, Anticoagulation-related ED   visit, Anticoagulation-related fatality    Comments:  Digoxin being stopped for at least two weeks (may decrease   INR)        Current Medications:   Prior to Admission medications    Medication Sig Start Date End Date Taking? Authorizing Provider   albuterol sulfate  (90 Base) MCG/ACT inhaler Inhale 2 puffs Every 6 (Six) Hours As Needed. 7/6/21   Shelby Haskins MD   alfuzosin (UROXATRAL) 10 MG 24 hr tablet Take 1 tablet by mouth 2 (Two) Times a Day. 9/23/19   Shelby Haskins MD   amitriptyline (ELAVIL) 25 MG tablet Take 2 tablets by mouth Every Night.    ProviderShelby MD   aspirin (aspirin) 81 MG EC tablet Take 1 tablet by mouth Daily. 4/11/22   Silvano Gillis MD   baclofen (LIORESAL) 10 MG tablet Take 1 tablet by mouth 2 (Two) Times a Day. 7/13/21   Shelby Haskins MD   BREO ELLIPTA 100-25 MCG/INH inhaler Inhale 1 puff Daily. 9/24/19   Shelby Haskins MD   EPINEPHrine (EPIPEN) 0.3 MG/0.3ML solution auto-injector injection epinephrine 0.3 mg/0.3 mL injection, auto-injector    Shelby Haskins MD   ezetimibe (ZETIA) 10 MG tablet TAKE ONE TABLET BY MOUTH EVERY DAY 12/13/24   Silvano Gillis MD    fluorometholone (FML) 0.1 % ophthalmic suspension Administer 1 drop to the right eye Daily. 5/17/21   Shelby Haskins MD   gabapentin (NEURONTIN) 300 MG capsule Take 2 capsules by mouth 2 (Two) Times a Day. 9/13/19   Shelby Haskins MD   hydroCHLOROthiazide (MICROZIDE) 12.5 MG capsule TAKE ONE CAPSULE BY MOUTH ONCE DAILY 1/13/25   Silvano Gillis MD   levothyroxine (SYNTHROID, LEVOTHROID) 75 MCG tablet Take 1 tablet by mouth Daily. 9/23/19   Shelby Haskins MD   meloxicam (MOBIC) 7.5 MG tablet Take 1 tablet by mouth Daily.    Shelby Haskins MD   montelukast (SINGULAIR) 10 MG tablet Take 1 tablet by mouth Every Evening. 9/23/19   Shleby Haskins MD   pantoprazole (PROTONIX) 40 MG EC tablet Take 1 tablet by mouth Daily. 2/22/22   Silvano Gillis MD   pravastatin (PRAVACHOL) 80 MG tablet Take 1 tablet by mouth every night at bedtime. 3/19/25   Silvano Gillis MD   sertraline (ZOLOFT) 100 MG tablet Take 1.5 tablets by mouth Daily. 3/15/22   Silvano Gillis MD   theophylline (UNIPHYL) 400 MG 24 hr tablet Take 1 tablet by mouth Daily. 9/23/19   Shelby Haskins MD   verapamil ER (VERELAN) 240 MG 24 hr capsule TAKE 1 CAPSULE BY MOUTH AT BEDTIME 12/17/24   Silvano Gillis MD   warfarin (Coumadin) 5 MG tablet Take 5 mg by mouth daily or as otherwise directed. 4/15/25   Silvano Gillis MD   Xiidra 5 % ophthalmic solution Apply 1 drop to eye(s) as directed by provider Every 12 (Twelve) Hours. 3/28/24   Shelby Haskins MD   digoxin (LANOXIN) 250 MCG tablet TAKE ONE TABLET BY MOUTH EVERY DAY 12/23/24 5/6/25  Silvano Gillis MD       Medical history:   Past Medical History:   Diagnosis Date    Allergic     Anxiety     Arthritis     Asthma     Atrial fibrillation     Atypical chest pain 06/11/2020    Cataract     COPD (chronic obstructive pulmonary disease)     COVID     COVID-19 01/13/2021    Depression     Enlarged prostate      Headache     Hyperlipidemia     Hypertension     Kidney stone     Near syncope 12/17/2020    Prostatitis     Shortness of breath     Sleep apnea     Stroke     Urinary tract infection        Social history:   Social History     Tobacco Use    Smoking status: Former     Types: Cigarettes    Smokeless tobacco: Never   Substance Use Topics    Alcohol use: Yes     Comment: occasionally       Allergies:    Penicillins and Rivaroxaban    Vaccine History:   Immunization History   Administered Date(s) Administered    ABRYSVO (RSV, 60+ or pregnant women 32-36 wks) 11/14/2024    Covid-19 (Pfizer) Gray Cap Monovalent 05/18/2021, 11/17/2021, 11/26/2021    FLUAD TRI 65YR+ 10/31/2019    Fluzone  >6mos 09/22/2014    Fluzone High-Dose 65+YRS 10/14/2016, 10/16/2017, 11/14/2018    H1N1 Inj 12/22/2009    Hepatitis A 09/17/2019, 11/19/2019    Influenza, Unspecified 09/18/2013    Pneumococcal Conjugate 13-Valent (PCV13) 10/12/2015    Pneumococcal Polysaccharide (PPSV23) 09/24/2013    Shingrix 09/17/2019, 11/19/2019    Zostavax 02/12/2013     [id number] SCORE   [id number] Score for Atrial Fibrillation Stroke Risk  Age in Years: 75+  Sex: Male  CHF History: Yes  Hypertension History: Yes  Stroke/TIA/Thromboembolism History: Yes  Vascular Disease History: No  Diabetes History: No  [id number] Score: 6  Stroke risks -: 6 Points. Risk of ischemic stroke: 9.7%. Risk of stroke/TIA/embolism: 13.6%    This patient is managed via a collaborative agreement, pursuant to IC 25-26-16. The signed protocol is kept in the [facility] at [address] [place] [address].    Education: Efe Malloy has been instructed to call if any changes in medications, doses, concerns, etc. Patient was provided dosing instructions and expressed verbal understanding and has no further questions at this time.    Plan:  1. Anticoagulation   - no change; warfarin 5 mg PO daily. Total weekly dose = 35 mg.   - RTC in 6  week(s)      Almita Leon, PharmD  5/6/2025  13:13 EDT

## 2025-05-06 NOTE — PROGRESS NOTES
Patient was having outpt stress test. He was noted to have heart rate in the 40s and 50s. I was called by stress test RN.  Patient reports he has some occasional dizziness. I am ordering digoxin level, placing a 2 week MCOT and stopping his digoxin. He is on no beta blockers. He remains on verapamil and has a h/o Afib. Will f/u on results, and will have him come in to office in 1 mo.

## 2025-05-15 LAB — CV ZIO PRESCRIPTION STATUS: NORMAL

## 2025-05-30 LAB
CV ZIO AF AVG BPM: 63 BPM
CV ZIO AF BPM HIGH: 144 BPM
CV ZIO AF BPM LOW: 37 BPM
CV ZIO AF F EPI AVG BPM: 103 BPM
CV ZIO AF F EPI BPM HIGH: 144 BPM
CV ZIO AF F EPI BPM LOW: 75 BPM
CV ZIO AF F EPI DT: NORMAL
CV ZIO AF PERCENT: 100 %
CV ZIO AF S EPI AVG BPM: 61 BPM
CV ZIO AF S EPI BPM HIGH: 90 BPM
CV ZIO AF S EPI BPM LOW: 37 BPM
CV ZIO AF S EPI DT: NORMAL
CV ZIO BASELINE AVG BPM: 63 BPM
CV ZIO BASELINE BPM HIGH: 144 BPM
CV ZIO BASELINE BPM LOW: 37 BPM
CV ZIO DEVICE ANALYSIS TIME: NORMAL
CV ZIO ECT SVE COUNT: 0 EPISODES
CV ZIO ECT SVE CPLT COUNT: 0 EPISODES
CV ZIO ECT SVE CPLT FREQ: 0
CV ZIO ECT SVE FREQ: 0
CV ZIO ECT SVE TPLT COUNT: 0 EPISODES
CV ZIO ECT SVE TPLT FREQ: 0
CV ZIO ECT VE COUNT: 281 EPISODES
CV ZIO ECT VE CPLT COUNT: 32 EPISODES
CV ZIO ECT VE CPLT FREQ: NORMAL
CV ZIO ECT VE FREQ: NORMAL
CV ZIO ECT VE TPLT COUNT: 1 EPISODES
CV ZIO ECT VE TPLT FREQ: NORMAL
CV ZIO ECTOPIC SVE COUPLET RAW PERCENT: 0 %
CV ZIO ECTOPIC SVE ISOLATED PERCENT: 0 %
CV ZIO ECTOPIC SVE TRIPLET RAW PERCENT: 0 %
CV ZIO ECTOPIC VE COUPLET RAW PERCENT: 0.07 %
CV ZIO ECTOPIC VE ISOLATED PERCENT: 0.29 %
CV ZIO ECTOPIC VE TRIPLET RAW PERCENT: 0 %
CV ZIO ENROLLMENT END: NORMAL
CV ZIO ENROLLMENT START: NORMAL
CV ZIO IRREG TYPE: NORMAL
CV ZIO PATIENT EVENTS DIARIES: 0
CV ZIO PATIENT EVENTS TRIGGERS: 0
CV ZIO PAUSE COUNT: 0
CV ZIO PRESCRIPTION STATUS: NORMAL
CV ZIO SVT COUNT: 0
CV ZIO TOTAL  ENROLLMENT PERIOD: NORMAL
CV ZIO VT COUNT: 0

## 2025-06-05 NOTE — H&P (VIEW-ONLY)
Cardiology Office Follow Up Visit      Primary Care Provider:  Uday Beltran MD    Reason for f/u:     6-week follow-up      Subjective     CC:    Patient states he is here to follow-up on testing recently ordered by Dr. Gamboa    History of Present Illness       Efe Malloy is a 79 y.o. male [place] with history of chronic persistent and rate controlled Afib, HTN, punctate R parietal CVA 2022.  He is on warfarin with goal INR 2-3.    Patient saw Dr. Gillis in July 2024.  He has a known history of chest pain with multiple stress tests, all of which been unremarkable.  Most recent December 2023, which showed diminished counts and septum inferior and inferolateral wall with no reversibility.  He had left heart cath September 2021 which revealed borderline obstructive disease, large ramus branch in both superior and inferior limbs.  Both IFR/FFR both limbs were insignificant in respect to hemodynamic criteria.       Patient return to the office in April 2025 and saw Dr. Gamboa, he reported intermittent substernal, nonradiating, burning chest pain. It has increased in frequency and duration for the last month or so.  Dr. Gamboa ordered bilateral carotid Dopplers, stress test and echo.  He also discussed switching warfarin to Eliquis per patient request due to inconvenience of INR checks, however, the cost was too great and patient decided to stay on Warfarin. Carotid Dopplers revealed right ICA less than 50% stenosis, left ICA less than 50% stenosis.  Stress test showed normal left ventricular EF, moderate size infarct in the inferior wall and septal wall with no significant ischemia. Echocardiogram showed normal left systolic function with calculated EF 64%.  Mild concentric left ventricular hypertrophy, normal right ventricular systolic function.  Moderate calcification of aortic valve with no significant stenosis or regurgitation.  Moderate mitral valve regurgitation.  Estimated RVSP 35-45 mmHg. He also recently had  M cot which did reveal 100% A-fib with occasional PVCs/couplets/triplets and 2 episodes of V. Tach, 1 lasting 5 beats, 1 lasting 20 beats, self terminating.    Since that visit, patient states that he has been having pretty consistent chest pressure with intermittent sharp chest pains.  He has also had some near syncope/balance/dizziness issues.  Patient states that last year at his previous visit with Dr. Gillis, they had discussed a possible heart catheterization, however it was deferred.  I was able to discuss with Dr. Gillis today recent testing results and patient's symptoms, and he gave patient the option of proceeding with heart cath at this time.  Patient states he is concerned with the symptoms, last heart catheterization was in 2021, he would like to proceed with heart cath for updated, comprehensive ischemic evaluation.  Also, patient is asking about resuming his digoxin. Digoxin level 5/6/2025 was 2.22.  I advised him I will order digoxin level in addition to his preprocedure cardiac cath labs, and pending result, he will be contacted regarding the digoxin.  He has permanent A-fib, today heart rate of 65, blood pressure 100/60.    ASSESSMENT/PLAN:      Diagnoses and all orders for this visit:    1. Coronary artery disease involving native coronary artery of native heart with angina pectoris (Primary)    2. Near syncope    3. Mixed hyperlipidemia    4. Essential hypertension    5. Persistent atrial fibrillation    6. TIA (transient ischemic attack)        MEDICAL DECISION MAKING:    CAD        Recent stress test with moderate size infarct in the inferior wall and septal wall with no significant ischemia        Plan cardiac cath appx 2 wks    C/o dizziness/near syncope  Hx TIA/punctate R parietal CVA 2022   Carotid doppler done, less than 50% bilaterally  Plan cardiac cath    Mixed hyperlipidemia        Lipids March 2025 LDL 60, triglycerides 119 HDL 35        Pravastatin 80 daily Zetia        Goal LDL less  than 70 optimally less than 55        Intolerance to other statins historically    Essential hypertension         /60 today, only on verapamil HCTZ         Goal blood pressure less than 135 systolic          he remains off hydralazine and losartan         Home BP checks, report back, if remains low, may need to decrease dosages    Persistent atrial fibrillation (Primary)   Remains on Warfarin through coag clinic   Currently off Digoxin due to elevated level-repeat level ordered   Rate 65         Past Medical History:   Diagnosis Date    Allergic     Anxiety     Arthritis     Asthma     Atrial fibrillation     Atypical chest pain 06/11/2020    Cataract     COPD (chronic obstructive pulmonary disease)     COVID     COVID-19 01/13/2021    Depression     Enlarged prostate     Headache     Hyperlipidemia     Hypertension     Kidney stone     Near syncope 12/17/2020    Prostatitis     Shortness of breath     Sleep apnea     Stroke     Urinary tract infection        Past Surgical History:   Procedure Laterality Date    APPENDECTOMY      CARDIAC CATHETERIZATION  2007    nonobstructive dz    CARDIAC CATHETERIZATION N/A 9/17/2021    Procedure: Left Heart Cath;  Surgeon: Silvano Gillis MD;  Location: [facility] CATH INVASIVE LOC[facility];  Service: Cardiology;  Laterality: N/A;    CERVICAL EPIDURAL      with Dr. Harshil Rodgers mgt    CHOLECYSTECTOMY      EYE SURGERY Bilateral     corneal transplant left 09/02/2020 (Right eye 2014)    JOINT REPLACEMENT Right     knee    KIDNEY STONE SURGERY      PROSTATE SURGERY           Current Outpatient Medications:     albuterol sulfate  (90 Base) MCG/ACT inhaler, Inhale 2 puffs Every 6 (Six) Hours As Needed., Disp: , Rfl:     alfuzosin (UROXATRAL) 10 MG 24 hr tablet, Take 1 tablet by mouth 2 (Two) Times a Day., Disp: , Rfl: 11    aspirin (aspirin) 81 MG EC tablet, Take 1 tablet by mouth Daily., Disp: 90 tablet, Rfl: 3    baclofen (LIORESAL) 10 MG tablet, Take 1 tablet by mouth  2 (Two) Times a Day., Disp: , Rfl:     BREO ELLIPTA 100-25 MCG/INH inhaler, Inhale 1 puff Daily., Disp: , Rfl: 5    EPINEPHrine (EPIPEN) 0.3 MG/0.3ML solution auto-injector injection, epinephrine 0.3 mg/0.3 mL injection, auto-injector, Disp: , Rfl:     ezetimibe (ZETIA) 10 MG tablet, TAKE ONE TABLET BY MOUTH EVERY DAY, Disp: 30 tablet, Rfl: 11    fluorometholone (FML) 0.1 % ophthalmic suspension, Administer 1 drop to both eyes Daily., Disp: , Rfl:     fluticasone (FLONASE) 50 MCG/ACT nasal spray, Administer 1 spray into the nostril(s) as directed by provider Daily., Disp: , Rfl:     gabapentin (NEURONTIN) 300 MG capsule, Take 2 capsules by mouth 2 (Two) Times a Day., Disp: , Rfl: 3    glimepiride (AMARYL) 2 MG tablet, Take 1 tablet by mouth Daily., Disp: , Rfl:     hydroCHLOROthiazide (MICROZIDE) 12.5 MG capsule, TAKE ONE CAPSULE BY MOUTH ONCE DAILY, Disp: 90 capsule, Rfl: 1    levothyroxine (SYNTHROID, LEVOTHROID) 75 MCG tablet, Take 1 tablet by mouth Daily., Disp: , Rfl: 5    meloxicam (MOBIC) 7.5 MG tablet, Take 1 tablet by mouth Daily., Disp: , Rfl:     montelukast (SINGULAIR) 10 MG tablet, Take 1 tablet by mouth Every Evening., Disp: , Rfl: 5    pantoprazole (PROTONIX) 40 MG EC tablet, Take 1 tablet by mouth Daily., Disp: 90 tablet, Rfl: 2    pravastatin (PRAVACHOL) 80 MG tablet, Take 1 tablet by mouth every night at bedtime., Disp: 90 tablet, Rfl: 3    sertraline (ZOLOFT) 100 MG tablet, Take 1.5 tablets by mouth Daily., Disp: 30 tablet, Rfl: 0    theophylline (UNIPHYL) 400 MG 24 hr tablet, Take 1 tablet by mouth Daily., Disp: , Rfl: 5    verapamil ER (VERELAN) 240 MG 24 hr capsule, TAKE 1 CAPSULE BY MOUTH AT BEDTIME, Disp: 90 capsule, Rfl: 1    warfarin (Coumadin) 5 MG tablet, Take 5 mg by mouth daily or as otherwise directed., Disp: 90 tablet, Rfl: 1    Social History     Socioeconomic History    Marital status:    Tobacco Use    Smoking status: Former     Types: Cigarettes    Smokeless tobacco: Never  "  Vaping Use    Vaping status: Never Used   Substance and Sexual Activity    Alcohol use: Yes     Comment: occasionally    Drug use: Not Currently    Sexual activity: Defer       Family History   Problem Relation Age of Onset    Heart disease Mother     Hypertension Mother     Diabetes Father     Stroke Father     Hypertension Father     Cancer Father         esophageal. prostate, skin    Diabetes Sister     Thyroid disease Sister     Heart disease Maternal Grandmother     Diabetes Paternal Grandmother        The following portions of the patient's history were reviewed and updated as appropriate: allergies, current medications, past family history, past medical history, past social history, past surgical history and problem list.    ROS    Pertinent items are noted in HPI, all other systems reviewed and negative    /60 (BP Location: Right arm, Patient Position: Sitting, Cuff Size: Large Adult)   Pulse 65   Resp 16   Ht 185.4 cm (73\")   Wt 120 kg (264 lb)   SpO2 96%   BMI 34.83 kg/m² .  Objective     Physical Exam  General Appearance: Alert, in no acute distress  Head:   Normocephalic, atraumatic  Eyes:    PERRLA, EOM intact, conjunctivae and sclerae normal, no  icterus  Throat: Oral mucosa moist  Neck:No carotid bruit or JVD  Lungs:  Clear to auscultation, respirations regular, even and unlabored   Heart:  irregular rhythm and normal rate, normal S1 and S2, no gallop, no rub, no click  Chest Wall:  No abnormalities observed  Abdomen:  Soft,  non-tender, non-distended, no guarding, no rebound  tenderness  Extremities:No edema, no cyanosis or redness  Pulses:Pulses palpable and equal bilaterally in all extremities  Skin:  No bleeding, bruising or rash   Neurologic:  Normal mood, thought content and behavior    Procedures           proBNP   Date Value Ref Range Status   06/11/2020 328.2 5.0 - 900.0 pg/mL Final     CMP          10/21/2024    09:15 3/10/2025    16:17 4/26/2025    19:03   CMP   Glucose  104  " "127    BUN  16  18    Creatinine  1.17  1.28    EGFR  64.2  57.6    Sodium  139  131    Potassium  4.5  3.8    Chloride  101  98    Calcium  9.9  9.3    Total Protein 6.5  7.1  7.0    Albumin 3.8  4.4  4.2    Globulin 2.7  2.7  2.8    Total Bilirubin  0.6  0.6    Alkaline Phosphatase  59  53    AST (SGOT)  39  28    ALT (SGPT)  28  21    Albumin/Globulin Ratio 1.4  1.6  1.5    BUN/Creatinine Ratio  13.7  14.1    Anion Gap  9.2  9.6      CBC w/diff          8/9/2024    11:54 3/10/2025    16:17 4/26/2025    19:03   CBC w/Diff   WBC 6.28  7.73  9.36    RBC 6.20  6.15  6.03    Hemoglobin 16.3  15.8  15.6    Hematocrit 49.7  49.9  49.4    MCV 80.2  81.1  81.9    MCH 26.3  25.7  25.9    MCHC 32.8  31.7  31.6    RDW 14.5  14.6  15.4    Platelets 164  162  134    Neutrophil Rel % 54.5  55.9  79.2    Immature Granulocyte Rel % 0.8  0.4  0.5    Lymphocyte Rel % 33.4  32.7  9.3    Monocyte Rel % 8.6  8.9  10.6    Eosinophil Rel % 2.2  1.6  0.1    Basophil Rel % 0.5  0.5  0.3       Lab Results   Component Value Date    TSH 0.217 (L) 03/10/2025      Lab Results   Component Value Date    FREET4 1.16 03/10/2025      No results found for: \"DDIMERQUANT\"  Magnesium   Date Value Ref Range Status   12/08/2023 1.9 1.6 - 2.4 mg/dL Final      Digoxin   Date Value Ref Range Status   05/06/2025 2.22 (H) 0.60 - 1.20 ng/mL Final      Lab Results   Component Value Date    TROPONINT <0.010 04/09/2022           Lipid Panel          3/10/2025    16:17   Lipid Panel   Total Cholesterol 117    Triglycerides 119    HDL Cholesterol 35    VLDL Cholesterol 22    LDL Cholesterol  60    LDL/HDL Ratio 1.66      No results found for: \"POCTROP\"  Results for orders placed during the hospital encounter of 05/06/25    Stress Test With Myocardial Perfusion (1 Day)    Interpretation Summary    Findings consistent with a normal ECG stress test.    Left ventricular ejection fraction is normal (Calculated EF = 70%).    Myocardial perfusion imaging indicates a " moderate-sized infarct located in the inferior wall and septal wall with no significant ischemia noted.    Impressions are consistent with an intermediate risk study.    Results for orders placed during the hospital encounter of 05/06/25    Adult Transthoracic Echo Complete W/ Color, Spectral and Contrast if Necessary Per Protocol    Interpretation Summary    Left ventricular systolic function is normal. Calculated left ventricular EF = 64%    The left ventricular cavity is mildly dilated.  LVEDD 6.1 cm.    Left ventricular wall thickness is consistent with mild concentric hypertrophy.  IVS 1.2 cm.    The right ventricular cavity is mildly dilated.  There is normal RV systolic function.    Aortic valve is moderately calcified but no significant stenosis or regurgitation noted.    Moderate mitral valve regurgitation is present.    The left atrial cavity is severely dilated.    Estimated right ventricular systolic pressure from tricuspid regurgitation is mildly elevated (35-45 mmHg).    Electronically signed by Brendan Ross MD, 05/06/25, 10:00 AM EDT.     Results for orders placed during the hospital encounter of 09/17/21    Cardiac Catheterization/Vascular Study    Narrative  Silvano Gillis MD, PhD  [facility] cardiology  Date of service 9-17-21    Procedure  1.  Left heart catheterization with coronary angiography left ventriculography via right radial approach  2.  Fractional flow reserve of high diagonal versus ramus intermedius superior limb  3.  Fractional flow reserve ramus intermedius, inferior limb separate branch    Indication  Continued chest pain, uninformative noninvasive studies    After informed consent the patient was brought to the catheterization lab sterilely prepped and draped in usual fashion for the right wrist for right radial access via micropuncture modified Seldinger technique under ultrasound guidance.  5/6 slender sheath was placed with standard cocktail of  heparin nitroglycerin and Cardene.  035 guidewire was advanced aortic valve followed by diagnostic catheterization with JL 3 and JR4 6 [place] catheters.  JR4 was used across aortic valve followed by hand-injection for LV gram, EDP assessment and pullback assessment the transaortic valve gradient.  Secondary to findings of possible obstructive coronary disease with large bifurcating ramus intermedius branch this is what made to perform FFR.  Patient was heparinized for ACT greater than 250 with 100 units/kg of heparin.  Standard FFR was zeroed outside the body, equalized in the left main followed by exam seen to first the inferior limb of the ramus intermedius, IFR was obtained 0.98, the wire was then pulled back followed again by equalization the left main, readvanced to the superior limb of the ramus intermedius separate branch followed by IFR which is 0.95, this was followed by FFR which was 0.87 with good correlated value.  Pullback to the left main revealed no drift at 0.99.  Final angiography revealed no wall trauma, no edge dissections, DELFINO-3 flow in all branches and all catheters and equipment were removed.  Sheath was removed with TR band for hemostasis.  Patient with Cath Lab chest pain-free hemodynamically electrically stable or talking staff neurologically grossly intact bilaterally.      Contrast 170 cc  Conscious sedation time 1 hour with IV Versed and fentanyl administration nurse with complete ECG pulse oximetry and hemodynamic monitoring during the case observed by me  Blood loss less than 5 cc  Complications none    Findings next  #1 open aortic pressure, 157/96  2.  Closing pressure was unchanged next  #3 LVEDP 12 to 16 mmHg  4.  Normal LV systolic function EF of 65 to 70%  5.  No transaortic gradient    Angiography next  1 left main large-caliber vessel no angiographic disease  2.  LAD medium caliber vessel coursing anteriorly with mild nonobstructive disease most severely 20 to 30% diffusely  with diffuse luminal regularities  3.  Ramus intermedius is a very large caliber bifurcating vessel along anterolateral wall, proximally there is nonobstructive 20 to 30% luminal regularities, at the bifurcation of the superior and inferior limbs there is angiographic 70-80% in the superior limb, 50-60% in the inferior limb with only mild luminal regularities distally.  4.  Circumflex has 1 obtuse marginal branch, OM has 60% eccentric plaque, circumflex proper has mild luminal regularities only with DELFINO-3 flow throughout next  #5 RCA large caliber vessel with nonobstructive disease, mild luminal regularities only including PLV and PDA branches distally with DELFINO-3 flow    Conclusions and recommendations  1 borderline nonobstructive disease most severely in the ramus intermedius branch of the bifurcation involving superior and inferior sizable limbs.  IFR and FFR unremarkable in both limbs measured today as noted above.  Nonobstructive LAD disease, obtuse marginal disease and b luminal regularities only in the RCA.  2.  High normal LVEDP, no transaortic gradient, hyperdynamic LV function 65 to 70%  3.  Continue primary mention goals, aspirin statin beta-blocker, antianginals, may try Ranexa for small vessel disease not amenable to intervention or visible by angiography for microvascular dysfunction with negative work-up today  4.  Patient be discharged with discharge criteria met, follow-up cardiology 2 to 4 weeks for further optimization of his medical therapy    Silvano Gillis MD, PhD    Efe Malloy  reports that he has quit smoking. His smoking use included cigarettes. He has never used smokeless tobacco.

## 2025-06-05 NOTE — PROGRESS NOTES
Cardiology Office Follow Up Visit      Primary Care Provider:  Uday Beltran MD    Reason for f/u:     6-week follow-up      Subjective     CC:    Patient states he is here to follow-up on testing recently ordered by Dr. Gamboa    History of Present Illness       Efe Malloy is a 79 y.o. male [place] with history of chronic persistent and rate controlled Afib, HTN, punctate R parietal CVA 2022.  He is on warfarin with goal INR 2-3.    Patient saw Dr. Gillis in July 2024.  He has a known history of chest pain with multiple stress tests, all of which been unremarkable.  Most recent December 2023, which showed diminished counts and septum inferior and inferolateral wall with no reversibility.  He had left heart cath September 2021 which revealed borderline obstructive disease, large ramus branch in both superior and inferior limbs.  Both IFR/FFR both limbs were insignificant in respect to hemodynamic criteria.       Patient return to the office in April 2025 and saw Dr. Gamboa, he reported intermittent substernal, nonradiating, burning chest pain. It has increased in frequency and duration for the last month or so.  Dr. Gamboa ordered bilateral carotid Dopplers, stress test and echo.  He also discussed switching warfarin to Eliquis per patient request due to inconvenience of INR checks, however, the cost was too great and patient decided to stay on Warfarin. Carotid Dopplers revealed right ICA less than 50% stenosis, left ICA less than 50% stenosis.  Stress test showed normal left ventricular EF, moderate size infarct in the inferior wall and septal wall with no significant ischemia. Echocardiogram showed normal left systolic function with calculated EF 64%.  Mild concentric left ventricular hypertrophy, normal right ventricular systolic function.  Moderate calcification of aortic valve with no significant stenosis or regurgitation.  Moderate mitral valve regurgitation.  Estimated RVSP 35-45 mmHg. He also recently had  M cot which did reveal 100% A-fib with occasional PVCs/couplets/triplets and 2 episodes of V. Tach, 1 lasting 5 beats, 1 lasting 20 beats, self terminating.    Since that visit, patient states that he has been having pretty consistent chest pressure with intermittent sharp chest pains.  He has also had some near syncope/balance/dizziness issues.  Patient states that last year at his previous visit with Dr. Gillis, they had discussed a possible heart catheterization, however it was deferred.  I was able to discuss with Dr. Gillis today recent testing results and patient's symptoms, and he gave patient the option of proceeding with heart cath at this time.  Patient states he is concerned with the symptoms, last heart catheterization was in 2021, he would like to proceed with heart cath for updated, comprehensive ischemic evaluation.  Also, patient is asking about resuming his digoxin. Digoxin level 5/6/2025 was 2.22.  I advised him I will order digoxin level in addition to his preprocedure cardiac cath labs, and pending result, he will be contacted regarding the digoxin.  He has permanent A-fib, today heart rate of 65, blood pressure 100/60.    ASSESSMENT/PLAN:      Diagnoses and all orders for this visit:    1. Coronary artery disease involving native coronary artery of native heart with angina pectoris (Primary)    2. Near syncope    3. Mixed hyperlipidemia    4. Essential hypertension    5. Persistent atrial fibrillation    6. TIA (transient ischemic attack)        MEDICAL DECISION MAKING:    CAD        Recent stress test with moderate size infarct in the inferior wall and septal wall with no significant ischemia        Plan cardiac cath appx 2 wks    C/o dizziness/near syncope  Hx TIA/punctate R parietal CVA 2022   Carotid doppler done, less than 50% bilaterally  Plan cardiac cath    Mixed hyperlipidemia        Lipids March 2025 LDL 60, triglycerides 119 HDL 35        Pravastatin 80 daily Zetia        Goal LDL less  than 70 optimally less than 55        Intolerance to other statins historically    Essential hypertension         /60 today, only on verapamil HCTZ         Goal blood pressure less than 135 systolic          he remains off hydralazine and losartan         Home BP checks, report back, if remains low, may need to decrease dosages    Persistent atrial fibrillation (Primary)   Remains on Warfarin through coag clinic   Currently off Digoxin due to elevated level-repeat level ordered   Rate 65         Past Medical History:   Diagnosis Date    Allergic     Anxiety     Arthritis     Asthma     Atrial fibrillation     Atypical chest pain 06/11/2020    Cataract     COPD (chronic obstructive pulmonary disease)     COVID     COVID-19 01/13/2021    Depression     Enlarged prostate     Headache     Hyperlipidemia     Hypertension     Kidney stone     Near syncope 12/17/2020    Prostatitis     Shortness of breath     Sleep apnea     Stroke     Urinary tract infection        Past Surgical History:   Procedure Laterality Date    APPENDECTOMY      CARDIAC CATHETERIZATION  2007    nonobstructive dz    CARDIAC CATHETERIZATION N/A 9/17/2021    Procedure: Left Heart Cath;  Surgeon: Silvano Gillis MD;  Location: [facility] CATH INVASIVE LOCATION;  Service: Cardiology;  Laterality: N/A;    CERVICAL EPIDURAL      with Dr. Harshil Rodgers mgt    CHOLECYSTECTOMY      EYE SURGERY Bilateral     corneal transplant left 09/02/2020 (Right eye 2014)    JOINT REPLACEMENT Right     knee    KIDNEY STONE SURGERY      PROSTATE SURGERY           Current Outpatient Medications:     albuterol sulfate  (90 Base) MCG/ACT inhaler, Inhale 2 puffs Every 6 (Six) Hours As Needed., Disp: , Rfl:     alfuzosin (UROXATRAL) 10 MG 24 hr tablet, Take 1 tablet by mouth 2 (Two) Times a Day., Disp: , Rfl: 11    aspirin (aspirin) 81 MG EC tablet, Take 1 tablet by mouth Daily., Disp: 90 tablet, Rfl: 3    baclofen (LIORESAL) 10 MG tablet, Take 1 tablet by mouth  2 (Two) Times a Day., Disp: , Rfl:     BREO ELLIPTA 100-25 MCG/INH inhaler, Inhale 1 puff Daily., Disp: , Rfl: 5    EPINEPHrine (EPIPEN) 0.3 MG/0.3ML solution auto-injector injection, epinephrine 0.3 mg/0.3 mL injection, auto-injector, Disp: , Rfl:     ezetimibe (ZETIA) 10 MG tablet, TAKE ONE TABLET BY MOUTH EVERY DAY, Disp: 30 tablet, Rfl: 11    fluorometholone (FML) 0.1 % ophthalmic suspension, Administer 1 drop to both eyes Daily., Disp: , Rfl:     fluticasone (FLONASE) 50 MCG/ACT nasal spray, Administer 1 spray into the nostril(s) as directed by provider Daily., Disp: , Rfl:     gabapentin (NEURONTIN) 300 MG capsule, Take 2 capsules by mouth 2 (Two) Times a Day., Disp: , Rfl: 3    glimepiride (AMARYL) 2 MG tablet, Take 1 tablet by mouth Daily., Disp: , Rfl:     hydroCHLOROthiazide (MICROZIDE) 12.5 MG capsule, TAKE ONE CAPSULE BY MOUTH ONCE DAILY, Disp: 90 capsule, Rfl: 1    levothyroxine (SYNTHROID, LEVOTHROID) 75 MCG tablet, Take 1 tablet by mouth Daily., Disp: , Rfl: 5    meloxicam (MOBIC) 7.5 MG tablet, Take 1 tablet by mouth Daily., Disp: , Rfl:     montelukast (SINGULAIR) 10 MG tablet, Take 1 tablet by mouth Every Evening., Disp: , Rfl: 5    pantoprazole (PROTONIX) 40 MG EC tablet, Take 1 tablet by mouth Daily., Disp: 90 tablet, Rfl: 2    pravastatin (PRAVACHOL) 80 MG tablet, Take 1 tablet by mouth every night at bedtime., Disp: 90 tablet, Rfl: 3    sertraline (ZOLOFT) 100 MG tablet, Take 1.5 tablets by mouth Daily., Disp: 30 tablet, Rfl: 0    theophylline (UNIPHYL) 400 MG 24 hr tablet, Take 1 tablet by mouth Daily., Disp: , Rfl: 5    verapamil ER (VERELAN) 240 MG 24 hr capsule, TAKE 1 CAPSULE BY MOUTH AT BEDTIME, Disp: 90 capsule, Rfl: 1    warfarin (Coumadin) 5 MG tablet, Take 5 mg by mouth daily or as otherwise directed., Disp: 90 tablet, Rfl: 1    Social History     Socioeconomic History    Marital status:    Tobacco Use    Smoking status: Former     Types: Cigarettes    Smokeless tobacco: Never  "  Vaping Use    Vaping status: Never Used   Substance and Sexual Activity    Alcohol use: Yes     Comment: occasionally    Drug use: Not Currently    Sexual activity: Defer       Family History   Problem Relation Age of Onset    Heart disease Mother     Hypertension Mother     Diabetes Father     Stroke Father     Hypertension Father     Cancer Father         esophageal. prostate, skin    Diabetes Sister     Thyroid disease Sister     Heart disease Maternal Grandmother     Diabetes Paternal Grandmother        The following portions of the patient's history were reviewed and updated as appropriate: allergies, current medications, past family history, past medical history, past social history, past surgical history and problem list.    ROS    Pertinent items are noted in HPI, all other systems reviewed and negative    /60 (BP Location: Right arm, Patient Position: Sitting, Cuff Size: Large Adult)   Pulse 65   Resp 16   Ht 185.4 cm (73\")   Wt 120 kg (264 lb)   SpO2 96%   BMI 34.83 kg/m² .  Objective     Physical Exam  General Appearance: Alert, in no acute distress  Head:   Normocephalic, atraumatic  Eyes:    PERRLA, EOM intact, conjunctivae and sclerae normal, no  icterus  Throat: Oral mucosa moist  Neck:No carotid bruit or JVD  Lungs:  Clear to auscultation, respirations regular, even and unlabored   Heart:  irregular rhythm and normal rate, normal S1 and S2, no gallop, no rub, no click  Chest Wall:  No abnormalities observed  Abdomen:  Soft,  non-tender, non-distended, no guarding, no rebound  tenderness  Extremities:No edema, no cyanosis or redness  Pulses:Pulses palpable and equal bilaterally in all extremities  Skin:  No bleeding, bruising or rash   Neurologic:  Normal mood, thought content and behavior    Procedures           proBNP   Date Value Ref Range Status   06/11/2020 328.2 5.0 - 900.0 pg/mL Final     CMP          10/21/2024    09:15 3/10/2025    16:17 4/26/2025    19:03   CMP   Glucose  104  " "127    BUN  16  18    Creatinine  1.17  1.28    EGFR  64.2  57.6    Sodium  139  131    Potassium  4.5  3.8    Chloride  101  98    Calcium  9.9  9.3    Total Protein 6.5  7.1  7.0    Albumin 3.8  4.4  4.2    Globulin 2.7  2.7  2.8    Total Bilirubin  0.6  0.6    Alkaline Phosphatase  59  53    AST (SGOT)  39  28    ALT (SGPT)  28  21    Albumin/Globulin Ratio 1.4  1.6  1.5    BUN/Creatinine Ratio  13.7  14.1    Anion Gap  9.2  9.6      CBC w/diff          8/9/2024    11:54 3/10/2025    16:17 4/26/2025    19:03   CBC w/Diff   WBC 6.28  7.73  9.36    RBC 6.20  6.15  6.03    Hemoglobin 16.3  15.8  15.6    Hematocrit 49.7  49.9  49.4    MCV 80.2  81.1  81.9    MCH 26.3  25.7  25.9    MCHC 32.8  31.7  31.6    RDW 14.5  14.6  15.4    Platelets 164  162  134    Neutrophil Rel % 54.5  55.9  79.2    Immature Granulocyte Rel % 0.8  0.4  0.5    Lymphocyte Rel % 33.4  32.7  9.3    Monocyte Rel % 8.6  8.9  10.6    Eosinophil Rel % 2.2  1.6  0.1    Basophil Rel % 0.5  0.5  0.3       Lab Results   Component Value Date    TSH 0.217 (L) 03/10/2025      Lab Results   Component Value Date    FREET4 1.16 03/10/2025      No results found for: \"DDIMERQUANT\"  Magnesium   Date Value Ref Range Status   12/08/2023 1.9 1.6 - 2.4 mg/dL Final      Digoxin   Date Value Ref Range Status   05/06/2025 2.22 (H) 0.60 - 1.20 ng/mL Final      Lab Results   Component Value Date    TROPONINT <0.010 04/09/2022           Lipid Panel          3/10/2025    16:17   Lipid Panel   Total Cholesterol 117    Triglycerides 119    HDL Cholesterol 35    VLDL Cholesterol 22    LDL Cholesterol  60    LDL/HDL Ratio 1.66      No results found for: \"POCTROP\"  Results for orders placed during the hospital encounter of 05/06/25    Stress Test With Myocardial Perfusion (1 Day)    Interpretation Summary    Findings consistent with a normal ECG stress test.    Left ventricular ejection fraction is normal (Calculated EF = 70%).    Myocardial perfusion imaging indicates a " moderate-sized infarct located in the inferior wall and septal wall with no significant ischemia noted.    Impressions are consistent with an intermediate risk study.    Results for orders placed during the hospital encounter of 05/06/25    Adult Transthoracic Echo Complete W/ Color, Spectral and Contrast if Necessary Per Protocol    Interpretation Summary    Left ventricular systolic function is normal. Calculated left ventricular EF = 64%    The left ventricular cavity is mildly dilated.  LVEDD 6.1 cm.    Left ventricular wall thickness is consistent with mild concentric hypertrophy.  IVS 1.2 cm.    The right ventricular cavity is mildly dilated.  There is normal RV systolic function.    Aortic valve is moderately calcified but no significant stenosis or regurgitation noted.    Moderate mitral valve regurgitation is present.    The left atrial cavity is severely dilated.    Estimated right ventricular systolic pressure from tricuspid regurgitation is mildly elevated (35-45 mmHg).    Electronically signed by Brendan Ross MD, 05/06/25, 10:00 AM EDT.     Results for orders placed during the hospital encounter of 09/17/21    Cardiac Catheterization/Vascular Study    Narrative  Silvano Gillis MD, PhD  [facility] cardiology  Date of service 9-17-21    Procedure  1.  Left heart catheterization with coronary angiography left ventriculography via right radial approach  2.  Fractional flow reserve of high diagonal versus ramus intermedius superior limb  3.  Fractional flow reserve ramus intermedius, inferior limb separate branch    Indication  Continued chest pain, uninformative noninvasive studies    After informed consent the patient was brought to the catheterization lab sterilely prepped and draped in usual fashion for the right wrist for right radial access via micropuncture modified Seldinger technique under ultrasound guidance.  5/6 slender sheath was placed with standard cocktail of  heparin nitroglycerin and Cardene.  035 guidewire was advanced aortic valve followed by diagnostic catheterization with JL 3 and JR4 6 [place] catheters.  JR4 was used across aortic valve followed by hand-injection for LV gram, EDP assessment and pullback assessment the transaortic valve gradient.  Secondary to findings of possible obstructive coronary disease with large bifurcating ramus intermedius branch this is what made to perform FFR.  Patient was heparinized for ACT greater than 250 with 100 units/kg of heparin.  Standard FFR was zeroed outside the body, equalized in the left main followed by exam seen to first the inferior limb of the ramus intermedius, IFR was obtained 0.98, the wire was then pulled back followed again by equalization the left main, readvanced to the superior limb of the ramus intermedius separate branch followed by IFR which is 0.95, this was followed by FFR which was 0.87 with good correlated value.  Pullback to the left main revealed no drift at 0.99.  Final angiography revealed no wall trauma, no edge dissections, DELFINO-3 flow in all branches and all catheters and equipment were removed.  Sheath was removed with TR band for hemostasis.  Patient with Cath Lab chest pain-free hemodynamically electrically stable or talking staff neurologically grossly intact bilaterally.      Contrast 170 cc  Conscious sedation time 1 hour with IV Versed and fentanyl administration nurse with complete ECG pulse oximetry and hemodynamic monitoring during the case observed by me  Blood loss less than 5 cc  Complications none    Findings next  #1 open aortic pressure, 157/96  2.  Closing pressure was unchanged next  #3 LVEDP 12 to 16 mmHg  4.  Normal LV systolic function EF of 65 to 70%  5.  No transaortic gradient    Angiography next  1 left main large-caliber vessel no angiographic disease  2.  LAD medium caliber vessel coursing anteriorly with mild nonobstructive disease most severely 20 to 30% diffusely  with diffuse luminal regularities  3.  Ramus intermedius is a very large caliber bifurcating vessel along anterolateral wall, proximally there is nonobstructive 20 to 30% luminal regularities, at the bifurcation of the superior and inferior limbs there is angiographic 70-80% in the superior limb, 50-60% in the inferior limb with only mild luminal regularities distally.  4.  Circumflex has 1 obtuse marginal branch, OM has 60% eccentric plaque, circumflex proper has mild luminal regularities only with DELFINO-3 flow throughout next  #5 RCA large caliber vessel with nonobstructive disease, mild luminal regularities only including PLV and PDA branches distally with DELFINO-3 flow    Conclusions and recommendations  1 borderline nonobstructive disease most severely in the ramus intermedius branch of the bifurcation involving superior and inferior sizable limbs.  IFR and FFR unremarkable in both limbs measured today as noted above.  Nonobstructive LAD disease, obtuse marginal disease and b luminal regularities only in the RCA.  2.  High normal LVEDP, no transaortic gradient, hyperdynamic LV function 65 to 70%  3.  Continue primary mention goals, aspirin statin beta-blocker, antianginals, may try Ranexa for small vessel disease not amenable to intervention or visible by angiography for microvascular dysfunction with negative work-up today  4.  Patient be discharged with discharge criteria met, follow-up cardiology 2 to 4 weeks for further optimization of his medical therapy    Silvano Gillis MD, PhD    Efe Malloy  reports that he has quit smoking. His smoking use included cigarettes. He has never used smokeless tobacco.

## 2025-06-10 ENCOUNTER — OFFICE VISIT (OUTPATIENT)
Dept: CARDIOLOGY | Facility: CLINIC | Age: 80
End: 2025-06-10
Payer: MEDICARE

## 2025-06-10 VITALS
HEART RATE: 65 BPM | BODY MASS INDEX: 34.99 KG/M2 | HEIGHT: 73 IN | RESPIRATION RATE: 16 BRPM | OXYGEN SATURATION: 96 % | WEIGHT: 264 LBS | SYSTOLIC BLOOD PRESSURE: 100 MMHG | DIASTOLIC BLOOD PRESSURE: 60 MMHG

## 2025-06-10 DIAGNOSIS — E78.2 MIXED HYPERLIPIDEMIA: ICD-10-CM

## 2025-06-10 DIAGNOSIS — I25.119 CORONARY ARTERY DISEASE INVOLVING NATIVE CORONARY ARTERY OF NATIVE HEART WITH ANGINA PECTORIS: Primary | ICD-10-CM

## 2025-06-10 DIAGNOSIS — R55 NEAR SYNCOPE: ICD-10-CM

## 2025-06-10 DIAGNOSIS — G45.9 TIA (TRANSIENT ISCHEMIC ATTACK): ICD-10-CM

## 2025-06-10 DIAGNOSIS — I48.19 PERSISTENT ATRIAL FIBRILLATION: ICD-10-CM

## 2025-06-10 DIAGNOSIS — I10 ESSENTIAL HYPERTENSION: ICD-10-CM

## 2025-06-10 PROBLEM — I25.10 CAD (CORONARY ARTERY DISEASE), NATIVE CORONARY ARTERY: Status: ACTIVE | Noted: 2025-06-10

## 2025-06-10 RX ORDER — GLIMEPIRIDE 2 MG/1
1 TABLET ORAL DAILY
COMMUNITY
Start: 2025-03-17

## 2025-06-10 RX ORDER — FLUTICASONE PROPIONATE 50 MCG
1 SPRAY, SUSPENSION (ML) NASAL DAILY
COMMUNITY
Start: 2025-03-02

## 2025-06-11 ENCOUNTER — ANTICOAGULATION VISIT (OUTPATIENT)
Dept: PHARMACY | Facility: HOSPITAL | Age: 80
End: 2025-06-11
Payer: MEDICARE

## 2025-06-11 DIAGNOSIS — I48.19 PERSISTENT ATRIAL FIBRILLATION: Primary | ICD-10-CM

## 2025-06-11 LAB
INR PPP: 1.9 (ref 0.9–1.1)
PROTHROMBIN TIME: 21.2 SECONDS

## 2025-06-11 PROCEDURE — 85610 PROTHROMBIN TIME: CPT

## 2025-06-11 PROCEDURE — G0463 HOSPITAL OUTPT CLINIC VISIT: HCPCS

## 2025-06-11 PROCEDURE — 36416 COLLJ CAPILLARY BLOOD SPEC: CPT

## 2025-06-11 RX ORDER — ENOXAPARIN SODIUM 150 MG/ML
120 INJECTION SUBCUTANEOUS EVERY 12 HOURS SCHEDULED
Qty: 8 ML | Refills: 1 | Status: SHIPPED | OUTPATIENT
Start: 2025-06-11

## 2025-06-11 NOTE — PROGRESS NOTES
Anticoagulation Clinic Progress Note    INR Goal: 2 - 3  Today's INR: 1.9 (subtherapeutic)  Current warfarin dose: warfarin 5 mg PO daily. Current total weekly dose = 35 mg per week.     Note: Patient has upcoming heart cath . Per discussion with JAZZ Montenegro, patient will need to hold warfarin for 3 days prior to cath and bridge with Lovenox before and after procedure. Went over instructions on holding warfarin (see letter in Letters tab) and also provided education on administering Lovenox injections.     INR History:      Latest Ref Rng & Units 2024     1:00 PM 2024     1:00 PM 2025     1:30 PM 3/19/2025     1:30 PM 2025     7:03 PM 2025    11:30 AM 2025     2:45 PM   Anticoagulation Monitoring   INR  2.30 2.30 2.00 2.40  3.10 1.90   INR Date  2024 2024 2025 3/19/2025  2025 2025   INR Goal  2.0-3.0 2.0-3.0 2.0-3.0 2.0-3.0  2.0-3.0 2.0-3.0   Trend  Same Same Same Same  Same Same   Last Week Total  35 mg 35 mg 35 mg 35 mg  35 mg 35 mg   Next Week Total  35 mg 35 mg 35 mg 35 mg  35 mg 35 mg   Sun  5 mg 5 mg 5 mg 5 mg  5 mg 5 mg   Mon  5 mg 5 mg 5 mg 5 mg  5 mg 5 mg   Tue  5 mg 5 mg 5 mg 5 mg  5 mg 5 mg   Wed  5 mg 5 mg 5 mg 5 mg  5 mg 5 mg   Thu  5 mg 5 mg 5 mg 5 mg  5 mg 5 mg   Fri  5 mg 5 mg 5 mg 5 mg  5 mg 5 mg   Sat  5 mg 5 mg 5 mg 5 mg  5 mg 5 mg   Historical INR 2.00 - 3.00     1.76          Anticoagulation Summary  As of 2025      INR goal:  2.0-3.0   TTR:  78.7% (6 y)   INR used for dosin.90 (2025)   Warfarin maintenance plan:  5 mg every day   Weekly warfarin total:  35 mg   No change documented:  Almita Leon, Jessy   Plan last modified:  Kavita Jolley PharmD (2023)   Next INR check:  2025   Priority:  Maintenance   Target end date:  --    Indications    Persistent atrial fibrillation [I48.19]                 Anticoagulation Episode Summary       INR check location:  --    Preferred lab:  --    Send INR  reminders to:  [facility] CLINICAL POOL    Comments:  Patient contract signed 11/15/21.          Anticoagulation Care Providers       Provider Role Specialty Phone number    Silvano Gillis MD  Cardiology [phone]            Clinic Interview:   Patient Findings     Positives:  Upcoming invasive procedure    Negatives:  Signs/symptoms of thrombosis, Signs/symptoms of bleeding,   Laboratory test error suspected, Change in health, Change in alcohol use,   Change in activity, Emergency department visit, Upcoming dental procedure,   Missed doses, Extra doses, Change in medications, Change in diet/appetite,   Hospital admission, Bruising, Other complaints      Clinical Outcomes     Negatives:  Major bleeding event, Thromboembolic event,   Anticoagulation-related hospital admission, Anticoagulation-related ED   visit, Anticoagulation-related fatality        Current Medications:   Prior to Admission medications    Medication Sig Start Date End Date Taking? Authorizing Provider   albuterol sulfate  (90 Base) MCG/ACT inhaler Inhale 2 puffs Every 6 (Six) Hours As Needed. 7/6/21   Shelby Haskins MD   alfuzosin (UROXATRAL) 10 MG 24 hr tablet Take 1 tablet by mouth 2 (Two) Times a Day. 9/23/19   Shelby Haskins MD   aspirin (aspirin) 81 MG EC tablet Take 1 tablet by mouth Daily. 4/11/22   Silvano Gillis MD   baclofen (LIORESAL) 10 MG tablet Take 1 tablet by mouth 2 (Two) Times a Day. 7/13/21   Shelby Haskins MD   BREO ELLIPTA 100-25 MCG/INH inhaler Inhale 1 puff Daily. 9/24/19   Shelby Haskins MD   enoxaparin sodium (LOVENOX) 120 MG/0.8ML solution prefilled syringe syringe Inject 0.8 mL under the skin into the appropriate area as directed Every 12 (Twelve) Hours. 6/11/25   Margie Balderas APRN   EPINEPHrine (EPIPEN) 0.3 MG/0.3ML solution auto-injector injection epinephrine 0.3 mg/0.3 mL injection, auto-injector    Shelby Haskins MD   ezetimibe  (ZETIA) 10 MG tablet TAKE ONE TABLET BY MOUTH EVERY DAY 12/13/24   Silvano Gillis MD   fluorometholone (FML) 0.1 % ophthalmic suspension Administer 1 drop to both eyes Daily. 5/17/21   Shelby Haskins MD   fluticasone (FLONASE) 50 MCG/ACT nasal spray Administer 1 spray into the nostril(s) as directed by provider Daily. 3/2/25   Shelby Haskins MD   gabapentin (NEURONTIN) 300 MG capsule Take 2 capsules by mouth 2 (Two) Times a Day. 9/13/19   Shelby Haskins MD   glimepiride (AMARYL) 2 MG tablet Take 1 tablet by mouth Daily. 3/17/25   Shelby Haskins MD   hydroCHLOROthiazide (MICROZIDE) 12.5 MG capsule TAKE ONE CAPSULE BY MOUTH ONCE DAILY 1/13/25   Silvano Gillis MD   levothyroxine (SYNTHROID, LEVOTHROID) 75 MCG tablet Take 1 tablet by mouth Daily. 9/23/19   Shelby Haskins MD   meloxicam (MOBIC) 7.5 MG tablet Take 1 tablet by mouth Daily.    Shelby Hsakins MD   montelukast (SINGULAIR) 10 MG tablet Take 1 tablet by mouth Every Evening. 9/23/19   Shelby Haskins MD   pantoprazole (PROTONIX) 40 MG EC tablet Take 1 tablet by mouth Daily. 2/22/22   Silvano Gillis MD   pravastatin (PRAVACHOL) 80 MG tablet Take 1 tablet by mouth every night at bedtime. 3/19/25   Silvano Gillis MD   sertraline (ZOLOFT) 100 MG tablet Take 1.5 tablets by mouth Daily. 3/15/22   Silvano Gillis MD   theophylline (UNIPHYL) 400 MG 24 hr tablet Take 1 tablet by mouth Daily. 9/23/19   Shelby Haskins MD   verapamil ER (VERELAN) 240 MG 24 hr capsule TAKE 1 CAPSULE BY MOUTH AT BEDTIME 12/17/24   Silvano Gillis MD   warfarin (Coumadin) 5 MG tablet Take 5 mg by mouth daily or as otherwise directed. 4/15/25   Silvano Gillis MD       Medical history:   Past Medical History:   Diagnosis Date    Allergic     Anxiety     Arthritis     Asthma     Atrial fibrillation     Atypical chest pain 06/11/2020    Cataract     COPD (chronic obstructive  pulmonary disease)     COVID     COVID-19 01/13/2021    Depression     Enlarged prostate     Headache     Hyperlipidemia     Hypertension     Kidney stone     Near syncope 12/17/2020    Prostatitis     Shortness of breath     Sleep apnea     Stroke     Urinary tract infection        Social history:   Social History     Tobacco Use    Smoking status: Former     Types: Cigarettes    Smokeless tobacco: Never   Substance Use Topics    Alcohol use: Yes     Comment: occasionally       Allergies:    Penicillins and Rivaroxaban    Vaccine History:   Immunization History   Administered Date(s) Administered    ABRYSVO (RSV, 60+ or pregnant women 32-36 wks) 11/14/2024    Covid-19 (Pfizer) Gray Cap Monovalent 05/18/2021, 11/17/2021, 11/26/2021    FLUAD TRI 65YR+ 10/31/2019    Fluzone  >6mos 09/22/2014    Fluzone High-Dose 65+YRS 10/14/2016, 10/16/2017, 11/14/2018    H1N1 Inj 12/22/2009    Hepatitis A 09/17/2019, 11/19/2019    Influenza, Unspecified 09/18/2013    Pneumococcal Conjugate 13-Valent (PCV13) 10/12/2015    Pneumococcal Polysaccharide (PPSV23) 09/24/2013    Shingrix 09/17/2019, 11/19/2019    Zostavax 02/12/2013     HHD2WM2-OEWV SCORE   [id number] Score for Atrial Fibrillation Stroke Risk  Age in Years: 75+  Sex: Male  CHF History: Yes  Hypertension History: Yes  Stroke/TIA/Thromboembolism History: Yes  Vascular Disease History: Yes  Diabetes History: No  [id number] Score: 7  Stroke risks -: 7 Points. Risk of ischemic stroke: 11.2%. Risk of stroke/TIA/embolism: 15.7%    This patient is managed via a collaborative agreement, pursuant to IC 25-26-16. The signed protocol is kept in the [facility] at [address] [place] [address].    Education: Efe Malloy has been instructed to call if any changes in medications, doses, concerns, etc. Patient was provided dosing instructions and expressed verbal understanding and has no further questions at this time.    Plan:  1. Anticoagulation   -  no change as INR only slightly subtherapeutic and will be holding for procedure soon; warfarin 5 mg PO daily. Total weekly dose = 35 mg.   - Follow holding instructions provided today (starting 6/23).  - Patient to call clinic following procedure for further dosing instructions and next appointment will be scheduled at that time.       Rosetta DanielD  6/11/2025  15:17 EDT

## 2025-06-17 RX ORDER — VERAPAMIL HYDROCHLORIDE 240 MG/1
240 CAPSULE, DELAYED RELEASE ORAL
Qty: 90 CAPSULE | Refills: 1 | Status: SHIPPED | OUTPATIENT
Start: 2025-06-17

## 2025-06-24 ENCOUNTER — ANTICOAGULATION VISIT (OUTPATIENT)
Dept: PHARMACY | Facility: HOSPITAL | Age: 80
End: 2025-06-24
Payer: MEDICARE

## 2025-06-24 ENCOUNTER — LAB (OUTPATIENT)
Dept: LAB | Facility: HOSPITAL | Age: 80
End: 2025-06-24
Payer: MEDICARE

## 2025-06-24 DIAGNOSIS — I25.119 CORONARY ARTERY DISEASE INVOLVING NATIVE CORONARY ARTERY OF NATIVE HEART WITH ANGINA PECTORIS: ICD-10-CM

## 2025-06-24 DIAGNOSIS — R55 NEAR SYNCOPE: ICD-10-CM

## 2025-06-24 DIAGNOSIS — I48.19 PERSISTENT ATRIAL FIBRILLATION: Primary | ICD-10-CM

## 2025-06-24 LAB
ANION GAP SERPL CALCULATED.3IONS-SCNC: 10.1 MMOL/L (ref 5–15)
BASOPHILS # BLD AUTO: 0.05 10*3/MM3 (ref 0–0.2)
BASOPHILS NFR BLD AUTO: 0.7 % (ref 0–1.5)
BUN SERPL-MCNC: 19.2 MG/DL (ref 8–23)
BUN/CREAT SERPL: 16.3 (ref 7–25)
CALCIUM SPEC-SCNC: 9.8 MG/DL (ref 8.6–10.5)
CHLORIDE SERPL-SCNC: 103 MMOL/L (ref 98–107)
CO2 SERPL-SCNC: 26.9 MMOL/L (ref 22–29)
CREAT SERPL-MCNC: 1.18 MG/DL (ref 0.76–1.27)
DEPRECATED RDW RBC AUTO: 44.2 FL (ref 37–54)
DIGOXIN SERPL-MCNC: 0.44 NG/ML (ref 0.6–1.2)
EGFRCR SERPLBLD CKD-EPI 2021: 63.6 ML/MIN/1.73
EOSINOPHIL # BLD AUTO: 0.27 10*3/MM3 (ref 0–0.4)
EOSINOPHIL NFR BLD AUTO: 4 % (ref 0.3–6.2)
ERYTHROCYTE [DISTWIDTH] IN BLOOD BY AUTOMATED COUNT: 14.5 % (ref 12.3–15.4)
GLUCOSE SERPL-MCNC: 102 MG/DL (ref 65–99)
HCT VFR BLD AUTO: 51.1 % (ref 37.5–51)
HGB BLD-MCNC: 15.9 G/DL (ref 13–17.7)
IMM GRANULOCYTES # BLD AUTO: 0.04 10*3/MM3 (ref 0–0.05)
IMM GRANULOCYTES NFR BLD AUTO: 0.6 % (ref 0–0.5)
INR PPP: 1.65 (ref 2–3)
LYMPHOCYTES # BLD AUTO: 2.13 10*3/MM3 (ref 0.7–3.1)
LYMPHOCYTES NFR BLD AUTO: 31.6 % (ref 19.6–45.3)
MAGNESIUM SERPL-MCNC: 2.3 MG/DL (ref 1.6–2.4)
MCH RBC QN AUTO: 26.1 PG (ref 26.6–33)
MCHC RBC AUTO-ENTMCNC: 31.1 G/DL (ref 31.5–35.7)
MCV RBC AUTO: 83.9 FL (ref 79–97)
MONOCYTES # BLD AUTO: 0.56 10*3/MM3 (ref 0.1–0.9)
MONOCYTES NFR BLD AUTO: 8.3 % (ref 5–12)
NEUTROPHILS NFR BLD AUTO: 3.69 10*3/MM3 (ref 1.7–7)
NEUTROPHILS NFR BLD AUTO: 54.8 % (ref 42.7–76)
NRBC BLD AUTO-RTO: 0 /100 WBC (ref 0–0.2)
PLATELET # BLD AUTO: 157 10*3/MM3 (ref 140–450)
PMV BLD AUTO: 9.2 FL (ref 6–12)
POTASSIUM SERPL-SCNC: 4.5 MMOL/L (ref 3.5–5.2)
PROTHROMBIN TIME: 19.6 SECONDS (ref 19.4–28.5)
RBC # BLD AUTO: 6.09 10*6/MM3 (ref 4.14–5.8)
SODIUM SERPL-SCNC: 140 MMOL/L (ref 136–145)
WBC NRBC COR # BLD AUTO: 6.74 10*3/MM3 (ref 3.4–10.8)

## 2025-06-24 PROCEDURE — 80048 BASIC METABOLIC PNL TOTAL CA: CPT

## 2025-06-24 PROCEDURE — 85025 COMPLETE CBC W/AUTO DIFF WBC: CPT

## 2025-06-24 PROCEDURE — 85610 PROTHROMBIN TIME: CPT

## 2025-06-24 PROCEDURE — 83735 ASSAY OF MAGNESIUM: CPT

## 2025-06-24 PROCEDURE — 80162 ASSAY OF DIGOXIN TOTAL: CPT

## 2025-06-24 PROCEDURE — 36415 COLL VENOUS BLD VENIPUNCTURE: CPT

## 2025-06-24 NOTE — PROGRESS NOTES
Anticoagulation Clinic Progress Note - Outside Lab INR Testing     INR Goal: 2 - 3  Today's INR: 1.65 (subtherapeutic). INR result was obtained with pre-op lab work today and uploaded via Epic. Appointment conducted telephonically with Efe (patient) today.   Current warfarin dose: warfarin 5 mg PO daily however patient started holding warfarin  as previously instructed. Gave himself first enoxaparin injection this morning.    INR History:      Latest Ref Rng & Units 2025     1:30 PM 3/19/2025     1:30 PM 2025     7:03 PM 2025    11:30 AM 2025     2:45 PM 2025     9:57 AM 2025     1:06 PM   Anticoagulation Monitoring   INR  2.00 2.40  3.10 1.90  1.65   INR Date  2025 3/19/2025  2025 2025  2025   INR Goal  2.0-3.0 2.0-3.0  2.0-3.0 2.0-3.0  2.0-3.0   Trend  Same Same  Same Same  Same   Last Week Total  35 mg 35 mg  35 mg 35 mg  30 mg   Next Week Total  35 mg 35 mg  35 mg 35 mg  25 mg   Sun  5 mg 5 mg  5 mg 5 mg  -   Mon  5 mg 5 mg  5 mg 5 mg  -   Tue  5 mg 5 mg  5 mg 5 mg  Hold ()   Wed  5 mg 5 mg  5 mg 5 mg  Hold ()   Thu  5 mg 5 mg  5 mg 5 mg  5 mg   Fri  5 mg 5 mg  5 mg 5 mg  -   Sat  5 mg 5 mg  5 mg 5 mg  -   Historical INR 2.00 - 3.00   1.76    1.65         Anticoagulation Summary  As of 2025      INR goal:  2.0-3.0   TTR:  78.2% (6 y)   INR used for dosin.65 (2025)   Warfarin maintenance plan:  5 mg every day   Weekly warfarin total:  35 mg   Plan last modified:  Kavita Jolley, PharmD (2023)   Next INR check:  2025   Target end date:  --    Indications    Persistent atrial fibrillation [I48.19]                 Anticoagulation Episode Summary       INR check location:  --    Preferred lab:  --    Send INR reminders to:  [facility] CLINICAL POOL    Comments:  Patient contract signed 11/15/21.          Anticoagulation Care Providers       Provider Role Specialty Phone number    Silvano Gillis MD  Cardiology  [phone]            Clinic Interview:   Patient Findings     Positives:  Missed doses (Started holding 6/23 in preparation for heart   cath on 6/26)    Negatives:  Signs/symptoms of thrombosis, Signs/symptoms of bleeding,   Laboratory test error suspected, Change in health, Change in alcohol use,   Change in activity, Upcoming invasive procedure, Emergency department   visit, Upcoming dental procedure, Extra doses, Change in medications,   Change in diet/appetite, Hospital admission, Bruising, Other complaints      Clinical Outcomes     Negatives:  Major bleeding event, Thromboembolic event,   Anticoagulation-related hospital admission, Anticoagulation-related ED   visit, Anticoagulation-related fatality        Current Medications:   Prior to Admission medications    Medication Sig Start Date End Date Taking? Authorizing Provider   albuterol sulfate  (90 Base) MCG/ACT inhaler Inhale 2 puffs Every 6 (Six) Hours As Needed. 7/6/21   Shelby Haskins MD   alfuzosin (UROXATRAL) 10 MG 24 hr tablet Take 1 tablet by mouth 2 (Two) Times a Day. 9/23/19   Shelby Haskins MD   aspirin (aspirin) 81 MG EC tablet Take 1 tablet by mouth Daily. 4/11/22   Silvano Gillis MD   baclofen (LIORESAL) 10 MG tablet Take 1 tablet by mouth 2 (Two) Times a Day. 7/13/21   Shelby Haskins MD   BREO ELLIPTA 100-25 MCG/INH inhaler Inhale 1 puff Daily. 9/24/19   Shelby Haskins MD   enoxaparin sodium (LOVENOX) 120 MG/0.8ML solution prefilled syringe syringe Inject 0.8 mL under the skin into the appropriate area as directed Every 12 (Twelve) Hours. 6/11/25   Margie Balderas APRN   EPINEPHrine (EPIPEN) 0.3 MG/0.3ML solution auto-injector injection epinephrine 0.3 mg/0.3 mL injection, auto-injector    Shelby Haskins MD   ezetimibe (ZETIA) 10 MG tablet TAKE ONE TABLET BY MOUTH EVERY DAY 12/13/24   Silvano Gillis MD   fluorometholone (FML) 0.1 % ophthalmic suspension Administer 1 drop to  both eyes Daily. 5/17/21   Shelby Haskins MD   fluticasone (FLONASE) 50 MCG/ACT nasal spray Administer 1 spray into the nostril(s) as directed by provider Daily. 3/2/25   Shelby Haskins MD   gabapentin (NEURONTIN) 300 MG capsule Take 2 capsules by mouth 2 (Two) Times a Day. 9/13/19   Shelby Haskins MD   glimepiride (AMARYL) 2 MG tablet Take 1 tablet by mouth Daily. 3/17/25   Shelby Haskins MD   hydroCHLOROthiazide (MICROZIDE) 12.5 MG capsule TAKE ONE CAPSULE BY MOUTH ONCE DAILY 1/13/25   Silvano Gillis MD   levothyroxine (SYNTHROID, LEVOTHROID) 75 MCG tablet Take 1 tablet by mouth Daily. 9/23/19   Shelby Haskins MD   meloxicam (MOBIC) 7.5 MG tablet Take 1 tablet by mouth Daily.    Shelby Haskins MD   montelukast (SINGULAIR) 10 MG tablet Take 1 tablet by mouth Every Evening. 9/23/19   Shelby Haskins MD   pantoprazole (PROTONIX) 40 MG EC tablet Take 1 tablet by mouth Daily. 2/22/22   Silvano Gillis MD   pravastatin (PRAVACHOL) 80 MG tablet Take 1 tablet by mouth every night at bedtime. 3/19/25   Silvano Gillis MD   sertraline (ZOLOFT) 100 MG tablet Take 1.5 tablets by mouth Daily. 3/15/22   Silvano Gillis MD   theophylline (UNIPHYL) 400 MG 24 hr tablet Take 1 tablet by mouth Daily. 9/23/19   Shelby Haskins MD   verapamil ER (VERELAN) 240 MG 24 hr capsule Take 1 capsule by mouth every night at bedtime. 6/17/25   Silvano Gillis MD   warfarin (Coumadin) 5 MG tablet Take 5 mg by mouth daily or as otherwise directed. 4/15/25   Silvano Gillis MD       Medical history:   Past Medical History:   Diagnosis Date    Allergic     Anxiety     Arthritis     Asthma     Atrial fibrillation     Atypical chest pain 06/11/2020    Cataract     COPD (chronic obstructive pulmonary disease)     COVID     COVID-19 01/13/2021    Depression     Enlarged prostate     Headache     Hyperlipidemia     Hypertension     Kidney stone      Near syncope 12/17/2020    Prostatitis     Shortness of breath     Sleep apnea     Stroke     Urinary tract infection        Social history:   Social History     Tobacco Use    Smoking status: Former     Types: Cigarettes    Smokeless tobacco: Never   Substance Use Topics    Alcohol use: Yes     Comment: occasionally       Allergies:    Penicillins and Rivaroxaban    [id number] SCORE   [id number] Score for Atrial Fibrillation Stroke Risk  Age in Years: 75+  Sex: Male  CHF History: Yes  Hypertension History: Yes  Stroke/TIA/Thromboembolism History: Yes  Vascular Disease History: Yes  Diabetes History: No  [id number] Score: 7  Stroke risks -: 7 Points. Risk of ischemic stroke: 11.2%. Risk of stroke/TIA/embolism: 15.7%      This patient is managed via a collaborative agreement, pursuant to IC 25-26-16. The signed protocol is kept in the [facility] at [address] [place] [address].    Education: Efe Malloy has been instructed to call if any changes in medications, doses, concerns, etc. Patient was provided dosing instructions and expressed verbal understanding and has no further questions at this time.    Plan:  1. Instructed patient to continue holding warfarin as previously instructed.  2. Continue enoxaparin injections this evening and tomorrow morning. None tomorrow evening.  3. Patient to confirm with cardiologist when enoxaparin and warfarin can be resumed post heart cath. Clinic will follow-up with patient Friday, 6/27, to make follow-up appointment for next INR check.    Kavita Jolley, Jessy  6/24/2025  13:07 EDT

## 2025-06-25 RX ORDER — DIGOXIN 125 MCG
250 TABLET ORAL
COMMUNITY

## 2025-06-26 ENCOUNTER — HOSPITAL ENCOUNTER (OUTPATIENT)
Facility: HOSPITAL | Age: 80
Setting detail: HOSPITAL OUTPATIENT SURGERY
Discharge: HOME | End: 2025-06-26
Attending: INTERNAL MEDICINE | Admitting: INTERNAL MEDICINE
Payer: MEDICARE

## 2025-06-26 VITALS
WEIGHT: 270.95 LBS | TEMPERATURE: 99 F | HEIGHT: 71 IN | HEART RATE: 81 BPM | RESPIRATION RATE: 15 BRPM | BODY MASS INDEX: 37.93 KG/M2 | DIASTOLIC BLOOD PRESSURE: 71 MMHG | OXYGEN SATURATION: 96 % | SYSTOLIC BLOOD PRESSURE: 111 MMHG

## 2025-06-26 DIAGNOSIS — R55 NEAR SYNCOPE: ICD-10-CM

## 2025-06-26 DIAGNOSIS — I25.119 CORONARY ARTERY DISEASE INVOLVING NATIVE CORONARY ARTERY OF NATIVE HEART WITH ANGINA PECTORIS: ICD-10-CM

## 2025-06-26 DIAGNOSIS — I25.118 CORONARY ARTERY DISEASE OF NATIVE ARTERY OF NATIVE HEART WITH STABLE ANGINA PECTORIS: Primary | ICD-10-CM

## 2025-06-26 DIAGNOSIS — I25.708 CORONARY ARTERY DISEASE OF BYPASS GRAFT OF NATIVE HEART WITH STABLE ANGINA PECTORIS: Primary | ICD-10-CM

## 2025-06-26 PROCEDURE — 25010000002 HEPARIN (PORCINE) PER 1000 UNITS: Performed by: INTERNAL MEDICINE

## 2025-06-26 PROCEDURE — C1887 CATHETER, GUIDING: HCPCS | Performed by: INTERNAL MEDICINE

## 2025-06-26 PROCEDURE — 25010000002 FENTANYL CITRATE (PF) 100 MCG/2ML SOLUTION: Performed by: INTERNAL MEDICINE

## 2025-06-26 PROCEDURE — C1894 INTRO/SHEATH, NON-LASER: HCPCS | Performed by: INTERNAL MEDICINE

## 2025-06-26 PROCEDURE — 25510000001 IOPAMIDOL PER 1 ML: Performed by: INTERNAL MEDICINE

## 2025-06-26 PROCEDURE — 93799 UNLISTED CV SVC/PROCEDURE: CPT | Performed by: INTERNAL MEDICINE

## 2025-06-26 PROCEDURE — 99152 MOD SED SAME PHYS/QHP 5/>YRS: CPT | Performed by: INTERNAL MEDICINE

## 2025-06-26 PROCEDURE — 25010000002 NITROGLYCERIN 5 MG/ML SOLUTION: Performed by: INTERNAL MEDICINE

## 2025-06-26 PROCEDURE — C1769 GUIDE WIRE: HCPCS | Performed by: INTERNAL MEDICINE

## 2025-06-26 PROCEDURE — 25810000003 SODIUM CHLORIDE 0.9 % SOLUTION: Performed by: INTERNAL MEDICINE

## 2025-06-26 PROCEDURE — C1760 CLOSURE DEV, VASC: HCPCS

## 2025-06-26 PROCEDURE — 25010000002 MIDAZOLAM PER 1 MG: Performed by: INTERNAL MEDICINE

## 2025-06-26 PROCEDURE — 25010000002 LIDOCAINE 1 % SOLUTION: Performed by: INTERNAL MEDICINE

## 2025-06-26 PROCEDURE — 99153 MOD SED SAME PHYS/QHP EA: CPT | Performed by: INTERNAL MEDICINE

## 2025-06-26 PROCEDURE — 93458 L HRT ARTERY/VENTRICLE ANGIO: CPT | Performed by: INTERNAL MEDICINE

## 2025-06-26 RX ORDER — HEPARIN SODIUM 1000 [USP'U]/ML
INJECTION, SOLUTION INTRAVENOUS; SUBCUTANEOUS
Status: DISCONTINUED | OUTPATIENT
Start: 2025-06-26 | End: 2025-06-26 | Stop reason: HOSPADM

## 2025-06-26 RX ORDER — SODIUM CHLORIDE 9 MG/ML
3 INJECTION, SOLUTION INTRAVENOUS CONTINUOUS
Status: DISPENSED | OUTPATIENT
Start: 2025-06-26 | End: 2025-06-26

## 2025-06-26 RX ORDER — NITROGLYCERIN 5 MG/ML
INJECTION, SOLUTION INTRAVENOUS
Status: DISCONTINUED | OUTPATIENT
Start: 2025-06-26 | End: 2025-06-26 | Stop reason: HOSPADM

## 2025-06-26 RX ORDER — IOPAMIDOL 755 MG/ML
INJECTION, SOLUTION INTRAVASCULAR
Status: DISCONTINUED | OUTPATIENT
Start: 2025-06-26 | End: 2025-06-26 | Stop reason: HOSPADM

## 2025-06-26 RX ORDER — LIDOCAINE HYDROCHLORIDE 10 MG/ML
INJECTION, SOLUTION INFILTRATION; PERINEURAL
Status: DISCONTINUED | OUTPATIENT
Start: 2025-06-26 | End: 2025-06-26 | Stop reason: HOSPADM

## 2025-06-26 RX ORDER — SODIUM CHLORIDE 9 MG/ML
INJECTION, SOLUTION INTRAVENOUS
Status: COMPLETED | OUTPATIENT
Start: 2025-06-26 | End: 2025-06-26

## 2025-06-26 RX ORDER — FLUTICASONE PROPIONATE 50 MCG
2 SPRAY, SUSPENSION (ML) NASAL DAILY
COMMUNITY

## 2025-06-26 RX ORDER — MIDAZOLAM HYDROCHLORIDE 1 MG/ML
INJECTION, SOLUTION INTRAMUSCULAR; INTRAVENOUS
Status: DISCONTINUED | OUTPATIENT
Start: 2025-06-26 | End: 2025-06-26 | Stop reason: HOSPADM

## 2025-06-26 RX ORDER — ASPIRIN 325 MG
TABLET ORAL
Status: DISCONTINUED | OUTPATIENT
Start: 2025-06-26 | End: 2025-06-26 | Stop reason: HOSPADM

## 2025-06-26 RX ORDER — AZELASTINE 1 MG/ML
2 SPRAY, METERED NASAL 2 TIMES DAILY
COMMUNITY
Start: 2025-06-23

## 2025-06-26 RX ORDER — ASPIRIN 81 MG/1
81 TABLET, CHEWABLE ORAL DAILY
Status: SHIPPED | OUTPATIENT
Start: 2025-06-26

## 2025-06-26 RX ORDER — NITROGLYCERIN 0.4 MG/1
0.4 TABLET SUBLINGUAL
Status: DISCONTINUED | OUTPATIENT
Start: 2025-06-26 | End: 2025-06-26 | Stop reason: HOSPADM

## 2025-06-26 RX ORDER — FENTANYL CITRATE 50 UG/ML
INJECTION, SOLUTION INTRAMUSCULAR; INTRAVENOUS
Status: DISCONTINUED | OUTPATIENT
Start: 2025-06-26 | End: 2025-06-26 | Stop reason: HOSPADM

## 2025-06-26 RX ORDER — ACETAMINOPHEN 325 MG/1
650 TABLET ORAL EVERY 4 HOURS PRN
Status: DISCONTINUED | OUTPATIENT
Start: 2025-06-26 | End: 2025-06-26 | Stop reason: HOSPADM

## 2025-06-26 NOTE — INTERVAL H&P NOTE
H&P reviewed. The patient was examined and there are no changes to the H&P.      History is unchanged relative to recent note from 6 - 10 - 25, abnormal stress test recently at least intermediate, has recurrent substernal chest pain nonradiating.  He has chest pain refractory to medical therapy and is here for left heart catheterization    Risk and benefits including death heart attack stroke pain bleeding infection need for vascular or progressions were discussed and he wished to proceed  Labs and vitals reviewed in EMR  Primary consultant notes reviewed  Labs reviewed  Hemodynamics reviewed  Volume is stable  I would lay flat  Gentle IV fluids ongoing for renal protection    Okay to proceed safely today    Silvano Gillis MD, PhD

## 2025-06-26 NOTE — Clinical Note
Hemostasis started on the right radial artery. Angio-Seal was used in achieving hemostasis. Closure device deployed in the vessel.

## 2025-06-26 NOTE — DISCHARGE INSTRUCTIONS
Post Cath Instructions  Drink plenty of water for the next 24 hours. This helps to eliminate the dye used in your procedure through urination.  You may resume a normal diet; however, try to avoid foods that would cause gas or constipation.     What to do for pain: acetaminophen (Tylenol), not ibuprofen (Advil) can be used for puncture site tenderness. If your pain is unmanageable with this method, call the Cardiologist's office.     Sedative medication (Anesthesia) given to you during your catheterization may decrease your judgement and reaction time for up to 24-48 hours.  Therefore:  DO NOT drive, operate hazardous machinery, or consume alcoholic beverages for 24 hours.   DO NOT make any important/legal decisions for 24 hours.   Have someone stay with you for at least 24 hours.    For the next 48 hours (to allow proper healing and prevent bleeding):  Avoid excessive bending at wound site  Avoid straining (anything that would tense up muscles around the affected puncture site)  Avoid lifting, pushing, or pulling objects greater than 5 pounds, for 5 days    For Groin Cases:  Refrain from running, vigorous walking, and sexual activity  No prolonged sitting or standing   Limit stair climbing as much as possible    Keep the puncture site clean and dry.  After 24 hours, remove the dressing and replace it with a Band-Aid for at least one additional day.  Gently clean the site with mild soap and water.  No scrubbing/rubbing, and lightly pat the area dry.  Showers are acceptable; however, avoid submerging in water (tub baths, hot tubs, pools, dishwater, etc…) for at least one week.  The site should be completely healed before resuming these activities to reduce the risk of infection. Watch for signs and symptoms of infection and notify the physician of any of the following:  Bleeding or an increase in swelling, redness, or warmth at the puncture site  Fever  Increased soreness around puncture site  Foul odor or significant  drainage from the puncture site  **A bruise or small “pea sized” lump under the skin at the puncture site is not unusual.  This should disappear within 3-4 weeks.**    CONTACT YOUR PHYSICIAN OR CALL 911 IF YOU EXPERIENCE ANY OF THE FOLLOWING:  Increased angina (chest pain) or frequent sensations of pressure, burning, pain, or other discomfort in the chest, arm, jaws, or stomach  Lightheadedness, dizziness, faint feeling, sweating, or difficulty breathing  Odd changes in sedation (like numbness, tingling, coldness, or pain) or color (pale/bluish) in the arm or leg in which the catheter was inserted.    IMPORTANT:  Although this occurs very rarely, if you should develop bright red or excessive bleeding, feel a “pop” inside at the insertion site, or notice a sudden increase in swelling larger than a walnut, you should call 911.  Hold continuous firm pressure to the access site until emergency personnel arrive.  It is best if someone else can do this for you.

## 2025-06-27 ENCOUNTER — ANTICOAGULATION VISIT (OUTPATIENT)
Dept: PHARMACY | Facility: HOSPITAL | Age: 80
End: 2025-06-27
Payer: MEDICARE

## 2025-06-27 DIAGNOSIS — I48.19 PERSISTENT ATRIAL FIBRILLATION: Primary | ICD-10-CM

## 2025-06-27 NOTE — PROGRESS NOTES
Medication Management Clinic: [facility]  Clinical Outreach     Efe Malloy is a 79 y.o. male and is a patient of the Medication Management Clinic at [facility] for anticoagulation monitoring.     Patient had heart cath done yesterday by Dr. Gillis. Per clinic pharmacist conversation with JAZZ Balderas via [employer] secure chat, patient to resume warfarin yesterday evening and did not need to resume enoxaparin injections. Contacted patient today to confirm he resumed warfarin 5 mg yesterday evening and he said he did do so. He asked what he needed to do with enoxaparin syringes. Instructed him to keep them for now as they are good until the expiration date listed on the package. Instructed him to keep them in a temperature controlled environment and not in the bathroom (away from moisture). Confirmed patient is aware of appointment on 7/1 and can make it.    Kavita Jolley, RosettaD  Ambulatory Care Clinical Pharmacist  6/27/2025  11:01 EDT

## 2025-06-28 ENCOUNTER — HOSPITAL ENCOUNTER (EMERGENCY)
Facility: HOSPITAL | Age: 80
Discharge: HOME | End: 2025-06-28
Attending: EMERGENCY MEDICINE
Payer: MEDICARE

## 2025-06-28 VITALS
WEIGHT: 270 LBS | OXYGEN SATURATION: 98 % | TEMPERATURE: 97.6 F | BODY MASS INDEX: 37.8 KG/M2 | SYSTOLIC BLOOD PRESSURE: 130 MMHG | DIASTOLIC BLOOD PRESSURE: 79 MMHG | HEART RATE: 88 BPM | HEIGHT: 71 IN | RESPIRATION RATE: 18 BRPM

## 2025-06-28 DIAGNOSIS — E11.649 HYPOGLYCEMIC EPISODE IN PATIENT WITH DIABETES MELLITUS: Primary | ICD-10-CM

## 2025-06-28 LAB
ANION GAP SERPL CALCULATED.3IONS-SCNC: 16 MMOL/L (ref 10–20)
BUN BLDA-MCNC: 20 MG/DL (ref 8–26)
CA-I BLDA-SCNC: 1.03 MMOL/L (ref 1.12–1.32)
CHLORIDE BLDA-SCNC: 104 MMOL/L (ref 98–109)
CO2 BLDA-SCNC: 22 MMOL/L (ref 24–29)
CREAT BLDA-MCNC: 1.2 MG/DL (ref 0.6–1.3)
EGFRCR SERPLBLD CKD-EPI 2021: 62.3 ML/MIN/1.73
GLUCOSE BLDC GLUCOMTR-MCNC: 142 MG/DL (ref 70–105)
GLUCOSE BLDC GLUCOMTR-MCNC: 208 MG/DL (ref 70–105)
GLUCOSE BLDC GLUCOMTR-MCNC: 230 MG/DL (ref 70–105)
HCT VFR BLDA CALC: 45 % (ref 38–51)
HGB BLDA-MCNC: 15.3 G/DL (ref 12–17)
HOLD SPECIMEN: NORMAL
HOLD SPECIMEN: NORMAL
POTASSIUM BLDA-SCNC: 3.7 MMOL/L (ref 3.5–4.9)
SODIUM BLD-SCNC: 137 MMOL/L (ref 138–146)
WHOLE BLOOD HOLD SPECIMEN: NORMAL

## 2025-06-28 PROCEDURE — 82948 REAGENT STRIP/BLOOD GLUCOSE: CPT

## 2025-06-28 PROCEDURE — 80047 BASIC METABLC PNL IONIZED CA: CPT

## 2025-06-28 PROCEDURE — 99283 EMERGENCY DEPT VISIT LOW MDM: CPT

## 2025-06-28 PROCEDURE — 85014 HEMATOCRIT: CPT

## 2025-06-28 NOTE — ED PROVIDER NOTES
Subjective   History of Present Illness  79-year-old male complaining of onset of confusion and diaphoresis the patient was found standing in the pantry trying to get a peanut butter jar open the patient is on medicine for type 2 diabetes which she has had for several years but does not check his blood sugars.  The patient states that he had [place] today because he was planning on going to [employer].  He reports no fever or chills.  He states he had some nausea en route to the hospital no vomiting or diarrhea.  The patient was given orange juice at the scene and also received 250 cc of D10 from EMS.  The patient was alert and orient [place] Coma Scale 15 on arrival to the hospital      Review of Systems   Constitutional:  Negative for unexpected weight change.   Endocrine: Positive for polydipsia. Negative for polyphagia and polyuria.       Past Medical History:   Diagnosis Date    Allergic     Anxiety     Arthritis     Asthma     Atrial fibrillation     Atypical chest pain 06/11/2020    Cataract     COPD (chronic obstructive pulmonary disease)     COVID     COVID-19 01/13/2021    Depression     Enlarged prostate     Headache     Hyperlipidemia     Hypertension     Kidney stone     Near syncope 12/17/2020    Prostatitis     Shortness of breath     Sleep apnea     Stroke 2022    Urinary tract infection        Allergies   Allergen Reactions    Penicillins Other (See Comments)     AS A CHILD    Rivaroxaban Other (See Comments) and GI Bleeding     Bleeding - GI    Xarelto       Past Surgical History:   Procedure Laterality Date    APPENDECTOMY      CARDIAC CATHETERIZATION  2007    nonobstructive dz    CARDIAC CATHETERIZATION N/A 09/17/2021    Procedure: Left Heart Cath;  Surgeon: Silvano Gillis MD;  Location: [facility] CATH INVASIVE LOCATION;  Service: Cardiology;  Laterality: N/A;    CERVICAL EPIDURAL      with Dr. Chua Pain mgt    CHOLECYSTECTOMY      EYE SURGERY Bilateral     corneal transplant left  09/02/2020 (Right eye 2014)    JOINT REPLACEMENT Right     knee    KIDNEY STONE SURGERY      LUMBAR DISC SURGERY  2024    Dr. Chua - L4/L5    PROSTATE SURGERY         Family History   Problem Relation Age of Onset    Heart disease Mother     Hypertension Mother     Diabetes Father     Stroke Father     Hypertension Father     Cancer Father         esophageal. prostate, skin    Diabetes Sister     Thyroid disease Sister     Heart disease Maternal Grandmother     Diabetes Paternal Grandmother        Social History     Socioeconomic History    Marital status:    Tobacco Use    Smoking status: Former     Types: Cigarettes    Smokeless tobacco: Never   Vaping Use    Vaping status: Never Used   Substance and Sexual Activity    Alcohol use: Yes     Comment: Occasionally - once a month.    Drug use: Not Currently    Sexual activity: Defer     No recent unusual food water travel or activity      Objective   Physical Exam  Alert [place] Coma Scale 15   HEENT: Pupils equal and reactive to light. Conjunctivae are not injected. Normal tympanic membranes. Oropharynx and nares are normal.   Neck: Supple. Midline trachea. No JVD. No goiter.   Chest: Clear and equal breath sounds bilaterally, regular rate and rhythm without murmur or rub.   Abdomen: Positive bowel sounds, nontender, nondistended. No rebound or peritoneal signs. No CVA tenderness.   Extremities no clubbing. cyanosis or edema. Motor sensory exam is normal. The full range of motion is intact   Skin: Warm and dry, no rashes or petechia.   Lymphatic: No regional lymphadenopathy. No calf pain, swelling or Homans sign    Procedures           ED Course                                             Labs Reviewed   POCT GLUCOSE FINGERSTICK - Abnormal; Notable for the following components:       Result Value    Glucose 208 (*)     All other components within normal limits   POCT CHEM 8 - Abnormal; Notable for the following components:    Glucose 230 (*)     Sodium 137  (*)     Total CO2 22 (*)     Ionized Calcium 1.03 (*)     All other components within normal limits   POCT GLUCOSE FINGERSTICK - Abnormal; Notable for the following components:    Glucose 142 (*)     All other components within normal limits   RAINBOW DRAW    Narrative:     The following orders were created for panel order [place] Draw.  Procedure                               Abnormality         Status                     ---------                               -----------         ------                     Green Top (Gel)[[id number]]                                  Final result               Lavender Top[[id number]]                                     Final result               Gold Top - SST[[id number]]                                   Final result               Light Blue Top[[id number]]                                                                Please view results for these tests on the individual orders.   GREEN TOP   LAVENDER TOP   GOLD TOP - SST     Medications - No data to display  No radiology results for the last day        Follow-up glucose 140    Medical Decision Making  Importance of glucose monitoring and checking blood sugars was discussed with the patient he indicated a willingness to start doing this and stated he had a previously scheduled appointment with his primary care provider Dr. Lares on Monday.  The patient was encouraged to eat and drink normally today.  He was stable at discharge and vocalized understanding of discharge instructions and warnings    Amount and/or Complexity of Data Reviewed  Labs: ordered. Decision-making details documented in ED Course.        Final diagnoses:   Hypoglycemic episode in patient with diabetes mellitus       ED Disposition  ED Disposition       ED Disposition   Discharge    Condition   Stable    Comment   --               No follow-up provider specified.       Medication List      No changes were made to your prescriptions during this visit.             Sami Patel MD  06/28/25 4507

## 2025-06-28 NOTE — ED NOTES
EMS reports pts wife found him in the pantry with the smell of peanut butter, confused and weak. EMS reports pts sugar was 50, gave him a couple glasses of OJ and a D10 drip and his sugar was 150 after that. Pt currently A&Ox4, states he feels fine and like his normal self. Pt denies any pain anywhere.

## 2025-06-28 NOTE — DISCHARGE INSTRUCTIONS
Try to eat and drink regularly for the last 72 hours  Talk to your doctor on Monday about diabetic education and starting to monitor blood sugars for better health

## 2025-07-01 ENCOUNTER — ANTICOAGULATION VISIT (OUTPATIENT)
Dept: PHARMACY | Facility: HOSPITAL | Age: 80
End: 2025-07-01
Payer: MEDICARE

## 2025-07-01 DIAGNOSIS — I48.19 PERSISTENT ATRIAL FIBRILLATION: Primary | ICD-10-CM

## 2025-07-01 LAB
INR PPP: 1.9 (ref 0.9–1.1)
PROTHROMBIN TIME: 21.6 SECONDS

## 2025-07-01 PROCEDURE — 85610 PROTHROMBIN TIME: CPT

## 2025-07-01 PROCEDURE — 36416 COLLJ CAPILLARY BLOOD SPEC: CPT

## 2025-07-01 PROCEDURE — G0463 HOSPITAL OUTPT CLINIC VISIT: HCPCS

## 2025-07-01 RX ORDER — HYDROCHLOROTHIAZIDE 12.5 MG/1
12.5 CAPSULE ORAL DAILY
Qty: 90 CAPSULE | Refills: 1 | Status: SHIPPED | OUTPATIENT
Start: 2025-07-01

## 2025-07-01 NOTE — PROGRESS NOTES
Anticoagulation Clinic Progress Note    INR Goal: 2 - 3  Today's INR: 1.9 (subtherapeutic)  Current warfarin dose: Warfarin 5 mg daily resumed  following procedure. Total dose of past days = 25 mg.     INR History:      Latest Ref Rng & Units 2025     7:03 PM 2025    11:30 AM 2025     2:45 PM 2025     9:57 AM 2025     1:06 PM 2025    11:01 AM 2025     2:00 PM   Anticoagulation Monitoring   INR   3.10 1.90  1.65 -- 1.90   INR Date   2025   INR Goal   2.0-3.0 2.0-3.0  2.0-3.0 2.0-3.0 2.0-3.0   Trend   Same Same  Same Same Same   Last Week Total   35 mg 35 mg  30 mg 20 mg 25 mg   Next Week Total   35 mg 35 mg  25 mg 35 mg 35 mg   Sun   5 mg 5 mg  - 5 mg 5 mg   Mon   5 mg 5 mg  - 5 mg 5 mg   Tue   5 mg 5 mg  Hold () - 5 mg   Wed   5 mg 5 mg  Hold () - 5 mg   Thu   5 mg 5 mg  5 mg - 5 mg   Fri   5 mg 5 mg  - 5 mg 5 mg   Sat   5 mg 5 mg  - 5 mg 5 mg   Historical INR 2.00 - 3.00 1.76    1.65           Anticoagulation Summary  As of 2025      INR goal:  2.0-3.0   TTR:  77.9% (6 y)   INR used for dosin.90 (2025)   Warfarin maintenance plan:  5 mg every day   Weekly warfarin total:  35 mg   No change documented:  Almita Leon, PharmD   Plan last modified:  Kavita Jolley, PharmD (2023)   Next INR check:  7/15/2025   Target end date:  --    Indications    Persistent atrial fibrillation [I48.19]                 Anticoagulation Episode Summary       INR check location:  --    Preferred lab:  --    Send INR reminders to:  [facility] CLINICAL POOL    Comments:  Patient contract signed 11/15/21.          Anticoagulation Care Providers       Provider Role Specialty Phone number    Silvano Gillis MD  Cardiology [phone]            Clinic Interview:   Patient Findings     Positives:  Emergency department visit    Negatives:  Signs/symptoms of thrombosis, Signs/symptoms of bleeding,   Laboratory test error  suspected, Change in health, Change in alcohol use,   Change in activity, Upcoming invasive procedure, Upcoming dental   procedure, Missed doses, Extra doses, Change in medications, Change in   diet/appetite, Hospital admission, Bruising, Other complaints    Comments:  To ED via EMS 6/28 due to hypoglycemia. No medication changes   at this time.       Clinical Outcomes     Negatives:  Major bleeding event, Thromboembolic event,   Anticoagulation-related hospital admission, Anticoagulation-related ED   visit, Anticoagulation-related fatality    Comments:  To ED via EMS 6/28 due to hypoglycemia. No medication changes   at this time.         Current Medications:   Prior to Admission medications    Medication Sig Start Date End Date Taking? Authorizing Provider   albuterol sulfate  (90 Base) MCG/ACT inhaler Inhale 2 puffs Every 6 (Six) Hours As Needed. 7/6/21   Shelby Haskins MD   alfuzosin (UROXATRAL) 10 MG 24 hr tablet Take 1 tablet by mouth 2 (Two) Times a Day. 9/23/19   Shelby Haskins MD   azelastine (ASTELIN) 0.1 % nasal spray Administer 2 sprays into the nostril(s) as directed by provider 2 (Two) Times a Day. 6/23/25   Shelby Haskins MD   baclofen (LIORESAL) 10 MG tablet Take 1 tablet by mouth 2 (Two) Times a Day. 7/13/21   Shelby Haskins MD   BREO ELLIPTA 100-25 MCG/INH inhaler Inhale 1 puff Daily. 9/24/19   Shelby Haskins MD   digoxin (LANOXIN) 125 MCG tablet Take 2 tablets by mouth Daily.    Shelby Haskins MD   enoxaparin sodium (LOVENOX) 120 MG/0.8ML solution prefilled syringe syringe Inject 0.8 mL under the skin into the appropriate area as directed Every 12 (Twelve) Hours. 6/11/25   Margie Balderas APRN   EPINEPHrine (EPIPEN) 0.3 MG/0.3ML solution auto-injector injection epinephrine 0.3 mg/0.3 mL injection, auto-injector    Shelby Haskins MD   ezetimibe (ZETIA) 10 MG tablet TAKE ONE TABLET BY MOUTH EVERY DAY 12/13/24   Silvano Gillis MD    fluorometholone (FML) 0.1 % ophthalmic suspension Administer 1 drop to both eyes Daily. 5/17/21   Shelby Haskins MD   fluticasone (FLONASE) 50 MCG/ACT nasal spray Administer 2 sprays into the nostril(s) as directed by provider Daily.    Shelby Haskins MD   gabapentin (NEURONTIN) 300 MG capsule Take 2 capsules by mouth 3 (Three) Times a Day. 9/13/19   Shelby Haskins MD   glimepiride (AMARYL) 2 MG tablet Take 1 tablet by mouth Daily. 3/17/25   Shelby Haskins MD   hydroCHLOROthiazide (MICROZIDE) 12.5 MG capsule TAKE ONE CAPSULE BY MOUTH EVERY DAY 7/1/25   Silvano Gillis MD   levothyroxine (SYNTHROID, LEVOTHROID) 175 MCG tablet Take 0.5 tablets by mouth Daily. 9/23/19   Shelby Haskins MD   meloxicam (MOBIC) 7.5 MG tablet Take 1 tablet by mouth Daily.    Shelby Haskins MD   montelukast (SINGULAIR) 10 MG tablet Take 1 tablet by mouth Every Evening. 9/23/19   Shelby Haskins MD   pantoprazole (PROTONIX) 40 MG EC tablet Take 1 tablet by mouth Daily. 2/22/22   Silvano Gillis MD   pravastatin (PRAVACHOL) 80 MG tablet Take 1 tablet by mouth every night at bedtime. 3/19/25   Silvano Gillis MD   sertraline (ZOLOFT) 100 MG tablet Take 1.5 tablets by mouth Daily. 3/15/22   Silvano Gillis MD   theophylline (UNIPHYL) 400 MG 24 hr tablet Take 1 tablet by mouth Daily. 9/23/19   Shelby Haskins MD   verapamil ER (VERELAN) 240 MG 24 hr capsule Take 1 capsule by mouth every night at bedtime. 6/17/25   Silvano Gillis MD   warfarin (Coumadin) 5 MG tablet Take 5 mg by mouth daily or as otherwise directed. 4/15/25   Silvano Gillis MD   hydroCHLOROthiazide (MICROZIDE) 12.5 MG capsule TAKE ONE CAPSULE BY MOUTH ONCE DAILY 1/13/25 7/1/25  Silvano Gillis MD       Medical history:   Past Medical History:   Diagnosis Date    Allergic     Anxiety     Arthritis     Asthma     Atrial fibrillation     Atypical chest pain  06/11/2020    Cataract     COPD (chronic obstructive pulmonary disease)     COVID     COVID-19 01/13/2021    Depression     Enlarged prostate     Headache     Hyperlipidemia     Hypertension     Kidney stone     Near syncope 12/17/2020    Prostatitis     Shortness of breath     Sleep apnea     Stroke 2022    Urinary tract infection        Social history:   Social History     Tobacco Use    Smoking status: Former     Types: Cigarettes    Smokeless tobacco: Never   Substance Use Topics    Alcohol use: Yes     Comment: Occasionally - once a month.       Allergies:    Penicillins and Rivaroxaban    Vaccine History:   Immunization History   Administered Date(s) Administered    ABRYSVO (RSV, 60+ or pregnant women 32-36 wks) 11/14/2024    Covid-19 (Pfizer) Gray Cap Monovalent 05/18/2021, 11/17/2021, 11/26/2021    FLUAD TRI 65YR+ 10/31/2019    Fluzone  >6mos 09/22/2014    Fluzone High-Dose 65+YRS 10/14/2016, 10/16/2017, 11/14/2018    H1N1 Inj 12/22/2009    Hepatitis A 09/17/2019, 11/19/2019    Influenza, Unspecified 09/18/2013    Pneumococcal Conjugate 13-Valent (PCV13) 10/12/2015    Pneumococcal Polysaccharide (PPSV23) 09/24/2013    Shingrix 09/17/2019, 11/19/2019    Zostavax 02/12/2013     [id number] SCORE   [id number] Score for Atrial Fibrillation Stroke Risk  Age in Years: 75+  Sex: Male  CHF History: Yes  Hypertension History: Yes  Stroke/TIA/Thromboembolism History: Yes  Vascular Disease History: Yes  Diabetes History: No  [id number] Score: 7  Stroke risks -: 7 Points. Risk of ischemic stroke: 11.2%. Risk of stroke/TIA/embolism: 15.7%    This patient is managed via a collaborative agreement, pursuant to IC 25-26-16. The signed protocol is kept in the [facility] at [address] [place] [address].    Education: Efe Malloy has been instructed to call if any changes in medications, doses, concerns, etc. Patient was provided dosing instructions and expressed verbal understanding and  has no further questions at this time.    Plan:  1. Anticoagulation   - no change as INR only slightly subtherapeutic and patient has not yet completed a full week of dosing since holding for procedure; warfarin 5 mg PO daily. Total weekly dose = 35 mg.   - RTC in 2 week(s)      Almita Leon, PharmD  7/1/2025  14:50 EDT

## 2025-07-15 ENCOUNTER — APPOINTMENT (OUTPATIENT)
Dept: GENERAL RADIOLOGY | Facility: HOSPITAL | Age: 80
End: 2025-07-15
Payer: MEDICARE

## 2025-07-15 ENCOUNTER — ANTICOAGULATION VISIT (OUTPATIENT)
Dept: PHARMACY | Facility: HOSPITAL | Age: 80
End: 2025-07-15
Payer: MEDICARE

## 2025-07-15 ENCOUNTER — APPOINTMENT (OUTPATIENT)
Dept: CARDIOLOGY | Facility: HOSPITAL | Age: 80
End: 2025-07-15
Payer: MEDICARE

## 2025-07-15 ENCOUNTER — HOSPITAL ENCOUNTER (OUTPATIENT)
Facility: HOSPITAL | Age: 80
Setting detail: OBSERVATION
Discharge: HOME | End: 2025-07-17
Attending: INTERNAL MEDICINE | Admitting: INTERNAL MEDICINE
Payer: MEDICARE

## 2025-07-15 DIAGNOSIS — M79.605 PAIN OF LEFT LOWER EXTREMITY: ICD-10-CM

## 2025-07-15 DIAGNOSIS — I25.708 CORONARY ARTERY DISEASE OF BYPASS GRAFT OF NATIVE HEART WITH STABLE ANGINA PECTORIS: ICD-10-CM

## 2025-07-15 DIAGNOSIS — M25.561 ACUTE PAIN OF RIGHT KNEE: Primary | ICD-10-CM

## 2025-07-15 DIAGNOSIS — M79.605 LEFT LEG PAIN: ICD-10-CM

## 2025-07-15 DIAGNOSIS — I48.19 PERSISTENT ATRIAL FIBRILLATION: Primary | ICD-10-CM

## 2025-07-15 DIAGNOSIS — R26.2 DIFFICULTY WALKING: ICD-10-CM

## 2025-07-15 PROBLEM — D32.9 MENINGIOMA: Status: ACTIVE | Noted: 2025-07-15

## 2025-07-15 LAB
BH CV LOWER VASCULAR LEFT COMMON FEMORAL AUGMENT: NORMAL
BH CV LOWER VASCULAR LEFT COMMON FEMORAL COMPETENT: NORMAL
BH CV LOWER VASCULAR LEFT COMMON FEMORAL COMPRESS: NORMAL
BH CV LOWER VASCULAR LEFT COMMON FEMORAL PHASIC: NORMAL
BH CV LOWER VASCULAR LEFT COMMON FEMORAL SPONT: NORMAL
BH CV LOWER VASCULAR LEFT DISTAL FEMORAL COMPRESS: NORMAL
BH CV LOWER VASCULAR LEFT GASTRONEMIUS COMPRESS: NORMAL
BH CV LOWER VASCULAR LEFT GREATER SAPH AK COMPRESS: NORMAL
BH CV LOWER VASCULAR LEFT GREATER SAPH BK COMPRESS: NORMAL
BH CV LOWER VASCULAR LEFT LESSER SAPH COMPRESS: NORMAL
BH CV LOWER VASCULAR LEFT MID FEMORAL AUGMENT: NORMAL
BH CV LOWER VASCULAR LEFT MID FEMORAL COMPETENT: NORMAL
BH CV LOWER VASCULAR LEFT MID FEMORAL COMPRESS: NORMAL
BH CV LOWER VASCULAR LEFT MID FEMORAL PHASIC: NORMAL
BH CV LOWER VASCULAR LEFT MID FEMORAL SPONT: NORMAL
BH CV LOWER VASCULAR LEFT PERONEAL COMPRESS: NORMAL
BH CV LOWER VASCULAR LEFT POPLITEAL AUGMENT: NORMAL
BH CV LOWER VASCULAR LEFT POPLITEAL COMPETENT: NORMAL
BH CV LOWER VASCULAR LEFT POPLITEAL COMPRESS: NORMAL
BH CV LOWER VASCULAR LEFT POPLITEAL PHASIC: NORMAL
BH CV LOWER VASCULAR LEFT POPLITEAL SPONT: NORMAL
BH CV LOWER VASCULAR LEFT POSTERIOR TIBIAL COMPRESS: NORMAL
BH CV LOWER VASCULAR LEFT PROFUNDA FEMORAL COMPRESS: NORMAL
BH CV LOWER VASCULAR LEFT PROXIMAL FEMORAL COMPRESS: NORMAL
BH CV LOWER VASCULAR LEFT SAPHENOFEMORAL JUNCTION COMPRESS: NORMAL
BH CV LOWER VASCULAR RIGHT COMMON FEMORAL AUGMENT: NORMAL
BH CV LOWER VASCULAR RIGHT COMMON FEMORAL COMPETENT: NORMAL
BH CV LOWER VASCULAR RIGHT COMMON FEMORAL COMPRESS: NORMAL
BH CV LOWER VASCULAR RIGHT COMMON FEMORAL PHASIC: NORMAL
BH CV LOWER VASCULAR RIGHT COMMON FEMORAL SPONT: NORMAL
GLUCOSE BLDC GLUCOMTR-MCNC: 120 MG/DL (ref 70–105)
GLUCOSE BLDC GLUCOMTR-MCNC: 63 MG/DL (ref 70–105)
INR PPP: 1.4 (ref 0.9–1.1)
INR PPP: 1.42 (ref 2–3)
PROTHROMBIN TIME: 16.4 SECONDS
PROTHROMBIN TIME: 17.3 SECONDS (ref 19.4–28.5)

## 2025-07-15 PROCEDURE — 36416 COLLJ CAPILLARY BLOOD SPEC: CPT

## 2025-07-15 PROCEDURE — 82948 REAGENT STRIP/BLOOD GLUCOSE: CPT

## 2025-07-15 PROCEDURE — 93971 EXTREMITY STUDY: CPT | Performed by: SURGERY

## 2025-07-15 PROCEDURE — 93010 ELECTROCARDIOGRAM REPORT: CPT | Performed by: INTERNAL MEDICINE

## 2025-07-15 PROCEDURE — 73562 X-RAY EXAM OF KNEE 3: CPT

## 2025-07-15 PROCEDURE — 36415 COLL VENOUS BLD VENIPUNCTURE: CPT

## 2025-07-15 PROCEDURE — 85610 PROTHROMBIN TIME: CPT | Performed by: NURSE PRACTITIONER

## 2025-07-15 PROCEDURE — 93005 ELECTROCARDIOGRAM TRACING: CPT | Performed by: INTERNAL MEDICINE

## 2025-07-15 PROCEDURE — G0378 HOSPITAL OBSERVATION PER HR: HCPCS

## 2025-07-15 PROCEDURE — G0463 HOSPITAL OUTPT CLINIC VISIT: HCPCS

## 2025-07-15 PROCEDURE — 96374 THER/PROPH/DIAG INJ IV PUSH: CPT

## 2025-07-15 PROCEDURE — 85610 PROTHROMBIN TIME: CPT

## 2025-07-15 PROCEDURE — 73630 X-RAY EXAM OF FOOT: CPT

## 2025-07-15 PROCEDURE — 99285 EMERGENCY DEPT VISIT HI MDM: CPT

## 2025-07-15 PROCEDURE — 25010000002 MORPHINE PER 10 MG: Performed by: NURSE PRACTITIONER

## 2025-07-15 PROCEDURE — 93971 EXTREMITY STUDY: CPT

## 2025-07-15 PROCEDURE — 73610 X-RAY EXAM OF ANKLE: CPT

## 2025-07-15 RX ORDER — ACETAMINOPHEN 325 MG/1
650 TABLET ORAL EVERY 4 HOURS PRN
Status: DISCONTINUED | OUTPATIENT
Start: 2025-07-15 | End: 2025-07-17 | Stop reason: HOSPADM

## 2025-07-15 RX ORDER — ALBUTEROL SULFATE 0.83 MG/ML
2.5 SOLUTION RESPIRATORY (INHALATION) EVERY 6 HOURS PRN
Status: DISCONTINUED | OUTPATIENT
Start: 2025-07-15 | End: 2025-07-17 | Stop reason: HOSPADM

## 2025-07-15 RX ORDER — GABAPENTIN 300 MG/1
600 CAPSULE ORAL 3 TIMES DAILY
Status: DISCONTINUED | OUTPATIENT
Start: 2025-07-15 | End: 2025-07-17 | Stop reason: HOSPADM

## 2025-07-15 RX ORDER — FLUOROMETHOLONE 1 MG/ML
1 SUSPENSION/ DROPS OPHTHALMIC DAILY
Status: DISCONTINUED | OUTPATIENT
Start: 2025-07-15 | End: 2025-07-15

## 2025-07-15 RX ORDER — LEVOTHYROXINE SODIUM 175 UG/1
175 TABLET ORAL
Status: DISCONTINUED | OUTPATIENT
Start: 2025-07-16 | End: 2025-07-17 | Stop reason: HOSPADM

## 2025-07-15 RX ORDER — BISACODYL 10 MG
10 SUPPOSITORY, RECTAL RECTAL DAILY PRN
Status: DISCONTINUED | OUTPATIENT
Start: 2025-07-15 | End: 2025-07-17 | Stop reason: HOSPADM

## 2025-07-15 RX ORDER — ATORVASTATIN CALCIUM 20 MG/1
20 TABLET, FILM COATED ORAL NIGHTLY
Status: DISCONTINUED | OUTPATIENT
Start: 2025-07-15 | End: 2025-07-17 | Stop reason: HOSPADM

## 2025-07-15 RX ORDER — SODIUM CHLORIDE 0.9 % (FLUSH) 0.9 %
10 SYRINGE (ML) INJECTION AS NEEDED
Status: DISCONTINUED | OUTPATIENT
Start: 2025-07-15 | End: 2025-07-17 | Stop reason: HOSPADM

## 2025-07-15 RX ORDER — INSULIN LISPRO 100 [IU]/ML
2-7 INJECTION, SOLUTION INTRAVENOUS; SUBCUTANEOUS
Status: DISCONTINUED | OUTPATIENT
Start: 2025-07-16 | End: 2025-07-17 | Stop reason: HOSPADM

## 2025-07-15 RX ORDER — SODIUM CHLORIDE 0.9 % (FLUSH) 0.9 %
10 SYRINGE (ML) INJECTION EVERY 12 HOURS SCHEDULED
Status: DISCONTINUED | OUTPATIENT
Start: 2025-07-15 | End: 2025-07-17 | Stop reason: HOSPADM

## 2025-07-15 RX ORDER — BISACODYL 5 MG/1
5 TABLET, DELAYED RELEASE ORAL DAILY PRN
Status: DISCONTINUED | OUTPATIENT
Start: 2025-07-15 | End: 2025-07-17 | Stop reason: HOSPADM

## 2025-07-15 RX ORDER — POLYETHYLENE GLYCOL 3350 17 G/17G
17 POWDER, FOR SOLUTION ORAL DAILY PRN
Status: DISCONTINUED | OUTPATIENT
Start: 2025-07-15 | End: 2025-07-17 | Stop reason: HOSPADM

## 2025-07-15 RX ORDER — TAMSULOSIN HYDROCHLORIDE 0.4 MG/1
0.4 CAPSULE ORAL NIGHTLY
Status: DISCONTINUED | OUTPATIENT
Start: 2025-07-15 | End: 2025-07-17 | Stop reason: HOSPADM

## 2025-07-15 RX ORDER — FLUOROMETHOLONE 1 MG/ML
1 SUSPENSION/ DROPS OPHTHALMIC DAILY
Status: DISCONTINUED | OUTPATIENT
Start: 2025-07-16 | End: 2025-07-17 | Stop reason: HOSPADM

## 2025-07-15 RX ORDER — MORPHINE SULFATE 2 MG/ML
2 INJECTION, SOLUTION INTRAMUSCULAR; INTRAVENOUS ONCE
Status: COMPLETED | OUTPATIENT
Start: 2025-07-15 | End: 2025-07-15

## 2025-07-15 RX ORDER — DEXTROSE MONOHYDRATE 25 G/50ML
25 INJECTION, SOLUTION INTRAVENOUS
Status: DISCONTINUED | OUTPATIENT
Start: 2025-07-15 | End: 2025-07-17 | Stop reason: HOSPADM

## 2025-07-15 RX ORDER — HYDRALAZINE HYDROCHLORIDE 20 MG/ML
10 INJECTION INTRAMUSCULAR; INTRAVENOUS EVERY 6 HOURS PRN
Status: DISCONTINUED | OUTPATIENT
Start: 2025-07-15 | End: 2025-07-17 | Stop reason: HOSPADM

## 2025-07-15 RX ORDER — SODIUM CHLORIDE 9 MG/ML
40 INJECTION, SOLUTION INTRAVENOUS AS NEEDED
Status: DISCONTINUED | OUTPATIENT
Start: 2025-07-15 | End: 2025-07-17 | Stop reason: HOSPADM

## 2025-07-15 RX ORDER — AMOXICILLIN 250 MG
2 CAPSULE ORAL 2 TIMES DAILY PRN
Status: DISCONTINUED | OUTPATIENT
Start: 2025-07-15 | End: 2025-07-17 | Stop reason: HOSPADM

## 2025-07-15 RX ORDER — VERAPAMIL HYDROCHLORIDE 240 MG/1
240 TABLET, FILM COATED, EXTENDED RELEASE ORAL NIGHTLY
Status: DISCONTINUED | OUTPATIENT
Start: 2025-07-15 | End: 2025-07-17 | Stop reason: HOSPADM

## 2025-07-15 RX ORDER — IBUPROFEN 600 MG/1
1 TABLET ORAL
Status: DISCONTINUED | OUTPATIENT
Start: 2025-07-15 | End: 2025-07-17 | Stop reason: HOSPADM

## 2025-07-15 RX ORDER — NICOTINE POLACRILEX 4 MG
15 LOZENGE BUCCAL
Status: DISCONTINUED | OUTPATIENT
Start: 2025-07-15 | End: 2025-07-17 | Stop reason: HOSPADM

## 2025-07-15 RX ORDER — ASPIRIN 81 MG/1
81 TABLET, CHEWABLE ORAL DAILY
Status: DISCONTINUED | OUTPATIENT
Start: 2025-07-16 | End: 2025-07-17 | Stop reason: HOSPADM

## 2025-07-15 RX ORDER — PANTOPRAZOLE SODIUM 40 MG/1
40 TABLET, DELAYED RELEASE ORAL
Status: DISCONTINUED | OUTPATIENT
Start: 2025-07-16 | End: 2025-07-17 | Stop reason: HOSPADM

## 2025-07-15 RX ORDER — HYDROCHLOROTHIAZIDE 12.5 MG/1
12.5 TABLET ORAL DAILY
Status: DISCONTINUED | OUTPATIENT
Start: 2025-07-16 | End: 2025-07-17 | Stop reason: HOSPADM

## 2025-07-15 RX ORDER — GLIPIZIDE 5 MG/1
5 TABLET ORAL
Status: DISCONTINUED | OUTPATIENT
Start: 2025-07-16 | End: 2025-07-15

## 2025-07-15 RX ORDER — MONTELUKAST SODIUM 10 MG/1
10 TABLET ORAL NIGHTLY
Status: DISCONTINUED | OUTPATIENT
Start: 2025-07-15 | End: 2025-07-17 | Stop reason: HOSPADM

## 2025-07-15 RX ORDER — DIGOXIN 125 MCG
250 TABLET ORAL
Status: DISCONTINUED | OUTPATIENT
Start: 2025-07-16 | End: 2025-07-17 | Stop reason: HOSPADM

## 2025-07-15 RX ORDER — ONDANSETRON 2 MG/ML
4 INJECTION INTRAMUSCULAR; INTRAVENOUS EVERY 6 HOURS PRN
Status: DISCONTINUED | OUTPATIENT
Start: 2025-07-15 | End: 2025-07-17 | Stop reason: HOSPADM

## 2025-07-15 RX ORDER — AZELASTINE 1 MG/ML
2 SPRAY, METERED NASAL 2 TIMES DAILY
Status: DISCONTINUED | OUTPATIENT
Start: 2025-07-15 | End: 2025-07-17 | Stop reason: HOSPADM

## 2025-07-15 RX ORDER — MELOXICAM 15 MG/1
7.5 TABLET ORAL DAILY
Status: DISCONTINUED | OUTPATIENT
Start: 2025-07-15 | End: 2025-07-15

## 2025-07-15 RX ORDER — WARFARIN SODIUM 5 MG/1
10 TABLET ORAL
Status: COMPLETED | OUTPATIENT
Start: 2025-07-15 | End: 2025-07-15

## 2025-07-15 RX ORDER — NITROGLYCERIN 0.4 MG/1
0.4 TABLET SUBLINGUAL
Status: DISCONTINUED | OUTPATIENT
Start: 2025-07-15 | End: 2025-07-17 | Stop reason: HOSPADM

## 2025-07-15 RX ORDER — BUDESONIDE AND FORMOTEROL FUMARATE DIHYDRATE 160; 4.5 UG/1; UG/1
2 AEROSOL RESPIRATORY (INHALATION)
Status: DISCONTINUED | OUTPATIENT
Start: 2025-07-15 | End: 2025-07-16

## 2025-07-15 RX ORDER — ONDANSETRON 4 MG/1
4 TABLET, ORALLY DISINTEGRATING ORAL EVERY 6 HOURS PRN
Status: DISCONTINUED | OUTPATIENT
Start: 2025-07-15 | End: 2025-07-17 | Stop reason: HOSPADM

## 2025-07-15 RX ORDER — BACLOFEN 10 MG/1
10 TABLET ORAL 2 TIMES DAILY
Status: DISCONTINUED | OUTPATIENT
Start: 2025-07-15 | End: 2025-07-17 | Stop reason: HOSPADM

## 2025-07-15 RX ADMIN — ATORVASTATIN CALCIUM 20 MG: 20 TABLET, FILM COATED ORAL at 21:02

## 2025-07-15 RX ADMIN — VERAPAMIL HYDROCHLORIDE 240 MG: 240 TABLET, FILM COATED, EXTENDED RELEASE ORAL at 21:02

## 2025-07-15 RX ADMIN — MORPHINE SULFATE 2 MG: 2 INJECTION, SOLUTION INTRAMUSCULAR; INTRAVENOUS at 16:41

## 2025-07-15 RX ADMIN — Medication 10 ML: at 21:03

## 2025-07-15 RX ADMIN — GABAPENTIN 600 MG: 300 CAPSULE ORAL at 21:02

## 2025-07-15 RX ADMIN — TAMSULOSIN HYDROCHLORIDE 0.4 MG: 0.4 CAPSULE ORAL at 21:02

## 2025-07-15 RX ADMIN — BACLOFEN 10 MG: 10 TABLET ORAL at 21:02

## 2025-07-15 RX ADMIN — WARFARIN SODIUM 10 MG: 5 TABLET ORAL at 21:02

## 2025-07-15 RX ADMIN — MONTELUKAST 10 MG: 10 TABLET, FILM COATED ORAL at 21:02

## 2025-07-15 RX ADMIN — AZELASTINE HYDROCHLORIDE 2 SPRAY: 137 SPRAY, METERED NASAL at 21:02

## 2025-07-15 NOTE — ED PROVIDER NOTES
Subjective   Chief Complaint   Patient presents with    Lower Extremity Issue     Left knee and leg pain, pain down calf.  Pt states his coumadin level was low this morning.  Pt unable to bear weight.         History of Present Illness  Patient is a 79-year-old male presents the ED for evaluation of left knee pain, left lower leg pain that radiates down his leg.  Patient reports he noticed this this morning.  He reports its painful to bear weight, it is painful to move his leg up and lift his leg.  He denies any direct trauma injury or fall prior to the pain.  No change in activity.  He reports the pain will start in his leg and shoot all the way down his leg to his feet.  Review of Systems    Past Medical History:   Diagnosis Date    Allergic     Anxiety     Arthritis     Asthma     Atrial fibrillation     Atypical chest pain 06/11/2020    Cataract     COPD (chronic obstructive pulmonary disease)     COVID     COVID-19 01/13/2021    Depression     Enlarged prostate     Headache     Hyperlipidemia     Hypertension     Kidney stone     Near syncope 12/17/2020    Prostatitis     Shortness of breath     Sleep apnea     Stroke 2022    Urinary tract infection        Allergies   Allergen Reactions    Penicillins Other (See Comments)     AS A CHILD    Rivaroxaban Other (See Comments) and GI Bleeding     Bleeding - GI    Xarelto       Past Surgical History:   Procedure Laterality Date    APPENDECTOMY      CARDIAC CATHETERIZATION  2007    nonobstructive dz    CARDIAC CATHETERIZATION N/A 09/17/2021    Procedure: Left Heart Cath;  Surgeon: Silvano Gillis MD;  Location: [facility] CATH INVASIVE LOCATION;  Service: Cardiology;  Laterality: N/A;    CARDIAC CATHETERIZATION N/A 6/26/2025    Procedure: Left Heart Cath;  Surgeon: Silvano Gillis MD;  Location:  [facility] CATH INVASIVE LOCATION;  Service: Cardiology;  Laterality: N/A;    CERVICAL EPIDURAL      with Dr. Chua Pain mgt    CHOLECYSTECTOMY      EYE SURGERY  Bilateral     corneal transplant left 09/02/2020 (Right eye 2014)    JOINT REPLACEMENT Right     knee    KIDNEY STONE SURGERY      LUMBAR DISC SURGERY  2024    Dr. Chua - L4/L5    PROSTATE SURGERY         Family History   Problem Relation Age of Onset    Heart disease Mother     Hypertension Mother     Diabetes Father     Stroke Father     Hypertension Father     Cancer Father         esophageal. prostate, skin    Diabetes Sister     Thyroid disease Sister     Heart disease Maternal Grandmother     Diabetes Paternal Grandmother        Social History     Socioeconomic History    Marital status:    Tobacco Use    Smoking status: Former     Types: Cigarettes    Smokeless tobacco: Never   Vaping Use    Vaping status: Never Used   Substance and Sexual Activity    Alcohol use: Yes     Comment: Occasionally - once a month.    Drug use: Not Currently    Sexual activity: Defer           Objective   Physical Exam  Vitals and nursing note reviewed.   Constitutional:       Appearance: Normal appearance. He is not toxic-appearing.   HENT:      Head: Normocephalic and atraumatic.      Nose: Nose normal.      Mouth/Throat:      Mouth: Mucous membranes are moist.      Pharynx: Oropharynx is clear.   Eyes:      Extraocular Movements: Extraocular movements intact.      Conjunctiva/sclera: Conjunctivae normal.      Pupils: Pupils are equal, round, and reactive to light.   Cardiovascular:      Rate and Rhythm: Normal rate and regular rhythm.      Heart sounds: Normal heart sounds. No murmur heard.     No friction rub. No gallop.   Pulmonary:      Effort: Pulmonary effort is normal.      Breath sounds: Normal breath sounds.   Musculoskeletal:      Cervical back: Normal range of motion and neck supple.      Left knee: No bony tenderness. Tenderness present. Normal pulse.      Left lower leg: Tenderness present. No swelling, deformity, lacerations or bony tenderness. No edema.      Left ankle: No swelling or deformity. No  tenderness. Normal range of motion. Normal pulse.      Left foot: Normal capillary refill. No swelling, deformity, tenderness, bony tenderness or crepitus. Normal pulse.      Comments: Patient has diffuse tenderness noted to the anterior knee, posterior knee, with tenderness down the anterior leg.  He has good distal pulses.  No erythema or warmth.  No evidence for septic arthritis.   Skin:     General: Skin is warm and dry.      Findings: No erythema or rash.   Neurological:      General: No focal deficit present.      Mental Status: He is alert and oriented to person, place, and time.         Procedures           ED Course              [id number] Score: 7                                          Medical Decision Making  Problems Addressed:  Acute pain of right knee: complicated acute illness or injury  Difficulty walking: complicated acute illness or injury  Pain of left lower extremity: complicated acute illness or injury    Amount and/or Complexity of Data Reviewed  Radiology: ordered.    Risk  Prescription drug management.  Decision regarding hospitalization.    Appropriate PPE worn during patient interactions.    Chart Review: Visit 6/28/2025    Differential diagnoses considered for patient chief complaint and presentation, this list is not all inclusive of diagnoses considered: DVT, septic arthritis, arterial insufficiency, fracture, strain.  Patient unable exam and workup.  No weakness appreciated to the extremities.  X-ray imaging was obtained and reveals no acute fracture.  Patient has diffuse tenderness from his knee down his lower leg.  He initially declined pain medicine, however on reexam was minimal to pain medication.  He attempted to walk and bear weight, however it is difficult for him to bear weight on his left leg, he reports sharp shooting pains down the leg whenever he does attempt to walk.  No sign of arterial insufficiency, he has good distal pulses.  He has no DVT.  Will admit to family  medicine for further evaluation and management given his continued pain, difficulty walking below baseline.  I discussed with the patient their test results, work-up here in the emergency department, and need for admission and further evaluation. Patient is agreeable to the plan of care. At time of disposition patient's VS are reviewed, and patient without acute distress.  Opportunity was provided for questions at the bedside, all questions and concerns were addressed.        Note Disclaimer: At [facility], we believe that sharing information builds trust and better relationships. You are receiving this note because you recently visited [facility]. It is possible you will see health information before a provider has talked with you about it. This kind of information can be easy to misunderstand. To help you fully understand what it means for your health, we urge you to discuss this note with your provider  Note dictated utilizing Dragon Dictation.          Final diagnoses:   Acute pain of right knee   Pain of left lower extremity   Difficulty walking       ED Disposition  ED Disposition       ED Disposition   Decision to Admit    Condition   --    Comment   Level of Care: Med/Surg [1]   Admitting Physician: CRUZ SEO [[id number]]   Attending Physician: CRUZ SEO [[id number]]                 No follow-up provider specified.       Medication List      No changes were made to your prescriptions during this visit.            Darshana Myers, JAZZ  07/15/25 [id number]

## 2025-07-15 NOTE — NURSING NOTE
Pt. Arrived from ER at 1813. This nurse introduced. VSS. Acquainted to room. Water and call light within reach.

## 2025-07-15 NOTE — PROGRESS NOTES
"Pharmacy dosing service  Anticoagulant  Warfarin     Subjective:    Efe Malloy is a 79 y.o.male being continued on warfarin for Atrial Fibrillation / Flutter.    INR Goal: 2 - 3  Home medication?: warfarin 5 mg PO daily  Bridge Therapy Present?:  No  Interacting Medications Evaluation (New/Present/Discontinued): pravastatin (can increase INR, home med changed to atorvastatin inpatient)  Additional Contributing Factors: n/a      Assessment/Plan:    Pt was seen in [facility] clinic today and found to have subtherapeutic ONR.  Documented plan was bolus of 10 mg x1 today, then resume 5 mg daily thereafter, since no obvious reason identified for subtherapeutic INR. Nurse confirmed pt did not take warfarin dose yet today.  Will follow with outpt plan for today and give 10 mg x1 today, then plan to re-assess dosing daily thereafter while inpatient.    Continue to monitor and adjust based on INR.         Date 7/15           INR 1.4           Dose 10 mg             Diet Order   Procedures    Diet: Cardiac, Diabetic; Healthy Heart (2-3 Na+); Consistent Carbohydrate; Fluid Consistency: Thin (IDDSI 0)      Objective:  [Ht: 182.9 cm (72.01\"); Wt: 118 kg (259 lb 14.8 oz); BMI: Body mass index is 35.24 kg/m².]    Lab Results   Component Value Date    ALBUMIN 4.2 04/26/2025     Lab Results   Component Value Date    INR 1.42 (L) 07/15/2025    INR 1.40 (H) 07/15/2025    INR 1.90 (H) 07/01/2025    PROTIME 17.3 (L) 07/15/2025    PROTIME 16.4 07/15/2025    PROTIME 21.6 07/01/2025     Lab Results   Component Value Date    HGB 15.3 06/28/2025    HGB 15.9 06/24/2025    HGB 15.6 04/26/2025     Lab Results   Component Value Date    HCT 45 06/28/2025    HCT 51.1 (H) 06/24/2025    HCT 49.4 04/26/2025       Jenny Ewing, PharmD, BCPS  07/15/25 19:27 EDT    "

## 2025-07-15 NOTE — PROGRESS NOTES
Anticoagulation Clinic Progress Note    INR Goal: 2 - 3  Today's INR: 1.4 (subtherapeutic)  Current warfarin dose: warfarin 5 mg PO daily. Current total weekly dose = 35 mg per week.     No identifiable reasons for sudden decrease in INR.     INR History:      Latest Ref Rng & Units 2025    11:30 AM 2025     2:45 PM 2025     9:57 AM 2025     1:06 PM 2025    11:01 AM 2025     2:00 PM 7/15/2025    10:30 AM   Anticoagulation Monitoring   INR  3.10 1.90  1.65 -- 1.90 1.40   INR Date  2025 2025  2025  2025 7/15/2025   INR Goal  2.0-3.0 2.0-3.0  2.0-3.0 2.0-3.0 2.0-3.0 2.0-3.0   Trend  Same Same  Same Same Same Same   Last Week Total  35 mg 35 mg  30 mg 20 mg 25 mg 35 mg   Next Week Total  35 mg 35 mg  25 mg 35 mg 35 mg 40 mg   Sun  5 mg 5 mg  - 5 mg 5 mg 5 mg   Mon  5 mg 5 mg  - 5 mg 5 mg 5 mg   Tue  5 mg 5 mg  Hold () - 5 mg 10 mg (7/15)   Wed  5 mg 5 mg  Hold () - 5 mg 5 mg   Thu  5 mg 5 mg  5 mg - 5 mg 5 mg   Fri  5 mg 5 mg  - 5 mg 5 mg 5 mg   Sat  5 mg 5 mg  - 5 mg 5 mg 5 mg   Historical INR 2.00 - 3.00   1.65            Anticoagulation Summary  As of 7/15/2025      INR goal:  2.0-3.0   TTR:  77.4% (6 y)   INR used for dosin.40 (7/15/2025)   Warfarin maintenance plan:  5 mg every day   Weekly warfarin total:  35 mg   Plan last modified:  Kavita Jolley, PharmD (2023)   Next INR check:  2025   Target end date:  --    Indications    Persistent atrial fibrillation [I48.19]                 Anticoagulation Episode Summary       INR check location:  --    Preferred lab:  --    Send INR reminders to:  [facility] CLINICAL POOL    Comments:  Patient contract signed 11/15/21.          Anticoagulation Care Providers       Provider Role Specialty Phone number    Silvano Gillis MD  Cardiology [phone]            Clinic Interview:   Patient Findings     Negatives:  Signs/symptoms of thrombosis, Signs/symptoms of bleeding,   Laboratory test  error suspected, Change in health, Change in alcohol use,   Change in activity, Upcoming invasive procedure, Emergency department   visit, Upcoming dental procedure, Missed doses, Extra doses, Change in   medications, Change in diet/appetite, Hospital admission, Bruising, Other   complaints      Clinical Outcomes     Negatives:  Major bleeding event, Thromboembolic event,   Anticoagulation-related hospital admission, Anticoagulation-related ED   visit, Anticoagulation-related fatality        Current Medications:   Prior to Admission medications    Medication Sig Start Date End Date Taking? Authorizing Provider   albuterol sulfate  (90 Base) MCG/ACT inhaler Inhale 2 puffs Every 6 (Six) Hours As Needed. 7/6/21   Shelby Haskins MD   alfuzosin (UROXATRAL) 10 MG 24 hr tablet Take 1 tablet by mouth 2 (Two) Times a Day. 9/23/19   Shelby Haskins MD   azelastine (ASTELIN) 0.1 % nasal spray Administer 2 sprays into the nostril(s) as directed by provider 2 (Two) Times a Day. 6/23/25   Shelby Haskins MD   baclofen (LIORESAL) 10 MG tablet Take 1 tablet by mouth 2 (Two) Times a Day. 7/13/21   Shelby Haskins MD   BREHENRY ELLIPTA 100-25 MCG/INH inhaler Inhale 1 puff Daily. 9/24/19   Shelby Haskins MD   digoxin (LANOXIN) 125 MCG tablet Take 2 tablets by mouth Daily.    Shelby Haskins MD   enoxaparin sodium (LOVENOX) 120 MG/0.8ML solution prefilled syringe syringe Inject 0.8 mL under the skin into the appropriate area as directed Every 12 (Twelve) Hours. 6/11/25   Margie Balderas APRN   EPINEPHrine (EPIPEN) 0.3 MG/0.3ML solution auto-injector injection epinephrine 0.3 mg/0.3 mL injection, auto-injector    Shelby Haskins MD   ezetimibe (ZETIA) 10 MG tablet TAKE ONE TABLET BY MOUTH EVERY DAY 12/13/24   Silvano Gillis MD   fluorometholone (FML) 0.1 % ophthalmic suspension Administer 1 drop to both eyes Daily. 5/17/21   Shelby Haskins MD   fluticasone (FLONASE) 50  MCG/ACT nasal spray Administer 2 sprays into the nostril(s) as directed by provider Daily.    Shelby Haskins MD   gabapentin (NEURONTIN) 300 MG capsule Take 2 capsules by mouth 3 (Three) Times a Day. 9/13/19   Shelby Haskins MD   glimepiride (AMARYL) 2 MG tablet Take 1 tablet by mouth Daily. 3/17/25   Shelby Haskins MD   hydroCHLOROthiazide (MICROZIDE) 12.5 MG capsule TAKE ONE CAPSULE BY MOUTH EVERY DAY 7/1/25   Silvano Gillis MD   levothyroxine (SYNTHROID, LEVOTHROID) 175 MCG tablet Take 0.5 tablets by mouth Daily. 9/23/19   Shelby Haskins MD   meloxicam (MOBIC) 7.5 MG tablet Take 1 tablet by mouth Daily.    Shelby Haskins MD   montelukast (SINGULAIR) 10 MG tablet Take 1 tablet by mouth Every Evening. 9/23/19   Shelby Haskins MD   pantoprazole (PROTONIX) 40 MG EC tablet Take 1 tablet by mouth Daily. 2/22/22   Silvano Gillis MD   pravastatin (PRAVACHOL) 80 MG tablet Take 1 tablet by mouth every night at bedtime. 3/19/25   Silvano Gillis MD   sertraline (ZOLOFT) 100 MG tablet Take 1.5 tablets by mouth Daily. 3/15/22   Silvano Gillis MD   theophylline (UNIPHYL) 400 MG 24 hr tablet Take 1 tablet by mouth Daily. 9/23/19   Shelby Haskins MD   verapamil ER (VERELAN) 240 MG 24 hr capsule Take 1 capsule by mouth every night at bedtime. 6/17/25   Silvano Gillis MD   warfarin (Coumadin) 5 MG tablet Take 5 mg by mouth daily or as otherwise directed. 4/15/25   Silvano Gillis MD       Medical history:   Past Medical History:   Diagnosis Date    Allergic     Anxiety     Arthritis     Asthma     Atrial fibrillation     Atypical chest pain 06/11/2020    Cataract     COPD (chronic obstructive pulmonary disease)     COVID     COVID-19 01/13/2021    Depression     Enlarged prostate     Headache     Hyperlipidemia     Hypertension     Kidney stone     Near syncope 12/17/2020    Prostatitis     Shortness of breath     Sleep apnea      Stroke 2022    Urinary tract infection        Social history:   Social History     Tobacco Use    Smoking status: Former     Types: Cigarettes    Smokeless tobacco: Never   Substance Use Topics    Alcohol use: Yes     Comment: Occasionally - once a month.       Allergies:    Penicillins and Rivaroxaban    Vaccine History:   Immunization History   Administered Date(s) Administered    ABRYSVO (RSV, 60+ or pregnant women 32-36 wks) 11/14/2024    Covid-19 (Pfizer) Gray Cap Monovalent 05/18/2021, 11/17/2021, 11/26/2021    FLUAD TRI 65YR+ 10/31/2019    Fluzone  >6mos 09/22/2014    Fluzone High-Dose 65+YRS 10/14/2016, 10/16/2017, 11/14/2018    H1N1 Inj 12/22/2009    Hepatitis A 09/17/2019, 11/19/2019    Influenza, Unspecified 09/18/2013    Pneumococcal Conjugate 13-Valent (PCV13) 10/12/2015    Pneumococcal Polysaccharide (PPSV23) 09/24/2013    Shingrix 09/17/2019, 11/19/2019    Zostavax 02/12/2013       [id number] SCORE   [id number] Score for Atrial Fibrillation Stroke Risk  Age in Years: 75+  Sex: Male  CHF History: Yes  Hypertension History: Yes  Stroke/TIA/Thromboembolism History: Yes  Vascular Disease History: Yes  Diabetes History: No  [id number] Score: 7  Stroke risks -: 7 Points. Risk of ischemic stroke: 11.2%. Risk of stroke/TIA/embolism: 15.7%      This patient is managed via a collaborative agreement, pursuant to IC 25-26-16. The signed protocol is kept in the [facility] at [address] [place] [address].    Education: Efe Malloy has been instructed to call if any changes in medications, doses, concerns, etc. Patient was provided dosing instructions and expressed verbal understanding and has no further questions at this time.    Plan:  1. Anticoagulation   - Bolus dose of warfarin 10 mg PO today, the resume warfarin 5 mg PO daily until next INR check on 7/22.  - RTC in 1 week(s)    Counseled patient on increased clotting risk associated with subtherapeutic INR,  signs/symptoms to monitor for, and when to seek medical attention.       Claire Reyes, Pharmacy Intern  7/15/2025  12:30 EDT

## 2025-07-16 ENCOUNTER — APPOINTMENT (OUTPATIENT)
Dept: MRI IMAGING | Facility: HOSPITAL | Age: 80
End: 2025-07-16
Payer: MEDICARE

## 2025-07-16 LAB
ANION GAP SERPL CALCULATED.3IONS-SCNC: 11.8 MMOL/L (ref 5–15)
BASOPHILS # BLD AUTO: 0.04 10*3/MM3 (ref 0–0.2)
BASOPHILS NFR BLD AUTO: 0.6 % (ref 0–1.5)
BUN SERPL-MCNC: 22.1 MG/DL (ref 8–23)
BUN/CREAT SERPL: 17.4 (ref 7–25)
CALCIUM SPEC-SCNC: 9 MG/DL (ref 8.6–10.5)
CHLORIDE SERPL-SCNC: 106 MMOL/L (ref 98–107)
CO2 SERPL-SCNC: 24.2 MMOL/L (ref 22–29)
CREAT SERPL-MCNC: 1.27 MG/DL (ref 0.76–1.27)
CRP SERPL-MCNC: <0.3 MG/DL (ref 0–0.5)
DEPRECATED RDW RBC AUTO: 44.1 FL (ref 37–54)
EGFRCR SERPLBLD CKD-EPI 2021: 58.2 ML/MIN/1.73
EOSINOPHIL # BLD AUTO: 0.22 10*3/MM3 (ref 0–0.4)
EOSINOPHIL NFR BLD AUTO: 3.5 % (ref 0.3–6.2)
ERYTHROCYTE [DISTWIDTH] IN BLOOD BY AUTOMATED COUNT: 14.6 % (ref 12.3–15.4)
ERYTHROCYTE [SEDIMENTATION RATE] IN BLOOD: 9 MM/HR (ref 0–20)
GLUCOSE BLDC GLUCOMTR-MCNC: 117 MG/DL (ref 70–105)
GLUCOSE BLDC GLUCOMTR-MCNC: 95 MG/DL (ref 70–105)
GLUCOSE BLDC GLUCOMTR-MCNC: 96 MG/DL (ref 70–105)
GLUCOSE BLDC GLUCOMTR-MCNC: 98 MG/DL (ref 70–105)
GLUCOSE SERPL-MCNC: 93 MG/DL (ref 65–99)
HCT VFR BLD AUTO: 48.9 % (ref 37.5–51)
HGB BLD-MCNC: 15.3 G/DL (ref 13–17.7)
IMM GRANULOCYTES # BLD AUTO: 0.02 10*3/MM3 (ref 0–0.05)
IMM GRANULOCYTES NFR BLD AUTO: 0.3 % (ref 0–0.5)
INR PPP: 1.44 (ref 2–3)
LYMPHOCYTES # BLD AUTO: 1.85 10*3/MM3 (ref 0.7–3.1)
LYMPHOCYTES NFR BLD AUTO: 29.8 % (ref 19.6–45.3)
MCH RBC QN AUTO: 26.2 PG (ref 26.6–33)
MCHC RBC AUTO-ENTMCNC: 31.3 G/DL (ref 31.5–35.7)
MCV RBC AUTO: 83.9 FL (ref 79–97)
MONOCYTES # BLD AUTO: 0.61 10*3/MM3 (ref 0.1–0.9)
MONOCYTES NFR BLD AUTO: 9.8 % (ref 5–12)
NEUTROPHILS NFR BLD AUTO: 3.46 10*3/MM3 (ref 1.7–7)
NEUTROPHILS NFR BLD AUTO: 56 % (ref 42.7–76)
NRBC BLD AUTO-RTO: 0 /100 WBC (ref 0–0.2)
PLATELET # BLD AUTO: 146 10*3/MM3 (ref 140–450)
PMV BLD AUTO: 9.2 FL (ref 6–12)
POTASSIUM SERPL-SCNC: 3.8 MMOL/L (ref 3.5–5.2)
PROTHROMBIN TIME: 17.5 SECONDS (ref 19.4–28.5)
QT INTERVAL: 399 MS
QTC INTERVAL: 434 MS
RBC # BLD AUTO: 5.83 10*6/MM3 (ref 4.14–5.8)
SODIUM SERPL-SCNC: 142 MMOL/L (ref 136–145)
TSH SERPL DL<=0.05 MIU/L-ACNC: 0.39 UIU/ML (ref 0.27–4.2)
URATE SERPL-MCNC: 6.4 MG/DL (ref 3.4–7)
WBC NRBC COR # BLD AUTO: 6.2 10*3/MM3 (ref 3.4–10.8)

## 2025-07-16 PROCEDURE — 85652 RBC SED RATE AUTOMATED: CPT

## 2025-07-16 PROCEDURE — 82948 REAGENT STRIP/BLOOD GLUCOSE: CPT

## 2025-07-16 PROCEDURE — 84443 ASSAY THYROID STIM HORMONE: CPT

## 2025-07-16 PROCEDURE — G0378 HOSPITAL OBSERVATION PER HR: HCPCS

## 2025-07-16 PROCEDURE — 73721 MRI JNT OF LWR EXTRE W/O DYE: CPT

## 2025-07-16 PROCEDURE — 84550 ASSAY OF BLOOD/URIC ACID: CPT

## 2025-07-16 PROCEDURE — 86140 C-REACTIVE PROTEIN: CPT

## 2025-07-16 PROCEDURE — 85025 COMPLETE CBC W/AUTO DIFF WBC: CPT

## 2025-07-16 PROCEDURE — 80048 BASIC METABOLIC PNL TOTAL CA: CPT

## 2025-07-16 PROCEDURE — 72148 MRI LUMBAR SPINE W/O DYE: CPT

## 2025-07-16 PROCEDURE — 85610 PROTHROMBIN TIME: CPT

## 2025-07-16 RX ORDER — WARFARIN SODIUM 5 MG/1
5 TABLET ORAL
Status: DISCONTINUED | OUTPATIENT
Start: 2025-07-16 | End: 2025-07-17

## 2025-07-16 RX ADMIN — THEOPHYLLINE ANHYDROUS 400 MG: 200 CAPSULE, EXTENDED RELEASE ORAL at 08:38

## 2025-07-16 RX ADMIN — ASPIRIN 81 MG CHEWABLE TABLET 81 MG: 81 TABLET CHEWABLE at 08:38

## 2025-07-16 RX ADMIN — GABAPENTIN 600 MG: 300 CAPSULE ORAL at 08:38

## 2025-07-16 RX ADMIN — BACLOFEN 10 MG: 10 TABLET ORAL at 20:34

## 2025-07-16 RX ADMIN — AZELASTINE HYDROCHLORIDE 2 SPRAY: 137 SPRAY, METERED NASAL at 20:35

## 2025-07-16 RX ADMIN — FLUOROMETHOLONE 1 DROP: 1 SUSPENSION/ DROPS OPHTHALMIC at 08:41

## 2025-07-16 RX ADMIN — LEVOTHYROXINE SODIUM 175 MCG: 175 TABLET ORAL at 06:32

## 2025-07-16 RX ADMIN — SERTRALINE HYDROCHLORIDE 150 MG: 100 TABLET, FILM COATED ORAL at 08:38

## 2025-07-16 RX ADMIN — WARFARIN SODIUM 5 MG: 5 TABLET ORAL at 17:50

## 2025-07-16 RX ADMIN — ATORVASTATIN CALCIUM 20 MG: 20 TABLET, FILM COATED ORAL at 20:34

## 2025-07-16 RX ADMIN — Medication 10 ML: at 20:36

## 2025-07-16 RX ADMIN — Medication 10 ML: at 08:41

## 2025-07-16 RX ADMIN — VERAPAMIL HYDROCHLORIDE 240 MG: 240 TABLET, FILM COATED, EXTENDED RELEASE ORAL at 20:34

## 2025-07-16 RX ADMIN — MONTELUKAST 10 MG: 10 TABLET, FILM COATED ORAL at 20:34

## 2025-07-16 RX ADMIN — HYDROCHLOROTHIAZIDE 12.5 MG: 12.5 TABLET ORAL at 08:38

## 2025-07-16 RX ADMIN — GABAPENTIN 600 MG: 300 CAPSULE ORAL at 20:34

## 2025-07-16 RX ADMIN — TAMSULOSIN HYDROCHLORIDE 0.4 MG: 0.4 CAPSULE ORAL at 20:34

## 2025-07-16 RX ADMIN — AZELASTINE HYDROCHLORIDE 2 SPRAY: 137 SPRAY, METERED NASAL at 08:41

## 2025-07-16 RX ADMIN — PANTOPRAZOLE SODIUM 40 MG: 40 TABLET, DELAYED RELEASE ORAL at 08:39

## 2025-07-16 RX ADMIN — BACLOFEN 10 MG: 10 TABLET ORAL at 08:38

## 2025-07-16 RX ADMIN — GABAPENTIN 600 MG: 300 CAPSULE ORAL at 17:49

## 2025-07-16 NOTE — PROGRESS NOTES
"Pharmacy dosing service  Anticoagulant  Warfarin     Subjective:    Efe Malloy is a 79 y.o.male being continued on warfarin for Atrial Fibrillation / Flutter.    INR Goal: 2 - 3  Home medication?: warfarin 5 mg PO daily  Bridge Therapy Present?:  No  Interacting Medications Evaluation (New/Present/Discontinued): pravastatin (can increase INR, home med changed to atorvastatin inpatient)  Additional Contributing Factors: n/a      Assessment/Plan:    Pt was seen in [facility] 7/15 and found to have INR below goal.  Documented plan was to increase warfarin to 10 mg x1 on 7/15, then resume 5 mg daily thereafter, since no obvious reason identified for subtherapeutic INR. Patient was given 10 mg dose of warfarin inpatient on 7/15.    INR today is still low at 1.44 which is to be expected after only one increased dose of warfarin yesterday. Will resume 5 mg dose today and monitor tomorrow to see how much INR increases after 10 mg dose. Will consider higher dose again tomorrow if INR does not increase.    Continue to monitor and adjust based on INR.         Date 7/15 7/16          INR 1.4 1.44          Dose 10 mg 5 mg            Diet Order   Procedures    Diet: Cardiac, Diabetic; Healthy Heart (2-3 Na+); Consistent Carbohydrate; Fluid Consistency: Thin (IDDSI 0)      Objective:  [Ht: 182.9 cm (72.01\"); Wt: 118 kg (259 lb 14.8 oz); BMI: Body mass index is 35.24 kg/m².]    Lab Results   Component Value Date    ALBUMIN 4.2 04/26/2025     Lab Results   Component Value Date    INR 1.44 (L) 07/16/2025    INR 1.42 (L) 07/15/2025    INR 1.40 (H) 07/15/2025    PROTIME 17.5 (L) 07/16/2025    PROTIME 17.3 (L) 07/15/2025    PROTIME 16.4 07/15/2025     Lab Results   Component Value Date    HGB 15.3 07/16/2025    HGB 15.3 06/28/2025    HGB 15.9 06/24/2025     Lab Results   Component Value Date    HCT 48.9 07/16/2025    HCT 45 06/28/2025    HCT 51.1 (H) 06/24/2025       Almita Bruner PharmD  07/16/25 11:02 EDT     "

## 2025-07-16 NOTE — CASE MANAGEMENT/SOCIAL WORK
Discharge Planning Assessment   Samuel     Patient Name: Efe Malloy  MRN: [id number]  Today's Date: 7/16/2025    Admit Date: 7/15/2025    Plan: Return home with family   Discharge Needs Assessment       Row Name 07/16/25 1250       Living Environment    People in Home spouse;child(suellen), dependent    Name(s) of People in Home wife Desirae & son    Current Living Arrangements home    Potentially Unsafe Housing Conditions none    In the past 12 months has the electric, gas, oil, or water company threatened to shut off services in your home? No    Primary Care Provided by self    Provides Primary Care For no one    Family Caregiver if Needed spouse    Quality of Family Relationships helpful;involved;supportive    Able to Return to Prior Arrangements yes       Resource/Environmental Concerns    Resource/Environmental Concerns none    Transportation Concerns none       Transportation Needs    In the past 12 months, has lack of transportation kept you from medical appointments or from getting medications? no    In the past 12 months, has lack of transportation kept you from meetings, work, or from getting things needed for daily living? No       Food Insecurity    Within the past 12 months, you worried that your food would run out before you got the money to buy more. Never true    Within the past 12 months, the food you bought just didn't last and you didn't have money to get more. Never true       Transition Planning    Patient/Family Anticipates Transition to home with family    Patient/Family Anticipated Services at Transition none    Transportation Anticipated family or friend will provide       Discharge Needs Assessment    Readmission Within the Last 30 Days no previous admission in last 30 days    Equipment Currently Used at Home cpap;cane, straight    Concerns to be Addressed care coordination/care conferences;discharge planning    Do you want help finding or keeping work or a job? Patient declined    Do you  want help with school or training? For example, starting or completing job training or getting a high school diploma, GED or equivalent Patient declined    Anticipated Changes Related to Illness none                   Discharge Plan       Row Name 07/16/25 1253       Plan    Plan Return home with family    Plan Comments CM met with patient at bedside to discuss discharge planning. Patient lives at home with his wife Desirae and his dependent son (has cerebral palsy). He drives and is independent with ADLs. He has a cane & cpap at home. PCP ([facility]) and pharmacy ([facility]) verified, agreeable to [facility]. Denies issues affording medications, utilities, and/or food. No current HHC or OPPT. Wife or daughter will transport at discharge. Patient may need walker depending on MRI/PT reccs.              Demographic Summary       Row Name 07/16/25 1250       General Information    Admission Type observation    Arrived From emergency department    Required Notices Provided Observation Status Notice    Referral Source admission list    Reason for Consult care coordination/care conference;discharge planning    Preferred Language English       Contact Information    Permission Granted to Share Info With                    Functional Status       Row Name 07/16/25 1258       Functional Status    Usual Activity Tolerance moderate    Current Activity Tolerance moderate       Functional Status, IADL    Medications independent    Meal Preparation independent    Housekeeping independent    Laundry independent    Shopping independent       Mental Status Summary    Recent Changes in Mental Status/Cognitive Functioning no changes             Dali Franco RN     [facility]  Office: [phone]  Cell: [phone]  Fax # [phone]

## 2025-07-16 NOTE — H&P
Patient Care Team:  Uday Beltran MD as PCP - General (Family Medicine)  Silvano Gillis MD as Consulting Physician (Cardiology)    Chief complaint left leg pain and inability to walk    Subjective     Patient is a 79 y.o. male with pmh of afib, DM, REGLA,, hx of cva, stable meningioma followed by [facility], who presents with left leg pain and difficulty walking which began yesterday. He originally noted difficulty in the shower when lifting his leg to wash his foot.  Reported that the pain intensified over the past 2 days, unrelieved by tylenol, but did not some improvement with CBD oil.  No noted injury, swelling, fever, redness.  No surgeries to left leg.  Today pain became more intense and had a near fall in the community and required significant help with ambulation.  He reports primari ly pain with some occasional paresthesia and heaviness.  At time of exam, he reports that it has improved since resting in bed.        In the Er, Duplex was negative for DVT.  INR was subtherapeutic at 1.4.  Xray of leg, knee and ankle were negative for fracture. He was unable to attempt therefore will be admitted for pain control and pt evaluation.    Review of Systems   Constitutional:  Negative for chills and fever.   Respiratory:  Negative for shortness of breath.    Musculoskeletal:  Positive for arthralgias and gait problem.   Neurological:  Positive for weakness. Negative for dizziness and syncope.   Psychiatric/Behavioral:  Negative for confusion.           History  Past Medical History:   Diagnosis Date    Allergic     Anxiety     Arthritis     Asthma     Atrial fibrillation     Atypical chest pain 06/11/2020    Cataract     COPD (chronic obstructive pulmonary disease)     COVID     COVID-19 01/13/2021    Depression     Enlarged prostate     Headache     Hyperlipidemia     Hypertension     Kidney stone     Near syncope 12/17/2020    Prostatitis     Shortness of breath     Sleep apnea     Stroke 2022     Urinary tract infection      Past Surgical History:   Procedure Laterality Date    APPENDECTOMY      CARDIAC CATHETERIZATION  2007    nonobstructive dz    CARDIAC CATHETERIZATION N/A 09/17/2021    Procedure: Left Heart Cath;  Surgeon: Silvano Gillis MD;  Location:  [facility];  Service: Cardiology;  Laterality: N/A;    CARDIAC CATHETERIZATION N/A 6/26/2025    Procedure: Left Heart Cath;  Surgeon: Silvano Gillis MD;  Location:  [facility];  Service: Cardiology;  Laterality: N/A;    CERVICAL EPIDURAL      with Dr. Harshil Rodgers mgt    CHOLECYSTECTOMY      EYE SURGERY Bilateral     corneal transplant left 09/02/2020 (Right eye 2014)    JOINT REPLACEMENT Right     knee    KIDNEY STONE SURGERY      LUMBAR DISC SURGERY  2024    Dr. Chua - L4/L5    PROSTATE SURGERY       Family History   Problem Relation Age of Onset    Heart disease Mother     Hypertension Mother     Diabetes Father     Stroke Father     Hypertension Father     Cancer Father         esophageal. prostate, skin    Diabetes Sister     Thyroid disease Sister     Heart disease Maternal Grandmother     Diabetes Paternal Grandmother      Social History     Tobacco Use    Smoking status: Former     Types: Cigarettes    Smokeless tobacco: Never   Vaping Use    Vaping status: Never Used   Substance Use Topics    Alcohol use: Yes     Comment: Occasionally - once a month.    Drug use: Not Currently     Facility-Administered Medications Prior to Admission   Medication Dose Route Frequency Provider Last Rate Last Admin    aspirin chewable tablet 81 mg  81 mg Oral Daily Silvano Gillis MD         Medications Prior to Admission   Medication Sig Dispense Refill Last Dose/Taking    alfuzosin (UROXATRAL) 10 MG 24 hr tablet Take 1 tablet by mouth 2 (Two) Times a Day.  11 7/15/2025 Morning    baclofen (LIORESAL) 10 MG tablet Take 1 tablet by mouth 2 (Two) Times a Day.   7/15/2025 Morning    BREO ELLIPTA 100-25  MCG/INH inhaler Inhale 1 puff Daily.  5 7/15/2025 Morning    ezetimibe (ZETIA) 10 MG tablet TAKE ONE TABLET BY MOUTH EVERY DAY 30 tablet 11 7/14/2025 Bedtime    fluorometholone (FML) 0.1 % ophthalmic suspension Administer 1 drop to both eyes Daily.   7/15/2025 Morning    fluticasone (FLONASE) 50 MCG/ACT nasal spray Administer 2 sprays into the nostril(s) as directed by provider Daily.   7/15/2025 Morning    gabapentin (NEURONTIN) 300 MG capsule Take 2 capsules by mouth 3 (Three) Times a Day.  3 7/15/2025 Morning    glimepiride (AMARYL) 2 MG tablet Take 1 tablet by mouth Daily.   7/15/2025 Morning    hydroCHLOROthiazide (MICROZIDE) 12.5 MG capsule TAKE ONE CAPSULE BY MOUTH EVERY DAY 90 capsule 1 7/15/2025 Morning    levothyroxine (SYNTHROID, LEVOTHROID) 175 MCG tablet Take 0.5 tablets by mouth Daily.  5 7/15/2025 Morning    montelukast (SINGULAIR) 10 MG tablet Take 1 tablet by mouth Every Evening.  5 7/14/2025 Bedtime    pravastatin (PRAVACHOL) 80 MG tablet Take 1 tablet by mouth every night at bedtime. 90 tablet 3 7/14/2025    sertraline (ZOLOFT) 100 MG tablet Take 1.5 tablets by mouth Daily. 30 tablet 0 7/15/2025 Morning    theophylline (UNIPHYL) 400 MG 24 hr tablet Take 1 tablet by mouth Daily.  5 7/15/2025 Morning    verapamil ER (VERELAN) 240 MG 24 hr capsule Take 1 capsule by mouth every night at bedtime. 90 capsule 1 7/14/2025 Bedtime    warfarin (Coumadin) 5 MG tablet Take 5 mg by mouth daily or as otherwise directed. 90 tablet 1 7/14/2025 Evening    albuterol sulfate  (90 Base) MCG/ACT inhaler Inhale 2 puffs Every 6 (Six) Hours As Needed.   Unknown    azelastine (ASTELIN) 0.1 % nasal spray Administer 2 sprays into the nostril(s) as directed by provider 2 (Two) Times a Day.       digoxin (LANOXIN) 125 MCG tablet Take 2 tablets by mouth Daily.   5/1/2025    EPINEPHrine (EPIPEN) 0.3 MG/0.3ML solution auto-injector injection epinephrine 0.3 mg/0.3 mL injection, auto-injector       meloxicam (MOBIC) 7.5 MG  tablet Take 1 tablet by mouth Daily.       pantoprazole (PROTONIX) 40 MG EC tablet Take 1 tablet by mouth Daily. 90 tablet 2      Allergies:  Penicillins and Rivaroxaban    Objective     Vital Signs  Temp:  [97.1 °F (36.2 °C)-97.9 °F (36.6 °C)] 97.9 °F (36.6 °C)  Heart Rate:  [74-89] 74  Resp:  [14-20] 14  BP: (113-152)/() 113/78     Physical Exam:      General Appearance:    Alert, cooperative, in no acute distress   Head:    Normocephalic, without obvious abnormality, atraumatic   Eyes:            Lids and lashes normal, conjunctivae and sclerae normal, no   icterus, no pallor, corneas clear, PERRLA   Ears:    Ears appear intact with no abnormalities noted   Throat:   No oral lesions, no thrush, oral mucosa moist   Neck:   No adenopathy, supple, trachea midline, no thyromegaly, no   carotid bruit, no JVD   Lungs:     Clear to auscultation,respirations regular, even and                  unlabored    Heart:    Regular rhythm and normal rate, normal S1 and S2, no            murmur, no gallop, no rub, no click   Chest Wall:    No abnormalities observed   Abdomen:     Normal bowel sounds, no masses, no organomegaly, soft        non-tender, non-distended, no guarding, no rebound                tenderness   Extremities:   Moves all extremities well, no edema, no cyanosis, no             redness; did not ambulate at this time.   Pulses:   Pulses palpable and equal bilaterally   Skin:   No bleeding, bruising or rash   Lymph nodes:   No palpable adenopathy   Neurologic:   Cranial nerves 2 - 12 grossly intact, sensation intact, DTR       present and equal bilaterally       Results Review:     Imaging Results (Last 24 Hours)       Procedure Component Value Units Date/Time    XR Foot 3+ View Left [[id number]] Collected: 07/15/25 1417     Updated: 07/15/25 1432    Narrative:      XR FOOT 3+ VW LEFT, XR KNEE 3 VW LEFT, XR ANKLE 3+ VW LEFT    Date of Exam: 7/15/2025 1:53 PM EDT    Indication: pain in leg with weight  bearing    Comparison: None available.    Findings:  Evaluation of the left knee shows no evidence for acute fracture or acute alignment abnormality. Mild medial compartment joint space narrowing is noted. No underlying joint effusion. Vascular calcifications are present.    Evaluation of the left ankle demonstrates some lucency along the talus along its dorsal margin. Overlying cortex appears intact without definitive avulsed osseous injury noted. There is some adjacent soft tissue prominence and recommend correlation on   physical examination. Remaining osseous structures appear intact. Lateral soft tissue swelling is noted.    Evaluation of the left foot shows no evidence for acute fracture or acute alignment abnormality. There is narrowing at the first MTP articulation. No soft tissue abnormality.      Impression:      Impression:    1. No acute osseous injury to the left knee.    2. No definite or acute osseous injury in the left ankle or left foot. There is some lucency along the dorsal margin of the talus with some adjacent soft tissue prominence. This may be due to underlying subchondral cystic change however correlate on   physical examination if patient has any tenderness at this site. Repeat radiographs in 7 to 10 days could additionally be considered as fractures, bony resorption and/or sclerosis can become more radiographically apparent at that time.      Electronically Signed: Alayna Turner MD    7/15/2025 2:22 PM EDT    Workstation ID: [id number]    XR Knee 3 View Left [[id number]] Collected: 07/15/25 1417     Updated: 07/15/25 1424    Narrative:      XR FOOT 3+ VW LEFT, XR KNEE 3 VW LEFT, XR ANKLE 3+ VW LEFT    Date of Exam: 7/15/2025 1:53 PM EDT    Indication: pain in leg with weight bearing    Comparison: None available.    Findings:  Evaluation of the left knee shows no evidence for acute fracture or acute alignment abnormality. Mild medial compartment joint space narrowing is noted. No underlying  joint effusion. Vascular calcifications are present.    Evaluation of the left ankle demonstrates some lucency along the talus along its dorsal margin. Overlying cortex appears intact without definitive avulsed osseous injury noted. There is some adjacent soft tissue prominence and recommend correlation on   physical examination. Remaining osseous structures appear intact. Lateral soft tissue swelling is noted.    Evaluation of the left foot shows no evidence for acute fracture or acute alignment abnormality. There is narrowing at the first MTP articulation. No soft tissue abnormality.      Impression:      Impression:    1. No acute osseous injury to the left knee.    2. No definite or acute osseous injury in the left ankle or left foot. There is some lucency along the dorsal margin of the talus with some adjacent soft tissue prominence. This may be due to underlying subchondral cystic change however correlate on   physical examination if patient has any tenderness at this site. Repeat radiographs in 7 to 10 days could additionally be considered as fractures, bony resorption and/or sclerosis can become more radiographically apparent at that time.      Electronically Signed: Alayna Turner MD    7/15/2025 2:22 PM EDT    Workstation ID: [id number]    XR Ankle 3+ View Left [[id number]] Collected: 07/15/25 1417     Updated: 07/15/25 1424    Narrative:      XR FOOT 3+ VW LEFT, XR KNEE 3 VW LEFT, XR ANKLE 3+ VW LEFT    Date of Exam: 7/15/2025 1:53 PM EDT    Indication: pain in leg with weight bearing    Comparison: None available.    Findings:  Evaluation of the left knee shows no evidence for acute fracture or acute alignment abnormality. Mild medial compartment joint space narrowing is noted. No underlying joint effusion. Vascular calcifications are present.    Evaluation of the left ankle demonstrates some lucency along the talus along its dorsal margin. Overlying cortex appears intact without definitive avulsed  osseous injury noted. There is some adjacent soft tissue prominence and recommend correlation on   physical examination. Remaining osseous structures appear intact. Lateral soft tissue swelling is noted.    Evaluation of the left foot shows no evidence for acute fracture or acute alignment abnormality. There is narrowing at the first MTP articulation. No soft tissue abnormality.      Impression:      Impression:    1. No acute osseous injury to the left knee.    2. No definite or acute osseous injury in the left ankle or left foot. There is some lucency along the dorsal margin of the talus with some adjacent soft tissue prominence. This may be due to underlying subchondral cystic change however correlate on   physical examination if patient has any tenderness at this site. Repeat radiographs in 7 to 10 days could additionally be considered as fractures, bony resorption and/or sclerosis can become more radiographically apparent at that time.      Electronically Signed: Alayna Turner MD    7/15/2025 2:22 PM EDT    Workstation ID: [id number]             Lab Results (last 24 hours)       Procedure Component Value Units Date/Time    POC Glucose Once [[id number]]  (Abnormal) Collected: 07/15/25 2226    Specimen: Blood Updated: 07/15/25 2229     Glucose 63 mg/dL      Comment: Serial Number: [id number]:  [id number]       Protime-INR [[id number]]  (Abnormal) Collected: 07/15/25 1336    Specimen: Blood from Arm, Right Updated: 07/15/25 1416     Protime 17.3 Seconds      INR 1.42             I reviewed the patient's new clinical results.    Assessment & Plan       Left leg pain    Persistent atrial fibrillation    Essential hypertension    Mixed hyperlipidemia    Hypothyroidism    Obstructive sleep apnea syndrome    Hearing loss    CAD (coronary artery disease), native coronary artery    Meningioma    Unable to ambulate    Left leg pain   -xray negative for fracture, Duplex negative for DVT.     -check inflammatory  markers, cbc bmp   -PT consult   -consider ortho consult if not improved in am    Afib- pharmacy to dose warfarin, rate controlled.    DM- monitor for hypoglycemia.  Glyburide on hold.  Reported he took his 2nd shot of compounded semaglutide yesterday.    Continue home medications for chronic conditions  May use home CPAP    VTE: on Warfarin  GI: Protonix      CODE STATUS:  Code status (Patient has no pulse and is not breathing):  CPR (Attempt to Resuscitate)  Medical Interventions (Patient has pulse or is breathing):  Full Support  Level of Support Discussed with:  Patient    Admission Status:  I believe this patient meets observation status    Expected length of stay:  1 midnights or less    I discussed the patient's findings and my recommendations with patient.     JAZZ Zepeda  07/15/25  23:09 EDT

## 2025-07-16 NOTE — SIGNIFICANT NOTE
07/16/25 1115   OTHER   Discipline physical therapist   Rehab Time/Intention   Session Not Performed patient unavailable for evaluation;other (see comments)  (Pt with signifciant knee pain, CT (-), however MRI ordered due to severity. Per OT, pt and family agree to hold therapy until after cleared per MRI for weight-bearing. Will f/u this afternoon as able or tomorrow for evaluation.)   Therapy Assessment/Plan (PT)   Criteria for Skilled Interventions Met (PT) yes;meets criteria   Recommendation   PT - Next Appointment 07/17/25   Recommendation   OT - Next Appointment 07/17/25

## 2025-07-16 NOTE — PLAN OF CARE
Problem: Adult Inpatient Plan of Care  Goal: Plan of Care Review  Outcome: Progressing  Flowsheets (Taken 7/16/2025 2910)  Outcome Evaluation: Pt is alert and oriented. Scheduled for MRI this pm. Currently refusing falls risk measures at this time.Pt restng comfortably in bed, watching televison with call light in hand. No complaints at this time, plan of care ongoing, will continue to monitor.  Goal: Patient-Specific Goal (Individualized)  Outcome: Progressing  Goal: Absence of Hospital-Acquired Illness or Injury  Outcome: Progressing  Goal: Optimal Comfort and Wellbeing  Outcome: Progressing  Goal: Readiness for Transition of Care  Outcome: Progressing     Problem: Comorbidity Management  Goal: Maintenance of COPD Symptom Control  Outcome: Progressing  Goal: Blood Glucose Level Within Target Range  Outcome: Progressing  Goal: Maintenance of Heart Failure Symptom Control  Outcome: Progressing     Problem: Fall Injury Risk  Goal: Absence of Fall and Fall-Related Injury  Outcome: Progressing     Problem: Pain Acute  Goal: Optimal Pain Control and Function  Outcome: Progressing   Goal Outcome Evaluation:              Outcome Evaluation: Pt is alert and oriented. Scheduled for MRI this pm. Currently refusing falls risk measures at this time.Pt restng comfortably in bed, watching televison with call light in hand. No complaints at this time, plan of care ongoing, will continue to monitor.

## 2025-07-16 NOTE — PROGRESS NOTES
LOS: 0 days   Patient Care Team:  Uday Beltran MD as PCP - General (Family Medicine)  Silvano Gillis MD as Consulting Physician (Cardiology)    Subjective     Interval History: pt with h/o surgery lumbar spine,  bilateral neuropathy in both feet    Patient Complaints: pain in left leg is some better but still significant, weak    History taken from: patient    Review of Systems   Constitutional:  Positive for activity change and fatigue.   Respiratory: Negative.     Cardiovascular: Negative.    Gastrointestinal: Negative.    Musculoskeletal:  Positive for arthralgias, back pain and gait problem.   Neurological:  Positive for weakness and numbness.           Objective     Vital Signs  Temp:  [97.1 °F (36.2 °C)-98.5 °F (36.9 °C)] 98 °F (36.7 °C)  Heart Rate:  [69-89] 69  Resp:  [12-20] 17  BP: (113-152)/() 120/67    Physical Exam:     General Appearance:    Alert, cooperative, in no acute distress   Head:    Normocephalic, without obvious abnormality, atraumatic   Eyes:            Lids and lashes normal, conjunctivae and sclerae normal, no   icterus, no pallor, corneas clear, PERRLA   Ears:    Ears appear intact with no abnormalities noted   Throat:   No oral lesions, no thrush, oral mucosa moist   Neck:   No adenopathy, supple, trachea midline, no thyromegaly, no   carotid bruit, no JVD   Lungs:     Clear to auscultation,respirations regular, even and                  unlabored    Heart:    Regular rhythm and normal rate, normal S1 and S2, no            murmur, no gallop, no rub, no click   Chest Wall:    No abnormalities observed   Abdomen:     Normal bowel sounds, no masses, no organomegaly, soft        non-tender, non-distended, no guarding, no rebound                tenderness   Extremities:   Moves all extremities well, no edema, no cyanosis, no             redness   Pulses:   Pulses palpable and equal bilaterally   Skin:   No bleeding, bruising or rash   Lymph nodes:   No palpable  adenopathy   Neurologic:   Cranial nerves 2 - 12 grossly intact, sensation intact, DTR       present and equal bilaterally        Results Review:    Lab Results (last 24 hours)       Procedure Component Value Units Date/Time    POC Glucose Once [[id number]]  (Normal) Collected: 07/16/25 0747    Specimen: Blood Updated: 07/16/25 0750     Glucose 96 mg/dL      Comment: Serial Number: [id number]:  [id number]       Basic Metabolic Panel [[id number]]  (Abnormal) Collected: 07/16/25 0401    Specimen: Blood from Arm, Right Updated: 07/16/25 0444     Glucose 93 mg/dL      BUN 22.1 mg/dL      Creatinine 1.27 mg/dL      Sodium 142 mmol/L      Potassium 3.8 mmol/L      Chloride 106 mmol/L      CO2 24.2 mmol/L      Calcium 9.0 mg/dL      BUN/Creatinine Ratio 17.4     Anion Gap 11.8 mmol/L      eGFR 58.2 mL/min/1.73     Narrative:      GFR Categories in Chronic Kidney Disease (CKD)              GFR Category          GFR (mL/min/1.73)    Interpretation  G1                    90 or greater        Normal or high (1)  G2                    60-89                Mild decrease (1)  G3a                   45-59                Mild to moderate decrease  G3b                   30-44                Moderate to severe decrease  G4                    15-29                Severe decrease  G5                    14 or less           Kidney failure    (1)In the absence of evidence of kidney disease, neither GFR category G1 or G2 fulfill the criteria for CKD.    eGFR calculation 2021 CKD-EPI creatinine equation, which does not include race as a factor    C-reactive Protein [[id number]]  (Normal) Collected: 07/16/25 0401    Specimen: Blood from Arm, Right Updated: 07/16/25 0444     C-Reactive Protein <0.30 mg/dL     Uric Acid [[id number]]  (Normal) Collected: 07/16/25 0401    Specimen: Blood from Arm, Right Updated: 07/16/25 0444     Uric Acid 6.4 mg/dL     TSH [[id number]]  (Normal) Collected: 07/16/25 0401    Specimen: Blood from Arm, Right  Updated: 07/16/25 0444     TSH 0.388 uIU/mL     Protime-INR [[id number]]  (Abnormal) Collected: 07/16/25 0401    Specimen: Blood from Arm, Right Updated: 07/16/25 0435     Protime 17.5 Seconds      INR 1.44    Sedimentation Rate [[id number]]  (Normal) Collected: 07/16/25 0401    Specimen: Blood from Arm, Right Updated: 07/16/25 0433     Sed Rate 9 mm/hr     CBC & Differential [[id number]]  (Abnormal) Collected: 07/16/25 0401    Specimen: Blood from Arm, Right Updated: 07/16/25 0414    Narrative:      The following orders were created for panel order CBC & Differential.  Procedure                               Abnormality         Status                     ---------                               -----------         ------                     CBC Auto Differential[[id number]]        Abnormal            Final result                 Please view results for these tests on the individual orders.    CBC Auto Differential [[id number]]  (Abnormal) Collected: 07/16/25 0401    Specimen: Blood from Arm, Right Updated: 07/16/25 0414     WBC 6.20 10*3/mm3      RBC 5.83 10*6/mm3      Hemoglobin 15.3 g/dL      Hematocrit 48.9 %      MCV 83.9 fL      MCH 26.2 pg      MCHC 31.3 g/dL      RDW 14.6 %      RDW-SD 44.1 fl      MPV 9.2 fL      Platelets 146 10*3/mm3      Neutrophil % 56.0 %      Lymphocyte % 29.8 %      Monocyte % 9.8 %      Eosinophil % 3.5 %      Basophil % 0.6 %      Immature Grans % 0.3 %      Neutrophils, Absolute 3.46 10*3/mm3      Lymphocytes, Absolute 1.85 10*3/mm3      Monocytes, Absolute 0.61 10*3/mm3      Eosinophils, Absolute 0.22 10*3/mm3      Basophils, Absolute 0.04 10*3/mm3      Immature Grans, Absolute 0.02 10*3/mm3      nRBC 0.0 /100 WBC     POC Glucose Once [[id number]]  (Abnormal) Collected: 07/15/25 2330    Specimen: Blood Updated: 07/15/25 2333     Glucose 120 mg/dL      Comment: Serial Number: [id number]:  [id number]       POC Glucose Once [[id number]]  (Abnormal) Collected: 07/15/25 6692     Specimen: Blood Updated: 07/15/25 2229     Glucose 63 mg/dL      Comment: Serial Number: [id number]:  [id number]       Protime-INR [[id number]]  (Abnormal) Collected: 07/15/25 1336    Specimen: Blood from Arm, Right Updated: 07/15/25 1416     Protime 17.3 Seconds      INR 1.42             Imaging Results (Last 24 Hours)       Procedure Component Value Units Date/Time    XR Foot 3+ View Left [[id number]] Collected: 07/15/25 1417     Updated: 07/15/25 1432    Narrative:      XR FOOT 3+ VW LEFT, XR KNEE 3 VW LEFT, XR ANKLE 3+ VW LEFT    Date of Exam: 7/15/2025 1:53 PM EDT    Indication: pain in leg with weight bearing    Comparison: None available.    Findings:  Evaluation of the left knee shows no evidence for acute fracture or acute alignment abnormality. Mild medial compartment joint space narrowing is noted. No underlying joint effusion. Vascular calcifications are present.    Evaluation of the left ankle demonstrates some lucency along the talus along its dorsal margin. Overlying cortex appears intact without definitive avulsed osseous injury noted. There is some adjacent soft tissue prominence and recommend correlation on   physical examination. Remaining osseous structures appear intact. Lateral soft tissue swelling is noted.    Evaluation of the left foot shows no evidence for acute fracture or acute alignment abnormality. There is narrowing at the first MTP articulation. No soft tissue abnormality.      Impression:      Impression:    1. No acute osseous injury to the left knee.    2. No definite or acute osseous injury in the left ankle or left foot. There is some lucency along the dorsal margin of the talus with some adjacent soft tissue prominence. This may be due to underlying subchondral cystic change however correlate on   physical examination if patient has any tenderness at this site. Repeat radiographs in 7 to 10 days could additionally be considered as fractures, bony resorption and/or sclerosis  can become more radiographically apparent at that time.      Electronically Signed: Alayna Turner MD    7/15/2025 2:22 PM EDT    Workstation ID: [id number]    XR Knee 3 View Left [[id number]] Collected: 07/15/25 1417     Updated: 07/15/25 1424    Narrative:      XR FOOT 3+ VW LEFT, XR KNEE 3 VW LEFT, XR ANKLE 3+ VW LEFT    Date of Exam: 7/15/2025 1:53 PM EDT    Indication: pain in leg with weight bearing    Comparison: None available.    Findings:  Evaluation of the left knee shows no evidence for acute fracture or acute alignment abnormality. Mild medial compartment joint space narrowing is noted. No underlying joint effusion. Vascular calcifications are present.    Evaluation of the left ankle demonstrates some lucency along the talus along its dorsal margin. Overlying cortex appears intact without definitive avulsed osseous injury noted. There is some adjacent soft tissue prominence and recommend correlation on   physical examination. Remaining osseous structures appear intact. Lateral soft tissue swelling is noted.    Evaluation of the left foot shows no evidence for acute fracture or acute alignment abnormality. There is narrowing at the first MTP articulation. No soft tissue abnormality.      Impression:      Impression:    1. No acute osseous injury to the left knee.    2. No definite or acute osseous injury in the left ankle or left foot. There is some lucency along the dorsal margin of the talus with some adjacent soft tissue prominence. This may be due to underlying subchondral cystic change however correlate on   physical examination if patient has any tenderness at this site. Repeat radiographs in 7 to 10 days could additionally be considered as fractures, bony resorption and/or sclerosis can become more radiographically apparent at that time.      Electronically Signed: Alayna Turner MD    7/15/2025 2:22 PM EDT    Workstation ID: [id number]    XR Ankle 3+ View Left [[id number]] Collected: 07/15/25 1417      Updated: 07/15/25 1424    Narrative:      XR FOOT 3+ VW LEFT, XR KNEE 3 VW LEFT, XR ANKLE 3+ VW LEFT    Date of Exam: 7/15/2025 1:53 PM EDT    Indication: pain in leg with weight bearing    Comparison: None available.    Findings:  Evaluation of the left knee shows no evidence for acute fracture or acute alignment abnormality. Mild medial compartment joint space narrowing is noted. No underlying joint effusion. Vascular calcifications are present.    Evaluation of the left ankle demonstrates some lucency along the talus along its dorsal margin. Overlying cortex appears intact without definitive avulsed osseous injury noted. There is some adjacent soft tissue prominence and recommend correlation on   physical examination. Remaining osseous structures appear intact. Lateral soft tissue swelling is noted.    Evaluation of the left foot shows no evidence for acute fracture or acute alignment abnormality. There is narrowing at the first MTP articulation. No soft tissue abnormality.      Impression:      Impression:    1. No acute osseous injury to the left knee.    2. No definite or acute osseous injury in the left ankle or left foot. There is some lucency along the dorsal margin of the talus with some adjacent soft tissue prominence. This may be due to underlying subchondral cystic change however correlate on   physical examination if patient has any tenderness at this site. Repeat radiographs in 7 to 10 days could additionally be considered as fractures, bony resorption and/or sclerosis can become more radiographically apparent at that time.      Electronically Signed: Alayna Turner MD    7/15/2025 2:22 PM EDT    Workstation ID: [id number]                 I reviewed the patient's new clinical results.    Medication Review:   Scheduled Meds:aspirin, 81 mg, Oral, Daily  atorvastatin, 20 mg, Oral, Nightly  azelastine, 2 spray, Each Nare, BID  baclofen, 10 mg, Oral, BID  budesonide-formoterol, 2 puff, Inhalation, BID  - RT  digoxin, 250 mcg, Oral, Daily  fluorometholone, 1 drop, Both Eyes, Daily  gabapentin, 600 mg, Oral, TID  hydroCHLOROthiazide, 12.5 mg, Oral, Daily  insulin lispro, 2-7 Units, Subcutaneous, TID With Meals  levothyroxine, 175 mcg, Oral, Q AM  montelukast, 10 mg, Oral, Nightly  pantoprazole, 40 mg, Oral, QAM AC  sertraline, 150 mg, Oral, Daily  sodium chloride, 10 mL, Intravenous, Q12H  tamsulosin, 0.4 mg, Oral, Nightly  theophylline, 400 mg, Oral, Q24H  verapamil SR, 240 mg, Oral, Nightly      Continuous Infusions:Pharmacy to dose warfarin,       PRN Meds:.  acetaminophen    albuterol    senna-docusate sodium **AND** polyethylene glycol **AND** bisacodyl **AND** bisacodyl    Calcium Replacement - Follow Nurse / BPA Driven Protocol    dextrose    dextrose    glucagon (human recombinant)    hydrALAZINE    Magnesium Standard Dose Replacement - Follow Nurse / BPA Driven Protocol    nitroglycerin    ondansetron ODT **OR** ondansetron    Pharmacy to dose warfarin    Phosphorus Replacement - Follow Nurse / BPA Driven Protocol    Potassium Replacement - Follow Nurse / BPA Driven Protocol    [COMPLETED] Insert Peripheral IV **AND** sodium chloride    sodium chloride    sodium chloride     Assessment & Plan       Left leg pain    Persistent atrial fibrillation    Essential hypertension    Mixed hyperlipidemia    Hypothyroidism    Obstructive sleep apnea syndrome    Hearing loss    CAD (coronary artery disease), native coronary artery    Meningioma    Unable to ambulate    Left leg pain              -xray negative for fracture, Duplex negative for DVT.                -labs unremarkable              -PT consult              -check MRI of left knee and lumbar spine - may need ortho vs neurosurgery consult depending on results     Afib- pharmacy to dose warfarin, rate controlled.     DM- monitor for hypoglycemia.  Glyburide on hold.  Reported he took his 2nd shot of compounded semaglutide yesterday.     Continue home  medications for chronic conditions  May use home CPAP     VTE: on Warfarin  GI: Protonix     CODE STATUS:  Code status (Patient has no pulse and is not breathing):full  Medical Interventions (Patient has pulse or is breathing):full support  Level of Support Discussed with :patient     Admission status:  I believe this patient meets observation status  Expected length of stay:  2 midnights or less  I discussed the patient's findings and my recommendations with the patient.    Plan for disposition:home when able    Elizabeth Mack MD  07/16/25  10:23 EDT

## 2025-07-17 ENCOUNTER — READMISSION MANAGEMENT (OUTPATIENT)
Dept: CALL CENTER | Facility: HOSPITAL | Age: 80
End: 2025-07-17
Payer: MEDICARE

## 2025-07-17 VITALS
WEIGHT: 259.92 LBS | HEART RATE: 74 BPM | BODY MASS INDEX: 35.21 KG/M2 | TEMPERATURE: 97.4 F | SYSTOLIC BLOOD PRESSURE: 116 MMHG | RESPIRATION RATE: 15 BRPM | OXYGEN SATURATION: 96 % | HEIGHT: 72 IN | DIASTOLIC BLOOD PRESSURE: 71 MMHG

## 2025-07-17 LAB
ANION GAP SERPL CALCULATED.3IONS-SCNC: 13.2 MMOL/L (ref 5–15)
BASOPHILS # BLD AUTO: 0.05 10*3/MM3 (ref 0–0.2)
BASOPHILS NFR BLD AUTO: 0.7 % (ref 0–1.5)
BUN SERPL-MCNC: 22.6 MG/DL (ref 8–23)
BUN/CREAT SERPL: 18.8 (ref 7–25)
CALCIUM SPEC-SCNC: 9.5 MG/DL (ref 8.6–10.5)
CHLORIDE SERPL-SCNC: 102 MMOL/L (ref 98–107)
CO2 SERPL-SCNC: 23.8 MMOL/L (ref 22–29)
CREAT SERPL-MCNC: 1.2 MG/DL (ref 0.76–1.27)
DEPRECATED RDW RBC AUTO: 43.5 FL (ref 37–54)
EGFRCR SERPLBLD CKD-EPI 2021: 62.3 ML/MIN/1.73
EOSINOPHIL # BLD AUTO: 0.29 10*3/MM3 (ref 0–0.4)
EOSINOPHIL NFR BLD AUTO: 4.2 % (ref 0.3–6.2)
ERYTHROCYTE [DISTWIDTH] IN BLOOD BY AUTOMATED COUNT: 14.6 % (ref 12.3–15.4)
GLUCOSE BLDC GLUCOMTR-MCNC: 119 MG/DL (ref 70–105)
GLUCOSE BLDC GLUCOMTR-MCNC: 94 MG/DL (ref 70–105)
GLUCOSE SERPL-MCNC: 95 MG/DL (ref 65–99)
HCT VFR BLD AUTO: 51.1 % (ref 37.5–51)
HGB BLD-MCNC: 16.2 G/DL (ref 13–17.7)
IMM GRANULOCYTES # BLD AUTO: 0.03 10*3/MM3 (ref 0–0.05)
IMM GRANULOCYTES NFR BLD AUTO: 0.4 % (ref 0–0.5)
INR PPP: 1.49 (ref 2–3)
LYMPHOCYTES # BLD AUTO: 2.28 10*3/MM3 (ref 0.7–3.1)
LYMPHOCYTES NFR BLD AUTO: 32.8 % (ref 19.6–45.3)
MCH RBC QN AUTO: 26.2 PG (ref 26.6–33)
MCHC RBC AUTO-ENTMCNC: 31.7 G/DL (ref 31.5–35.7)
MCV RBC AUTO: 82.6 FL (ref 79–97)
MONOCYTES # BLD AUTO: 0.64 10*3/MM3 (ref 0.1–0.9)
MONOCYTES NFR BLD AUTO: 9.2 % (ref 5–12)
NEUTROPHILS NFR BLD AUTO: 3.67 10*3/MM3 (ref 1.7–7)
NEUTROPHILS NFR BLD AUTO: 52.7 % (ref 42.7–76)
NRBC BLD AUTO-RTO: 0 /100 WBC (ref 0–0.2)
PLATELET # BLD AUTO: 159 10*3/MM3 (ref 140–450)
PMV BLD AUTO: 9.2 FL (ref 6–12)
POTASSIUM SERPL-SCNC: 3.8 MMOL/L (ref 3.5–5.2)
PROTHROMBIN TIME: 18 SECONDS (ref 19.4–28.5)
RBC # BLD AUTO: 6.19 10*6/MM3 (ref 4.14–5.8)
SODIUM SERPL-SCNC: 139 MMOL/L (ref 136–145)
WBC NRBC COR # BLD AUTO: 6.96 10*3/MM3 (ref 3.4–10.8)

## 2025-07-17 PROCEDURE — 85025 COMPLETE CBC W/AUTO DIFF WBC: CPT

## 2025-07-17 PROCEDURE — G0378 HOSPITAL OBSERVATION PER HR: HCPCS

## 2025-07-17 PROCEDURE — 80048 BASIC METABOLIC PNL TOTAL CA: CPT

## 2025-07-17 PROCEDURE — 97161 PT EVAL LOW COMPLEX 20 MIN: CPT

## 2025-07-17 PROCEDURE — 82948 REAGENT STRIP/BLOOD GLUCOSE: CPT

## 2025-07-17 PROCEDURE — 85610 PROTHROMBIN TIME: CPT

## 2025-07-17 RX ORDER — WARFARIN SODIUM 5 MG/1
10 TABLET ORAL
Status: DISCONTINUED | OUTPATIENT
Start: 2025-07-17 | End: 2025-07-17 | Stop reason: HOSPADM

## 2025-07-17 RX ORDER — HYDROCODONE BITARTRATE AND ACETAMINOPHEN 7.5; 325 MG/1; MG/1
1 TABLET ORAL EVERY 6 HOURS PRN
Refills: 0 | Status: DISCONTINUED | OUTPATIENT
Start: 2025-07-17 | End: 2025-07-17 | Stop reason: HOSPADM

## 2025-07-17 RX ORDER — HYDROCODONE BITARTRATE AND ACETAMINOPHEN 7.5; 325 MG/1; MG/1
1 TABLET ORAL EVERY 6 HOURS PRN
Qty: 20 TABLET | Refills: 0 | Status: SHIPPED | OUTPATIENT
Start: 2025-07-17 | End: 2025-07-22

## 2025-07-17 RX ADMIN — SERTRALINE HYDROCHLORIDE 150 MG: 100 TABLET, FILM COATED ORAL at 09:06

## 2025-07-17 RX ADMIN — LEVOTHYROXINE SODIUM 175 MCG: 175 TABLET ORAL at 05:09

## 2025-07-17 RX ADMIN — GABAPENTIN 600 MG: 300 CAPSULE ORAL at 09:06

## 2025-07-17 RX ADMIN — FLUOROMETHOLONE 1 DROP: 1 SUSPENSION/ DROPS OPHTHALMIC at 09:08

## 2025-07-17 RX ADMIN — Medication 10 ML: at 09:07

## 2025-07-17 RX ADMIN — ASPIRIN 81 MG CHEWABLE TABLET 81 MG: 81 TABLET CHEWABLE at 09:07

## 2025-07-17 RX ADMIN — AZELASTINE HYDROCHLORIDE 2 SPRAY: 137 SPRAY, METERED NASAL at 09:08

## 2025-07-17 RX ADMIN — PANTOPRAZOLE SODIUM 40 MG: 40 TABLET, DELAYED RELEASE ORAL at 09:06

## 2025-07-17 RX ADMIN — HYDROCHLOROTHIAZIDE 12.5 MG: 12.5 TABLET ORAL at 09:06

## 2025-07-17 RX ADMIN — THEOPHYLLINE ANHYDROUS 400 MG: 200 CAPSULE, EXTENDED RELEASE ORAL at 09:06

## 2025-07-17 RX ADMIN — BACLOFEN 10 MG: 10 TABLET ORAL at 09:06

## 2025-07-17 NOTE — PROGRESS NOTES
"Pharmacy dosing service  Anticoagulant  Warfarin     Subjective:    Efe Malloy is a 79 y.o.male being continued on warfarin for Atrial Fibrillation / Flutter.    INR Goal: 2 - 3  Home medication?: warfarin 5 mg PO daily  Bridge Therapy Present?:  No  Interacting Medications Evaluation (New/Present/Discontinued): pravastatin (can increase INR, home med changed to atorvastatin inpatient)  Additional Contributing Factors: n/a      Assessment/Plan:    Pt was seen in [facility] 7/15 and found to have INR below goal.  Documented plan was to increase warfarin to 10 mg x1 on 7/15, then resume 5 mg daily thereafter, since no obvious reason identified for subtherapeutic INR.     INR today remains low at 1.49 after two doses of warfarin during this admission. Will boost patient dose again to 10 mg of warfarin today.    Continue to monitor and adjust based on INR.         Date 7/15 7/16 7/17         INR 1.4 1.44 1.49         Dose 10 mg 5 mg 10 mg           Diet Order   Procedures    Diet: Cardiac, Diabetic; Healthy Heart (2-3 Na+); Consistent Carbohydrate; Fluid Consistency: Thin (IDDSI 0)      Objective:  [Ht: 182.9 cm (72.01\"); Wt: 118 kg (259 lb 14.8 oz); BMI: Body mass index is 35.24 kg/m².]    Lab Results   Component Value Date    ALBUMIN 4.2 04/26/2025     Lab Results   Component Value Date    INR 1.49 (L) 07/17/2025    INR 1.44 (L) 07/16/2025    INR 1.42 (L) 07/15/2025    PROTIME 18.0 (L) 07/17/2025    PROTIME 17.5 (L) 07/16/2025    PROTIME 17.3 (L) 07/15/2025     Lab Results   Component Value Date    HGB 16.2 07/17/2025    HGB 15.3 07/16/2025    HGB 15.3 06/28/2025     Lab Results   Component Value Date    HCT 51.1 (H) 07/17/2025    HCT 48.9 07/16/2025    HCT 45 06/28/2025       Almita Bruner, Jessy  07/17/25 11:23 EDT   "

## 2025-07-17 NOTE — PLAN OF CARE
Problem: Adult Inpatient Plan of Care  Goal: Absence of Hospital-Acquired Illness or Injury  Intervention: Identify and Manage Fall Risk  Recent Flowsheet Documentation  Taken 7/17/2025 1400 by Kady Hollis RN  Safety Promotion/Fall Prevention: safety round/check completed  Taken 7/17/2025 1200 by Kady Hollis RN  Safety Promotion/Fall Prevention: safety round/check completed  Taken 7/17/2025 1000 by Kady Hollis RN  Safety Promotion/Fall Prevention: safety round/check completed  Intervention: Prevent Skin Injury  Recent Flowsheet Documentation  Taken 7/17/2025 1200 by Kady Hollis RN  Body Position: position changed independently  Taken 7/17/2025 0828 by Kady Hollis RN  Body Position: position changed independently     Problem: Fall Injury Risk  Goal: Absence of Fall and Fall-Related Injury  Outcome: Progressing  Intervention: Promote Injury-Free Environment  Recent Flowsheet Documentation  Taken 7/17/2025 1400 by Kady Hollis RN  Safety Promotion/Fall Prevention: safety round/check completed  Taken 7/17/2025 1200 by Kady Hollis RN  Safety Promotion/Fall Prevention: safety round/check completed  Taken 7/17/2025 1000 by Kady Hollis RN  Safety Promotion/Fall Prevention: safety round/check completed   Goal Outcome Evaluation:         Patient to discharge home

## 2025-07-17 NOTE — DISCHARGE SUMMARY
Date of Discharge:  7/17/2025    Discharge Diagnosis:   Left leg pain  Persistent atrial fibrillation  Essential hypertension  Mixed hyperlipidemia  Hypothyroidism  Obstructive sleep apnea syndrome  Hearing loss  CAD (coronary artery disease), native coronary artery  Meningioma  Unable to ambulate    Presenting Problem/History of Present Illness  Active Hospital Problems    Diagnosis  POA    **Left leg pain [M79.605]  Yes    Meningioma [D32.9]  Yes    Unable to ambulate [R26.2]  Unknown    CAD (coronary artery disease), native coronary artery [I25.10]  Yes    Mixed hyperlipidemia [E78.2]  Yes    Obstructive sleep apnea syndrome [G47.33]  Yes    Hypothyroidism [E03.9]  Yes    Essential hypertension [I10]  Yes    Persistent atrial fibrillation [I48.19]  Yes    Hearing loss [H91.90]  Yes      Resolved Hospital Problems   No resolved problems to display.          Hospital Course    Patient is a 79 y.o. male presented with pmh of afib, DM, REGLA,, hx of cva, stable meningioma followed by [facility], who presents with left leg pain and difficulty walking which began yesterday. He originally noted difficulty in the shower when lifting his leg to wash his foot.  Reported that the pain intensified over the past 2 days, unrelieved by tylenol. Patient had a heart cath 2 weeks ago and had an incident of hypoglycemia not long after. He was found down in the pantry and wife couldn't get the door open. He states he felt like he did pass out and didn't know how he got to the floor, so could have likely sustained an injury at that time. However, he presented when pain got to be 20/20 during one episode. MRI of knee with noted he has an appointment with Dr Cervantes tomorrow for further evaluation. He was seen by PT and they recommend outpatient therapy.            Procedures Performed         Consults:   Consults       Date and Time Order Name Status Description    7/16/2025 10:14 PM Inpatient Orthopedic Surgery Consult               Pertinent Test Results:    Lab Results (most recent)       Procedure Component Value Units Date/Time    POC Glucose Once [[id number]]  (Normal) Collected: 07/17/25 1149    Specimen: Blood Updated: 07/17/25 1151     Glucose 94 mg/dL      Comment: Serial Number: [id number]:  [id number]       POC Glucose Once [[id number]]  (Abnormal) Collected: 07/17/25 0811    Specimen: Blood Updated: 07/17/25 0813     Glucose 119 mg/dL      Comment: Serial Number: [id number]:  [id number]       Basic Metabolic Panel [[id number]]  (Normal) Collected: 07/17/25 0449    Specimen: Blood from Arm, Left Updated: 07/17/25 0536     Glucose 95 mg/dL      BUN 22.6 mg/dL      Creatinine 1.20 mg/dL      Sodium 139 mmol/L      Potassium 3.8 mmol/L      Chloride 102 mmol/L      CO2 23.8 mmol/L      Calcium 9.5 mg/dL      BUN/Creatinine Ratio 18.8     Anion Gap 13.2 mmol/L      eGFR 62.3 mL/min/1.73     Narrative:      GFR Categories in Chronic Kidney Disease (CKD)              GFR Category          GFR (mL/min/1.73)    Interpretation  G1                    90 or greater        Normal or high (1)  G2                    60-89                Mild decrease (1)  G3a                   45-59                Mild to moderate decrease  G3b                   30-44                Moderate to severe decrease  G4                    15-29                Severe decrease  G5                    14 or less           Kidney failure    (1)In the absence of evidence of kidney disease, neither GFR category G1 or G2 fulfill the criteria for CKD.    eGFR calculation 2021 CKD-EPI creatinine equation, which does not include race as a factor    Protime-INR [[id number]]  (Abnormal) Collected: 07/17/25 0449    Specimen: Blood from Arm, Left Updated: 07/17/25 0511     Protime 18.0 Seconds      INR 1.49    CBC & Differential [[id number]]  (Abnormal) Collected: 07/17/25 0449    Specimen: Blood from Arm, Left Updated: 07/17/25 0459    Narrative:      The following  orders were created for panel order CBC & Differential.  Procedure                               Abnormality         Status                     ---------                               -----------         ------                     CBC Auto Differential[[id number]]        Abnormal            Final result                 Please view results for these tests on the individual orders.    CBC Auto Differential [[id number]]  (Abnormal) Collected: 07/17/25 0449    Specimen: Blood from Arm, Left Updated: 07/17/25 0459     WBC 6.96 10*3/mm3      RBC 6.19 10*6/mm3      Hemoglobin 16.2 g/dL      Hematocrit 51.1 %      MCV 82.6 fL      MCH 26.2 pg      MCHC 31.7 g/dL      RDW 14.6 %      RDW-SD 43.5 fl      MPV 9.2 fL      Platelets 159 10*3/mm3      Neutrophil % 52.7 %      Lymphocyte % 32.8 %      Monocyte % 9.2 %      Eosinophil % 4.2 %      Basophil % 0.7 %      Immature Grans % 0.4 %      Neutrophils, Absolute 3.67 10*3/mm3      Lymphocytes, Absolute 2.28 10*3/mm3      Monocytes, Absolute 0.64 10*3/mm3      Eosinophils, Absolute 0.29 10*3/mm3      Basophils, Absolute 0.05 10*3/mm3      Immature Grans, Absolute 0.03 10*3/mm3      nRBC 0.0 /100 WBC     Basic Metabolic Panel [[id number]]  (Abnormal) Collected: 07/16/25 0401    Specimen: Blood from Arm, Right Updated: 07/16/25 0444     Glucose 93 mg/dL      BUN 22.1 mg/dL      Creatinine 1.27 mg/dL      Sodium 142 mmol/L      Potassium 3.8 mmol/L      Chloride 106 mmol/L      CO2 24.2 mmol/L      Calcium 9.0 mg/dL      BUN/Creatinine Ratio 17.4     Anion Gap 11.8 mmol/L      eGFR 58.2 mL/min/1.73     Narrative:      GFR Categories in Chronic Kidney Disease (CKD)              GFR Category          GFR (mL/min/1.73)    Interpretation  G1                    90 or greater        Normal or high (1)  G2                    60-89                Mild decrease (1)  G3a                   45-59                Mild to moderate decrease  G3b                   30-44                Moderate to  severe decrease  G4                    15-29                Severe decrease  G5                    14 or less           Kidney failure    (1)In the absence of evidence of kidney disease, neither GFR category G1 or G2 fulfill the criteria for CKD.    eGFR calculation 2021 CKD-EPI creatinine equation, which does not include race as a factor    C-reactive Protein [[id number]]  (Normal) Collected: 07/16/25 0401    Specimen: Blood from Arm, Right Updated: 07/16/25 0444     C-Reactive Protein <0.30 mg/dL     Uric Acid [[id number]]  (Normal) Collected: 07/16/25 0401    Specimen: Blood from Arm, Right Updated: 07/16/25 0444     Uric Acid 6.4 mg/dL     TSH [[id number]]  (Normal) Collected: 07/16/25 0401    Specimen: Blood from Arm, Right Updated: 07/16/25 0444     TSH 0.388 uIU/mL     Protime-INR [[id number]]  (Abnormal) Collected: 07/16/25 0401    Specimen: Blood from Arm, Right Updated: 07/16/25 0435     Protime 17.5 Seconds      INR 1.44    Sedimentation Rate [[id number]]  (Normal) Collected: 07/16/25 0401    Specimen: Blood from Arm, Right Updated: 07/16/25 0433     Sed Rate 9 mm/hr     CBC & Differential [[id number]]  (Abnormal) Collected: 07/16/25 0401    Specimen: Blood from Arm, Right Updated: 07/16/25 0414    Narrative:      The following orders were created for panel order CBC & Differential.  Procedure                               Abnormality         Status                     ---------                               -----------         ------                     CBC Auto Differential[[id number]]        Abnormal            Final result                 Please view results for these tests on the individual orders.    CBC Auto Differential [[id number]]  (Abnormal) Collected: 07/16/25 0401    Specimen: Blood from Arm, Right Updated: 07/16/25 0414     WBC 6.20 10*3/mm3      RBC 5.83 10*6/mm3      Hemoglobin 15.3 g/dL      Hematocrit 48.9 %      MCV 83.9 fL      MCH 26.2 pg      MCHC 31.3 g/dL      RDW 14.6 %      RDW-SD 44.1  fl      MPV 9.2 fL      Platelets 146 10*3/mm3      Neutrophil % 56.0 %      Lymphocyte % 29.8 %      Monocyte % 9.8 %      Eosinophil % 3.5 %      Basophil % 0.6 %      Immature Grans % 0.3 %      Neutrophils, Absolute 3.46 10*3/mm3      Lymphocytes, Absolute 1.85 10*3/mm3      Monocytes, Absolute 0.61 10*3/mm3      Eosinophils, Absolute 0.22 10*3/mm3      Basophils, Absolute 0.04 10*3/mm3      Immature Grans, Absolute 0.02 10*3/mm3      nRBC 0.0 /100 WBC              Results for orders placed during the hospital encounter of 05/06/25    Adult Transthoracic Echo Complete W/ Color, Spectral and Contrast if Necessary Per Protocol 05/06/2025 10:00 AM    Interpretation Summary    Left ventricular systolic function is normal. Calculated left ventricular EF = 64%    The left ventricular cavity is mildly dilated.  LVEDD 6.1 cm.    Left ventricular wall thickness is consistent with mild concentric hypertrophy.  IVS 1.2 cm.    The right ventricular cavity is mildly dilated.  There is normal RV systolic function.    Aortic valve is moderately calcified but no significant stenosis or regurgitation noted.    Moderate mitral valve regurgitation is present.    The left atrial cavity is severely dilated.    Estimated right ventricular systolic pressure from tricuspid regurgitation is mildly elevated (35-45 mmHg).    Electronically signed by Brendan Ross MD, 05/06/25, 10:00 AM EDT.       Condition on Discharge:  Stable    Vital Signs  Temp:  [97.1 °F (36.2 °C)-98.4 °F (36.9 °C)] 97.4 °F (36.3 °C)  Heart Rate:  [73-87] 74  Resp:  [8-19] 15  BP: ()/() 116/71      Physical Exam  Vitals reviewed.   Constitutional:       Appearance: He is not ill-appearing.   HENT:      Head: Normocephalic and atraumatic.      Right Ear: External ear normal.      Left Ear: External ear normal.      Nose: Nose normal.      Mouth/Throat:      Mouth: Mucous membranes are moist.   Eyes:      General:         Right eye:  No discharge.         Left eye: No discharge.   Cardiovascular:      Rate and Rhythm: Normal rate and regular rhythm.      Pulses: Normal pulses.      Heart sounds: Normal heart sounds.   Pulmonary:      Effort: Pulmonary effort is normal.      Breath sounds: Normal breath sounds.   Abdominal:      General: Bowel sounds are normal.      Palpations: Abdomen is soft.   Musculoskeletal:         General: Normal range of motion.      Cervical back: Normal range of motion.   Skin:     General: Skin is warm.   Neurological:      Mental Status: He is alert and oriented to person, place, and time.   Psychiatric:         Behavior: Behavior normal.              Discharge Disposition  Home or Self Care    Discharge Medications     Discharge Medications        New Medications        Instructions Start Date   Diclofenac Sodium 1 % gel gel  Commonly known as: VOLTAREN   4 g, Topical, 4 Times Daily      HYDROcodone-acetaminophen 7.5-325 MG per tablet  Commonly known as: NORCO   1 tablet, Oral, Every 6 Hours PRN             Continue These Medications        Instructions Start Date   albuterol sulfate  (90 Base) MCG/ACT inhaler  Commonly known as: PROVENTIL HFA;VENTOLIN HFA;PROAIR HFA   2 puffs, Every 6 Hours PRN      alfuzosin 10 MG 24 hr tablet  Commonly known as: UROXATRAL   10 mg, 2 Times Daily      azelastine 0.1 % nasal spray  Commonly known as: ASTELIN   2 sprays, 2 Times Daily      baclofen 10 MG tablet  Commonly known as: LIORESAL   10 mg, 2 Times Daily      digoxin 125 MCG tablet  Commonly known as: LANOXIN   250 mcg, Daily Digoxin      EPINEPHrine 0.3 MG/0.3ML solution auto-injector injection  Commonly known as: EPIPEN   epinephrine 0.3 mg/0.3 mL injection, auto-injector      ezetimibe 10 MG tablet  Commonly known as: ZETIA   10 mg, Oral, Daily      fluorometholone 0.1 % ophthalmic suspension  Commonly known as: FML   1 drop, Daily      fluticasone 50 MCG/ACT nasal spray  Commonly known as: FLONASE   2 sprays,  Daily      gabapentin 300 MG capsule  Commonly known as: NEURONTIN   600 mg, 3 Times Daily      glimepiride 2 MG tablet  Commonly known as: AMARYL   1 tablet, Daily      hydroCHLOROthiazide 12.5 MG capsule  Commonly known as: MICROZIDE   12.5 mg, Oral, Daily      levothyroxine 175 MCG tablet  Commonly known as: SYNTHROID, LEVOTHROID   87.5 mcg, Daily      montelukast 10 MG tablet  Commonly known as: SINGULAIR   10 mg, Every Evening      pantoprazole 40 MG EC tablet  Commonly known as: PROTONIX   40 mg, Oral, Daily      pravastatin 80 MG tablet  Commonly known as: PRAVACHOL   80 mg, Oral, Every Night at Bedtime      sertraline 100 MG tablet  Commonly known as: ZOLOFT   150 mg, Oral, Daily      theophylline 400 MG 24 hr tablet  Commonly known as: UNIPHYL   400 mg, Daily      verapamil  MG 24 hr capsule  Commonly known as: VERELAN   240 mg, Oral, Every Night at Bedtime      warfarin 5 MG tablet  Commonly known as: Coumadin   Take 5 mg by mouth daily or as otherwise directed.             Stop These Medications      Breo Ellipta 100-25 MCG/INH aerosol powder   Generic drug: Fluticasone Furoate-Vilanterol     meloxicam 7.5 MG tablet  Commonly known as: MOBIC              Discharge Diet:   Diet Instructions       Diet: Regular/House Diet; Regular (IDDSI 7); Thin (IDDSI 0)      Discharge Diet: Regular/House Diet    Texture: Regular (IDDSI 7)    Fluid Consistency: Thin (IDDSI 0)            Activity at Discharge:   Activity Instructions       Gradually Increase Activity Until at Pre-Hospitalization Level              Follow-up Appointments  Future Appointments   Date Time Provider Department [place]   7/22/2025 11:00 AM [facility]  [facility]IAN[facility] ALVARADO     Additional Instructions for the Follow-ups that You Need to Schedule       Ambulatory Referral to Physical Therapy for Evaluation & Treatment   As directed      Specialty needed: Evaluate and treat   Massage: Soft Tissue Mobilizaton   Exercises: ROM Strengthening    Weight Bearing Status: Full weight bearing   Follow-up needed: Yes        Discharge Follow-up with PCP   As directed       Currently Documented PCP:    Uday Beltran MD    PCP Phone Number:    [phone]     Follow Up Details: call for appointment in 2 weeks        Discharge Follow-up with Specified Provider: Dr Cervantes tomorrvaleri   As directed      To: Dr Helen pendleton                Test Results Pending at Discharge  Pending Results       None             JAZZ Roberson  07/17/25  13:43 EDT    Time: Discharge 25 min

## 2025-07-17 NOTE — OUTREACH NOTE
Prep Survey      Flowsheet Row Responses   [facility]   Is LACE score < 7 ? No   Eligibility Readm Mgmt   Discharge diagnosis Left leg pain   Does the patient have one of the following disease processes/diagnoses(primary or secondary)? Other   Does the patient have Home health ordered? No   Is there a DME ordered? No   Prep survey completed? Yes            THIERRY DAMON - Registered Nurse

## 2025-07-17 NOTE — CASE MANAGEMENT/SOCIAL WORK
Continued Stay Note  [facility]     Patient Name: Efe Malloy  MRN: [id number]  Today's Date: 7/17/2025    Admit Date: 7/15/2025    Plan: Return home with family   Discharge Plan       Row Name 07/17/25 1013       Plan    Plan Comments DC Barriers: MRI results, awaiting ortho consult             Dali Franco RN     [facility]  Office: [phone]  Cell: [phone]  Fax # [phone]

## 2025-07-17 NOTE — CASE MANAGEMENT/SOCIAL WORK
Case Management Discharge Note      Final Note: routine home         Selected Continued Care - Discharged on 7/17/2025 Admission date: 7/15/2025 - Discharge disposition: Home or Self Care       Transportation Services  Transportation: Private Transportation  Private: Car    Final Discharge Disposition Code: 01 - home or self-care

## 2025-07-17 NOTE — THERAPY EVALUATION
Patient Name: Efe Malloy  : 1945    MRN: [id number]                              Today's Date: 2025       Admit Date: 7/15/2025    Visit Dx:     ICD-10-CM ICD-9-CM   1. Acute pain of right knee  M25.561 719.46   2. Pain of left lower extremity  M79.605 729.5   3. Difficulty walking  R26.2 719.7   4. Coronary artery disease of bypass graft of native heart with stable angina pectoris  I25.708 414.05     413.9   5. Left leg pain  M79.605 729.5     Patient Active Problem List   Diagnosis    Persistent atrial fibrillation    Essential hypertension    COPD (chronic obstructive pulmonary disease)    Hyperglycemia    Atypical chest pain    Abnormal Pap smear of anus    Congestive heart failure    Depressive disorder    Gastroesophageal reflux disease    Mixed hyperlipidemia    Hypothyroidism    Neuropathy    Obstructive sleep apnea syndrome    Primary osteoarthritis of right knee    Cervical spondylosis without myelopathy    Cervico-occipital neuralgia    Lumbosacral spondylosis without myelopathy    Near syncope    Arthritis    Chronic idiopathic constipation    Disorder of prostate    Dysplasia of anus    Gout    Hearing loss    Seasonal allergic rhinitis    TIA (transient ischemic attack)    CAD (coronary artery disease), native coronary artery    Left leg pain    Meningioma    Unable to ambulate     Past Medical History:   Diagnosis Date    Allergic     Anxiety     Arthritis     Asthma     Atrial fibrillation     Atypical chest pain 2020    Cataract     COPD (chronic obstructive pulmonary disease)     COVID     COVID-19 2021    Depression     Enlarged prostate     Headache     Hyperlipidemia     Hypertension     Kidney stone     Near syncope 2020    Prostatitis     Shortness of breath     Sleep apnea     Stroke     Urinary tract infection      Past Surgical History:   Procedure Laterality Date    APPENDECTOMY      CARDIAC CATHETERIZATION  2007    nonobstructive dz    CARDIAC  CATHETERIZATION N/A 09/17/2021    Procedure: Left Heart Cath;  Surgeon: Silvano Gillis MD;  Location:  [facility];  Service: Cardiology;  Laterality: N/A;    CARDIAC CATHETERIZATION N/A 6/26/2025    Procedure: Left Heart Cath;  Surgeon: Silvano Gillis MD;  Location:  [facility];  Service: Cardiology;  Laterality: N/A;    CERVICAL EPIDURAL      with Dr. Chua Pain mgt    CHOLECYSTECTOMY      EYE SURGERY Bilateral     corneal transplant left 09/02/2020 (Right eye 2014)    JOINT REPLACEMENT Right     knee    KIDNEY STONE SURGERY      LUMBAR DISC SURGERY  2024    Dr. Chua - L4/L5    PROSTATE SURGERY        General Information       Row Name 07/17/25 2646          Physical Therapy Time and Intention    Document Type evaluation  -SS (r) TS (t) SS (c)     Mode of Treatment physical therapy  -SS (r) TS (t) SS (c)       Row Name 07/17/25 1334          General Information    Patient Profile Reviewed yes  -SS (r) TS (t) SS (c)     Prior Level of Function independent:;ADL's;all household mobility;transfer;driving  -SS (r) TS (t) SS (c)     Existing Precautions/Restrictions fall  -SS (r) TS (t) SS (c)     Barriers to Rehab previous functional deficit;impaired sensation  -SS (r) TS (t) SS (c)       Row Name 07/17/25 1330          Living Environment    Current Living Arrangements home  -SS (r) TS (t) SS (c)     People in Home spouse;child(suellen), dependent  -SS (r) TS (t) SS (c)       Row Name 07/17/25 1334          Home Main Entrance    Number of Stairs, Main Entrance none  -SS (r) TS (t) SS (c)     Stair Railings, Main Entrance none  -SS (r) TS (t) SS (c)       Row Name 07/17/25 1334          Stairs Within Home, Primary    Stairs, Within Home, Primary pt lives in a [facility]  -SS (r) TS (t) SS (c)     Number of Stairs, Within Home, Primary none  -SS (r) TS (t) SS (c)       Row Name 07/17/25 1333          Cognition    Orientation Status (Cognition) oriented x 4  -SS (r) TS (t) SS  (c)       Row Name 07/17/25 1339          Safety Issues/Impairments Affecting Functional Mobility    Impairments Affecting Function (Mobility) balance;pain;sensation/sensory awareness;strength  -SS (r) TS (t) SS (c)               User Key  (r) = Recorded By, (t) = Taken By, (c) = Cosigned By      Initials Name Provider Type    SS Almita Reardon, PT Physical Therapist    TS José Miguel Henderson, PT Student PT Student                   Mobility       Row Name 07/17/25 1338          Bed Mobility    Bed Mobility bed mobility (all) activities  -SS (r) TS (t) SS (c)     All Activities, Cobb (Bed Mobility) independent  -SS (r) TS (t) SS (c)       Row Name 07/17/25 1338          Bed-Chair Transfer    Bed-Chair Cobb (Transfers) not tested  -SS (r) TS (t) SS (c)       Row Name 07/17/25 1338          Sit-Stand Transfer    Sit-Stand Cobb (Transfers) standby assist  -SS (r) TS (t) SS (c)     Assistive Device (Sit-Stand Transfers) walker, front-wheeled  -SS (r) TS (t) SS (c)       Row Name 07/17/25 1338          Gait/Stairs (Locomotion)    Cobb Level (Gait) standby assist  -SS (r) TS (t) SS (c)     Assistive Device (Gait) walker, front-wheeled  -SS (r) TS (t) SS (c)     Patient was able to Ambulate yes  -SS (r) TS (t) SS (c)     Distance in Feet (Gait) 50  -SS (r) TS (t) SS (c)     Deviations/Abnormal Patterns (Gait) gait speed decreased;valerie decreased  -SS (r) TS (t) SS (c)     Bilateral Gait Deviations heel strike decreased  -SS (r) TS (t) SS (c)     Cobb Level (Stairs) not tested  -SS (r) TS (t) SS (c)               User Key  (r) = Recorded By, (t) = Taken By, (c) = Cosigned By      Initials Name Provider Type    SS Almita Reardon, PT Physical Therapist    TS José Miguel Henderson, PT Student PT Student                   Obj/Interventions       Row Name 07/17/25 1344          Range of Motion Comprehensive    General Range of Motion no range of motion deficits identified  -SS (r) TS  (t) SS (c)       Row Name 07/17/25 1344          Strength Comprehensive (MMT)    General Manual Muscle Testing (MMT) Assessment lower extremity strength deficits identified  -SS (r) TS (t) SS (c)     Comment, General Manual Muscle Testing (MMT) Assessment Gross BLE 4/5 MMT  -SS (r) TS (t) SS (c)       Row Name 07/17/25 1344          Balance    Balance Assessment sitting static balance;standing static balance;standing dynamic balance  -SS (r) TS (t) SS (c)     Static Sitting Balance independent  -SS (r) TS (t) SS (c)     Position, Sitting Balance unsupported;sitting edge of bed  -SS (r) TS (t) SS (c)     Static Standing Balance standby assist  -SS (r) TS (t) SS (c)     Dynamic Standing Balance contact guard  -SS (r) TS (t) SS (c)     Position/Device Used, Standing Balance walker, front-wheeled  -SS (r) TS (t) SS (c)     Balance Interventions tandem standing;moderate challenge;narrowed base of support;standing;sitting;sit to stand;minimal challenge  -SS (r) TS (t) SS (c)       Row Name 07/17/25 1343          Sensory Assessment (Somatosensory)    Sensory Assessment (Somatosensory) other (see comments)  Pt has diabetic neuropathy in both feet.  -SS (r) TS (t) SS (c)               User Key  (r) = Recorded By, (t) = Taken By, (c) = Cosigned By      Initials Name Provider Type    SS Almita Reardon, PT Physical Therapist    José Miguel Coronel PT Student PT Student                   Goals/Plan    No documentation.                  Clinical Impression       Row Name 07/17/25 6998          Pain    Pain Location calf;knee  -SS (r) TS (t) SS (c)     Pain Side/Orientation left  -SS (r) TS (t) SS (c)     Pain Management Interventions exercise or physical activity utilized  -SS (r) TS (t) SS (c)     Response to Pain Interventions activity participation with tolerable pain  -SS (r) TS (t) SS (c)     Additional Documentation Pain Scale: FACES Pre/Post-Treatment (Group)  -SS (r) TS (t) SS (c)       Row Name 07/17/25 3892           Pain Scale: FACES Pre/Post-Treatment    Pain: FACES Scale, Pretreatment 2-->hurts little bit  -SS (r) TS (t) SS (c)     Posttreatment Pain Rating 2-->hurts little bit  -SS (r) TS (t) SS (c)       Row Name 07/17/25 9204          Plan of Care Review    Plan of Care Reviewed With patient  -SS (r) TS (t) SS (c)     Progress improving  -SS (r) TS (t) SS (c)     Outcome Evaluation Efe Malloy is a 80 y/o M who presents to [facility] with leg pain and diffculty walking. Pt dx with L medial meniscal tear and MCL strain. Pt is a fall risk and had a prior stroke , were he lost vision but all symptoms resided. PMH of afib, DM, REGLA,, hx of cva, stable meningioma followed by [facility]. At baseline, pt lives in a SSH with spouse and dependent child. Pt has no stairs to enter and was indepedent with all ADLs, driving, and household and community ambulation. This date, pt AAOx4 and is SBA for bed mobility, transfers, and ambulation with RW. Pt pain level has improved today and has minimal swelling of the L tibial-femoral joint. Pt was able to ambulate 50 ft with rolling walker and demonstrated proper handling of rolling walker and upright posture. Pt had decreased gait speed and heel strike while ambulating. Pt instructed on 4-stage balance test, where he was able to hold tandem stance bilat indicating pt at a decreased risk for falls. Due to pt being near PLOF and decrease in pain symptoms, pt is able to return home with family. Reccomend OPPT and RW usgae upon d/c to address balance and strength deficits. Pt owns a rolling walker and was instructed to use after d/c. Will follow. (P)   -TS       Row Name 07/17/25 6453          Therapy Assessment/Plan (PT)    Rehab Potential (PT) good  -SS (r) TS (t) SS (c)     Criteria for Skilled Interventions Met (PT) no;no problems identified which require skilled intervention  -SS (r) TS (t) SS (c)     Therapy Frequency (PT) evaluation only  -SS (r) TS (t) SS (c)       Row Name 07/17/25 5783           Vital Signs    Pretreatment Heart Rate (beats/min) 82  -SS (r) TS (t) SS (c)     Intratreatment Heart Rate (beats/min) 86  -SS (r) TS (t) SS (c)     Posttreatment Heart Rate (beats/min) 84  -SS (r) TS (t) SS (c)     Pre SpO2 (%) 95  -SS (r) TS (t) SS (c)     O2 Delivery Pre Treatment room air  -SS (r) TS (t) SS (c)     Intra SpO2 (%) 94  -SS (r) TS (t) SS (c)     O2 Delivery Intra Treatment room air  -SS (r) TS (t) SS (c)     Post SpO2 (%) 95  -SS (r) TS (t) SS (c)     O2 Delivery Post Treatment room air  -SS (r) TS (t) SS (c)     Pre Patient Position Supine  -SS (r) TS (t) SS (c)     Intra Patient Position Standing  -SS (r) TS (t) SS (c)     Post Patient Position Sitting  -SS (r) TS (t) SS (c)       Row Name 07/17/25 1346          Positioning and Restraints    Pre-Treatment Position in bed  -SS (r) TS (t) SS (c)     Post Treatment Position bed  -SS (r) TS (t) SS (c)     In Bed notified nsg;call light within reach;encouraged to call for assist  -SS (r) TS (t) SS (c)               User Key  (r) = Recorded By, (t) = Taken By, (c) = Cosigned By      Initials Name Provider Type    SS Almita Reardon, PT Physical Therapist    José Miguel Coronel PT Student PT Student                   Outcome Measures       Row Name 07/17/25 1409 07/17/25 0828       How much help from another person do you currently need...    Turning from your back to your side while in flat bed without using bedrails? 4  -SS (r) TS (t) SS (c) 4  -KH    Moving from lying on back to sitting on the side of a flat bed without bedrails? 4  -SS (r) TS (t) SS (c) 4  -KH    Moving to and from a bed to a chair (including a wheelchair)? 4  -SS (r) TS (t) SS (c) 4  -KH    Standing up from a chair using your arms (e.g., wheelchair, bedside chair)? 4  -SS (r) TS (t) SS (c) 4  -KH    Climbing 3-5 steps with a railing? 3  -SS (r) TS (t) SS (c) 4  -KH    To walk in hospital room? 4  -SS (r) TS (t) SS (c) 4  -KH    AM-PAC 6 Clicks Score (PT) 23  -SS (r) TS (t) 24   -    Highest Level of Mobility Goal Walk 25 Feet or More-7  -SS (r) TS (t) Walk 250 Feet or More - 8  -KH      Row Name 07/17/25 1409          Functional Assessment    Outcome Measure Options AM-PAC 6 Clicks Basic Mobility (PT)  -SS (r) TS (t) SS (c)       Row Name 07/17/25 1409          Other Outcome Measure Tool Used    Other Outcome Measure Tool Comments 4-stage balance assement, pt was able to maintain tandem stance for 10 seconds bilaterally. declined SLS due to Left knee pain.  -SS (r) TS (t) SS (c)               User Key  (r) = Recorded By, (t) = Taken By, (c) = Cosigned By      Initials Name Provider Type     Almita Reardon, PT Physical Therapist    Kady Romano, RN Registered Nurse    José Miguel Coronel, PT Student PT Student                                 Physical Therapy Education       Title: PT OT SLP Therapies (Resolved)       Topic: Physical Therapy (Resolved)       Point: Mobility training (Resolved)       Learning Progress Summary            Patient Acceptance, E, VU by  at 7/17/2025 1411                      Point: Home exercise program (Resolved)       Learning Progress Summary            Patient Acceptance, E, VU by  at 7/17/2025 1411                      Point: Body mechanics (Resolved)       Learning Progress Summary            Patient Acceptance, E, VU by  at 7/17/2025 1411                      Point: Precautions (Resolved)       Learning Progress Summary            Patient Acceptance, E, VU by  at 7/17/2025 1411                                      User Key       Initials Effective Dates Name Provider Type Discipline     05/08/25 -  José Miguel Henderson, PT Student PT Student PT                  PT Recommendation and Plan     Progress: improving  Outcome Evaluation: (P) Efe Malloy is a 80 y/o M who presents to [facility] with leg pain and diffculty walking. Pt dx with L medial meniscal tear and MCL strain. Pt is a fall risk and had a prior stroke , were he lost vision but  all symptoms resided. PMH of afib, DM, REGLA,, hx of cva, stable meningioma followed by [facility]. At baseline, pt lives in a SSH with spouse and dependent child. Pt has no stairs to enter and was indepedent with all ADLs, driving, and household and community ambulation. This date, pt AAOx4 and is SBA for bed mobility, transfers, and ambulation with RW. Pt pain level has improved today and has minimal swelling of the L tibial-femoral joint. Pt was able to ambulate 50 ft with rolling walker and demonstrated proper handling of rolling walker and upright posture. Pt had decreased gait speed and heel strike while ambulating. Pt instructed on 4-stage balance test, where he was able to hold tandem stance bilat indicating pt at a decreased risk for falls. Due to pt being near PLOF and decrease in pain symptoms, pt is able to return home with family. Reccomend OPPT and RW usgae upon d/c to address balance and strength deficits. Pt owns a rolling walker and was instructed to use after d/c. Will follow.     Time Calculation:   PT Evaluation Complexity  History, PT Evaluation Complexity: 3 or more personal factors and/or comorbidities  Examination of Body Systems (PT Eval Complexity): 1-2 elements  Clinical Presentation (PT Evaluation Complexity): stable  Clinical Decision Making (PT Evaluation Complexity): low complexity  Overall Complexity (PT Evaluation Complexity): low complexity     PT Charges       Row Name 07/17/25 1412             Time Calculation    Start Time 1226  -SS (r) TS (t) SS (c)      Stop Time 1253  -SS (r) TS (t) SS (c)      Time Calculation (min) 27 min  -SS (r) TS (t)      PT Received On 07/17/25  -SS (r) TS (t) SS (c)         Time Calculation- PT    Total Timed Code Minutes- PT 0 minute(s)  -SS (r) TS (t) SS (c)                User Key  (r) = Recorded By, (t) = Taken By, (c) = Cosigned By      Initials Name Provider Type    Almita Wu, PT Physical Therapist    José Miguel Coronel PT Student PT  Student                  Therapy Charges for Today       Code Description Service Date Service Provider Modifiers Qty    [id number] HC PT EVAL LOW COMPLEXITY 4 7/17/2025 José Miguel Henderson, PT Student GP 1            PT G-Codes  Outcome Measure Options: AM-PAC 6 Clicks Basic Mobility (PT)  AM-PAC 6 Clicks Score (PT): 23  PT Discharge Summary  Anticipated Discharge Disposition (PT): home with outpatient therapy services    José Miguel Henderson PT Student  7/17/2025

## 2025-07-17 NOTE — PLAN OF CARE
Goal Outcome Evaluation:  Plan of Care Reviewed With: patient        Progress: improving  Outcome Evaluation: (P) Efe Malloy is a 80 y/o M who presents to [facility] with leg pain and diffculty walking. Pt dx with L medial meniscal tear and MCL strain. Pt is a fall risk and had a prior stroke , were he lost vision but all symptoms resided. PMH of afib, DM, REGLA,, hx of cva, stable meningioma followed by [facility]. At baseline, pt lives in a SSH with spouse and dependent child. Pt has no stairs to enter and was indepedent with all ADLs, driving, and household and community ambulation. This date, pt AAOx4 and is SBA for bed mobility, transfers, and ambulation with RW. Pt pain level has improved today and has minimal swelling of the L tibial-femoral joint. Pt was able to ambulate 50 ft with rolling walker and demonstrated proper handling of rolling walker and upright posture. Pt had decreased gait speed and heel strike while ambulating. Pt instructed on 4-stage balance test, where he was able to hold tandem stance bilat indicating pt at a decreased risk for falls. Due to pt being near PLOF and decrease in pain symptoms, pt is able to return home with family. Reccomend OPPT and RW usgae upon d/c to address balance and strength deficits. Pt owns a rolling walker and was instructed to use after d/c. Will follow.    Anticipated Discharge Disposition (PT): home with outpatient therapy services

## 2025-07-17 NOTE — SIGNIFICANT NOTE
07/17/25 1124   Rehab Time/Intention   Session Not Performed   (Will follow up after ortho consult)   Recommendation   OT - Next Appointment 07/18/25

## 2025-07-17 NOTE — PLAN OF CARE
Goal Outcome Evaluation:  Plan of Care Reviewed With: patient        Progress: improving  Outcome Evaluation: Pt has rested comfortably through out the night with no complaints. Pt refuses fall risk measures. Plan of care ongoing.

## 2025-07-18 ENCOUNTER — TELEPHONE (OUTPATIENT)
Dept: PHARMACY | Facility: HOSPITAL | Age: 80
End: 2025-07-18
Payer: MEDICARE

## 2025-07-18 NOTE — TELEPHONE ENCOUNTER
Medication Management Clinic: [facility]  Clinical Outreach     Efe Malloy is a 79 y.o. male and is a patient of the Medication Management Clinic at [facility] for anticoagulation monitoring.     Attempted to contact patient post discharge from hospital on 7/17.  INR at discharge was 1.49 with an INR goal of 2-3.  Checking to see if patient took 10 mg on 7/17 or if he went back to his 5 mg dose.  Patient has an INR follow up appointment on 7/22.  No answer.  Left Voicemail.      Bony Chapman, PharmD  Ambulatory Care Clinical Pharmacist  7/18/2025  10:28 EDT

## 2025-07-22 ENCOUNTER — ANTICOAGULATION VISIT (OUTPATIENT)
Dept: PHARMACY | Facility: HOSPITAL | Age: 80
End: 2025-07-22
Payer: MEDICARE

## 2025-07-22 DIAGNOSIS — I48.19 PERSISTENT ATRIAL FIBRILLATION: Primary | ICD-10-CM

## 2025-07-22 LAB
INR PPP: 1.6 (ref 0.9–1.1)
PROTHROMBIN TIME: 18.3 SECONDS

## 2025-07-22 PROCEDURE — 85610 PROTHROMBIN TIME: CPT

## 2025-07-22 PROCEDURE — 36416 COLLJ CAPILLARY BLOOD SPEC: CPT

## 2025-07-22 PROCEDURE — G0463 HOSPITAL OUTPT CLINIC VISIT: HCPCS

## 2025-07-22 NOTE — PROGRESS NOTES
Anticoagulation Clinic Progress Note    INR Goal: 2 - 3  Today's INR: 1.6 (subtherapeutic)  Current warfarin dose:     7/15 (Admitted to Hospital) = 10 mg   = 5 mg   (Discharged) = 5 mg  - = 5 mg    Past 7 day dose = 40 mg    - Patient has some bruising on wrist and arm from hospital stay    INR History:      Latest Ref Rng & Units 2025    11:01 AM 2025     2:00 PM 7/15/2025    10:30 AM 7/15/2025     1:36 PM 2025     4:01 AM 2025     4:49 AM 2025    11:00 AM   Anticoagulation Monitoring   INR  -- 1.90 1.40    1.60   INR Date   2025 7/15/2025    2025   INR Goal  2.0-3.0 2.0-3.0 2.0-3.0    2.0-3.0   Trend  Same Same Same    Same   Last Week Total  20 mg 25 mg 35 mg    40 mg   Next Week Total  35 mg 35 mg 40 mg    40 mg   Sun  5 mg 5 mg 5 mg    5 mg   Mon  5 mg 5 mg 5 mg    5 mg   Tue  - 5 mg 10 mg (7/15)    10 mg ()   Wed  - 5 mg 5 mg    5 mg   Thu  - 5 mg 5 mg    5 mg   Fri  5 mg 5 mg 5 mg    5 mg   Sat  5 mg 5 mg 5 mg    5 mg   Historical INR 2.00 - 3.00    1.42  1.44  1.49         Anticoagulation Summary  As of 2025      INR goal:  2.0-3.0   TTR:  77.2% (6.1 y)   INR used for dosin.60 (2025)   Warfarin maintenance plan:  5 mg every day   Weekly warfarin total:  35 mg   Plan last modified:  Kavita Jolley, PharmD (2023)   Next INR check:  2025   Target end date:  --    Indications    Persistent atrial fibrillation [I48.19]                 Anticoagulation Episode Summary       INR check location:  --    Preferred lab:  --    Send INR reminders to:  [facility] CLINICAL POOL    Comments:  Patient contract signed 11/15/21.          Anticoagulation Care Providers       Provider Role Specialty Phone number    Silvano Gillis MD  Cardiology [phone]            Clinic Interview:   Patient Findings     Negatives:  Signs/symptoms of thrombosis, Signs/symptoms of bleeding,   Laboratory test error suspected, Change in health,  Change in alcohol use,   Change in activity, Upcoming invasive procedure, Emergency department   visit, Upcoming dental procedure, Missed doses, Extra doses, Change in   medications, Change in diet/appetite, Hospital admission, Bruising, Other   complaints      Clinical Outcomes     Negatives:  Major bleeding event, Thromboembolic event,   Anticoagulation-related hospital admission, Anticoagulation-related ED   visit, Anticoagulation-related fatality        Current Medications:   Prior to Admission medications    Medication Sig Start Date End Date Taking? Authorizing Provider   albuterol sulfate  (90 Base) MCG/ACT inhaler Inhale 2 puffs Every 6 (Six) Hours As Needed. 7/6/21   Shelby Haskins MD   alfuzosin (UROXATRAL) 10 MG 24 hr tablet Take 1 tablet by mouth 2 (Two) Times a Day. 9/23/19   Shelby Haskins MD   azelastine (ASTELIN) 0.1 % nasal spray Administer 2 sprays into the nostril(s) as directed by provider 2 (Two) Times a Day. 6/23/25   Shelby Haskins MD   baclofen (LIORESAL) 10 MG tablet Take 1 tablet by mouth 2 (Two) Times a Day. 7/13/21   Shelby Haskins MD   Diclofenac Sodium (VOLTAREN) 1 % gel gel Apply 4 g topically to the appropriate area as directed 4 (Four) Times a Day. 7/17/25   Telma Lyons APRN   EPINEPHrine (EPIPEN) 0.3 MG/0.3ML solution auto-injector injection epinephrine 0.3 mg/0.3 mL injection, auto-injector    Shelby Haskins MD   ezetimibe (ZETIA) 10 MG tablet TAKE ONE TABLET BY MOUTH EVERY DAY 12/13/24   Silvano Gillis MD   fluorometholone (FML) 0.1 % ophthalmic suspension Administer 1 drop to both eyes Daily. 5/17/21   Shelby Haskins MD   fluticasone (FLONASE) 50 MCG/ACT nasal spray Administer 2 sprays into the nostril(s) as directed by provider Daily.    Shelby Haskins MD   gabapentin (NEURONTIN) 300 MG capsule Take 2 capsules by mouth 3 (Three) Times a Day. 9/13/19   Shelby Haskins MD   glimepiride (AMARYL) 2 MG  tablet Take 1 tablet by mouth Daily. 3/17/25   Shelby Haskins MD   hydroCHLOROthiazide (MICROZIDE) 12.5 MG capsule TAKE ONE CAPSULE BY MOUTH EVERY DAY 7/1/25   Silvano Gillis MD   HYDROcodone-acetaminophen (NORCO) 7.5-325 MG per tablet Take 1 tablet by mouth Every 6 (Six) Hours As Needed for Moderate Pain for up to 5 days. 7/17/25 7/22/25  Telma Lyons APRN   levothyroxine (SYNTHROID, LEVOTHROID) 175 MCG tablet Take 0.5 tablets by mouth Daily. 9/23/19   Shelby Haskins MD   montelukast (SINGULAIR) 10 MG tablet Take 1 tablet by mouth Every Evening. 9/23/19   Shelby Haskins MD   pantoprazole (PROTONIX) 40 MG EC tablet Take 1 tablet by mouth Daily. 2/22/22   Silvano Gillis MD   pravastatin (PRAVACHOL) 80 MG tablet Take 1 tablet by mouth every night at bedtime. 3/19/25   Silvano Gillis MD   sertraline (ZOLOFT) 100 MG tablet Take 1.5 tablets by mouth Daily. 3/15/22   Silvano Gillis MD   theophylline (UNIPHYL) 400 MG 24 hr tablet Take 1 tablet by mouth Daily. 9/23/19   Shelby Haskins MD   verapamil ER (VERELAN) 240 MG 24 hr capsule Take 1 capsule by mouth every night at bedtime. 6/17/25   Silvano Gillis MD   warfarin (Coumadin) 5 MG tablet Take 5 mg by mouth daily or as otherwise directed. 4/15/25   Silvano Gillis MD   digoxin (LANOXIN) 125 MCG tablet Take 2 tablets by mouth Daily.  7/22/25  Shelby Haskins MD       Medical history:   Past Medical History:   Diagnosis Date    Allergic     Anxiety     Arthritis     Asthma     Atrial fibrillation     Atypical chest pain 06/11/2020    Cataract     COPD (chronic obstructive pulmonary disease)     COVID     COVID-19 01/13/2021    Depression     Enlarged prostate     Headache     Hyperlipidemia     Hypertension     Kidney stone     Near syncope 12/17/2020    Prostatitis     Shortness of breath     Sleep apnea     Stroke 2022    Urinary tract infection        Social history:   Social  History     Tobacco Use    Smoking status: Former     Types: Cigarettes    Smokeless tobacco: Never   Substance Use Topics    Alcohol use: Yes     Comment: Occasionally - once a month.       Allergies:    Penicillins and Rivaroxaban    Vaccine History:   Immunization History   Administered Date(s) Administered    ABRYSVO (RSV, 60+ or pregnant women 32-36 wks) 11/14/2024    Covid-19 (Pfizer) Gray Cap Monovalent 05/18/2021, 11/17/2021, 11/26/2021    FLUAD TRI 65YR+ 10/31/2019    Fluzone  >6mos 09/22/2014    Fluzone High-Dose 65+YRS 10/14/2016, 10/16/2017, 11/14/2018    H1N1 Inj 12/22/2009    Hepatitis A 09/17/2019, 11/19/2019    Influenza, Unspecified 09/18/2013    Pneumococcal Conjugate 13-Valent (PCV13) 10/12/2015    Pneumococcal Polysaccharide (PPSV23) 09/24/2013    Shingrix 09/17/2019, 11/19/2019    Zostavax 02/12/2013       [id number] SCORE   [id number] Score for Atrial Fibrillation Stroke Risk  Age in Years: 75+  Sex: Male  CHF History: Yes  Hypertension History: Yes  Stroke/TIA/Thromboembolism History: Yes  Vascular Disease History: Yes  Diabetes History: No  [id number] Score: 7  Stroke risks -: 7 Points. Risk of ischemic stroke: 11.2%. Risk of stroke/TIA/embolism: 15.7%      This patient is managed via a collaborative agreement, pursuant to IC 25-26-16. The signed protocol is kept in the [facility] at [address] [place] [address].    Education: Efe Malloy has been instructed to call if any changes in medications, doses, concerns, etc. Patient was provided dosing instructions and expressed verbal understanding and has no further questions at this time.    Plan:  1. Anticoagulation   - Instructed patient to take bolus dose of warfarin 10 mg PO today 7/22 and then continue warfarin 5 mg PO daily until next INR check 7/29;  Next Total weekly dose = 40 mg.   - RTC in 1 week(s)    Counseled patient on increased clotting risk associated with subtherapeutic INR, signs/symptoms  to monitor for, and when to seek medical attention.  - LVM 7/22      Claire Reyes, Pharmacy Intern  7/22/2025  12:00 EDT

## 2025-07-25 ENCOUNTER — READMISSION MANAGEMENT (OUTPATIENT)
Dept: CALL CENTER | Facility: HOSPITAL | Age: 80
End: 2025-07-25
Payer: COMMERCIAL

## 2025-07-25 NOTE — OUTREACH NOTE
Medical Week 1 Survey      Flowsheet Row Responses   [facility] patient discharged from? [facility]   Does the patient have one of the following disease processes/diagnoses(primary or secondary)? Other   Week 1 attempt successful? Yes   Call start time 1007   Call end time 1010   Discharge diagnosis Left leg pain   Person spoke with today (if not patient) and relationship Patient   Medication alerts for this patient Voltaren, Norco   Meds reviewed with patient/caregiver? Yes   Is the patient having any side effects they believe may be caused by any medication additions or changes? No   Does the patient have all medications ordered at discharge? Yes   Prescription comments No questions or concerns with medications.   Is the patient taking all medications as directed (includes completed medication regime)? Yes   Comments regarding appointments Patient states he has seen an orthopedic doctor for f/u on left leg pain.   Does the patient have a primary care provider?  Yes   Comments regarding PCP PCP--Dr. Uday Beltran   Has the patient kept scheduled appointments due by today? Yes   Has home health visited the patient within 72 hours of discharge? N/A   Psychosocial issues? No   Comments Patient states he is doing much better, leg pain is resolved for now. F/u with orthopedic MD.   Did the patient receive a copy of their discharge instructions? Yes   Nursing interventions Reviewed instructions with patient   What is the patient's perception of their health status since discharge? Improving   Is the patient/caregiver able to teach back signs and symptoms related to disease process for when to call PCP? Yes   Is the patient/caregiver able to teach back signs and symptoms related to disease process for when to call 911? Yes   Is the patient/caregiver able to teach back the hierarchy of who to call/visit for symptoms/problems? PCP, Specialist, Home health nurse, Urgent Care, ED, 911 Yes   If the patient is a current  smoker, are they able to teach back resources for cessation? Not a smoker   Week 1 call completed? Yes   Graduated Yes   Would this patient benefit from a Referral to [facility] Social Work? No   Is the patient interested in additional calls from an ambulatory ? No   Graduated/Revoked comments Patient doing well, no needs or concerns.   Call end time 1010            Arti ISAACS - Registered Nurse

## 2025-07-29 ENCOUNTER — ANTICOAGULATION VISIT (OUTPATIENT)
Dept: PHARMACY | Facility: HOSPITAL | Age: 80
End: 2025-07-29
Payer: MEDICARE

## 2025-07-29 DIAGNOSIS — I48.19 PERSISTENT ATRIAL FIBRILLATION: Primary | ICD-10-CM

## 2025-07-29 LAB
INR PPP: 2 (ref 0.9–1.1)
PROTHROMBIN TIME: 22.1 SECONDS

## 2025-07-29 PROCEDURE — 85610 PROTHROMBIN TIME: CPT

## 2025-07-29 PROCEDURE — G0463 HOSPITAL OUTPT CLINIC VISIT: HCPCS

## 2025-07-29 PROCEDURE — 36416 COLLJ CAPILLARY BLOOD SPEC: CPT

## 2025-07-29 NOTE — PROGRESS NOTES
Anticoagulation Clinic Progress Note    INR Goal: 2 - 3  Today's INR: 2.0 (therapeutic)  Current warfarin dose: warfarin 5 mg PO daily, except warfarin 10 mg PO on Tuesday.  Current total weekly dose = 40 mg per week.     INR History:      Latest Ref Rng & Units 2025     2:00 PM 7/15/2025    10:30 AM 7/15/2025     1:36 PM 2025     4:01 AM 2025     4:49 AM 2025    11:00 AM 2025    10:45 AM   Anticoagulation Monitoring   INR  1.90 1.40    1.60 2.00   INR Date  2025 7/15/2025    2025 2025   INR Goal  2.0-3.0 2.0-3.0    2.0-3.0 2.0-3.0   Trend  Same Same    Same Up   Last Week Total  25 mg 35 mg    40 mg 40 mg   Next Week Total  35 mg 40 mg    40 mg 40 mg   Sun  5 mg 5 mg    5 mg 5 mg   Mon  5 mg 5 mg    5 mg 5 mg   Tue  5 mg 10 mg (7/15)    10 mg () 10 mg   Wed  5 mg 5 mg    5 mg 5 mg   Thu  5 mg 5 mg    5 mg 5 mg   Fri  5 mg 5 mg    5 mg 5 mg   Sat  5 mg 5 mg    5 mg 5 mg   Historical INR 2.00 - 3.00   1.42  1.44  1.49          Anticoagulation Summary  As of 2025      INR goal:  2.0-3.0   TTR:  77.0% (6.1 y)   INR used for dosin.00 (2025)   Warfarin maintenance plan:  10 mg every Tue; 5 mg all other days   Weekly warfarin total:  40 mg   Plan last modified:  Reyes, Claire, Pharmacy Intern (2025)   Next INR check:  2025   Target end date:  --    Indications    Persistent atrial fibrillation [I48.19]                 Anticoagulation Episode Summary       INR check location:  --    Preferred lab:  --    Send INR reminders to:  [facility] CLINICAL POOL    Comments:  Patient contract signed 11/15/21.          Anticoagulation Care Providers       Provider Role Specialty Phone number    Silvano Gillis MD  Cardiology [phone]            Clinic Interview:   Patient Findings     Negatives:  Signs/symptoms of thrombosis, Signs/symptoms of bleeding,   Laboratory test error suspected, Change in health, Change in alcohol use,   Change in  activity, Upcoming invasive procedure, Emergency department   visit, Upcoming dental procedure, Missed doses, Extra doses, Change in   medications, Change in diet/appetite, Hospital admission, Bruising, Other   complaints      Clinical Outcomes     Negatives:  Major bleeding event, Thromboembolic event,   Anticoagulation-related hospital admission, Anticoagulation-related ED   visit, Anticoagulation-related fatality        Current Medications:   Prior to Admission medications    Medication Sig Start Date End Date Taking? Authorizing Provider   albuterol sulfate  (90 Base) MCG/ACT inhaler Inhale 2 puffs Every 6 (Six) Hours As Needed. 7/6/21   Shelby Haskins MD   alfuzosin (UROXATRAL) 10 MG 24 hr tablet Take 1 tablet by mouth 2 (Two) Times a Day. 9/23/19   Shelby Haskins MD   azelastine (ASTELIN) 0.1 % nasal spray Administer 2 sprays into the nostril(s) as directed by provider 2 (Two) Times a Day. 6/23/25   Shelby Haskins MD   baclofen (LIORESAL) 10 MG tablet Take 1 tablet by mouth 2 (Two) Times a Day. 7/13/21   Shelby Haskins MD   Diclofenac Sodium (VOLTAREN) 1 % gel gel Apply 4 g topically to the appropriate area as directed 4 (Four) Times a Day. 7/17/25   Telma Lyons APRN   EPINEPHrine (EPIPEN) 0.3 MG/0.3ML solution auto-injector injection epinephrine 0.3 mg/0.3 mL injection, auto-injector    Shelby Haskins MD   ezetimibe (ZETIA) 10 MG tablet TAKE ONE TABLET BY MOUTH EVERY DAY 12/13/24   Silvano Gillis MD   fluorometholone (FML) 0.1 % ophthalmic suspension Administer 1 drop to both eyes Daily. 5/17/21   Shelby Haskins MD   fluticasone (FLONASE) 50 MCG/ACT nasal spray Administer 2 sprays into the nostril(s) as directed by provider Daily.    Shelby Haskins MD   gabapentin (NEURONTIN) 300 MG capsule Take 2 capsules by mouth 3 (Three) Times a Day. 9/13/19   Shelby Haskins MD   glimepiride (AMARYL) 2 MG tablet Take 1 tablet by mouth Daily.  3/17/25   Shelby Haskins MD   hydroCHLOROthiazide (MICROZIDE) 12.5 MG capsule TAKE ONE CAPSULE BY MOUTH EVERY DAY 7/1/25   Silvano Gillis MD   levothyroxine (SYNTHROID, LEVOTHROID) 175 MCG tablet Take 0.5 tablets by mouth Daily. 9/23/19   Shelby Haskins MD   montelukast (SINGULAIR) 10 MG tablet Take 1 tablet by mouth Every Evening. 9/23/19   Shelby Haskins MD   pantoprazole (PROTONIX) 40 MG EC tablet Take 1 tablet by mouth Daily. 2/22/22   Silvano Gillis MD   pravastatin (PRAVACHOL) 80 MG tablet Take 1 tablet by mouth every night at bedtime. 3/19/25   Silvano Gillis MD   sertraline (ZOLOFT) 100 MG tablet Take 1.5 tablets by mouth Daily. 3/15/22   Silvano Gillis MD   theophylline (UNIPHYL) 400 MG 24 hr tablet Take 1 tablet by mouth Daily. 9/23/19   Shelby Haskins MD   verapamil ER (VERELAN) 240 MG 24 hr capsule Take 1 capsule by mouth every night at bedtime. 6/17/25   Silvano Gillis MD   warfarin (Coumadin) 5 MG tablet Take 5 mg by mouth daily or as otherwise directed. 4/15/25   Silvano Gillis MD       Medical history:   Past Medical History:   Diagnosis Date    Allergic     Anxiety     Arthritis     Asthma     Atrial fibrillation     Atypical chest pain 06/11/2020    Cataract     COPD (chronic obstructive pulmonary disease)     COVID     COVID-19 01/13/2021    Depression     Enlarged prostate     Headache     Hyperlipidemia     Hypertension     Kidney stone     Near syncope 12/17/2020    Prostatitis     Shortness of breath     Sleep apnea     Stroke 2022    Urinary tract infection        Social history:   Social History     Tobacco Use    Smoking status: Former     Types: Cigarettes    Smokeless tobacco: Never   Substance Use Topics    Alcohol use: Yes     Comment: Occasionally - once a month.       Allergies:    Penicillins and Rivaroxaban    Vaccine History:   Immunization History   Administered Date(s) Administered    ABRYSVO  (RSV, 60+ or pregnant women 32-36 wks) 11/14/2024    Covid-19 (Pfizer) Gray Cap Monovalent 05/18/2021, 11/17/2021, 11/26/2021    FLUAD TRI 65YR+ 10/31/2019    Fluzone  >6mos 09/22/2014    Fluzone High-Dose 65+YRS 10/14/2016, 10/16/2017, 11/14/2018    H1N1 Inj 12/22/2009    Hepatitis A 09/17/2019, 11/19/2019    Influenza, Unspecified 09/18/2013    Pneumococcal Conjugate 13-Valent (PCV13) 10/12/2015    Pneumococcal Polysaccharide (PPSV23) 09/24/2013    Shingrix 09/17/2019, 11/19/2019    Zostavax 02/12/2013       [id number] SCORE   [id number] Score for Atrial Fibrillation Stroke Risk  Age in Years: 75+  Sex: Male  CHF History: Yes  Hypertension History: Yes  Stroke/TIA/Thromboembolism History: Yes  Vascular Disease History: Yes  Diabetes History: No  [id number] Score: 7  Stroke risks -: 7 Points. Risk of ischemic stroke: 11.2%. Risk of stroke/TIA/embolism: 15.7%      This patient is managed via a collaborative agreement, pursuant to IC 25-26-16. The signed protocol is kept in the [facility] at [address] [place] [address].    Education: Efe Malloy has been instructed to call if any changes in medications, doses, concerns, etc. Patient was provided dosing instructions and expressed verbal understanding and has no further questions at this time.    Plan:  1. Anticoagulation   - no change; warfarin 5 mg PO daily, except warfarin 10 mg PO on Tuesday.  Total weekly dose = 40 mg.   - RTC in 3 week(s)      Claire Reyes, Pharmacy Intern  7/29/2025  14:27 EDT

## 2025-08-19 ENCOUNTER — ANTICOAGULATION VISIT (OUTPATIENT)
Dept: PHARMACY | Facility: HOSPITAL | Age: 80
End: 2025-08-19
Payer: MEDICARE

## 2025-08-19 DIAGNOSIS — I48.19 PERSISTENT ATRIAL FIBRILLATION: Primary | ICD-10-CM

## 2025-08-19 LAB
INR PPP: 1.8 (ref 0.9–1.1)
PROTHROMBIN TIME: 20.3 SECONDS

## 2025-08-19 PROCEDURE — 85610 PROTHROMBIN TIME: CPT

## 2025-08-19 PROCEDURE — 36416 COLLJ CAPILLARY BLOOD SPEC: CPT

## 2025-08-19 PROCEDURE — G0463 HOSPITAL OUTPT CLINIC VISIT: HCPCS

## (undated) DEVICE — CATH GUIDE LAUNCHER EBU3.0 6F 100CM

## (undated) DEVICE — GLIDESHEATH SLENDER STAINLESS STEEL KIT: Brand: GLIDESHEATH SLENDER

## (undated) DEVICE — BND COMPR ZEPHYR VASC LG

## (undated) DEVICE — PK TRY HEART CATH 50

## (undated) DEVICE — STPCK 3WY HP ROT

## (undated) DEVICE — DGW .035 FC J3MM 150CM TEF HEP: Brand: EMERALD

## (undated) DEVICE — CATH DIAG IMPULSE PIG .056 6F 110CM

## (undated) DEVICE — PINNACLE INTRODUCER SHEATH: Brand: PINNACLE

## (undated) DEVICE — CATH MPAC STP 6F: Brand: SUPER TORQUE

## (undated) DEVICE — ST ACC MICROPUNCTURE STFF/CANN PLAT/TP 4F 21G 40CM

## (undated) DEVICE — Device: Brand: OMNIWIRE PRESSURE GUIDE WIRE

## (undated) DEVICE — CATH GUIDE LAUNCHER EBU4.0 6F 100CM

## (undated) DEVICE — CATH DIAG IMPULSE FL4 6F 100CM

## (undated) DEVICE — CATH DIAG IMPULSE FR4 6F 125CM

## (undated) DEVICE — ELECTRD DEFIB M/FUNC PROPADZ RADIOL 2PK

## (undated) DEVICE — CATH DIAG IMPULSE FR4 6F 100CM

## (undated) DEVICE — GUIDE CATHETER: Brand: MACH1™

## (undated) DEVICE — SI AVANTI+ 6F MID W/O GW&OBT: Brand: AVANTI

## (undated) DEVICE — CATH DIAG IMPULSE FL3 5F 100CM

## (undated) DEVICE — GW PTFE EMERALD HEPCOAT FC J TIP STD .035 3MM 150CM

## (undated) DEVICE — GW EMR FIX EXCHG J STD .035 3MM 260CM